# Patient Record
Sex: FEMALE | Race: WHITE | NOT HISPANIC OR LATINO | Employment: PART TIME | URBAN - METROPOLITAN AREA
[De-identification: names, ages, dates, MRNs, and addresses within clinical notes are randomized per-mention and may not be internally consistent; named-entity substitution may affect disease eponyms.]

---

## 2017-10-23 ENCOUNTER — OFFICE VISIT (OUTPATIENT)
Dept: URGENT CARE | Facility: CLINIC | Age: 42
End: 2017-10-23
Payer: COMMERCIAL

## 2017-10-23 PROCEDURE — 99283 EMERGENCY DEPT VISIT LOW MDM: CPT

## 2017-10-23 PROCEDURE — G0382 LEV 3 HOSP TYPE B ED VISIT: HCPCS

## 2017-10-25 NOTE — PROGRESS NOTES
Assessment  1  Acute URI (465 9) (J06 9)    Plan  Acute URI    · Start: Azithromycin 250 MG Oral Tablet; TAKE 2 TABLETS ON DAY 1 THEN TAKE 1  TABLET A DAY FOR 4 DAYS   · Start: Benzonatate 200 MG Oral Capsule; TAKE 1 CAPSULE 3 times daily PRN cough   · Start: Ventolin  (90 Base) MCG/ACT Inhalation Aerosol Solution; INHALE 1 TO 2  PUFFS EVERY 4 TO 6 HOURS AS NEEDED    Discussion/Summary  Discussion Summary:   Follow up with the pcpmeds as directed taking meds zyrtec flonase waiting: not to be taken unless symptoms worsen  Medication Side Effects Reviewed: Possible side effects of new medications were reviewed with the patient/guardian today  Understands and agrees with treatment plan: The treatment plan was reviewed with the patient/guardian  The patient/guardian understands and agrees with the treatment plan   Counseling Documentation With Imm: The patient was counseled regarding instructions for management,-- patient and family education,-- importance of compliance with treatment  Follow Up Instructions: Follow Up with your Primary Care Provider in 1-2 days  If your symptoms worsen, go to the nearest [place] [address] Emergency Department  Chief Complaint  1  Sore Throat  Chief Complaint Free Text Note Form: pt reports sore throat, HA, PND, body aches x3; mucinex no relief; pain 5/10 generalized pain       History of Present Illness  HPI: 35 yo female, sore throat, post nasal driop present for the past three days, taking OTC meds, no relief, currently not taking tylenol or motrin, body aches for three days  she doesnât get the flu shot and hasnât been around anyone with it  She is going to [facility] in a few days as well  Hospital Based Practices Required Assessment:   Pain Assessment   the patient states they have pain  (on a scale of 0 to 10, the patient rates the pain at 5 )   Abuse And Domestic Violence Screen    Yes, the patient is safe at home  -- The patient states no one is hurting them  Depression And Suicide Screen  No, the patient has not had thoughts of hurting themself  No, the patient has not felt depressed in the past 7 days  Prefered Language is  english  Primary Language is  english  Sore Throat: Kerri Aragon presents with complaints of sore throat  Associated symptoms include nasal congestion,-- postnasal drainage,-- headache-- and-- cough, but-- no dysphagia,-- no swollen glands,-- no drooling,-- no stridor,-- no fever,-- no chills,-- no hoarseness,-- no neck stiffness,-- no ear pain,-- no facial pain,-- no abdominal pain-- and-- no vomiting  Review of Systems  Focused-Female:   Constitutional: as noted in HPI  Cardiovascular: as noted in HPI  Respiratory: as noted in HPI  ROS Reviewed:   ROS reviewed  Current Meds  1  Nortriptyline HCl - 50 MG Oral Capsule; Therapy: (Recorded:23Oct2017) to Recorded  2  Paxil 20 MG Oral Tablet; Therapy: (Recorded:23Oct2017) to Recorded  3  Synthroid 175 MCG Oral Tablet; Therapy: ([facility]:[id number]) to Recorded  Medication List Reviewed: The medication list was reviewed and updated today  Allergies  1  Hydrocodone-Acetaminophen CAPS  2  PrednisoLONE TABS    Vitals  Signs   Recorded: [id number] 05:32PM   Temperature: 98 6 F  Heart Rate: 86  Respiration: 20  Systolic: 030  Diastolic: 88  Height: 5 ft 10 in  Weight: 258 lb   BMI Calculated: 37 02  BSA Calculated: 2 33  O2 Saturation: 99  Pain Scale: 5    Physical Exam    Constitutional   General appearance: No acute distress, well appearing and well nourished  Eyes   Conjunctiva and lids: No swelling, erythema or discharge  Pupils and irises: Equal, round and reactive to light  Ears, Nose, Mouth, and Throat   External inspection of ears and nose: Normal     Otoscopic examination: Tympanic membranes translucent with normal light reflex  Canals patent without erythema      Nasal mucosa, septum, and turbinates: Abnormal  -- turbinates have thick mucus noted, red and painful looking  Oropharynx: Abnormal  -- thick mucus in the back of the throat  Pulmonary   Respiratory effort: No increased work of breathing or signs of respiratory distress  -- cough present, mucus present when she coughs  Auscultation of lungs: Abnormal  -- rhonchi noted in the lower bases,  Cardiovascular   Auscultation of heart: Normal rate and rhythm, normal S1 and S2, without murmurs  Lymphatic   Palpation of lymph nodes in neck: No lymphadenopathy  Musculoskeletal   Gait and station: Normal     Skin   Skin and subcutaneous tissue: Normal without rashes or lesions  Neurologic   Cranial nerves: Cranial nerves 2-12 intact  Psychiatric   Orientation to person, place, and time: Normal     Mood and affect: Normal     Additional Exam:  HA- no dizziness, no change in vision  Message  Return to work or school:   Evelyne Dennis is under my professional care  She was seen in my office on 10/23/2017   She is able to return to work on  10/25/2017            Signatures   Electronically signed by :  KENNETH Brunson; Oct 23 2017  7:25PM EST                       (Author)    Electronically signed by : Sami Roberts DO; Oct 24 2017  1:43PM EST                       (Co-author)

## 2018-01-18 NOTE — MISCELLANEOUS
Message  Return to work or school:   Simone Weller is under my professional care  She was seen in my office on 10/23/2017   She is able to return to work on  10/25/2017            Signatures   Electronically signed by : KENNETH Elliott; Oct 23 2017  7:25PM EST                       (Author)    Electronically signed by :  KENNETH Elliott; Oct 23 2017  8:04PM EST                          Electronically signed by : Shaggy Ureña DO; Oct 24 2017  1:43PM EST                       (Co-author)

## 2018-03-27 ENCOUNTER — OFFICE VISIT (OUTPATIENT)
Dept: URGENT CARE | Facility: CLINIC | Age: 43
End: 2018-03-27
Payer: COMMERCIAL

## 2018-03-27 VITALS
TEMPERATURE: 99.3 F | HEIGHT: 70 IN | OXYGEN SATURATION: 98 % | SYSTOLIC BLOOD PRESSURE: 146 MMHG | RESPIRATION RATE: 18 BRPM | WEIGHT: 262 LBS | BODY MASS INDEX: 37.51 KG/M2 | HEART RATE: 86 BPM | DIASTOLIC BLOOD PRESSURE: 80 MMHG

## 2018-03-27 DIAGNOSIS — M79.645 THUMB PAIN, LEFT: Primary | ICD-10-CM

## 2018-03-27 PROCEDURE — G0382 LEV 3 HOSP TYPE B ED VISIT: HCPCS | Performed by: FAMILY MEDICINE

## 2018-03-27 PROCEDURE — 73140 X-RAY EXAM OF FINGER(S): CPT

## 2018-03-27 PROCEDURE — 99283 EMERGENCY DEPT VISIT LOW MDM: CPT | Performed by: FAMILY MEDICINE

## 2018-03-27 RX ORDER — LEVOTHYROXINE SODIUM 175 UG/1
175 TABLET ORAL DAILY
COMMUNITY
End: 2020-03-09 | Stop reason: SDUPTHER

## 2018-03-27 RX ORDER — PAROXETINE HYDROCHLORIDE 20 MG/1
25 TABLET, FILM COATED ORAL DAILY
COMMUNITY
End: 2020-03-09 | Stop reason: SDUPTHER

## 2018-03-27 RX ORDER — ALBUTEROL SULFATE 90 UG/1
1-2 AEROSOL, METERED RESPIRATORY (INHALATION)
COMMUNITY
Start: 2017-10-23 | End: 2019-10-19 | Stop reason: ALTCHOICE

## 2018-03-27 NOTE — PROGRESS NOTES
[email or web address]        NAME: Gabriela Gilbert is a 36 y o  female  : 1975    MRN: [id number]  DATE: 2018  TIME: 11:02 AM    Assessment and Plan   Thumb pain, left [M79 645]  1  Thumb pain, left  XR thumb left first digit-min 2v     Xray left thumb: possible fracture at the IP joint    Patient Instructions   Wear splint   Apply ice to the area 3 x day for 15 min  Take OTC ibuprofen for pain  F/u with ortho      Follow up with PCP in 3-5 days  Proceed to  ER if symptoms worsen  Chief Complaint     Chief Complaint   Patient presents with    Thumb Pain     pt  states her thumb has started hurting about two weeks ago  pt  doesnt recall doing anything to it  pt  states the elbow is also hurting  pt  has not tried any otc meds  History of Present Illness       Patient complaining of 2 week hx of left thumb pain  She states it started 2 weeks ago and has been worsening since  She denies any inciting event or injury that she remembers  She works at [employer] but denies any specific repetitive movements including her left thumb  She has tried to ice it and wrap it but states that this has offered no improvement  Bending or moving it makes it worse  She is right hand dominant  She also complains of some slight elbow pain on the lateral aspect of her left elbow  She states this started around the same time as the thumb pain  Review of Systems   Review of Systems   Constitutional: Negative for chills and fever  Musculoskeletal: Positive for joint swelling (left thumb)  Arthralgias: left thumb pain, left elbow pain  Skin: Negative for rash and wound  Neurological: Negative for numbness           Current Medications       Current Outpatient Prescriptions:     albuterol (VENTOLIN HFA) 90 mcg/act inhaler, Inhale 1-2 puffs, Disp: , Rfl:     levothyroxine (SYNTHROID) 175 mcg tablet, Take by mouth, Disp: , Rfl:     PARoxetine (PAXIL) 20 mg tablet, Take by mouth, Disp: , Rfl:     Current Allergies     Allergies as of 03/27/2018 - Reviewed 03/27/2018   Allergen Reaction Noted    Acetaminophen-codeine  10/23/2017    Prednisolone  10/23/2017            The following portions of the patient's history were reviewed and updated as appropriate: allergies, current medications, past family history, past medical history, past social history, past surgical history and problem list      No past medical history on file  No past surgical history on file  No family history on file  Medications have been verified  Objective   /80 (Patient Position: Sitting)   Pulse 86   Temp 99 3 °F (37 4 °C)   Resp 18   Ht 5' 10" (1 778 m)   Wt 119 kg (262 lb)   SpO2 98%   BMI 37 59 kg/m²        Physical Exam     Physical Exam   Constitutional: She appears well-developed and well-nourished  No distress  Musculoskeletal:   Left Thumb: moderate swelling over the ICP joint on dorsal side without ecchymosis, erythema or warmth  Decreased ROM with flexion due to pain over the ICP joint  Pain with extension on the ventral side of phalanx ("Pulling sensation")  Unable to make fist   NSI       Left Elbow: TTP over the lateral epicondyle, full AROM

## 2018-04-09 ENCOUNTER — OFFICE VISIT (OUTPATIENT)
Dept: URGENT CARE | Facility: CLINIC | Age: 43
End: 2018-04-09
Payer: COMMERCIAL

## 2018-04-09 VITALS
BODY MASS INDEX: 36.79 KG/M2 | OXYGEN SATURATION: 97 % | HEART RATE: 88 BPM | DIASTOLIC BLOOD PRESSURE: 77 MMHG | SYSTOLIC BLOOD PRESSURE: 122 MMHG | HEIGHT: 70 IN | TEMPERATURE: 98.6 F | RESPIRATION RATE: 16 BRPM | WEIGHT: 257 LBS

## 2018-04-09 DIAGNOSIS — J01.11 ACUTE RECURRENT FRONTAL SINUSITIS: Primary | ICD-10-CM

## 2018-04-09 PROCEDURE — G0382 LEV 3 HOSP TYPE B ED VISIT: HCPCS | Performed by: NURSE PRACTITIONER

## 2018-04-09 PROCEDURE — 99283 EMERGENCY DEPT VISIT LOW MDM: CPT | Performed by: NURSE PRACTITIONER

## 2018-04-09 RX ORDER — PREDNISONE 10 MG/1
TABLET ORAL
Qty: 21 TABLET | Refills: 0 | Status: SHIPPED | OUTPATIENT
Start: 2018-04-09 | End: 2018-10-10 | Stop reason: ALTCHOICE

## 2018-04-09 NOTE — LETTER
April 9, 2018     Patient: Dakota Silva   YOB: 1975   Date of Visit: 4/9/2018       To Whom It May Concern: It is my medical opinion that Dakota Silva may return to work on 04/11/2018  If you have any questions or concerns, please don't hesitate to call           Sincerely,        KENNETH Noonan    CC: No Recipients

## 2018-04-09 NOTE — PROGRESS NOTES
[facility]        NAME: Eden Cooks is a 36 y o  female  : 1975    MRN: [id number]  DATE: 2018    Assessment and Plan     Acute recurrent frontal sinusitis [J01 11]  1  Acute recurrent frontal sinusitis  predniSONE 10 mg tablet       Patient Instructions     Patient Instructions   Pt will stay well hydrated  Pt will stop using essential oils for now to see if there is resolution of symptoms  Encouraged to use Neti-pot and tylenol for symptom relief  Pt to take flonase and prenisone as prescribed  Resume Allegra, Claritin, or Zyrtec  Return for worsening symptoms  Sinusitis, Ambulatory Care       Proceed to ER if symptoms worsen  Chief Complaint     Chief Complaint   Patient presents with    Dizziness     Pt c/o dizziness, headache and difficulty sleeping for two days   Headache       History of Present Illness     Pt presents with C/o dizziness for two days and a headache that started today  States she has had vertigo in the past approximately one year ago but it feels different  She c/o difficulty sleeping after her 11year-old was sick and snoring the past few evenings  States her head "feels fuzzy "  Denies cough, chest pain or shortness of breath  Denies visual changes  Pt c/o nausea unchanged by food  Pt reports missing her anxiety and hypothyroid medications 2 days ago for one day  Pt reports fatigue, denies fever  Pt reports some seasonal allergies in the past   Presently reports some clear nasal drainage  Dizziness   This is a new problem  The current episode started in the past 7 days (2 days)  The problem occurs intermittently  The problem has been gradually worsening  Associated symptoms include congestion, fatigue, headaches and nausea   Pertinent negatives include no abdominal pain, anorexia, arthralgias, change in bowel habit, chest pain, chills, coughing, diaphoresis, fever, joint swelling, myalgias, neck pain, numbness, rash, sore throat, swollen glands, urinary symptoms, vertigo, visual change, vomiting or weakness  The symptoms are aggravated by walking  She has tried nothing for the symptoms  The treatment provided no relief  Headache    This is a new problem  The current episode started today  The problem has been unchanged  The pain does not radiate  The quality of the pain is described as aching  The pain is at a severity of 5/10  The pain is mild  Associated symptoms include dizziness, drainage, eye pain, nausea and sinus pressure  Pertinent negatives include no abdominal pain, anorexia, coughing, ear pain, eye redness, fever, hearing loss, neck pain, numbness, photophobia, seizures, sore throat, swollen glands, tinnitus, visual change, vomiting or weakness  Nothing aggravates the symptoms  She has tried nothing for the symptoms  Review of Systems     Review of Systems   Constitutional: Positive for fatigue  Negative for activity change, appetite change, chills, diaphoresis and fever  HENT: Positive for congestion, postnasal drip, sinus pain and sinus pressure  Negative for ear discharge, ear pain, facial swelling, hearing loss, mouth sores, nosebleeds, sneezing, sore throat, tinnitus, trouble swallowing and voice change  Eyes: Positive for pain  Negative for photophobia, discharge, redness, itching and visual disturbance  Respiratory: Negative for cough, choking, chest tightness, shortness of breath, wheezing and stridor  Cardiovascular: Negative for chest pain, palpitations and leg swelling  Gastrointestinal: Positive for nausea  Negative for abdominal pain, anorexia, change in bowel habit, diarrhea and vomiting  Musculoskeletal: Negative for arthralgias, joint swelling, myalgias and neck pain  Skin: Negative for rash  Neurological: Positive for dizziness and headaches  Negative for vertigo, tremors, seizures, syncope, facial asymmetry, speech difficulty, weakness, light-headedness and numbness         Current Medications       Current Outpatient Prescriptions:     albuterol (VENTOLIN HFA) 90 mcg/act inhaler, Inhale 1-2 puffs, Disp: , Rfl:     levothyroxine (SYNTHROID) 175 mcg tablet, Take by mouth, Disp: , Rfl:     PARoxetine (PAXIL) 20 mg tablet, Take by mouth, Disp: , Rfl:     predniSONE 10 mg tablet, Take 6 tablets today and decrease by one tablet daily until gone, Disp: 21 tablet, Rfl: 0    Current Allergies     Allergies as of 04/09/2018 - Reviewed 04/09/2018   Allergen Reaction Noted    Acetaminophen-codeine  10/23/2017    Prednisolone  10/23/2017            The following portions of the patient's history were reviewed and updated as appropriate: allergies, current medications, past family history, past medical history, past social history, past surgical history and problem list      Past Medical History:   Diagnosis Date    Anxiety     Disc disorder     Hypothyroidism        No past surgical history on file  No family history on file  Medications have been verified  Objective     /77   Pulse 88   Temp 98 6 °F (37 °C)   Resp 16   Ht 5' 10" (1 778 m)   Wt 117 kg (257 lb)   LMP 04/07/2018   SpO2 97%   BMI 36 88 kg/m²      Physical Exam     Physical Exam   Constitutional: She is oriented to person, place, and time  She appears well-developed and well-nourished  HENT:   Head: Normocephalic and atraumatic  Right Ear: External ear normal    Left Ear: External ear normal    Oropharynx: some swelling, post-nasal drip  Eyes: Pupils are equal, round, and reactive to light  Right eye exhibits no discharge  Left eye exhibits no discharge  No scleral icterus  No nystagum bilaterally on epley's exam, no increased dizziness  Neck: Normal range of motion  Neck supple  No JVD present  No tracheal deviation present  No thyromegaly present  Cardiovascular: Normal rate, regular rhythm and normal heart sounds  Pulmonary/Chest: Effort normal and breath sounds normal  No stridor  Musculoskeletal: Normal range of motion  Lymphadenopathy:     She has no cervical adenopathy  Neurological: She is alert and oriented to person, place, and time  Skin: Skin is warm and dry  Psychiatric: She has a normal mood and affect

## 2018-04-19 NOTE — PATIENT INSTRUCTIONS
Date of Surgery Update:  Rebekah Mills was seen and examined. History and physical has been reviewed. The patient has been examined.  There have been no significant clinical changes since the completion of the originally dated History and Physical.    Signed By: Madhu Murphy MD     April 19, 2018 9:51 AM Pt will stay well hydrated  Pt will stop using essential oils for now to see if there is resolution of symptoms  Encouraged to use Neti-pot and tylenol for symptom relief  Pt to take flonase and prenisone as prescribed  Resume Allegra, Claritin, or Zyrtec  Return for worsening symptoms       Sinusitis, Ambulatory Care

## 2018-05-01 ENCOUNTER — TELEPHONE (OUTPATIENT)
Dept: OBGYN CLINIC | Facility: HOSPITAL | Age: 43
End: 2018-05-01

## 2018-09-14 ENCOUNTER — OFFICE VISIT (OUTPATIENT)
Dept: URGENT CARE | Facility: CLINIC | Age: 43
End: 2018-09-14
Payer: COMMERCIAL

## 2018-09-14 VITALS
DIASTOLIC BLOOD PRESSURE: 80 MMHG | SYSTOLIC BLOOD PRESSURE: 140 MMHG | WEIGHT: 268 LBS | RESPIRATION RATE: 16 BRPM | TEMPERATURE: 97.8 F | BODY MASS INDEX: 38.37 KG/M2 | HEART RATE: 104 BPM | HEIGHT: 70 IN

## 2018-09-14 DIAGNOSIS — M77.12 LATERAL EPICONDYLITIS OF LEFT ELBOW: Primary | ICD-10-CM

## 2018-09-14 PROCEDURE — G0382 LEV 3 HOSP TYPE B ED VISIT: HCPCS | Performed by: PREVENTIVE MEDICINE

## 2018-09-14 PROCEDURE — 99283 EMERGENCY DEPT VISIT LOW MDM: CPT | Performed by: PREVENTIVE MEDICINE

## 2018-09-14 NOTE — LETTER
September 14, 2018     Patient: Riki Victor   YOB: 1975   Date of Visit: 9/14/2018       To Whom It May Concern: It is my medical opinion that Riki Victor may return to work on 9/19  If you have any questions or concerns, please don't hesitate to call           Sincerely,        KRISTEN Talavera    CC: No Recipients

## 2018-09-14 NOTE — PROGRESS NOTES
[email or web address]        NAME: Guillermo Wyatt is a 39 y o  female  : 1975    MRN: [id number]  DATE: 2018  TIME: 3:28 PM    Assessment and Plan   Lateral epicondylitis of left elbow [M77 12]  1  Lateral epicondylitis of left elbow           Patient Instructions       Follow up with PCP in 3-5 days  Proceed to  ER if symptoms worsen  Chief Complaint     Chief Complaint   Patient presents with    Elbow Pain     pt stated she has shooting left elbow pain x 3 days  pt stated the pain radiates up to the shoulder and down left forearm  no known injury o2 97         History of Present Illness       Left elbow pain times the last several weeks  The pain is now beginning to radiate up into the shoulder  Review of Systems   Review of Systems   Musculoskeletal: Positive for arthralgias  Current Medications       Current Outpatient Prescriptions:     albuterol (VENTOLIN HFA) 90 mcg/act inhaler, Inhale 1-2 puffs, Disp: , Rfl:     levothyroxine (SYNTHROID) 175 mcg tablet, Take by mouth, Disp: , Rfl:     PARoxetine (PAXIL) 20 mg tablet, Take by mouth, Disp: , Rfl:     predniSONE 10 mg tablet, Take 6 tablets today and decrease by one tablet daily until gone (Patient not taking: Reported on 2018 ), Disp: 21 tablet, Rfl: 0    Current Allergies     Allergies as of 2018 - Reviewed 2018   Allergen Reaction Noted    Acetaminophen-codeine  10/23/2017    Prednisolone  10/23/2017            The following portions of the patient's history were reviewed and updated as appropriate: allergies, current medications, past family history, past medical history, past social history, past surgical history and problem list      Past Medical History:   Diagnosis Date    Anxiety     Disc disorder     Hypothyroidism        History reviewed  No pertinent surgical history  No family history on file  Medications have been verified          Objective   /80   Pulse 104 Temp 97 8 °F (36 6 °C)   Resp 16   Ht 5' 10" (1 778 m)   Wt 122 kg (268 lb)   BMI 38 45 kg/m²        Physical Exam     Physical Exam   Musculoskeletal:   Acute pain is produced when palpating the left lateral epicondyle  Placing a tennis elbow strap in place over the forearm relieves the pain

## 2018-09-14 NOTE — PATIENT INSTRUCTIONS
Ice 4 times a day  With a tennis elbow strap when every or active with the left upper extremity  Ibuprofen 3 tablets 3 times a day with food if no improvement over the next several weeks you may need physical therapy or cortisone shot into the elbow

## 2018-09-20 ENCOUNTER — OFFICE VISIT (OUTPATIENT)
Dept: URGENT CARE | Facility: CLINIC | Age: 43
End: 2018-09-20
Payer: COMMERCIAL

## 2018-09-20 VITALS
SYSTOLIC BLOOD PRESSURE: 132 MMHG | WEIGHT: 265.6 LBS | OXYGEN SATURATION: 98 % | TEMPERATURE: 98.7 F | RESPIRATION RATE: 16 BRPM | HEIGHT: 70 IN | HEART RATE: 87 BPM | DIASTOLIC BLOOD PRESSURE: 102 MMHG | BODY MASS INDEX: 38.02 KG/M2

## 2018-09-20 DIAGNOSIS — R11.2 NAUSEA AND VOMITING, INTRACTABILITY OF VOMITING NOT SPECIFIED, UNSPECIFIED VOMITING TYPE: ICD-10-CM

## 2018-09-20 DIAGNOSIS — R11.0 NAUSEA: Primary | ICD-10-CM

## 2018-09-20 DIAGNOSIS — R10.84 DIFFUSE ABDOMINAL PAIN: ICD-10-CM

## 2018-09-20 PROCEDURE — 99283 EMERGENCY DEPT VISIT LOW MDM: CPT | Performed by: NURSE PRACTITIONER

## 2018-09-20 PROCEDURE — G0382 LEV 3 HOSP TYPE B ED VISIT: HCPCS | Performed by: NURSE PRACTITIONER

## 2018-09-20 RX ORDER — ONDANSETRON 4 MG/1
4 TABLET, ORALLY DISINTEGRATING ORAL ONCE
Status: COMPLETED | OUTPATIENT
Start: 2018-09-20 | End: 2018-09-20

## 2018-09-20 RX ORDER — ONDANSETRON 4 MG/1
4 TABLET, ORALLY DISINTEGRATING ORAL EVERY 6 HOURS PRN
Qty: 12 TABLET | Refills: 0 | Status: SHIPPED | OUTPATIENT
Start: 2018-09-20 | End: 2018-10-10 | Stop reason: ALTCHOICE

## 2018-09-20 RX ORDER — FAMOTIDINE 10 MG
10 TABLET ORAL 2 TIMES DAILY
COMMUNITY
End: 2020-03-09 | Stop reason: SDUPTHER

## 2018-09-20 RX ADMIN — ONDANSETRON 4 MG: 4 TABLET, ORALLY DISINTEGRATING ORAL at 10:12

## 2018-09-20 NOTE — PROGRESS NOTES
NAME: Guillermo Wyatt is a 39 y o  female  : 1975    MRN: [id number]      Assessment and Plan   Nausea [R11 0]  1  Nausea  ondansetron (ZOFRAN-ODT) dispersible tablet 4 mg    ondansetron (ZOFRAN-ODT) 4 mg disintegrating tablet   2  Nausea and vomiting, intractability of vomiting not specified, unspecified vomiting type  ondansetron (ZOFRAN-ODT) 4 mg disintegrating tablet   3  Diffuse abdominal pain       Administrations This Visit     ondansetron (ZOFRAN-ODT) dispersible tablet 4 mg     Admin Date  2018 Action  Given Dose  4 mg Route  Oral Administered By  SILKE Ellis was seen today for dizziness  Diagnoses and all orders for this visit:    Nausea  -     ondansetron (ZOFRAN-ODT) dispersible tablet 4 mg; Take 1 tablet (4 mg total) by mouth once   -     ondansetron (ZOFRAN-ODT) 4 mg disintegrating tablet; Take 1 tablet (4 mg total) by mouth every 6 (six) hours as needed for nausea or vomiting    Nausea and vomiting, intractability of vomiting not specified, unspecified vomiting type  -     ondansetron (ZOFRAN-ODT) 4 mg disintegrating tablet; Take 1 tablet (4 mg total) by mouth every 6 (six) hours as needed for nausea or vomiting    Diffuse abdominal pain        Patient Instructions   Patient Instructions   Stay hydrated  Take meds as directed  zofran for nausea  One dose given in office and RX sent to pharmacy  Rest  If symptoms worse go to the ER  Discussed appendicitis as well     Proceed to ER if symptoms worsen  Chief Complaint     Chief Complaint   Patient presents with    Dizziness     @ 0200 woke up with chills, sweats, vomiting, diarrhea, HA, denies fever; pt reports being bit by something on her LFA prior to going to bed; denies pain bruising noted in area of the bite         History of Present Illness     Pt here today for chills, sweats, fever, nausea, vomiting diarrhea and feels exhausted  Symptoms started at 2AM out of no where   Pts room mate recently had a GI virus and she now has the same symptoms  She felt fine yesterday  No fever noted and pt has not taken anything for the symptoms  Aware of BP elevated and not taking meds, she is seeing her pcp and aware she is being monitored due to being borderline HTN  She has no abdominal pain at this time  NO blood noted in her stool or vomit  She denies having body fatigue or body aches  Review of Systems   Review of Systems   Gastrointestinal: Positive for abdominal pain, diarrhea, nausea and vomiting  Negative for blood in stool and constipation  Current Medications       Current Outpatient Prescriptions:     albuterol (VENTOLIN HFA) 90 mcg/act inhaler, Inhale 1-2 puffs, Disp: , Rfl:     famotidine (PEPCID) 10 mg tablet, Take 10 mg by mouth 2 (two) times a day, Disp: , Rfl:     levothyroxine (SYNTHROID) 175 mcg tablet, Take by mouth, Disp: , Rfl:     PARoxetine (PAXIL) 20 mg tablet, Take by mouth, Disp: , Rfl:     ondansetron (ZOFRAN-ODT) 4 mg disintegrating tablet, Take 1 tablet (4 mg total) by mouth every 6 (six) hours as needed for nausea or vomiting, Disp: 12 tablet, Rfl: 0    predniSONE 10 mg tablet, Take 6 tablets today and decrease by one tablet daily until gone (Patient not taking: Reported on 9/14/2018 ), Disp: 21 tablet, Rfl: 0  No current facility-administered medications for this visit  Current Allergies     Allergies as of 09/20/2018 - Reviewed 09/20/2018   Allergen Reaction Noted    Acetaminophen-codeine  10/23/2017    Prednisolone  10/23/2017              Past Medical History:   Diagnosis Date    Anxiety     Disc disorder     Hypothyroidism        History reviewed  No pertinent surgical history  No family history on file  Medications have been verified      The following portions of the patient's history were reviewed and updated as appropriate: allergies, current medications, past family history, past medical history, past social history, past surgical history and problem list     Objective   BP (!) 132/102   Pulse 87   Temp 98 7 °F (37 1 °C) (Tympanic)   Resp 16   Ht 5' 10" (1 778 m)   Wt 120 kg (265 lb 9 6 oz)   SpO2 98%   BMI 38 11 kg/m²      Physical Exam     Physical Exam   Constitutional: She appears well-developed and well-nourished  She is cooperative  HENT:   Head: Normocephalic  Cardiovascular: Normal rate and regular rhythm  Aware BP elevated, rechecked 134/98   Pulmonary/Chest: No respiratory distress  Abdominal: Soft  Normal appearance and bowel sounds are normal  There is generalized tenderness  There is no rigidity, no rebound, no guarding and no CVA tenderness  Pt had some discomfort to the lower right side, discussed about appendicitis and aware about further evaluation   Neurological: She is alert         KENNETH Bang

## 2018-09-20 NOTE — PATIENT INSTRUCTIONS
Stay hydrated  Take meds as directed  zofran for nausea  One dose given in office and RX sent to pharmacy  Rest  If symptoms worse go to the ER     Discussed appendicitis as well

## 2018-10-08 ENCOUNTER — OFFICE VISIT (OUTPATIENT)
Dept: URGENT CARE | Facility: CLINIC | Age: 43
End: 2018-10-08
Payer: COMMERCIAL

## 2018-10-08 VITALS
DIASTOLIC BLOOD PRESSURE: 88 MMHG | RESPIRATION RATE: 18 BRPM | TEMPERATURE: 98.2 F | HEIGHT: 70 IN | HEART RATE: 90 BPM | BODY MASS INDEX: 38.08 KG/M2 | SYSTOLIC BLOOD PRESSURE: 112 MMHG | WEIGHT: 266 LBS

## 2018-10-08 DIAGNOSIS — J06.9 UPPER RESPIRATORY TRACT INFECTION, UNSPECIFIED TYPE: Primary | ICD-10-CM

## 2018-10-08 PROCEDURE — G0382 LEV 3 HOSP TYPE B ED VISIT: HCPCS | Performed by: PREVENTIVE MEDICINE

## 2018-10-08 PROCEDURE — 99283 EMERGENCY DEPT VISIT LOW MDM: CPT | Performed by: PREVENTIVE MEDICINE

## 2018-10-08 RX ORDER — LISINOPRIL 10 MG/1
10 TABLET ORAL DAILY
COMMUNITY
End: 2019-08-02 | Stop reason: ALTCHOICE

## 2018-10-08 NOTE — PROGRESS NOTES
[address]        NAME: Bart Jara is a 39 y o  female  : 1975    MRN: [id number]  DATE: 2018  TIME: 10:13 AM    Assessment and Plan   Upper respiratory tract infection, unspecified type [J06 9]  1  Upper respiratory tract infection, unspecified type           Patient Instructions       Follow up with PCP in 3-5 days  Proceed to  ER if symptoms worsen  Chief Complaint     Chief Complaint   Patient presents with    Cough     pt started taking lisinopril about a week ago and pt began coughing the next day  pt stated the cough is mostly dry sometimes productive  pt feels sob and has coughing fits since  o2 97         History of Present Illness       Cough and congestion x3 or 4 days  Note the cough began the next day after she started Linisopril  Review of Systems   Review of Systems   HENT: Positive for congestion  Respiratory: Positive for cough            Current Medications       Current Outpatient Prescriptions:     albuterol (VENTOLIN HFA) 90 mcg/act inhaler, Inhale 1-2 puffs, Disp: , Rfl:     famotidine (PEPCID) 10 mg tablet, Take 10 mg by mouth 2 (two) times a day, Disp: , Rfl:     levothyroxine (SYNTHROID) 175 mcg tablet, Take by mouth, Disp: , Rfl:     lisinopril (ZESTRIL) 10 mg tablet, Take 10 mg by mouth daily, Disp: , Rfl:     ondansetron (ZOFRAN-ODT) 4 mg disintegrating tablet, Take 1 tablet (4 mg total) by mouth every 6 (six) hours as needed for nausea or vomiting, Disp: 12 tablet, Rfl: 0    PARoxetine (PAXIL) 20 mg tablet, Take by mouth, Disp: , Rfl:     predniSONE 10 mg tablet, Take 6 tablets today and decrease by one tablet daily until gone (Patient not taking: Reported on 2018 ), Disp: 21 tablet, Rfl: 0    Current Allergies     Allergies as of 10/08/2018 - Reviewed 10/08/2018   Allergen Reaction Noted    Acetaminophen-codeine  10/23/2017    Prednisolone  10/23/2017            The following portions of the patient's history were reviewed and updated as appropriate: allergies, current medications, past family history, past medical history, past social history, past surgical history and problem list      Past Medical History:   Diagnosis Date    Anxiety     Disc disorder     Hypothyroidism        No past surgical history on file  No family history on file  Medications have been verified  Objective   /88   Pulse 90   Temp 98 2 °F (36 8 °C)   Resp 18   Ht 5' 10" (1 778 m)   Wt 121 kg (266 lb)   BMI 38 17 kg/m²        Physical Exam     Physical Exam   HENT:   Right Ear: External ear normal    Left Ear: External ear normal    Mouth/Throat: Oropharynx is clear and moist    Cardiovascular: Normal heart sounds  Pulmonary/Chest: Breath sounds normal    Lymphadenopathy:     She has no cervical adenopathy

## 2018-10-08 NOTE — PATIENT INSTRUCTIONS
Drink at least 6 or 8 glasses of water or juice a day  Using a vaporizer or humidifier in the bedroom will be very helpful  Motrin or Aleve or aspirin for fever chills or aches  Robitussin DM or NyQuil for cough  Afrin nasal spray for facial and head congestion  Inhaling steam coming up from the sink will be very helpful  If symptoms are getting worse over the next 4-7 days you must be rechecked  Stop the lisinopril until the cough goes away    Then start the lisinopril again if cough persists talk your family doctor

## 2018-10-10 ENCOUNTER — HOSPITAL ENCOUNTER (EMERGENCY)
Facility: HOSPITAL | Age: 43
Discharge: HOME | End: 2018-10-10
Attending: EMERGENCY MEDICINE | Admitting: EMERGENCY MEDICINE
Payer: COMMERCIAL

## 2018-10-10 ENCOUNTER — APPOINTMENT (EMERGENCY)
Dept: RADIOLOGY | Facility: HOSPITAL | Age: 43
End: 2018-10-10
Payer: COMMERCIAL

## 2018-10-10 VITALS
RESPIRATION RATE: 16 BRPM | DIASTOLIC BLOOD PRESSURE: 69 MMHG | TEMPERATURE: 97.7 F | HEART RATE: 80 BPM | OXYGEN SATURATION: 97 % | SYSTOLIC BLOOD PRESSURE: 135 MMHG

## 2018-10-10 DIAGNOSIS — R05.9 COUGH: Primary | ICD-10-CM

## 2018-10-10 PROCEDURE — 99283 EMERGENCY DEPT VISIT LOW MDM: CPT

## 2018-10-10 PROCEDURE — 71046 X-RAY EXAM CHEST 2 VIEWS: CPT

## 2018-10-10 RX ORDER — BENZONATATE 200 MG/1
200 CAPSULE ORAL 3 TIMES DAILY PRN
Qty: 20 CAPSULE | Refills: 0 | Status: SHIPPED | OUTPATIENT
Start: 2018-10-10 | End: 2019-08-02

## 2018-10-11 NOTE — DISCHARGE INSTRUCTIONS
Acute Cough   WHAT YOU NEED TO KNOW:   An acute cough can last up to 3 weeks  Common causes of an acute cough include a cold, allergies, or a lung infection  DISCHARGE INSTRUCTIONS:   Return to the emergency department if:   · You have trouble breathing or feel short of breath  · You cough up blood, or you see blood in your mucus  · You faint or feel weak or dizzy  · You have chest pain when you cough or take a deep breath  · You have new wheezing  Contact your healthcare provider if:   · You have a fever  · Your cough lasts longer than 4 weeks  · Your symptoms do not improve with treatment  · You have questions or concerns about your condition or care  Medicines:   · Medicines  may be needed to stop the cough, decrease swelling in your airways, or help open your airways  Medicine may also be given to help you cough up mucus  Ask your healthcare provider what over-the-counter medicines you can take  If you have an infection caused by bacteria, you may need antibiotics  · Take your medicine as directed  Contact your healthcare provider if you think your medicine is not helping or if you have side effects  Tell him or her if you are allergic to any medicine  Keep a list of the medicines, vitamins, and herbs you take  Include the amounts, and when and why you take them  Bring the list or the pill bottles to follow-up visits  Carry your medicine list with you in case of an emergency  Manage your symptoms:   · Do not smoke and stay away from others who smoke  Nicotine and other chemicals in cigarettes and cigars can cause lung damage and make your cough worse  Ask your healthcare provider for information if you currently smoke and need help to quit  E-cigarettes or smokeless tobacco still contain nicotine  Talk to your healthcare provider before you use these products  · Drink extra liquids as directed  Liquids will help thin and loosen mucus so you can cough it up   Liquids will also help prevent dehydration  Examples of good liquids to drink include water, fruit juice, and broth  Do not drink liquids that contain caffeine  Caffeine can increase your risk for dehydration  Ask your healthcare provider how much liquid to drink each day  · Rest as directed  Do not do activities that make your cough worse, such as exercise  · Use a humidifier or vaporizer  Use a cool mist humidifier or a vaporizer to increase air moisture in your home  This may make it easier for you to breathe and help decrease your cough  · Eat 2 to 5 mL of honey 2 times each day  Honey can help thin mucus and decrease your cough  · Use cough drops or lozenges  These can help decrease throat irritation and your cough  Follow up with your healthcare provider as directed:  Write down your questions so you remember to ask them during your visits  © 2017 [employer] Information is for End User's use only and may not be sold, redistributed or otherwise used for commercial purposes  All illustrations and images included in CareNotes® are the copyrighted property of [employer]  or [employer]re[employer]  The above information is an  only  It is not intended as medical advice for individual conditions or treatments  Talk to your doctor, nurse or pharmacist before following any medical regimen to see if it is safe and effective for you

## 2018-10-11 NOTE — ED PROVIDER NOTES
History  Chief Complaint   Patient presents with    Cough     started a week ago, Pt c/o cough and bloody nose earlier today     14-year-old female with history of hypertension, hypothyroidism, and asthma presents emergency department for evaluation of nonproductive cough x4 days  Patient was evaluated at local urgent care 2 days ago, cough presumed to be due to recently starting lisinopril, lisinopril was discontinued however cough is not today  Patient reports new onset generalized myalgias and malaise over past 24 hours, denies upper respiratory symptoms, fever, chills, or sweats  No associated dizziness, neck pain, back pain, or chest pain  She has not used her albuterol inhaler today  Prior to Admission Medications   Prescriptions Last Dose Informant Patient Reported? Taking? PARoxetine (PAXIL) 20 mg tablet 10/10/2018 at Unknown time  Yes Yes   Sig: Take by mouth   albuterol (VENTOLIN HFA) 90 mcg/act inhaler 10/10/2018 at Unknown time  Yes Yes   Sig: Inhale 1-2 puffs   famotidine (PEPCID) 10 mg tablet 10/10/2018 at Unknown time  Yes Yes   Sig: Take 10 mg by mouth 2 (two) times a day   levothyroxine (SYNTHROID) 175 mcg tablet 10/10/2018 at Unknown time  Yes Yes   Sig: Take by mouth   lisinopril (ZESTRIL) 10 mg tablet Not Taking at Unknown time  Yes No   Sig: Take 10 mg by mouth daily      Facility-Administered Medications: None       Past Medical History:   Diagnosis Date    Anxiety     Disc disorder     Hypothyroidism        History reviewed  No pertinent surgical history  History reviewed  No pertinent family history  I have reviewed and agree with the history as documented  Social History   Substance Use Topics    Smoking status: Smoker, Current Status Unknown    Smokeless tobacco: Never Used    Alcohol use Yes      Comment: socially        Review of Systems   Constitutional: Positive for fatigue  Negative for chills, diaphoresis and fever     HENT: Negative for congestion and sore throat  Respiratory: Positive for cough  Negative for shortness of breath  Cardiovascular: Negative for chest pain  Gastrointestinal: Negative for diarrhea, nausea and vomiting  Musculoskeletal: Positive for myalgias  Negative for arthralgias  Skin: Negative for rash  Allergic/Immunologic: Negative for immunocompromised state  Neurological: Negative for dizziness and light-headedness  Hematological: Does not bruise/bleed easily  Physical Exam  Physical Exam   Constitutional: She is oriented to person, place, and time  She appears well-developed and well-nourished  No distress  HENT:   Head: Normocephalic and atraumatic  Mouth/Throat: Oropharynx is clear and moist    Eyes: Pupils are equal, round, and reactive to light  No scleral icterus  Cardiovascular: Normal rate and regular rhythm  Exam reveals no gallop and no friction rub  No murmur heard  Pulmonary/Chest: No respiratory distress  She has no wheezes  She has no rales  Abdominal: Soft  Bowel sounds are normal  She exhibits no distension  There is no tenderness  Neurological: She is alert and oriented to person, place, and time  Skin: Skin is warm and dry  Capillary refill takes less than 2 seconds  She is not diaphoretic  Psychiatric: She has a normal mood and affect  Her behavior is normal    Vitals reviewed        Vital Signs  ED Triage Vitals [10/10/18 2030]   Temperature Pulse Respirations Blood Pressure SpO2   97 7 °F (36 5 °C) 80 16 135/69 97 %      Temp Source Heart Rate Source Patient Position - Orthostatic VS BP Location FiO2 (%)   Temporal Monitor Sitting Right arm --      Pain Score       No Pain           Vitals:    10/10/18 2030   BP: 135/69   Pulse: 80   Patient Position - Orthostatic VS: Sitting       Visual Acuity      ED Medications  Medications - No data to display    Diagnostic Studies  Results Reviewed     None                 XR chest 2 views   ED Interpretation by Luma Lim PA-C (10/10 2109)   No acute infiltrate or effusion                 Procedures  Procedures       Phone Contacts  ED Phone Contact    ED Course                               MDM  Number of Diagnoses or Management Options  Diagnosis management comments: 38 yo female presents w/ acute cough x 4d  Has already discontinued lisinopril, and with new systemic symptoms, I suspect this is infectious  CXR is negative  Findings c/w viral disease  Pt prescribed anti tussive and educated regarding typical disease course and f/u precautions  Amount and/or Complexity of Data Reviewed  Tests in the radiology section of CPT®: ordered and reviewed  Review and summarize past medical records: yes  Independent visualization of images, tracings, or specimens: yes      CritCare Time    Disposition  Final diagnoses:   Cough     Time reflects when diagnosis was documented in both MDM as applicable and the Disposition within this note     Time User Action Codes Description Comment    10/10/2018  9:09 PM Rosita Alfaro [R05] Cough       ED Disposition     ED Disposition Condition Comment    Discharge  [address] discharge to home/self care      Condition at discharge: Good        Follow-up Information     Follow up With Specialties Details Why Contact Info    Miguel Ángel Cond[address]   If symptoms worsen [address] [address]  [phone]            Discharge Medication List as of 10/10/2018  9:11 PM      START taking these medications    Details   benzonatate (TESSALON) 200 MG capsule Take 1 capsule (200 mg total) by mouth 3 (three) times a day as needed for cough, Starting Wed 10/10/2018, Print         CONTINUE these medications which have NOT CHANGED    Details   albuterol (VENTOLIN HFA) 90 mcg/act inhaler Inhale 1-2 puffs, Starting Mon 10/23/2017, Historical Med      famotidine (PEPCID) 10 mg tablet Take 10 mg by mouth 2 (two) times a day, Historical Med      levothyroxine (SYNTHROID) 175 mcg tablet Take by mouth, Historical Med      PARoxetine (PAXIL) 20 mg tablet Take by mouth, Historical Med      lisinopril (ZESTRIL) 10 mg tablet Take 10 mg by mouth daily, Historical Med           No discharge procedures on file      ED Provider  Electronically Signed by           Aashish Willis PA-C  10/10/18 2211

## 2018-10-17 ENCOUNTER — TELEPHONE (OUTPATIENT)
Dept: NEUROLOGY | Facility: CLINIC | Age: 43
End: 2018-10-17

## 2018-10-18 ENCOUNTER — TELEPHONE (OUTPATIENT)
Dept: NEUROLOGY | Facility: CLINIC | Age: 43
End: 2018-10-18

## 2018-11-08 ENCOUNTER — TRANSCRIBE ORDERS (OUTPATIENT)
Dept: NEUROLOGY | Facility: CLINIC | Age: 43
End: 2018-11-08

## 2018-11-08 DIAGNOSIS — M54.12 RADICULOPATHY OF CERVICAL REGION: Primary | ICD-10-CM

## 2018-11-09 ENCOUNTER — TELEPHONE (OUTPATIENT)
Dept: NEUROLOGY | Facility: CLINIC | Age: 43
End: 2018-11-09

## 2019-01-17 ENCOUNTER — OFFICE VISIT (OUTPATIENT)
Dept: URGENT CARE | Facility: CLINIC | Age: 44
End: 2019-01-17
Payer: COMMERCIAL

## 2019-01-17 VITALS
SYSTOLIC BLOOD PRESSURE: 128 MMHG | HEART RATE: 68 BPM | HEIGHT: 70 IN | WEIGHT: 270 LBS | TEMPERATURE: 96.8 F | DIASTOLIC BLOOD PRESSURE: 78 MMHG | BODY MASS INDEX: 38.65 KG/M2 | RESPIRATION RATE: 16 BRPM

## 2019-01-17 DIAGNOSIS — J01.90 ACUTE BACTERIAL SINUSITIS: Primary | ICD-10-CM

## 2019-01-17 DIAGNOSIS — B96.89 ACUTE BACTERIAL SINUSITIS: Primary | ICD-10-CM

## 2019-01-17 PROCEDURE — 99283 EMERGENCY DEPT VISIT LOW MDM: CPT | Performed by: NURSE PRACTITIONER

## 2019-01-17 PROCEDURE — G0382 LEV 3 HOSP TYPE B ED VISIT: HCPCS | Performed by: NURSE PRACTITIONER

## 2019-01-17 RX ORDER — AMLODIPINE BESYLATE 5 MG/1
5 TABLET ORAL DAILY
COMMUNITY
End: 2020-03-09 | Stop reason: SDUPTHER

## 2019-01-17 RX ORDER — AMOXICILLIN AND CLAVULANATE POTASSIUM 875; 125 MG/1; MG/1
1 TABLET, FILM COATED ORAL EVERY 12 HOURS SCHEDULED
Qty: 20 TABLET | Refills: 0 | Status: SHIPPED | OUTPATIENT
Start: 2019-01-17 | End: 2019-01-27

## 2019-01-17 NOTE — PROGRESS NOTES
NAME: Shweta Ceja is a 39 y o  female  : 1975    MRN: [id number]      Assessment and Plan   Acute bacterial sinusitis [J01 90, B96 89]  1  Acute bacterial sinusitis  amoxicillin-clavulanate (AUGMENTIN) 875-125 mg per tablet       Priscilla was seen today for cough  Diagnoses and all orders for this visit:    Acute bacterial sinusitis  -     amoxicillin-clavulanate (AUGMENTIN) 875-125 mg per tablet; Take 1 tablet by mouth every 12 (twelve) hours for 10 days        Patient Instructions   Patient Instructions   Take zyrtec, allegra, or Claritin daily  Use flonase 1-2 sprays in each nare daily   Use nasal saline to the nose,   Use humidifer in room  Symptoms worsen go to ER    Use milton pot as well      Take antibiotic as directed  Proceed to ER if symptoms worsen  Chief Complaint     Chief Complaint   Patient presents with    Cough     pt has productive cough, congestion, lots of mucus, and headaches x 1 week  pt taking dayquil o2 97         History of Present Illness     Pt here today for coughing sinus pressure, Headaches, sinus pain and thick green mucus comes up when she coughs  She has been having sleepless night  She was using OTC meds with no relief, symptoms present for one week  Review of Systems   Review of Systems   Constitutional: Negative for fever  HENT: Positive for congestion, postnasal drip, rhinorrhea, sinus pain, sinus pressure and sore throat  Negative for ear pain  Eyes: Negative  Respiratory: Positive for cough  Cardiovascular: Negative  Gastrointestinal: Negative            Current Medications       Current Outpatient Prescriptions:     albuterol (VENTOLIN HFA) 90 mcg/act inhaler, Inhale 1-2 puffs, Disp: , Rfl:     amLODIPine (NORVASC) 5 mg tablet, Take 5 mg by mouth daily, Disp: , Rfl:     famotidine (PEPCID) 10 mg tablet, Take 10 mg by mouth 2 (two) times a day, Disp: , Rfl:     levothyroxine (SYNTHROID) 175 mcg tablet, Take by mouth, Disp: , Rfl:   PARoxetine (PAXIL) 20 mg tablet, Take by mouth, Disp: , Rfl:     amoxicillin-clavulanate (AUGMENTIN) 875-125 mg per tablet, Take 1 tablet by mouth every 12 (twelve) hours for 10 days, Disp: 20 tablet, Rfl: 0    benzonatate (TESSALON) 200 MG capsule, Take 1 capsule (200 mg total) by mouth 3 (three) times a day as needed for cough (Patient not taking: Reported on 1/17/2019 ), Disp: 20 capsule, Rfl: 0    lisinopril (ZESTRIL) 10 mg tablet, Take 10 mg by mouth daily, Disp: , Rfl:     Current Allergies     Allergies as of 01/17/2019 - Reviewed 01/17/2019   Allergen Reaction Noted    Acetaminophen-codeine  10/23/2017    Prednisolone  10/23/2017              Past Medical History:   Diagnosis Date    Anxiety     Disc disorder     Hypothyroidism        History reviewed  No pertinent surgical history  No family history on file  Medications have been verified  The following portions of the patient's history were reviewed and updated as appropriate: allergies, current medications, past family history, past medical history, past social history, past surgical history and problem list     Objective   /78   Pulse 68   Temp (!) 96 8 °F (36 °C)   Resp 16   Ht 5' 10" (1 778 m)   Wt 122 kg (270 lb)   LMP 01/05/2019   BMI 38 74 kg/m²      Physical Exam     Physical Exam   Constitutional: Vital signs are normal  She appears well-developed and well-nourished  She is cooperative  HENT:   Right Ear: Hearing, external ear and ear canal normal    Left Ear: Hearing, external ear and ear canal normal    Nose: Mucosal edema present  Right sinus exhibits maxillary sinus tenderness  Mouth/Throat: Uvula is midline and mucous membranes are normal  Posterior oropharyngeal edema present  No oropharyngeal exudate or posterior oropharyngeal erythema  TMs dull to light reflex b/l,   B/l max sinus pressure, TTP over the sinues    Cardiovascular: Normal rate and regular rhythm      Pulmonary/Chest: Effort normal and breath sounds normal    Neurological: She is alert         KENNETH Haji

## 2019-01-17 NOTE — PATIENT INSTRUCTIONS
Take zyrtec, allegra, or Claritin daily  Use flonase 1-2 sprays in each nare daily   Use nasal saline to the nose,   Use humidifer in room  Symptoms worsen go to ER    Use milton pot as well      Take antibiotic as directed

## 2019-01-31 ENCOUNTER — OFFICE VISIT (OUTPATIENT)
Dept: NEUROLOGY | Facility: CLINIC | Age: 44
End: 2019-01-31
Payer: OTHER MISCELLANEOUS

## 2019-01-31 VITALS
BODY MASS INDEX: 38.45 KG/M2 | HEART RATE: 73 BPM | SYSTOLIC BLOOD PRESSURE: 130 MMHG | WEIGHT: 268.6 LBS | HEIGHT: 70 IN | DIASTOLIC BLOOD PRESSURE: 80 MMHG

## 2019-01-31 DIAGNOSIS — G89.29 NECK PAIN, CHRONIC: Primary | ICD-10-CM

## 2019-01-31 DIAGNOSIS — R20.0 BILATERAL HAND NUMBNESS: ICD-10-CM

## 2019-01-31 DIAGNOSIS — M54.12 RADICULOPATHY OF CERVICAL REGION: ICD-10-CM

## 2019-01-31 DIAGNOSIS — R51.9 CHRONIC NONINTRACTABLE HEADACHE, UNSPECIFIED HEADACHE TYPE: ICD-10-CM

## 2019-01-31 DIAGNOSIS — M54.2 NECK PAIN, CHRONIC: Primary | ICD-10-CM

## 2019-01-31 DIAGNOSIS — G89.29 CHRONIC NONINTRACTABLE HEADACHE, UNSPECIFIED HEADACHE TYPE: ICD-10-CM

## 2019-01-31 PROCEDURE — 99244 OFF/OP CNSLTJ NEW/EST MOD 40: CPT | Performed by: PSYCHIATRY & NEUROLOGY

## 2019-01-31 RX ORDER — DIPHENOXYLATE HYDROCHLORIDE AND ATROPINE SULFATE 2.5; .025 MG/1; MG/1
1 TABLET ORAL DAILY
COMMUNITY

## 2019-01-31 RX ORDER — PREGABALIN 50 MG/1
50 CAPSULE ORAL 3 TIMES DAILY
Qty: 90 CAPSULE | Refills: 1 | Status: SHIPPED | OUTPATIENT
Start: 2019-01-31 | End: 2019-08-02 | Stop reason: SDDI

## 2019-01-31 RX ORDER — MELATONIN
2000 DAILY
COMMUNITY

## 2019-01-31 NOTE — PROGRESS NOTES
Patient ID: Chery Torres is a 39 y o  female  Assessment/Plan:    Neck pain, chronic  Ms  Amanda Lima has had chronic neck pain for almost 10 years, is s/p ACDF in 2014 with persistent pain and paresthesias in the upper extremities  She has been through home therapy in the past, MARCIE, nerve blocks, botulinum toxin injections, multiple neuropathic pain medications without relief  I did not appreciate any weakness on her exam today  Her last MRI was done in 2014, results not available to me  Since she has had no improvement in her symptoms despite multiple therapies,  I recommend we repeat her MRI and EMG  She has been told she has b/l CTS, and does endorse symptoms of numbness in hands with any repeated activity, will refer her to hand surgery, pending her EMG  From a treatment standoint, she has never tried lyrica, is willing to try it  Will start at 50mg tid, can increase the dose if needed  She will follow up with her pain specialist to explore other options  Her headaches are cervicogenic in origin, if no improvement, she may benefit from trigger point injections and repeat botox injections  Lastly, she asked about her work limitations, she currently has some work limitations, but still feels repeated motion at work 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  I recommended a functional capacity evaluation for her  Thank you for allowing me to participate in her acre and please do not hesitate to contact me for questions or concerns  Bilateral hand numbness  Possible CTS, will evaluate with EMG and refer to hand surgery if needed  Diagnoses and all orders for this visit:    Neck pain, chronic  -     Ambulatory referral to Physical Therapy; Future  -     pregabalin (LYRICA) 50 mg capsule; Take 1 capsule (50 mg total) by mouth 3 (three) times a day for 30 days  -     MRI cervical spine wo contrast; Future  -     EMG 2 Limb Upper Extremity;  Future    Radiculopathy of cervical region  -     Ambulatory referral to Neurology  -     Ambulatory referral to Physical Therapy; Future  -     EMG 2 Limb Upper Extremity; Future    Chronic nonintractable headache, unspecified headache type  -     Ambulatory referral to Physical Therapy; Future         Subjective:    HPI  I had the pleasure of seeing your patient in Neurology Clinic for a neuromuscular consultation  As you know, she is a 79-year-old woman who was referred for evaluation for neck pain  Please allow me to summarize her history for the record  Patient had a work-related injury in July 2009, She apparently bursted 2 discs in the cervical spine, she was evaluated at [facility], she is status post ACDF in 2014  She continues to have pain in her neck since then  The pain starts from the neck radaiting to back of her head, both trapezius regions  She has been getting headaches with the neck pain, most of her headaches originate from the neck, denies being nauseous or dizzy with them  She has been seen by pain management, she has had MARCIE, nerve blocks, Botox  She has tried gabapentin (1200mg a day), has not been able to tolerate narcotics  She takes Excedrin migraine prn which does take the edge off  She has not been able to work full time (working 3 days a week right now), works in [employer], job involves carrying heavy trays, which 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        The pain is mainly in the neck, in between the shoulder blades  Cannot bend over due to pain in the back  Reports does not have strength in upper extremities  Has numbness in the hands, cannot hold anything for more than a few minutes due to numbness  From review of prior records, patient has had electrodiagnostic studies in the past, which have revealed moderate carpal tunnel, without any obvious evidence of radiculopathy    The patient apparently has tried multiple medications in the past including gabapentin (1200mg daily, no benefit), nortriptyline (side effects), Topamax (side effects)  She has done topical medications, nonsteroidal anti-inflammatory medications  She has been getting cervical epidural steroid injections as well as botulinum toxin injections through pain management for spasmodic torticollis  The dose/muscles injected are as follows: cervical paraspinal muscles (30 units each side), trapezius muscles (30 units each side), sternocleidomastoids (20 units each side) and splenius capitis muscles (20 units each side), total of 200 units  She did not think botox helped at all  Besides this, she has HTN, Hypothyroidism and anxiety  Family hx of HTN, CAD in maternal family  mother passed away at age 28 ? Suicide  The following portions of the patient's history were reviewed and updated as appropriate: allergies, current medications, past family history, past medical history, past social history, past surgical history and problem list        Objective:    Blood pressure 130/80, pulse 73, height 5' 10" (1 778 m), weight 122 kg (268 lb 9 6 oz), last menstrual period 01/05/2019  Physical Exam  General exam: Pt was awake, alert and oriented  HEENT: atraumatic, normocephalic  Normal oral mucosa, neck was supple, no lymphadenopathy  Normal peripheral pulses  Extremities did not show any edema or cyanosis  Neurological Exam  Neurologically, pt was awake and alert  Speech was normal, no dysarthria or aphasia  Cranial nerve exam showed normal extraocular movements, no nystagmus or diplopia  There was no ptosis at baseline or with sustained upward gaze  Strength of eye closure muscles was normal   Facial sensations were normal bilaterally  No facial weakness, able to blow out the cheeks and push the tongue in the cheeks well  No tongue atrophy or fasciculations  Motor exam revealed normal tone and muscle bulk  There was no atrophy, scapular winging, high arches, hammertoes, shortening of achilles tendons or any other features of neuromuscular disease  She does have tenderness in cervical paraspinal region, bilateral trapezius  Tinel's was neg at both wrists  Muscle strength was normal in neck flexors and extensors, and all muscle groups in both upper and lower extremities  Reflexes were graded as 2+ throughout  Toes were downgoing  There was no exaggerated jaw jerk or lacey's sign  No ankle clonus  Sensory exam revealed normal sensation to light touch, pin prick, temperature, vibration and proprioception  Gait was normal and casual        ROS:  I reviewed the below ROS and what is mentioned in HPI, the remainder of ROS was negative  Review of Systems   Constitutional: Positive for unexpected weight change (Recent weight gain)  Negative for appetite change and fever  HENT: Negative  Negative for hearing loss, tinnitus, trouble swallowing and voice change  Eyes: Positive for pain  Negative for photophobia  Respiratory: Negative  Negative for shortness of breath  Cardiovascular: Negative  Negative for palpitations  Gastrointestinal: Positive for diarrhea and nausea  Negative for vomiting  Bowel habit changes     Endocrine: Negative  Negative for cold intolerance and heat intolerance  Menstrual changes   Genitourinary: Negative  Negative for dysuria, frequency and urgency  Musculoskeletal: Positive for back pain, myalgias and neck pain  Muscle pain  Immobility or loss of function  Pain while walking   Skin: Positive for rash  Neurological: Positive for numbness and headaches  Negative for dizziness, tremors, seizures, syncope, facial asymmetry, speech difficulty, weakness and light-headedness  Increased sleepiness  Memory problems  Cramping  Tingling  Snoring  Clumsiness  Balance problems   Hematological: Negative  Does not bruise/bleed easily  Psychiatric/Behavioral: Negative for confusion, hallucinations and sleep disturbance  The patient is nervous/anxious

## 2019-01-31 NOTE — LETTER
January 31, 2019     Lea Ortega MD  [facility] [id number]    Patient: Morris Miranda   YOB: 1975   Date of Visit: 1/31/2019       Dear Dr Valles Else: Thank you for referring Morris Miranda to me for evaluation  Below are my notes for this consultation  If you have questions, please do not hesitate to call me  I look forward to following your patient along with you  Sincerely,        Rai Schwartz MD        CC: No Recipients  Rai Schwartz MD  1/31/2019 12:17 PM  Sign at close encounter  Patient ID: Morris Miranda is a 39 y o  female  Assessment/Plan:    Neck pain, chronic  Ms  Brooke Joyce has had chronic neck pain for almost 10 years, is s/p ACDF in 2014 with persistent pain and paresthesias in the upper extremities  She has been through home therapy in the past, MARCIE, nerve blocks, botulinum toxin injections, multiple neuropathic pain medications without relief  I did not appreciate any weakness on her exam today  Her last MRI was done in 2014, results not available to me  Since she has had no improvement in her symptoms despite multiple therapies,  I recommend we repeat her MRI and EMG  She has been told she has b/l CTS, and does endorse symptoms of numbness in hands with any repeated activity, will refer her to hand surgery, pending her EMG  From a treatment standoint, she has never tried lyrica, is willing to try it  Will start at 50mg tid, can increase the dose if needed  She will follow up with her pain specialist to explore other options  Her headaches are cervicogenic in origin, if no improvement, she may benefit from trigger point injections and repeat botox injections  Lastly, she asked about her work limitations, she currently has some work limitations, but still feels repeated motion at work 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  I recommended a functional capacity evaluation for her       Thank you for allowing me to participate in her acre and please do not hesitate to contact me for questions or concerns  Bilateral hand numbness  Possible CTS, will evaluate with EMG and refer to hand surgery if needed  Diagnoses and all orders for this visit:    Neck pain, chronic  -     Ambulatory referral to Physical Therapy; Future  -     pregabalin (LYRICA) 50 mg capsule; Take 1 capsule (50 mg total) by mouth 3 (three) times a day for 30 days  -     MRI cervical spine wo contrast; Future  -     EMG 2 Limb Upper Extremity; Future    Radiculopathy of cervical region  -     Ambulatory referral to Neurology  -     Ambulatory referral to Physical Therapy; Future  -     EMG 2 Limb Upper Extremity; Future    Chronic nonintractable headache, unspecified headache type  -     Ambulatory referral to Physical Therapy; Future         Subjective:    HPI  I had the pleasure of seeing your patient in Neurology Clinic for a neuromuscular consultation  As you know, she is a 40-year-old woman who was referred for evaluation for neck pain  Please allow me to summarize her history for the record  Patient had a work-related injury in July 2009, She apparently bursted 2 discs in the cervical spine, she was evaluated at [facility], she is status post ACDF in 2014  She continues to have pain in her neck since then  The pain starts from the neck radaiting to back of her head, both trapezius regions  She has been getting headaches with the neck pain, most of her headaches originate from the neck, denies being nauseous or dizzy with them  She has been seen by pain management, she has had MARCIE, nerve blocks, Botox  She has tried gabapentin (1200mg a day), has not been able to tolerate narcotics  She takes Excedrin migraine prn which does take the edge off  She has not been able to work full time (working 3 days a week right now), works in [employer], job involves carrying heavy trays, which 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         The pain is mainly in the neck, in between the shoulder blades  Cannot bend over due to pain in the back  Reports does not have strength in upper extremities  Has numbness in the hands, cannot hold anything for more than a few minutes due to numbness  From review of prior records, patient has had electrodiagnostic studies in the past, which have revealed moderate carpal tunnel, without any obvious evidence of radiculopathy  The patient apparently has tried multiple medications in the past including gabapentin (1200mg daily, no benefit), nortriptyline (side effects), Topamax (side effects)  She has done topical medications, nonsteroidal anti-inflammatory medications  She has been getting cervical epidural steroid injections as well as botulinum toxin injections through pain management for spasmodic torticollis  The dose/muscles injected are as follows: cervical paraspinal muscles (30 units each side), trapezius muscles (30 units each side), sternocleidomastoids (20 units each side) and splenius capitis muscles (20 units each side), total of 200 units  She did not think botox helped at all  Besides this, she has HTN, Hypothyroidism and anxiety  Family hx of HTN, CAD in maternal family  mother passed away at age 28 ? Suicide  The following portions of the patient's history were reviewed and updated as appropriate: allergies, current medications, past family history, past medical history, past social history, past surgical history and problem list        Objective:    Blood pressure 130/80, pulse 73, height 5' 10" (1 778 m), weight 122 kg (268 lb 9 6 oz), last menstrual period 01/05/2019  Physical Exam  General exam: Pt was awake, alert and oriented  HEENT: atraumatic, normocephalic  Normal oral mucosa, neck was supple, no lymphadenopathy  Normal peripheral pulses  Extremities did not show any edema or cyanosis  Neurological Exam  Neurologically, pt was awake and alert  Speech was normal, no dysarthria or aphasia  Cranial nerve exam showed normal extraocular movements, no nystagmus or diplopia  There was no ptosis at baseline or with sustained upward gaze  Strength of eye closure muscles was normal   Facial sensations were normal bilaterally  No facial weakness, able to blow out the cheeks and push the tongue in the cheeks well  No tongue atrophy or fasciculations  Motor exam revealed normal tone and muscle bulk  There was no atrophy, scapular winging, high arches, hammertoes, shortening of achilles tendons or any other features of neuromuscular disease  She does have tenderness in cervical paraspinal region, bilateral trapezius  Tinel's was neg at both wrists  Muscle strength was normal in neck flexors and extensors, and all muscle groups in both upper and lower extremities  Reflexes were graded as 2+ throughout  Toes were downgoing  There was no exaggerated jaw jerk or lacey's sign  No ankle clonus  Sensory exam revealed normal sensation to light touch, pin prick, temperature, vibration and proprioception  Gait was normal and casual        ROS:  I reviewed the below ROS and what is mentioned in HPI, the remainder of ROS was negative  Review of Systems   Constitutional: Positive for unexpected weight change (Recent weight gain)  Negative for appetite change and fever  HENT: Negative  Negative for hearing loss, tinnitus, trouble swallowing and voice change  Eyes: Positive for pain  Negative for photophobia  Respiratory: Negative  Negative for shortness of breath  Cardiovascular: Negative  Negative for palpitations  Gastrointestinal: Positive for diarrhea and nausea  Negative for vomiting  Bowel habit changes     Endocrine: Negative  Negative for cold intolerance and heat intolerance  Menstrual changes   Genitourinary: Negative  Negative for dysuria, frequency and urgency  Musculoskeletal: Positive for back pain, myalgias and neck pain          Muscle pain  Immobility or loss of function  Pain while walking   Skin: Positive for rash  Neurological: Positive for numbness and headaches  Negative for dizziness, tremors, seizures, syncope, facial asymmetry, speech difficulty, weakness and light-headedness  Increased sleepiness  Memory problems  Cramping  Tingling  Snoring  Clumsiness  Balance problems   Hematological: Negative  Does not bruise/bleed easily  Psychiatric/Behavioral: Negative for confusion, hallucinations and sleep disturbance  The patient is nervous/anxious

## 2019-01-31 NOTE — ASSESSMENT & PLAN NOTE
Ms Nicolasa Corcoran has had chronic neck pain for almost 10 years, is s/p ACDF in 2014 with persistent pain and paresthesias in the upper extremities  She has been through home therapy in the past, MARCIE, nerve blocks, botulinum toxin injections, multiple neuropathic pain medications without relief  I did not appreciate any weakness on her exam today  Her last MRI was done in 2014, results not available to me  Since she has had no improvement in her symptoms despite multiple therapies,  I recommend we repeat her MRI and EMG  She has been told she has b/l CTS, and does endorse symptoms of numbness in hands with any repeated activity, will refer her to hand surgery, pending her EMG  From a treatment standoint, she has never tried lyrica, is willing to try it  Will start at 50mg tid, can increase the dose if needed  She will follow up with her pain specialist to explore other options  Her headaches are cervicogenic in origin, if no improvement, she may benefit from trigger point injections and repeat botox injections  Lastly, she asked about her work limitations, she currently has some work limitations, but still feels repeated motion at work 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  I recommended a functional capacity evaluation for her  Thank you for allowing me to participate in her acre and please do not hesitate to contact me for questions or concerns

## 2019-02-04 ENCOUNTER — APPOINTMENT (EMERGENCY)
Dept: CT IMAGING | Facility: HOSPITAL | Age: 44
End: 2019-02-04
Payer: COMMERCIAL

## 2019-02-04 ENCOUNTER — HOSPITAL ENCOUNTER (EMERGENCY)
Facility: HOSPITAL | Age: 44
Discharge: HOME | End: 2019-02-04
Attending: EMERGENCY MEDICINE | Admitting: EMERGENCY MEDICINE
Payer: COMMERCIAL

## 2019-02-04 VITALS
RESPIRATION RATE: 20 BRPM | SYSTOLIC BLOOD PRESSURE: 119 MMHG | HEIGHT: 70 IN | DIASTOLIC BLOOD PRESSURE: 76 MMHG | HEART RATE: 61 BPM | TEMPERATURE: 97.9 F | WEIGHT: 268 LBS | BODY MASS INDEX: 38.37 KG/M2 | OXYGEN SATURATION: 95 %

## 2019-02-04 DIAGNOSIS — G44.86 CERVICOGENIC HEADACHE: Primary | ICD-10-CM

## 2019-02-04 LAB
ANION GAP SERPL CALCULATED.3IONS-SCNC: 8 MMOL/L (ref 4–13)
BASOPHILS # BLD AUTO: 0.02 THOUSANDS/ΜL (ref 0–0.1)
BASOPHILS NFR BLD AUTO: 0 % (ref 0–1)
BUN SERPL-MCNC: 20 MG/DL (ref 5–25)
CALCIUM SERPL-MCNC: 9 MG/DL (ref 8.3–10.1)
CHLORIDE SERPL-SCNC: 105 MMOL/L (ref 100–108)
CO2 SERPL-SCNC: 26 MMOL/L (ref 21–32)
CREAT SERPL-MCNC: 0.76 MG/DL (ref 0.6–1.3)
EOSINOPHIL # BLD AUTO: 0.18 THOUSAND/ΜL (ref 0–0.61)
EOSINOPHIL NFR BLD AUTO: 3 % (ref 0–6)
ERYTHROCYTE [DISTWIDTH] IN BLOOD BY AUTOMATED COUNT: 13.7 % (ref 11.6–15.1)
GFR SERPL CREATININE-BSD FRML MDRD: 96 ML/MIN/1.73SQ M
GLUCOSE SERPL-MCNC: 90 MG/DL (ref 65–140)
HCT VFR BLD AUTO: 40.9 % (ref 34.8–46.1)
HGB BLD-MCNC: 13.6 G/DL (ref 11.5–15.4)
IMM GRANULOCYTES # BLD AUTO: 0.01 THOUSAND/UL (ref 0–0.2)
IMM GRANULOCYTES NFR BLD AUTO: 0 % (ref 0–2)
LYMPHOCYTES # BLD AUTO: 1.93 THOUSANDS/ΜL (ref 0.6–4.47)
LYMPHOCYTES NFR BLD AUTO: 34 % (ref 14–44)
MCH RBC QN AUTO: 30.2 PG (ref 26.8–34.3)
MCHC RBC AUTO-ENTMCNC: 33.3 G/DL (ref 31.4–37.4)
MCV RBC AUTO: 91 FL (ref 82–98)
MONOCYTES # BLD AUTO: 0.25 THOUSAND/ΜL (ref 0.17–1.22)
MONOCYTES NFR BLD AUTO: 4 % (ref 4–12)
NEUTROPHILS # BLD AUTO: 3.31 THOUSANDS/ΜL (ref 1.85–7.62)
NEUTS SEG NFR BLD AUTO: 59 % (ref 43–75)
NRBC BLD AUTO-RTO: 0 /100 WBCS
PLATELET # BLD AUTO: 277 THOUSANDS/UL (ref 149–390)
PMV BLD AUTO: 10 FL (ref 8.9–12.7)
POTASSIUM SERPL-SCNC: 4 MMOL/L (ref 3.5–5.3)
RBC # BLD AUTO: 4.5 MILLION/UL (ref 3.81–5.12)
SODIUM SERPL-SCNC: 139 MMOL/L (ref 136–145)
WBC # BLD AUTO: 5.7 THOUSAND/UL (ref 4.31–10.16)

## 2019-02-04 PROCEDURE — 96374 THER/PROPH/DIAG INJ IV PUSH: CPT

## 2019-02-04 PROCEDURE — 80048 BASIC METABOLIC PNL TOTAL CA: CPT | Performed by: EMERGENCY MEDICINE

## 2019-02-04 PROCEDURE — 85025 COMPLETE CBC W/AUTO DIFF WBC: CPT | Performed by: EMERGENCY MEDICINE

## 2019-02-04 PROCEDURE — 36415 COLL VENOUS BLD VENIPUNCTURE: CPT | Performed by: EMERGENCY MEDICINE

## 2019-02-04 PROCEDURE — 70450 CT HEAD/BRAIN W/O DYE: CPT

## 2019-02-04 PROCEDURE — 99284 EMERGENCY DEPT VISIT MOD MDM: CPT

## 2019-02-04 PROCEDURE — 96361 HYDRATE IV INFUSION ADD-ON: CPT

## 2019-02-04 PROCEDURE — 96375 TX/PRO/DX INJ NEW DRUG ADDON: CPT

## 2019-02-04 RX ORDER — KETOROLAC TROMETHAMINE 30 MG/ML
15 INJECTION, SOLUTION INTRAMUSCULAR; INTRAVENOUS ONCE
Status: COMPLETED | OUTPATIENT
Start: 2019-02-04 | End: 2019-02-04

## 2019-02-04 RX ORDER — BUTALBITAL, ACETAMINOPHEN AND CAFFEINE 50; 325; 40 MG/1; MG/1; MG/1
1 TABLET ORAL EVERY 6 HOURS PRN
Qty: 12 TABLET | Refills: 0 | Status: SHIPPED | OUTPATIENT
Start: 2019-02-04 | End: 2020-02-22 | Stop reason: ALTCHOICE

## 2019-02-04 RX ORDER — METOCLOPRAMIDE HYDROCHLORIDE 5 MG/ML
10 INJECTION INTRAMUSCULAR; INTRAVENOUS ONCE
Status: COMPLETED | OUTPATIENT
Start: 2019-02-04 | End: 2019-02-04

## 2019-02-04 RX ORDER — DIPHENHYDRAMINE HYDROCHLORIDE 50 MG/ML
25 INJECTION INTRAMUSCULAR; INTRAVENOUS ONCE
Status: COMPLETED | OUTPATIENT
Start: 2019-02-04 | End: 2019-02-04

## 2019-02-04 RX ADMIN — SODIUM CHLORIDE 1000 ML: 0.9 INJECTION, SOLUTION INTRAVENOUS at 10:22

## 2019-02-04 RX ADMIN — DIPHENHYDRAMINE HYDROCHLORIDE 25 MG: 50 INJECTION, SOLUTION INTRAMUSCULAR; INTRAVENOUS at 10:24

## 2019-02-04 RX ADMIN — METOCLOPRAMIDE 10 MG: 5 INJECTION, SOLUTION INTRAMUSCULAR; INTRAVENOUS at 10:24

## 2019-02-04 RX ADMIN — KETOROLAC TROMETHAMINE 15 MG: 30 INJECTION, SOLUTION INTRAMUSCULAR; INTRAVENOUS at 10:23

## 2019-02-04 NOTE — ED NOTES
Patient states she sees Pain management for this chronic pain   She states nothing  Is helping her     Nany Hanson, SILKE  02/04/19 [id number]

## 2019-02-04 NOTE — ED NOTES
Patient states that the medicine took the edge off but is still reporting an 8 out of 10    Prior to pain meds she was a 10 out [address] [place], RN  02/04/19 4687

## 2019-02-04 NOTE — DISCHARGE INSTRUCTIONS
Acute Headache, Ambulatory Care   GENERAL INFORMATION:   An acute headache  is pain or discomfort that starts suddenly and gets worse quickly  The cause of an acute headache may not be known  It may be triggered by stress, fatigue, hormones, food, or trauma  Common related symptoms include the following:   · Fever    · Sinus pressure    · Loss of memory    · Nausea or vomiting    · Problems with your vision, such as watery or red eyes, loss of vision, or pain in bright light    · Stiff neck    · Tenderness of the head and neck area    · Trouble staying awake, or being less alert than usual     · Weakness or less energy  Seek immediate care for the following symptoms:   · Severe pain    · A headache that occurs after a blow to the head, a fall, or other trauma     · Confusion or forgetfulness    · Numbness on one side of your face or body  Treatment for an acute headache  may include medicine to decrease pain  You may also need biofeedback or cognitive behavioral therapy  Ask your healthcare provider about these and other treatments for an acute headache  Manage my symptoms:   · Apply heat  on your head for 20 to 30 minutes every 2 hours for as many days as directed  Heat helps decrease pain and muscle spasms  You may alternate heat and ice  · Apply ice  on your head for 15 to 20 minutes every hour or as directed  Use an ice pack, or put crushed ice in a plastic bag  Cover it with a towel  Ice helps decrease pain  · Relax your muscles  Lie down in a comfortable position and close your eyes  Relax your muscles slowly  Start at your toes and work your way up your body  · Keep a record of your headaches  Write down when your headaches start and stop  Include your symptoms and what you were doing when the headache began  Record what you ate or drank for 24 hours before the headache started  Describe the pain and where it hurts  Keep track of what you did to treat your headache and whether it worked    Follow up with your healthcare provider as directed:  Bring your headache record with you when you see your healthcare provider  Write down your questions so you remember to ask them during your visits  CARE AGREEMENT:   You have the right to help plan your care  Learn about your health condition and how it may be treated  Discuss treatment options with your caregivers to decide what care you want to receive  You always have the right to refuse treatment  The above information is an  only  It is not intended as medical advice for individual conditions or treatments  Talk to your doctor, nurse or pharmacist before following any medical regimen to see if it is safe and effective for you  © 2014 [address] is for End User's use only and may not be sold, redistributed or otherwise used for commercial purposes  All illustrations and images included in CareNotes® are the copyrighted property of A D A M [employer]  or Casey Contreras

## 2019-02-04 NOTE — ED NOTES
Patient states her BP is never that high    Patient states she was supposed to start Lyrica but never picked up the prescription do to money issues     Cady Diego RN  02/04/19 1400 FERNANDA Guerrier RN  02/04/19 [id number]

## 2019-02-06 ENCOUNTER — TELEPHONE (OUTPATIENT)
Dept: GASTROENTEROLOGY | Facility: CLINIC | Age: 44
End: 2019-02-06

## 2019-02-06 NOTE — TELEPHONE ENCOUNTER
Leida-pt has been contacted to reschedule cancelled colon ordered from last ov with no response-please advise   Thank you

## 2019-02-06 NOTE — ED PROVIDER NOTES
History  Chief Complaint   Patient presents with    Neck Pain     Patient has chronic neck and L side of her head  Pain got worse on Tuesday     39year-old female presents for chronic left-sided neck pain and left-sided head pain  Patient normally has right-sided neck pain and right head pain but sometimes has left-sided pain  This is worse than normal   She has had a gradual onset of the pain for the past 4 to 5 days  She follows with pain management  She has no other complaints no visual changes no nausea no vomiting no other associated symptoms  Looks well on exam on her cell phone when I 1st examine her  She has a normal neurologic exam no temporal artery tenderness no bruits  Assessment plan:  Headache  CT head as the patient is requesting it, will order a migraine cocktail        Neck Pain   Pain location:  L side  Quality:  Aching  Pain radiates to:  Head  Pain severity:  Mild  Pain is:  Same all the time  Onset quality:  Gradual  Duration:  4 days  Timing:  Intermittent  Progression:  Waxing and waning  Chronicity:  New  Associated symptoms: headaches    Associated symptoms: no chest pain, no fever and no photophobia        Prior to Admission Medications   Prescriptions Last Dose Informant Patient Reported? Taking?    PARoxetine (PAXIL) 20 mg tablet   Yes Yes   Sig: Take by mouth   albuterol (VENTOLIN HFA) 90 mcg/act inhaler   Yes Yes   Sig: Inhale 1-2 puffs   amLODIPine (NORVASC) 5 mg tablet   Yes Yes   Sig: Take 5 mg by mouth daily   benzonatate (TESSALON) 200 MG capsule Not Taking at Unknown time  No No   Sig: Take 1 capsule (200 mg total) by mouth 3 (three) times a day as needed for cough   Patient not taking: Reported on 1/17/2019    cholecalciferol (VITAMIN D3) 1,000 units tablet   Yes Yes   Sig: Take 2,000 Units by mouth daily   famotidine (PEPCID) 10 mg tablet   Yes Yes   Sig: Take 10 mg by mouth 2 (two) times a day   levothyroxine (SYNTHROID) 175 mcg tablet   Yes Yes   Sig: Take by mouth lisinopril (ZESTRIL) 10 mg tablet Not Taking at Unknown time  Yes No   Sig: Take 10 mg by mouth daily   multivitamin (THERAGRAN) TABS   Yes Yes   Sig: Take 1 tablet by mouth daily   pregabalin (LYRICA) 50 mg capsule Not Taking at Unknown time  No No   Sig: Take 1 capsule (50 mg total) by mouth 3 (three) times a day for 30 days   Patient not taking: Reported on 2/4/2019       Facility-Administered Medications: None       Past Medical History:   Diagnosis Date    Anxiety     Disc disorder     Hypothyroidism        History reviewed  No pertinent surgical history  History reviewed  No pertinent family history  I have reviewed and agree with the history as documented  Social History   Substance Use Topics    Smoking status: Smoker, Current Status Unknown    Smokeless tobacco: Never Used    Alcohol use Yes      Comment: socially        Review of Systems   Constitutional: Negative for chills, fatigue and fever  Eyes: Negative for photophobia and visual disturbance  Respiratory: Negative for cough and shortness of breath  Cardiovascular: Negative for chest pain, palpitations and leg swelling  Gastrointestinal: Negative for diarrhea, nausea and vomiting  Endocrine: Negative for polydipsia and polyuria  Genitourinary: Negative for decreased urine volume, difficulty urinating, dysuria and frequency  Musculoskeletal: Positive for neck pain  Negative for back pain and neck stiffness  Skin: Negative for color change and rash  Allergic/Immunologic: Negative for environmental allergies and immunocompromised state  Neurological: Positive for headaches  Negative for dizziness  Hematological: Negative for adenopathy  Does not bruise/bleed easily  Psychiatric/Behavioral: Negative for dysphoric mood  The patient is not nervous/anxious  Physical Exam  Physical Exam   Constitutional: She is oriented to person, place, and time  She appears well-developed and well-nourished  No distress  HENT:   Head: Normocephalic and atraumatic  Nose: Nose normal    Eyes: Pupils are equal, round, and reactive to light  Conjunctivae and EOM are normal  No scleral icterus  Neck: Normal range of motion  Neck supple  No JVD present  No tracheal deviation present  No thyromegaly present  Cardiovascular: Normal rate, regular rhythm, normal heart sounds and intact distal pulses  Exam reveals no gallop and no friction rub  Pulmonary/Chest: Effort normal and breath sounds normal  No respiratory distress  She has no wheezes  She has no rales  She exhibits no tenderness  Abdominal: Soft  Bowel sounds are normal  She exhibits no distension and no mass  There is no tenderness  There is no rebound and no guarding  No hernia  Musculoskeletal: Normal range of motion  She exhibits no edema, tenderness or deformity  Neurological: She is alert and oriented to person, place, and time  She has normal reflexes  No cranial nerve deficit  Coordination normal    Skin: Skin is warm and dry  She is not diaphoretic  No erythema  Psychiatric: She has a normal mood and affect  Her behavior is normal    Nursing note and vitals reviewed        Vital Signs  ED Triage Vitals   Temperature Pulse Respirations Blood Pressure SpO2   02/04/19 0939 02/04/19 0941 02/04/19 0941 02/04/19 0941 02/04/19 0941   97 9 °F (36 6 °C) 82 (!) 24 (!) 154/110 99 %      Temp src Heart Rate Source Patient Position - Orthostatic VS BP Location FiO2 (%)   -- -- -- -- --             Pain Score       02/04/19 0941       Worst Possible Pain           Vitals:    02/04/19 0945 02/04/19 1130 02/04/19 1200 02/04/19 1215   BP: (!) 151/112 130/71 118/77 119/76   Pulse:  61 60 61       Visual Acuity  Visual Acuity      Most Recent Value   L Pupil Size (mm)  3   R Pupil Size (mm)  3          ED Medications  Medications   sodium chloride 0 9 % bolus 1,000 mL (0 mL Intravenous Stopped 2/4/19 1129)   ketorolac (TORADOL) injection 15 mg (15 mg Intravenous Given 2/4/19 1023)   metoclopramide (REGLAN) injection 10 mg (10 mg Intravenous Given 2/4/19 1024)   diphenhydrAMINE (BENADRYL) injection 25 mg (25 mg Intravenous Given 2/4/19 1024)       Diagnostic Studies  Results Reviewed     Procedure Component Value Units Date/Time    Basic metabolic panel [[id number]] Collected:  02/04/19 1033    Lab Status:  Final result Specimen:  Blood from Arm, Left Updated:  02/04/19 1103     Sodium 139 mmol/L      Potassium 4 0 mmol/L      Chloride 105 mmol/L      CO2 26 mmol/L      ANION GAP 8 mmol/L      BUN 20 mg/dL      Creatinine 0 76 mg/dL      Glucose 90 mg/dL      Calcium 9 0 mg/dL      eGFR 96 ml/min/1 73sq m     Narrative:         National Kidney Disease Education Program recommendations are as follows:  GFR calculation is accurate only with a steady state creatinine  Chronic Kidney disease less than 60 ml/min/1 73 sq  meters  Kidney failure less than 15 ml/min/1 73 sq  meters  CBC and differential [[id number]] Collected:  02/04/19 1033    Lab Status:  Final result Specimen:  Blood from Arm, Left Updated:  02/04/19 1051     WBC 5 70 Thousand/uL      RBC 4 50 Million/uL      Hemoglobin 13 6 g/dL      Hematocrit 40 9 %      MCV 91 fL      MCH 30 2 pg      MCHC 33 3 g/dL      RDW 13 7 %      MPV 10 0 fL      Platelets 406 Thousands/uL      nRBC 0 /100 WBCs      Neutrophils Relative 59 %      Immat GRANS % 0 %      Lymphocytes Relative 34 %      Monocytes Relative 4 %      Eosinophils Relative 3 %      Basophils Relative 0 %      Neutrophils Absolute 3 31 Thousands/µL      Immature Grans Absolute 0 01 Thousand/uL      Lymphocytes Absolute 1 93 Thousands/µL      Monocytes Absolute 0 25 Thousand/µL      Eosinophils Absolute 0 18 Thousand/µL      Basophils Absolute 0 02 Thousands/µL                  CT head without contrast   Final Result by Du Melissa MD (02/04 1204)      No acute intracranial abnormality                    Workstation performed: [id number] Procedures  Procedures       Phone Contacts  ED Phone Contact    ED Course                               MDM  Number of Diagnoses or Management Options  Cervicogenic headache: new and requires workup     Amount and/or Complexity of Data Reviewed  Tests in the radiology section of CPT®: reviewed and ordered  Independent visualization of images, tracings, or specimens: yes    Patient Progress  Patient progress: resolved      Disposition  Final diagnoses:   Cervicogenic headache     Time reflects when diagnosis was documented in both MDM as applicable and the Disposition within this note     Time User Action Codes Description Comment    2/4/2019 12:18 PM Johanne Acosta Add [R51] Cervicogenic headache       ED Disposition     ED Disposition Condition Date/Time Comment    Discharge  Mon Feb 4, 2019 12:18 PM Tammyntkevin Hurley discharge to home/self care      Condition at discharge: Good        Follow-up Information     Follow up With Specialties Details Why Contact Info    Pete Leija MD Internal Medicine Schedule an appointment as soon as possible for a visit  [address] [facility]  [phone]            Discharge Medication List as of 2/4/2019 12:19 PM      START taking these medications    Details   butalbital-acetaminophen-caffeine (FIORICET,ESGIC) -40 mg per tablet Take 1 tablet by mouth every 6 (six) hours as needed for headaches, Starting Mon 2/4/2019, Print         CONTINUE these medications which have NOT CHANGED    Details   albuterol (VENTOLIN HFA) 90 mcg/act inhaler Inhale 1-2 puffs, Starting Mon 10/23/2017, Historical Med      amLODIPine (NORVASC) 5 mg tablet Take 5 mg by mouth daily, Historical Med      cholecalciferol (VITAMIN D3) 1,000 units tablet Take 2,000 Units by mouth daily, Historical Med      famotidine (PEPCID) 10 mg tablet Take 10 mg by mouth 2 (two) times a day, Historical Med      levothyroxine (SYNTHROID) 175 mcg tablet Take by mouth, Historical Med multivitamin (THERAGRAN) TABS Take 1 tablet by mouth daily, Historical Med      PARoxetine (PAXIL) 20 mg tablet Take by mouth, Historical Med      benzonatate (TESSALON) 200 MG capsule Take 1 capsule (200 mg total) by mouth 3 (three) times a day as needed for cough, Starting Wed 10/10/2018, Print      lisinopril (ZESTRIL) 10 mg tablet Take 10 mg by mouth daily, Historical Med      pregabalin (LYRICA) 50 mg capsule Take 1 capsule (50 mg total) by mouth 3 (three) times a day for 30 days, Starting u 1/31/2019, Until Sat 3/2/2019, Normal           No discharge procedures on file      ED Provider  Electronically Signed by           Rl Calderon DO  02/06/19 1542

## 2019-02-13 ENCOUNTER — TELEPHONE (OUTPATIENT)
Dept: NEUROLOGY | Facility: CLINIC | Age: 44
End: 2019-02-13

## 2019-02-13 NOTE — TELEPHONE ENCOUNTER
Left message stating the MRI cervical spine was denied by the insurance because they are requiring 6 weeks of PT before they will pay for it  Advised that we have to cancel her appt for tomorrow

## 2019-02-13 NOTE — TELEPHONE ENCOUNTER
Spoke with patient because she was upset and said it had to go through workers comp and not her insurance  I explained that it has to go through her medical insurance before workers comp will cover it  Her insurance said she needs 6 weeks of physical therapy in order for her to be able to have the test  Neither the insurance nor workers comp will cover it until the pt has been completed   She said she is talking to her

## 2019-03-09 ENCOUNTER — OFFICE VISIT (OUTPATIENT)
Dept: URGENT CARE | Facility: CLINIC | Age: 44
End: 2019-03-09
Payer: COMMERCIAL

## 2019-03-09 VITALS
TEMPERATURE: 98.4 F | BODY MASS INDEX: 38.37 KG/M2 | RESPIRATION RATE: 16 BRPM | DIASTOLIC BLOOD PRESSURE: 84 MMHG | HEIGHT: 70 IN | HEART RATE: 83 BPM | WEIGHT: 268 LBS | OXYGEN SATURATION: 98 % | SYSTOLIC BLOOD PRESSURE: 148 MMHG

## 2019-03-09 DIAGNOSIS — J01.90 ACUTE BACTERIAL SINUSITIS: Primary | ICD-10-CM

## 2019-03-09 DIAGNOSIS — T36.95XA ANTIBIOTIC-INDUCED YEAST INFECTION: ICD-10-CM

## 2019-03-09 DIAGNOSIS — B37.9 ANTIBIOTIC-INDUCED YEAST INFECTION: ICD-10-CM

## 2019-03-09 DIAGNOSIS — B96.89 ACUTE BACTERIAL SINUSITIS: Primary | ICD-10-CM

## 2019-03-09 PROCEDURE — 99283 EMERGENCY DEPT VISIT LOW MDM: CPT | Performed by: NURSE PRACTITIONER

## 2019-03-09 PROCEDURE — G0382 LEV 3 HOSP TYPE B ED VISIT: HCPCS | Performed by: NURSE PRACTITIONER

## 2019-03-09 RX ORDER — AMOXICILLIN AND CLAVULANATE POTASSIUM 875; 125 MG/1; MG/1
1 TABLET, FILM COATED ORAL EVERY 12 HOURS SCHEDULED
Qty: 20 TABLET | Refills: 0 | Status: SHIPPED | OUTPATIENT
Start: 2019-03-09 | End: 2019-03-19

## 2019-03-09 RX ORDER — FLUCONAZOLE 150 MG/1
150 TABLET ORAL ONCE
Qty: 1 TABLET | Refills: 0 | Status: SHIPPED | OUTPATIENT
Start: 2019-03-09 | End: 2019-03-09

## 2019-03-09 NOTE — PATIENT INSTRUCTIONS
Take zyrtec, allegra, or Claritin daily  Use flonase 1-2 sprays in each nare daily   Use nasal saline to the nose,   Use humidifer in room  Symptoms worsen go to ER    Use milton pot as well    Take antibiotic until finished   Rest

## 2019-03-09 NOTE — PROGRESS NOTES
NAME: Corey Hurley is a 39 y o  female  : 1975    MRN: [id number]      Assessment and Plan   Acute bacterial sinusitis [J01 90, B96 89]  1  Acute bacterial sinusitis  amoxicillin-clavulanate (AUGMENTIN) 875-125 mg per tablet   2  Antibiotic-induced yeast infection  fluconazole (DIFLUCAN) 150 mg tablet       Priscilla was seen today for nasal congestion  Diagnoses and all orders for this visit:    Acute bacterial sinusitis  -     amoxicillin-clavulanate (AUGMENTIN) 875-125 mg per tablet; Take 1 tablet by mouth every 12 (twelve) hours for 10 days    Antibiotic-induced yeast infection  -     fluconazole (DIFLUCAN) 150 mg tablet; Take 1 tablet (150 mg total) by mouth once for 1 dose        Patient Instructions   Patient Instructions   Take zyrtec, allegra, or Claritin daily  Use flonase 1-2 sprays in each nare daily   Use nasal saline to the nose,   Use humidifer in room  Symptoms worsen go to ER    Use milton pot as well    Take antibiotic until finished   Rest      Proceed to ER if symptoms worsen  Chief Complaint     Chief Complaint   Patient presents with    Nasal Congestion     congestion x2 weeks/ears feel "clogged"/pain  x 2 weeks         History of Present Illness     Pt here today with cough and congestion for the past 2 weeks, nasal and chest congestion, pt is active smoker, and has RX for ventolin and has not needed to use it  Today she has ear pressure on both side that have worsened and cause a lot of discomfort and pain  She is a heavy smoker and has been coughing but has no mucus production at this time  Patient has pain in both nares right side worse than left      Review of Systems   Review of Systems   Constitutional: Positive for fatigue  Negative for chills and fever  HENT: Positive for postnasal drip, rhinorrhea and sinus pressure  Negative for sore throat  Eyes: Negative  Respiratory: Positive for cough  Cardiovascular: Negative            Current Medications       Current Outpatient Medications:     albuterol (VENTOLIN HFA) 90 mcg/act inhaler, Inhale 1-2 puffs, Disp: , Rfl:     amLODIPine (NORVASC) 5 mg tablet, Take 5 mg by mouth daily, Disp: , Rfl:     butalbital-acetaminophen-caffeine (FIORICET,ESGIC) -40 mg per tablet, Take 1 tablet by mouth every 6 (six) hours as needed for headaches, Disp: 12 tablet, Rfl: 0    cholecalciferol (VITAMIN D3) 1,000 units tablet, Take 2,000 Units by mouth daily, Disp: , Rfl:     famotidine (PEPCID) 10 mg tablet, Take 10 mg by mouth 2 (two) times a day, Disp: , Rfl:     levothyroxine (SYNTHROID) 175 mcg tablet, Take by mouth, Disp: , Rfl:     multivitamin (THERAGRAN) TABS, Take 1 tablet by mouth daily, Disp: , Rfl:     amoxicillin-clavulanate (AUGMENTIN) 875-125 mg per tablet, Take 1 tablet by mouth every 12 (twelve) hours for 10 days, Disp: 20 tablet, Rfl: 0    benzonatate (TESSALON) 200 MG capsule, Take 1 capsule (200 mg total) by mouth 3 (three) times a day as needed for cough (Patient not taking: Reported on 1/17/2019 ), Disp: 20 capsule, Rfl: 0    fluconazole (DIFLUCAN) 150 mg tablet, Take 1 tablet (150 mg total) by mouth once for 1 dose, Disp: 1 tablet, Rfl: 0    lisinopril (ZESTRIL) 10 mg tablet, Take 10 mg by mouth daily, Disp: , Rfl:     PARoxetine (PAXIL) 20 mg tablet, Take by mouth, Disp: , Rfl:     pregabalin (LYRICA) 50 mg capsule, Take 1 capsule (50 mg total) by mouth 3 (three) times a day for 30 days (Patient not taking: Reported on 2/4/2019 ), Disp: 90 capsule, Rfl: 1    Current Allergies     Allergies as of 03/09/2019 - Reviewed 03/09/2019   Allergen Reaction Noted    Acetaminophen-codeine  10/23/2017    Hydrocodone Other (See Comments) 02/29/2016    Prednisolone  10/23/2017              Past Medical History:   Diagnosis Date    Anxiety     Disc disorder     Hypothyroidism        History reviewed  No pertinent surgical history  History reviewed  No pertinent family history        Medications have been verified  The following portions of the patient's history were reviewed and updated as appropriate: allergies, current medications, past family history, past medical history, past social history, past surgical history and problem list     Objective   /84   Pulse 83   Temp 98 4 °F (36 9 °C) (Tympanic)   Resp 16   Ht 5' 10" (1 778 m)   Wt 122 kg (268 lb)   SpO2 98%   BMI 38 45 kg/m²      Physical Exam     Physical Exam   Constitutional: She appears well-developed  She is cooperative  HENT:   Head: Normocephalic  Nose: Mucosal edema and sinus tenderness present  Mouth/Throat: Uvula is midline, oropharynx is clear and moist and mucous membranes are normal  Tonsils are 0 on the right  Tonsils are 0 on the left  No tonsillar exudate  TMs bilateral dull to light reflex right side worse than left fluid noted behind the left TM  Turbinates bilaterally are red swollen right side turbinates are red and excoriated thick mucus noted in the left nare   Neck: Trachea normal, normal range of motion and full passive range of motion without pain  Carotid bruit is not present  No thyroid mass present  Cardiovascular: Normal rate, regular rhythm, normal heart sounds and normal pulses  Pulmonary/Chest: Effort normal and breath sounds normal    Neurological: She is alert         Orlena BadderCHUCKNP

## 2019-04-16 ENCOUNTER — TRANSCRIBE ORDERS (OUTPATIENT)
Dept: ADMINISTRATIVE | Facility: HOSPITAL | Age: 44
End: 2019-04-16

## 2019-04-16 DIAGNOSIS — M54.12 CERVICAL RADICULOPATHY: Primary | ICD-10-CM

## 2019-04-16 DIAGNOSIS — Z12.39 BREAST SCREENING, UNSPECIFIED: Primary | ICD-10-CM

## 2019-08-02 ENCOUNTER — HOSPITAL ENCOUNTER (EMERGENCY)
Facility: HOSPITAL | Age: 44
Discharge: HOME | End: 2019-08-02
Attending: EMERGENCY MEDICINE | Admitting: EMERGENCY MEDICINE
Payer: COMMERCIAL

## 2019-08-02 VITALS
WEIGHT: 275 LBS | TEMPERATURE: 99.5 F | BODY MASS INDEX: 39.46 KG/M2 | HEART RATE: 96 BPM | OXYGEN SATURATION: 100 % | RESPIRATION RATE: 16 BRPM | DIASTOLIC BLOOD PRESSURE: 72 MMHG | SYSTOLIC BLOOD PRESSURE: 163 MMHG

## 2019-08-02 DIAGNOSIS — M27.3 DRY SOCKET: Primary | ICD-10-CM

## 2019-08-02 DIAGNOSIS — R51.9 ACUTE HEADACHE: ICD-10-CM

## 2019-08-02 LAB
ANION GAP SERPL CALCULATED.3IONS-SCNC: 10 MMOL/L (ref 4–13)
BASOPHILS # BLD AUTO: 0.03 THOUSANDS/ΜL (ref 0–0.1)
BASOPHILS NFR BLD AUTO: 1 % (ref 0–1)
BUN SERPL-MCNC: 13 MG/DL (ref 5–25)
CALCIUM SERPL-MCNC: 8.9 MG/DL (ref 8.3–10.1)
CHLORIDE SERPL-SCNC: 104 MMOL/L (ref 100–108)
CO2 SERPL-SCNC: 26 MMOL/L (ref 21–32)
CREAT SERPL-MCNC: 0.78 MG/DL (ref 0.6–1.3)
EOSINOPHIL # BLD AUTO: 0.13 THOUSAND/ΜL (ref 0–0.61)
EOSINOPHIL NFR BLD AUTO: 2 % (ref 0–6)
ERYTHROCYTE [DISTWIDTH] IN BLOOD BY AUTOMATED COUNT: 13.9 % (ref 11.6–15.1)
GFR SERPL CREATININE-BSD FRML MDRD: 92 ML/MIN/1.73SQ M
GLUCOSE SERPL-MCNC: 89 MG/DL (ref 65–140)
HCT VFR BLD AUTO: 37.8 % (ref 34.8–46.1)
HGB BLD-MCNC: 12.4 G/DL (ref 11.5–15.4)
IMM GRANULOCYTES # BLD AUTO: 0.05 THOUSAND/UL (ref 0–0.2)
IMM GRANULOCYTES NFR BLD AUTO: 1 % (ref 0–2)
LYMPHOCYTES # BLD AUTO: 0.98 THOUSANDS/ΜL (ref 0.6–4.47)
LYMPHOCYTES NFR BLD AUTO: 17 % (ref 14–44)
MCH RBC QN AUTO: 30 PG (ref 26.8–34.3)
MCHC RBC AUTO-ENTMCNC: 32.8 G/DL (ref 31.4–37.4)
MCV RBC AUTO: 92 FL (ref 82–98)
MONOCYTES # BLD AUTO: 0.47 THOUSAND/ΜL (ref 0.17–1.22)
MONOCYTES NFR BLD AUTO: 8 % (ref 4–12)
NEUTROPHILS # BLD AUTO: 4.18 THOUSANDS/ΜL (ref 1.85–7.62)
NEUTS SEG NFR BLD AUTO: 71 % (ref 43–75)
NRBC BLD AUTO-RTO: 0 /100 WBCS
PLATELET # BLD AUTO: 212 THOUSANDS/UL (ref 149–390)
PMV BLD AUTO: 10 FL (ref 8.9–12.7)
POTASSIUM SERPL-SCNC: 4.4 MMOL/L (ref 3.5–5.3)
RBC # BLD AUTO: 4.13 MILLION/UL (ref 3.81–5.12)
SODIUM SERPL-SCNC: 140 MMOL/L (ref 136–145)
WBC # BLD AUTO: 5.84 THOUSAND/UL (ref 4.31–10.16)

## 2019-08-02 PROCEDURE — 96374 THER/PROPH/DIAG INJ IV PUSH: CPT

## 2019-08-02 PROCEDURE — 93005 ELECTROCARDIOGRAM TRACING: CPT

## 2019-08-02 PROCEDURE — 99285 EMERGENCY DEPT VISIT HI MDM: CPT

## 2019-08-02 PROCEDURE — 85025 COMPLETE CBC W/AUTO DIFF WBC: CPT | Performed by: EMERGENCY MEDICINE

## 2019-08-02 PROCEDURE — 80048 BASIC METABOLIC PNL TOTAL CA: CPT | Performed by: EMERGENCY MEDICINE

## 2019-08-02 PROCEDURE — 96375 TX/PRO/DX INJ NEW DRUG ADDON: CPT

## 2019-08-02 PROCEDURE — 99284 EMERGENCY DEPT VISIT MOD MDM: CPT | Performed by: EMERGENCY MEDICINE

## 2019-08-02 PROCEDURE — 96361 HYDRATE IV INFUSION ADD-ON: CPT

## 2019-08-02 PROCEDURE — 36415 COLL VENOUS BLD VENIPUNCTURE: CPT | Performed by: EMERGENCY MEDICINE

## 2019-08-02 RX ORDER — AMOXICILLIN 500 MG/1
500 CAPSULE ORAL EVERY 8 HOURS SCHEDULED
COMMUNITY
End: 2019-10-19 | Stop reason: ALTCHOICE

## 2019-08-02 RX ORDER — CHLORHEXIDINE GLUCONATE 0.12 MG/ML
15 RINSE ORAL 2 TIMES DAILY
Qty: 120 ML | Refills: 0 | Status: SHIPPED | OUTPATIENT
Start: 2019-08-02 | End: 2019-10-19 | Stop reason: ALTCHOICE

## 2019-08-02 RX ORDER — METOCLOPRAMIDE HYDROCHLORIDE 5 MG/ML
10 INJECTION INTRAMUSCULAR; INTRAVENOUS ONCE
Status: COMPLETED | OUTPATIENT
Start: 2019-08-02 | End: 2019-08-02

## 2019-08-02 RX ORDER — KETOROLAC TROMETHAMINE 30 MG/ML
15 INJECTION, SOLUTION INTRAMUSCULAR; INTRAVENOUS ONCE
Status: COMPLETED | OUTPATIENT
Start: 2019-08-02 | End: 2019-08-02

## 2019-08-02 RX ADMIN — SODIUM CHLORIDE 1000 ML: 0.9 INJECTION, SOLUTION INTRAVENOUS at 20:26

## 2019-08-02 RX ADMIN — METOCLOPRAMIDE 10 MG: 5 INJECTION, SOLUTION INTRAMUSCULAR; INTRAVENOUS at 21:11

## 2019-08-02 RX ADMIN — KETOROLAC TROMETHAMINE 15 MG: 30 INJECTION, SOLUTION INTRAMUSCULAR; INTRAVENOUS at 20:23

## 2019-08-03 NOTE — DISCHARGE INSTRUCTIONS
Dry Socket   WHAT YOU NEED TO KNOW:   A dry socket is a painful condition that develops 1 to 3 days after a permanent tooth has been extracted (removed)  It happens when the blood clot at the site of the extraction dissolves, exposing your jawbone  DISCHARGE INSTRUCTIONS:   Medicines:   · NSAIDs  help decrease swelling and pain or fever  This medicine is available with or without a doctor's order  NSAIDs can cause stomach bleeding or kidney problems in certain people  If you take blood thinner medicine, always ask your healthcare provider if NSAIDs are safe for you  Always read the medicine label and follow directions  · Prescription pain medicine  may be given  Ask your healthcare provider how to take this medicine safely  · Antibiotics  may be prescribed if you have an infection  · Take your medicine as directed  Contact your healthcare provider if you think your medicine is not helping or if you have side effects  Tell him of her if you are allergic to any medicine  Keep a list of the medicines, vitamins, and herbs you take  Include the amounts, and when and why you take them  Bring the list or the pill bottles to follow-up visits  Carry your medicine list with you in case of an emergency  Follow up with your dentist within 2 days, or as directed: Your dentist may need to change or take out the dressing  He will also check to see how your dry socket is healing  Write down your questions so you remember to ask them during your visits  Care for your dry socket:   · Do not smoke  Nicotine in cigarettes may prevent your blood from clotting properly  If you smoke, it is never too late to quit  Ask for information if you need help quitting  · Irrigate your dry socket  if you do not have a dressing  Cleanse your dry socket with a chlorhexidine mouthwash to help remove dead tissue or food  Talk with your dentist about how to use a syringe to irrigate your dry socket   Ask him where you can find an oral solution with chlorhexidine  Contact your dentist if:   · You continue to have pain even after you take pain medicine  · You have a fever  · You have questions or concerns about your condition or care  Return to the emergency department if:   · Your swelling is so bad that you cannot close or open your mouth  · You have trouble breathing  © 2017 [employer] Information is for End User's use only and may not be sold, redistributed or otherwise used for commercial purposes  All illustrations and images included in CareNotes® are the copyrighted property of [employer] , [email or web address]  or [employer]  The above information is an  only  It is not intended as medical advice for individual conditions or treatments  Talk to your doctor, nurse or pharmacist before following any medical regimen to see if it is safe and effective for you

## 2019-08-03 NOTE — ED PROVIDER NOTES
History  Chief Complaint   Patient presents with    Dizziness     Tooth pulled yesterday and now presents with dizziness, stomach pain and chest feels tight  On amoxicillin 500mg every 8 hours     38year old female presents with severe sharp pain from site of her dental extraction yesterday morning radiating to her jaw and towards her right eye  Pain began last night and has gradually worsened since onset  She took a left over percocet last night without improvement and advil this morning also without improvement  She has been taking amoxicillin since the procedure  Patient denies fevers, but reports chill worsening this evening as well as chest tightness and lightheadedness  History provided by:  Patient  Dental Pain   Location:  Upper  Upper teeth location:  2/RU 2nd molar  Quality:  Sharp  Severity:  Severe  Onset quality:  Gradual  Duration:  1 day  Timing:  Constant  Progression:  Worsening  Chronicity:  New  Context: recent dental surgery    Relieved by:  Nothing  Worsened by:  Touching and pressure  Ineffective treatments: percocet and nsaids  Associated symptoms: headaches    Associated symptoms: no congestion, no fever and no neck pain    Risk factors: smoking        Prior to Admission Medications   Prescriptions Last Dose Informant Patient Reported? Taking?    PARoxetine (PAXIL) 20 mg tablet   Yes No   Sig: Take 20 mg by mouth daily    albuterol (VENTOLIN HFA) 90 mcg/act inhaler   Yes No   Sig: Inhale 1-2 puffs   amLODIPine (NORVASC) 5 mg tablet   Yes No   Sig: Take 5 mg by mouth daily   amoxicillin (AMOXIL) 500 mg capsule   Yes Yes   Sig: Take 500 mg by mouth every 8 (eight) hours   butalbital-acetaminophen-caffeine (FIORICET,ESGIC) -40 mg per tablet   No No   Sig: Take 1 tablet by mouth every 6 (six) hours as needed for headaches   cholecalciferol (VITAMIN D3) 1,000 units tablet   Yes No   Sig: Take 2,000 Units by mouth daily   famotidine (PEPCID) 10 mg tablet   Yes No   Sig: Take 10 mg by mouth 2 (two) times a day   levothyroxine (SYNTHROID) 175 mcg tablet   Yes No   Sig: Take 175 mcg by mouth daily    multivitamin (THERAGRAN) TABS   Yes No   Sig: Take 1 tablet by mouth daily      Facility-Administered Medications: None       Past Medical History:   Diagnosis Date    Anxiety     Asthma     Disc disorder     Hypothyroidism     Migraine        Past Surgical History:   Procedure Laterality Date    BACK SURGERY       SECTION      CHOLECYSTECTOMY         History reviewed  No pertinent family history  I have reviewed and agree with the history as documented  Social History     Tobacco Use    Smoking status: Current Every Day Smoker    Smokeless tobacco: Never Used   Substance Use Topics    Alcohol use: Never     Frequency: Never     Comment: socially    Drug use: No        Review of Systems   Constitutional: Positive for chills  Negative for appetite change and fever  HENT: Positive for dental problem  Negative for congestion, rhinorrhea and sore throat  Respiratory: Positive for chest tightness  Negative for cough and shortness of breath  Cardiovascular: Negative for chest pain, palpitations and leg swelling  Gastrointestinal: Negative for abdominal pain, constipation, diarrhea, nausea and vomiting  Genitourinary: Negative for dysuria, frequency and hematuria  Musculoskeletal: Negative for myalgias, neck pain and neck stiffness  Skin: Negative for pallor  Neurological: Positive for light-headedness and headaches  Negative for syncope and weakness  All other systems reviewed and are negative  Physical Exam  Physical Exam   Constitutional: She is oriented to person, place, and time  She appears well-developed and well-nourished  Non-toxic appearance  No distress  HENT:   Head: Normocephalic and atraumatic  Clot does not appear to be present in site of extracted tooth  Exquisitely tender to palpation      Eyes: Pupils are equal, round, and reactive to light  Conjunctivae and EOM are normal    Neck: Normal range of motion  Neck supple  No tracheal deviation present  No thyromegaly present  Cardiovascular: Normal rate, regular rhythm, normal heart sounds and intact distal pulses  Pulmonary/Chest: Effort normal and breath sounds normal    Abdominal: Soft  Bowel sounds are normal  She exhibits no distension  There is no tenderness  Lymphadenopathy:     She has no cervical adenopathy  Neurological: She is alert and oriented to person, place, and time  Skin: Skin is warm and dry  She is not diaphoretic  Psychiatric: Her mood appears anxious  Nursing note and vitals reviewed        Vital Signs  ED Triage Vitals   Temperature Pulse Respirations Blood Pressure SpO2   08/02/19 1934 08/02/19 1935 08/02/19 1935 08/02/19 1935 08/02/19 1935   99 5 °F (37 5 °C) 96 16 163/72 100 %      Temp src Heart Rate Source Patient Position - Orthostatic VS BP Location FiO2 (%)   -- 08/02/19 1935 08/02/19 1935 08/02/19 1935 --    Monitor Lying Right arm       Pain Score       08/02/19 1935       Worst Possible Pain           Vitals:    08/02/19 1935   BP: 163/72   Pulse: 96   Patient Position - Orthostatic VS: Lying         Visual Acuity  Visual Acuity      Most Recent Value   L Pupil Size (mm)  3   R Pupil Size (mm)  3          ED Medications  Medications   sodium chloride 0 9 % bolus 1,000 mL (1,000 mL Intravenous New Bag 8/2/19 2026)   ketorolac (TORADOL) injection 15 mg (15 mg Intravenous Given 8/2/19 2023)   metoclopramide (REGLAN) injection 10 mg (10 mg Intravenous Given 8/2/19 2111)       Diagnostic Studies  Results Reviewed     Procedure Component Value Units Date/Time    Basic metabolic panel [[id number]] Collected:  08/02/19 2027    Lab Status:  Final result Specimen:  Blood from Arm, Right Updated:  08/02/19 2045     Sodium 140 mmol/L      Potassium 4 4 mmol/L      Chloride 104 mmol/L      CO2 26 mmol/L      ANION GAP 10 mmol/L      BUN 13 mg/dL      Creatinine 0 78 mg/dL      Glucose 89 mg/dL      Calcium 8 9 mg/dL      eGFR 92 ml/min/1 73sq m     Narrative:       National Kidney Disease Foundation guidelines for Chronic Kidney Disease (CKD):     Stage 1 with normal or high GFR (GFR > 90 mL/min/1 73 square meters)    Stage 2 Mild CKD (GFR = 60-89 mL/min/1 73 square meters)    Stage 3A Moderate CKD (GFR = 45-59 mL/min/1 73 square meters)    Stage 3B Moderate CKD (GFR = 30-44 mL/min/1 73 square meters)    Stage 4 Severe CKD (GFR = 15-29 mL/min/1 73 square meters)    Stage 5 End Stage CKD (GFR <15 mL/min/1 73 square meters)  Note: GFR calculation is accurate only with a steady state creatinine    CBC and differential [[id number]] Collected:  08/02/19 2027    Lab Status:  Final result Specimen:  Blood from Arm, Right Updated:  08/02/19 2034     WBC 5 84 Thousand/uL      RBC 4 13 Million/uL      Hemoglobin 12 4 g/dL      Hematocrit 37 8 %      MCV 92 fL      MCH 30 0 pg      MCHC 32 8 g/dL      RDW 13 9 %      MPV 10 0 fL      Platelets 086 Thousands/uL      nRBC 0 /100 WBCs      Neutrophils Relative 71 %      Immat GRANS % 1 %      Lymphocytes Relative 17 %      Monocytes Relative 8 %      Eosinophils Relative 2 %      Basophils Relative 1 %      Neutrophils Absolute 4 18 Thousands/µL      Immature Grans Absolute 0 05 Thousand/uL      Lymphocytes Absolute 0 98 Thousands/µL      Monocytes Absolute 0 47 Thousand/µL      Eosinophils Absolute 0 13 Thousand/µL      Basophils Absolute 0 03 Thousands/µL                  No orders to display              Procedures  Dry socket treatment  Date/Time: 8/2/2019 8:41 PM  Performed by: Cedric Delacruz MD  Authorized by: Cedric Delacruz MD     Patient location:  ED  Consent:     Consent obtained:  Verbal    Consent given by:  Patient    Risks discussed:  Infection and pain    Alternatives discussed:  No treatment  Indications:     Indications:  Dry socket post extraction  Anesthesia (see MAR for exact dosages):      Anesthesia method: None  Procedure Detail:     Procedure note (site, laterality, method, findings):  Site flushed with 20 ml saline then dried  Dry socket paste applied to area with improvement in symptoms  Post-procedure details:     Patient tolerance of procedure: Tolerated well, no immediate complications  ECG 12 Lead Documentation Only  Date/Time: 8/2/2019 8:17 PM  Performed by: Cathie Fonseca MD  Authorized by: Cathie Fonseca MD     Indications / Diagnosis:  Chest tightness  ECG reviewed by me, the ED Provider: yes    Patient location:  ED  Previous ECG:     Previous ECG:  Unavailable  Interpretation:     Interpretation: normal    Rate:     ECG rate:  82    ECG rate assessment: normal    Rhythm:     Rhythm: sinus rhythm    Ectopy:     Ectopy: none    QRS:     QRS axis:  Normal    QRS intervals:  Normal  Conduction:     Conduction: normal    ST segments:     ST segments:  Normal  T waves:     T waves: inverted      Inverted:  AVL           ED Course                               MDM  Number of Diagnoses or Management Options  Acute headache: new and requires workup  Dry socket: new and requires workup  Diagnosis management comments: 38year old female presents for evaluation of severe pain after dental extraction  Patient also complaining of lightheadedness and chest tightness  Patient anxious  EKG normal  Labs unremarkable  Significant improvement after toradol and application of dry socket paste  Reglan added for mild persistent headache  Dental follow up  Return precautions provided          Amount and/or Complexity of Data Reviewed  Clinical lab tests: ordered and reviewed    Patient Progress  Patient progress: stable      Disposition  Final diagnoses:   Dry socket   Acute headache     Time reflects when diagnosis was documented in both MDM as applicable and the Disposition within this note     Time User Action Codes Description Comment    8/2/2019  8:48 PM Lore Alfaro [M27 3] Dry socket     8/2/2019  8:52 PM Deisy Byod Add [R51] Acute headache       ED Disposition     ED Disposition Condition Date/Time Comment    Discharge Stable Fri Aug 2, 2019  8:48 PM Abimael Pearson discharge to home/self care  Follow-up Information     Follow up With Specialties Details Why Contact Info Additional Information    your dentist  Call in 1 day for further management      [address] [facility] Emergency Medicine Go to  If symptoms worsen, severe facial swelling, fever >101F [address] [facility] [address] ED, [place] [address], [place], [place], [address]          Patient's Medications   Discharge Prescriptions    CHLORHEXIDINE (PERIDEX) 0 12 % SOLUTION    Apply 15 mL to the mouth or throat 2 (two) times a day       Start Date: 8/2/2019  End Date: --       Order Dose: 15 mL       Quantity: 120 mL    Refills: 0     No discharge procedures on file      ED Provider  Electronically Signed by           Kai Jarrell MD  08/02/19 9828

## 2019-08-04 LAB
ATRIAL RATE: 82 BPM
P AXIS: 47 DEGREES
PR INTERVAL: 146 MS
QRS AXIS: 78 DEGREES
QRSD INTERVAL: 78 MS
QT INTERVAL: 350 MS
QTC INTERVAL: 408 MS
T WAVE AXIS: 69 DEGREES
VENTRICULAR RATE: 82 BPM

## 2019-08-04 PROCEDURE — 93010 ELECTROCARDIOGRAM REPORT: CPT | Performed by: INTERNAL MEDICINE

## 2019-09-09 ENCOUNTER — APPOINTMENT (OUTPATIENT)
Dept: RADIOLOGY | Facility: CLINIC | Age: 44
End: 2019-09-09
Payer: COMMERCIAL

## 2019-09-09 DIAGNOSIS — M79.672 PAIN OF LEFT HEEL: ICD-10-CM

## 2019-09-09 PROCEDURE — 73630 X-RAY EXAM OF FOOT: CPT

## 2019-09-29 ENCOUNTER — APPOINTMENT (OUTPATIENT)
Dept: RADIOLOGY | Facility: CLINIC | Age: 44
End: 2019-09-29
Payer: COMMERCIAL

## 2019-09-29 ENCOUNTER — OFFICE VISIT (OUTPATIENT)
Dept: URGENT CARE | Facility: CLINIC | Age: 44
End: 2019-09-29
Payer: COMMERCIAL

## 2019-09-29 VITALS
WEIGHT: 283 LBS | TEMPERATURE: 99.6 F | OXYGEN SATURATION: 98 % | HEIGHT: 70 IN | DIASTOLIC BLOOD PRESSURE: 78 MMHG | BODY MASS INDEX: 40.52 KG/M2 | HEART RATE: 85 BPM | RESPIRATION RATE: 18 BRPM | SYSTOLIC BLOOD PRESSURE: 122 MMHG

## 2019-09-29 DIAGNOSIS — J40 BRONCHITIS: Primary | ICD-10-CM

## 2019-09-29 DIAGNOSIS — R05.9 COUGH: ICD-10-CM

## 2019-09-29 PROCEDURE — 99283 EMERGENCY DEPT VISIT LOW MDM: CPT | Performed by: PHYSICIAN ASSISTANT

## 2019-09-29 PROCEDURE — G0382 LEV 3 HOSP TYPE B ED VISIT: HCPCS | Performed by: PHYSICIAN ASSISTANT

## 2019-09-29 PROCEDURE — 71046 X-RAY EXAM CHEST 2 VIEWS: CPT

## 2019-09-29 RX ORDER — AZITHROMYCIN 250 MG/1
TABLET, FILM COATED ORAL
Qty: 6 TABLET | Refills: 0 | Status: SHIPPED | OUTPATIENT
Start: 2019-09-29 | End: 2019-10-03

## 2019-09-29 RX ORDER — GUAIFENESIN AND DEXTROMETHORPHAN HYDROBROMIDE 1200; 60 MG/1; MG/1
1 TABLET, EXTENDED RELEASE ORAL 2 TIMES DAILY
Qty: 14 EACH | Refills: 0 | Status: SHIPPED | OUTPATIENT
Start: 2019-09-29 | End: 2019-10-06

## 2019-09-29 RX ORDER — ALBUTEROL SULFATE 90 UG/1
2 AEROSOL, METERED RESPIRATORY (INHALATION) EVERY 6 HOURS PRN
Qty: 18 G | Refills: 0 | Status: SHIPPED | OUTPATIENT
Start: 2019-09-29 | End: 2022-04-27 | Stop reason: SDUPTHER

## 2019-09-29 NOTE — PATIENT INSTRUCTIONS

## 2019-09-29 NOTE — PROGRESS NOTES
Assessment/Plan    Bronchitis [J40]  1  Bronchitis  azithromycin (ZITHROMAX) 250 mg tablet    albuterol (VENTOLIN HFA) 90 mcg/act inhaler    Dextromethorphan-guaiFENesin (MUCINEX DM MAXIMUM STRENGTH)  MG TB12   2  Cough  XR chest pa & lateral         Subjective:     Patient ID: Maryam Rios is a 38 y o  female  Reason For Visit / Chief Complaint  Chief Complaint   Patient presents with    Sore Throat     Started 19 with chills, cough with green phlegm, chest tightness, sore throat, headache         56-year-old female presents to the clinic with productive cough, chest tightness, shortness of breath with coughing fits, sore throat, headaches, subjective fevers and chills  Patient states symptoms started   Patient states that she has tried over-the-counter medications without improvement in symptoms  Past Medical History:   Diagnosis Date    Anxiety     Asthma     Disc disorder     Hypothyroidism     Migraine        Past Surgical History:   Procedure Laterality Date    BACK SURGERY       SECTION      CHOLECYSTECTOMY         History reviewed  No pertinent family history  Review of Systems   Constitutional: Positive for chills and fever  HENT: Positive for congestion  Negative for ear pain, rhinorrhea and sore throat  Respiratory: Positive for cough, chest tightness, shortness of breath and wheezing  Cardiovascular: Negative for chest pain, palpitations and leg swelling  Gastrointestinal: Negative for abdominal pain, constipation, diarrhea, nausea and vomiting  Neurological: Positive for headaches  Negative for dizziness and light-headedness  Objective:    /78   Pulse 85   Temp 99 6 °F (37 6 °C)   Resp 18   Ht 5' 10" (1 778 m)   Wt 128 kg (283 lb)   SpO2 98%   BMI 40 61 kg/m²     Physical Exam   Constitutional: She is oriented to person, place, and time  Vital signs are normal  She appears well-developed and well-nourished   No distress  HENT:   Head: Normocephalic and atraumatic  Right Ear: Tympanic membrane is not erythematous  A middle ear effusion is present  Left Ear: Tympanic membrane is not erythematous  A middle ear effusion is present  Nose: Mucosal edema present  No rhinorrhea  Mouth/Throat: Uvula is midline and mucous membranes are normal  No trismus in the jaw  No uvula swelling  Posterior oropharyngeal erythema (PND) present  Cardiovascular: Normal rate, regular rhythm and normal heart sounds  Pulmonary/Chest: Effort normal  She has decreased breath sounds  Dry coughing fits noted on exam when taking deep breaths  Musculoskeletal: Normal range of motion  Neurological: She is alert and oriented to person, place, and time  Skin: Skin is warm and dry  She is not diaphoretic  Nursing note and vitals reviewed

## 2019-10-19 ENCOUNTER — OFFICE VISIT (OUTPATIENT)
Dept: URGENT CARE | Facility: CLINIC | Age: 44
End: 2019-10-19
Payer: COMMERCIAL

## 2019-10-19 VITALS
BODY MASS INDEX: 40.94 KG/M2 | OXYGEN SATURATION: 98 % | RESPIRATION RATE: 18 BRPM | WEIGHT: 286 LBS | HEIGHT: 70 IN | TEMPERATURE: 99.2 F | HEART RATE: 100 BPM | SYSTOLIC BLOOD PRESSURE: 102 MMHG | DIASTOLIC BLOOD PRESSURE: 80 MMHG

## 2019-10-19 DIAGNOSIS — J45.31 MILD PERSISTENT ASTHMA WITH EXACERBATION: Primary | ICD-10-CM

## 2019-10-19 PROCEDURE — 99283 EMERGENCY DEPT VISIT LOW MDM: CPT | Performed by: NURSE PRACTITIONER

## 2019-10-19 PROCEDURE — G0382 LEV 3 HOSP TYPE B ED VISIT: HCPCS | Performed by: NURSE PRACTITIONER

## 2019-10-19 RX ORDER — BENZONATATE 200 MG/1
200 CAPSULE ORAL 3 TIMES DAILY PRN
Qty: 20 CAPSULE | Refills: 0 | Status: SHIPPED | OUTPATIENT
Start: 2019-10-19 | End: 2020-02-22 | Stop reason: ALTCHOICE

## 2019-10-19 RX ORDER — MECLIZINE HYDROCHLORIDE 25 MG/1
25 TABLET ORAL
COMMUNITY
Start: 2019-08-04 | End: 2020-03-09

## 2019-10-19 RX ORDER — PREDNISONE 20 MG/1
TABLET ORAL
Qty: 18 TABLET | Refills: 0 | Status: SHIPPED | OUTPATIENT
Start: 2019-10-19 | End: 2020-02-22 | Stop reason: ALTCHOICE

## 2019-10-19 NOTE — PROGRESS NOTES
[facility]        NAME: Janet Jara is a 38 y o  female  : 1975    MRN: [id number]  DATE: 2019  TIME: 11:46 AM    Assessment and Plan   Mild persistent asthma with exacerbation [J45 31]  1  Mild persistent asthma with exacerbation  benzonatate (TESSALON) 200 MG capsule    predniSONE 20 mg tablet         Patient Instructions     Continue with inhaler  Follow up with PCP in 3-5 days; recommend testing for COPD  Stop smoking   Proceed to  ER if symptoms worsen  Chief Complaint     Chief Complaint   Patient presents with    Cough     Seen 2 weeks ago  States not better  Denies fever  C/o fatigue, chest and lower back pain  History of Present Illness       HPI   Reports ongoing coughing, for well over 2 weeks  was treated for bronchitis/asthma two weeks ago, with z-jason, albuterol inhaler and OTC cough med  Says she has seen improvement but the coughing persist  CXR was normal, except for degenerative disc disease  Chronic smoker, of about 29 years  Currently smokes about 1/2 pack a day  Takes allegra for allergies  Review of Systems   Review of Systems   Constitutional: Positive for fatigue  Negative for chills and fever  HENT: Positive for rhinorrhea  Negative for sinus pressure, sneezing, sore throat and trouble swallowing  Respiratory: Positive for cough, chest tightness, shortness of breath and wheezing (early morning and late nights)  Cardiovascular: Positive for chest pain (from coughing alot)  Gastrointestinal: Negative for nausea and vomiting           Current Medications       Current Outpatient Medications:     albuterol (VENTOLIN HFA) 90 mcg/act inhaler, Inhale 2 puffs every 6 (six) hours as needed for wheezing, Disp: 18 g, Rfl: 0    amLODIPine (NORVASC) 5 mg tablet, Take 5 mg by mouth daily, Disp: , Rfl:     butalbital-acetaminophen-caffeine (FIORICET,ESGIC) -40 mg per tablet, Take 1 tablet by mouth every 6 (six) hours as needed for headaches, Disp: 12 tablet, Rfl: 0    cholecalciferol (VITAMIN D3) 1,000 units tablet, Take 2,000 Units by mouth daily, Disp: , Rfl:     famotidine (PEPCID) 10 mg tablet, Take 10 mg by mouth 2 (two) times a day, Disp: , Rfl:     levothyroxine (SYNTHROID) 175 mcg tablet, Take 175 mcg by mouth daily , Disp: , Rfl:     multivitamin (THERAGRAN) TABS, Take 1 tablet by mouth daily, Disp: , Rfl:     PARoxetine (PAXIL) 20 mg tablet, Take 25 mg by mouth daily , Disp: , Rfl:     benzonatate (TESSALON) 200 MG capsule, Take 1 capsule (200 mg total) by mouth 3 (three) times a day as needed for cough, Disp: 20 capsule, Rfl: 0    meclizine (ANTIVERT) 25 mg tablet, 25 mg, Disp: , Rfl:     predniSONE 20 mg tablet, 1 tab 3x/day for 3 days, then BID x 3 days, then daily x 3 days, Disp: 18 tablet, Rfl: 0    Current Allergies     Allergies as of 10/19/2019 - Reviewed 10/19/2019   Allergen Reaction Noted    Acetaminophen-codeine  10/23/2017    Hydrocodone Other (See Comments) 2016    Prednisolone  10/23/2017            The following portions of the patient's history were reviewed and updated as appropriate: allergies, current medications, past family history, past medical history, past social history, past surgical history and problem list      Past Medical History:   Diagnosis Date    Anxiety     Asthma     Disc disorder     Hypothyroidism     Migraine        Past Surgical History:   Procedure Laterality Date    BACK SURGERY       SECTION      CHOLECYSTECTOMY         History reviewed  No pertinent family history  Medications have been verified  Objective   /80   Pulse 100   Temp 99 2 °F (37 3 °C)   Resp 18   Ht 5' 10" (1 778 m)   Wt 130 kg (286 lb)   SpO2 98%   BMI 41 04 kg/m²        Physical Exam     Physical Exam   Constitutional: No distress  HENT:   Right Ear: External ear normal    Left Ear: External ear normal    Cardiovascular: Normal rate and regular rhythm  Pulmonary/Chest: Effort normal and breath sounds normal  She has no wheezes  She exhibits no tenderness  Lymphadenopathy:     She has no cervical adenopathy

## 2019-10-19 NOTE — PATIENT INSTRUCTIONS
Asthma   WHAT YOU NEED TO KNOW:   Asthma is a lung disease that makes breathing difficult  Chronic inflammation and reactions to triggers narrow the airways in the lungs  Asthma can become life-threatening if it is not managed  DISCHARGE INSTRUCTIONS:   Return to the emergency department if:   · You have severe shortness of breath  · Your lips or nails turn blue or gray  · The skin around your neck and ribs pulls in with each breath  · You have shortness of breath, even after you take your short-term medicine as directed  · Your peak flow numbers are in the red zone of your AAP  Contact your healthcare provider if:   · You run out of medicine before your next refill is due  · Your symptoms get worse  · You need to take more medicine than usual to control your symptoms  · You have questions or concerns about your condition or care  Medicines:   · Medicines  decrease inflammation, open airways, and make it easier to breathe  Medicines may be inhaled, taken as a pill, or injected  Short-term medicines relieve your symptoms quickly  Long-term medicines are used to prevent future attacks  You may also need medicine to help control your allergies  Ask your healthcare provider for more information about the medicine you are given and how to take it safely  · Take your medicine as directed  Contact your healthcare provider if you think your medicine is not helping or if you have side effects  Tell him of her if you are allergic to any medicine  Keep a list of the medicines, vitamins, and herbs you take  Include the amounts, and when and why you take them  Bring the list or the pill bottles to follow-up visits  Carry your medicine list with you in case of an emergency  Follow up with your healthcare provider as directed: You will need to return to make sure your medicine is working and your symptoms are controlled   You may be referred to an asthma specialist  Paige Snow may be asked to keep a record of your peak flow values and bring it with you to your appointments  Write down your questions so you remember to ask them during your visits  Manage your symptoms and prevent future attacks:   · Follow your Asthma Action Plan (AAP)  This is a written plan that you and your healthcare provider create  It explains which medicine you need and when to change doses if necessary  It also explains how you can monitor symptoms and use a peak flow meter  The meter measures how well your lungs are working  · Manage other health conditions , such as allergies, acid reflux, and sleep apnea  · Identify and avoid triggers  These may include pets, dust mites, mold, and cockroaches  · Do not smoke or be around others who smoke  Nicotine and other chemicals in cigarettes and cigars can cause lung damage  Ask your healthcare provider for information if you currently smoke and need help to quit  E-cigarettes or smokeless tobacco still contain nicotine  Talk to your healthcare provider before you use these products  · Ask about the flu vaccine  The flu can make your asthma worse  You may need a yearly flu shot  © 2017 [facility] [facility] Information is for End User's use only and may not be sold, redistributed or otherwise used for commercial purposes  All illustrations and images included in CareNotes® are the copyrighted property of A D A M [employer] Inc  or Casey Contreras  The above information is an  only  It is not intended as medical advice for individual conditions or treatments  Talk to your doctor, nurse or pharmacist before following any medical regimen to see if it is safe and effective for you

## 2020-02-21 ENCOUNTER — HOSPITAL ENCOUNTER (EMERGENCY)
Facility: HOSPITAL | Age: 45
Discharge: HOME | End: 2020-02-21
Attending: EMERGENCY MEDICINE | Admitting: EMERGENCY MEDICINE
Payer: COMMERCIAL

## 2020-02-21 VITALS
OXYGEN SATURATION: 95 % | TEMPERATURE: 98.4 F | SYSTOLIC BLOOD PRESSURE: 128 MMHG | WEIGHT: 283 LBS | HEART RATE: 83 BPM | RESPIRATION RATE: 18 BRPM | HEIGHT: 70 IN | DIASTOLIC BLOOD PRESSURE: 75 MMHG | BODY MASS INDEX: 40.52 KG/M2

## 2020-02-21 DIAGNOSIS — K52.9 GASTROENTERITIS: Primary | ICD-10-CM

## 2020-02-21 DIAGNOSIS — R10.9 ABDOMINAL PAIN: ICD-10-CM

## 2020-02-21 DIAGNOSIS — E86.0 DEHYDRATION: ICD-10-CM

## 2020-02-21 LAB
ALBUMIN SERPL BCP-MCNC: 3.8 G/DL (ref 3.5–5)
ALP SERPL-CCNC: 68 U/L (ref 46–116)
ALT SERPL W P-5'-P-CCNC: 28 U/L (ref 12–78)
ANION GAP SERPL CALCULATED.3IONS-SCNC: 15 MMOL/L (ref 4–13)
AST SERPL W P-5'-P-CCNC: 16 U/L (ref 5–45)
ATRIAL RATE: 91 BPM
BASOPHILS # BLD AUTO: 0.06 THOUSANDS/ΜL (ref 0–0.1)
BASOPHILS NFR BLD AUTO: 0 % (ref 0–1)
BILIRUB SERPL-MCNC: 0.4 MG/DL (ref 0.2–1)
BUN SERPL-MCNC: 20 MG/DL (ref 5–25)
CALCIUM SERPL-MCNC: 9.5 MG/DL (ref 8.3–10.1)
CHLORIDE SERPL-SCNC: 106 MMOL/L (ref 100–108)
CO2 SERPL-SCNC: 22 MMOL/L (ref 21–32)
CREAT SERPL-MCNC: 1.06 MG/DL (ref 0.6–1.3)
EOSINOPHIL # BLD AUTO: 0.07 THOUSAND/ΜL (ref 0–0.61)
EOSINOPHIL NFR BLD AUTO: 0 % (ref 0–6)
ERYTHROCYTE [DISTWIDTH] IN BLOOD BY AUTOMATED COUNT: 13.8 % (ref 11.6–15.1)
EXT PREG TEST URINE: NEGATIVE
EXT. CONTROL ED NAV: NORMAL
GFR SERPL CREATININE-BSD FRML MDRD: 64 ML/MIN/1.73SQ M
GLUCOSE SERPL-MCNC: 149 MG/DL (ref 65–140)
HCT VFR BLD AUTO: 46.4 % (ref 34.8–46.1)
HGB BLD-MCNC: 15.7 G/DL (ref 11.5–15.4)
IMM GRANULOCYTES # BLD AUTO: 0.07 THOUSAND/UL (ref 0–0.2)
IMM GRANULOCYTES NFR BLD AUTO: 0 % (ref 0–2)
LIPASE SERPL-CCNC: 107 U/L (ref 73–393)
LYMPHOCYTES # BLD AUTO: 0.91 THOUSANDS/ΜL (ref 0.6–4.47)
LYMPHOCYTES NFR BLD AUTO: 6 % (ref 14–44)
MCH RBC QN AUTO: 30.1 PG (ref 26.8–34.3)
MCHC RBC AUTO-ENTMCNC: 33.8 G/DL (ref 31.4–37.4)
MCV RBC AUTO: 89 FL (ref 82–98)
MONOCYTES # BLD AUTO: 0.86 THOUSAND/ΜL (ref 0.17–1.22)
MONOCYTES NFR BLD AUTO: 5 % (ref 4–12)
NEUTROPHILS # BLD AUTO: 14.07 THOUSANDS/ΜL (ref 1.85–7.62)
NEUTS SEG NFR BLD AUTO: 89 % (ref 43–75)
NRBC BLD AUTO-RTO: 0 /100 WBCS
P AXIS: 70 DEGREES
PLATELET # BLD AUTO: 344 THOUSANDS/UL (ref 149–390)
PMV BLD AUTO: 10.2 FL (ref 8.9–12.7)
POTASSIUM SERPL-SCNC: 3.8 MMOL/L (ref 3.5–5.3)
PR INTERVAL: 152 MS
PROT SERPL-MCNC: 7.7 G/DL (ref 6.4–8.2)
QRS AXIS: 83 DEGREES
QRSD INTERVAL: 80 MS
QT INTERVAL: 354 MS
QTC INTERVAL: 435 MS
RBC # BLD AUTO: 5.21 MILLION/UL (ref 3.81–5.12)
SODIUM SERPL-SCNC: 143 MMOL/L (ref 136–145)
T WAVE AXIS: 73 DEGREES
VENTRICULAR RATE: 91 BPM
WBC # BLD AUTO: 16.04 THOUSAND/UL (ref 4.31–10.16)

## 2020-02-21 PROCEDURE — 93010 ELECTROCARDIOGRAM REPORT: CPT | Performed by: INTERNAL MEDICINE

## 2020-02-21 PROCEDURE — 83690 ASSAY OF LIPASE: CPT | Performed by: EMERGENCY MEDICINE

## 2020-02-21 PROCEDURE — 99285 EMERGENCY DEPT VISIT HI MDM: CPT | Performed by: EMERGENCY MEDICINE

## 2020-02-21 PROCEDURE — 36415 COLL VENOUS BLD VENIPUNCTURE: CPT | Performed by: EMERGENCY MEDICINE

## 2020-02-21 PROCEDURE — 96376 TX/PRO/DX INJ SAME DRUG ADON: CPT

## 2020-02-21 PROCEDURE — 96374 THER/PROPH/DIAG INJ IV PUSH: CPT

## 2020-02-21 PROCEDURE — 93005 ELECTROCARDIOGRAM TRACING: CPT

## 2020-02-21 PROCEDURE — 99284 EMERGENCY DEPT VISIT MOD MDM: CPT

## 2020-02-21 PROCEDURE — 85025 COMPLETE CBC W/AUTO DIFF WBC: CPT | Performed by: EMERGENCY MEDICINE

## 2020-02-21 PROCEDURE — 96375 TX/PRO/DX INJ NEW DRUG ADDON: CPT

## 2020-02-21 PROCEDURE — 80053 COMPREHEN METABOLIC PANEL: CPT | Performed by: EMERGENCY MEDICINE

## 2020-02-21 PROCEDURE — 96361 HYDRATE IV INFUSION ADD-ON: CPT

## 2020-02-21 PROCEDURE — 81025 URINE PREGNANCY TEST: CPT | Performed by: EMERGENCY MEDICINE

## 2020-02-21 RX ORDER — KETOROLAC TROMETHAMINE 30 MG/ML
15 INJECTION, SOLUTION INTRAMUSCULAR; INTRAVENOUS ONCE
Status: COMPLETED | OUTPATIENT
Start: 2020-02-21 | End: 2020-02-21

## 2020-02-21 RX ORDER — ONDANSETRON 4 MG/1
4 TABLET, ORALLY DISINTEGRATING ORAL EVERY 8 HOURS PRN
Qty: 12 TABLET | Refills: 0 | Status: SHIPPED | OUTPATIENT
Start: 2020-02-21 | End: 2021-11-03

## 2020-02-21 RX ORDER — ONDANSETRON 2 MG/ML
4 INJECTION INTRAMUSCULAR; INTRAVENOUS ONCE
Status: COMPLETED | OUTPATIENT
Start: 2020-02-21 | End: 2020-02-21

## 2020-02-21 RX ADMIN — SODIUM CHLORIDE 1000 ML: 0.9 INJECTION, SOLUTION INTRAVENOUS at 03:56

## 2020-02-21 RX ADMIN — ONDANSETRON 4 MG: 2 INJECTION INTRAMUSCULAR; INTRAVENOUS at 04:21

## 2020-02-21 RX ADMIN — SODIUM CHLORIDE 1000 ML: 0.9 INJECTION, SOLUTION INTRAVENOUS at 02:23

## 2020-02-21 RX ADMIN — ONDANSETRON 4 MG: 2 INJECTION INTRAMUSCULAR; INTRAVENOUS at 02:28

## 2020-02-21 RX ADMIN — KETOROLAC TROMETHAMINE 15 MG: 30 INJECTION, SOLUTION INTRAMUSCULAR; INTRAVENOUS at 02:29

## 2020-02-21 NOTE — DISCHARGE INSTRUCTIONS
Abdominal Pain   WHAT YOU NEED TO KNOW:   Abdominal pain can be dull, achy, or sharp  You may have pain in one area of your abdomen, or in your entire abdomen  Your pain may be caused by a condition such as constipation, food sensitivity or poisoning, infection, or a blockage  Abdominal pain can also be from a hernia, appendicitis, or an ulcer  Liver, gallbladder, or kidney conditions can also cause abdominal pain  The cause of your abdominal pain may be unknown  DISCHARGE INSTRUCTIONS:   Return to the emergency department if:   · You have new chest pain or shortness of breath  · You have pulsing pain in your upper abdomen or lower back that suddenly becomes constant  · Your pain is in the right lower abdominal area and worsens with movement  · You have a fever over 100 4°F (38°C) or shaking chills  · You are vomiting and cannot keep food or liquids down  · Your pain does not improve or gets worse over the next 8 to 12 hours  · You see blood in your vomit or bowel movements, or they look black and tarry  · Your skin or the whites of your eyes turn yellow  · You are a woman and have a large amount of vaginal bleeding that is not your monthly period  Contact your healthcare provider if:   · You have pain in your lower back  · You are a man and have pain in your testicles  · You have pain when you urinate  · You have questions or concerns about your condition or care  Follow up with your healthcare provider within 24 hours or as directed:  Write down your questions so you remember to ask them during your visits  Medicines:   · Medicines  may be given to calm your stomach and prevent vomiting or to decrease pain  Ask how to take pain medicine safely  · Take your medicine as directed  Contact your healthcare provider if you think your medicine is not helping or if you have side effects  Tell him of her if you are allergic to any medicine   Keep a list of the medicines, vitamins, and herbs you take  Include the amounts, and when and why you take them  Bring the list or the pill bottles to follow-up visits  Carry your medicine list with you in case of an emergency  © 2017 [employer]  Information is for End User's use only and may not be sold, redistributed or otherwise used for commercial purposes  All illustrations and images included in CareNotes® are the copyrighted property of A D A M [employer]  or [employer]  The above information is an  only  It is not intended as medical advice for individual conditions or treatments  Talk to your doctor, nurse or pharmacist before following any medical regimen to see if it is safe and effective for you  Gastroenteritis   WHAT YOU NEED TO KNOW:   Gastroenteritis, or stomach flu, is an infection of the stomach and intestines  DISCHARGE INSTRUCTIONS:   Call 911 for any of the following:   · You have trouble breathing or a very fast pulse  Return to the emergency department if:   · You see blood in your diarrhea  · You cannot stop vomiting  · You have not urinated for 12 hours  · You feel like you are going to faint  Contact your healthcare provider if:   · You have a fever  · You continue to vomit or have diarrhea, even after treatment  · You see worms in your diarrhea  · Your mouth or eyes are dry  You are not urinating as much or as often  · You have questions or concerns about your condition or care  Medicines:   · Medicines  may be given to stop vomiting or diarrhea, decrease abdominal cramps, or treat an infection  · Take your medicine as directed  Contact your healthcare provider if you think your medicine is not helping or if you have side effects  Tell him or her if you are allergic to any medicine  Keep a list of the medicines, vitamins, and herbs you take  Include the amounts, and when and why you take them   Bring the list or the pill bottles to follow-up visits  Carry your medicine list with you in case of an emergency  Manage your symptoms:   · Drink liquids as directed  Ask your healthcare provider how much liquid to drink each day, and which liquids are best for you  You may also need to drink an oral rehydration solution (ORS)  An ORS has the right amounts of sugar, salt, and minerals in water to replace body fluids  · Eat bland foods  When you feel hungry, begin eating soft, bland foods  Examples are bananas, clear soup, potatoes, and applesauce  Do not have dairy products, alcohol, sugary drinks, or drinks with caffeine until you feel better  · Rest as much as possible  Slowly start to do more each day when you begin to feel better  Prevent the spread of gastroenteritis:  Gastroenteritis can spread easily  Keep yourself, your family, and your surroundings clean to help prevent the spread of gastroenteritis:  · Wash your hands often  Use soap and water  Wash your hands after you use the bathroom, change a child's diapers, or sneeze  Wash your hands before you prepare or eat food  · Clean surfaces and do laundry often  Wash your clothes and towels separately from the rest of the laundry  Clean surfaces in your home with antibacterial  or bleach  · Clean food thoroughly and cook safely  Wash raw vegetables before you cook  Cook meat, fish, and eggs fully  Do not use the same dishes for raw meat as you do for other foods  Refrigerate any leftover food immediately  · Be aware when you camp or travel  Drink only clean water  Do not drink from rivers or lakes unless you purify or boil the water first  When you travel, drink bottled water and do not add ice  Do not eat fruit that has not been peeled  Do not eat raw fish or meat that is not fully cooked  Follow up with your healthcare provider as directed:  Write down your questions so you remember to ask them during your visits     © 2017 [facility] [address] [facility] is for End User's use only and may not be sold, redistributed or otherwise used for commercial purposes  All illustrations and images included in CareNotes® are the copyrighted property of A D A M , Inc  or [employer] Contreras  The above information is an  only  It is not intended as medical advice for individual conditions or treatments  Talk to your doctor, nurse or pharmacist before following any medical regimen to see if it is safe and effective for you

## 2020-02-21 NOTE — ED NOTES
Pt ambulated to bathroom, voided without difficulty  Steady gait  Denies dizziness  No s/s distress        Barbara Acosta RN  02/21/20 4974

## 2020-02-21 NOTE — ED PROVIDER NOTES
History  Chief Complaint   Patient presents with    Abdominal Pain    Diarrhea    Nausea     71-year-old female brought in by ambulance for evaluation of nausea, vomiting, diarrhea and diffuse abdominal cramping beginning approximately 2 hours ago  Patient states that she was lying in bed when the symptoms awoke her from sleep  She reports approximately 4 episodes of nonbloody, nonbilious emesis as well as countless episodes of watery diarrhea since onset of symptoms  Patient states that she had been feeling nauseated since approximately 5:00 p m  this evening  She had eaten a tuna sandwich from [facility] at 3:00 p m  preceding the onset of this nausea  She has not eaten since having the sandwich at 3:00 pm  She denies any other sick contacts  No cough, congestion or myalgias  No fever, but chills beginning with the episodes of vomiting this evening        History provided by:  Patient  Abdominal Pain   Pain location:  Generalized  Pain quality: cramping    Pain radiates to:  Does not radiate  Pain severity:  Severe  Onset quality:  Gradual  Duration:  2 hours  Timing:  Constant  Progression:  Worsening  Chronicity:  New  Context: awakening from sleep    Relieved by:  None tried  Worsened by:  Nothing  Ineffective treatments:  None tried  Associated symptoms: chills, diarrhea, nausea and vomiting    Associated symptoms: no chest pain, no constipation, no cough, no dysuria, no fever, no hematuria, no shortness of breath and no sore throat    Diarrhea:     Quality:  Watery    Number of occurrences:  Multiple    Severity:  Severe    Duration:  2 hours    Timing:  Constant    Progression:  Unchanged  Nausea:     Severity:  Severe    Onset quality:  Gradual    Duration:  9 hours    Timing:  Constant    Progression:  Worsening  Vomiting:     Quality:  Stomach contents    Number of occurrences:  4    Severity:  Moderate    Duration:  2 hours    Timing:  Constant    Progression:  Unchanged  Risk factors comment: Cholecystectomy,   Diarrhea   Associated symptoms: abdominal pain, chills and vomiting    Associated symptoms: no fever, no headaches and no myalgias    Nausea   The primary symptoms include abdominal pain, nausea, vomiting and diarrhea  Primary symptoms do not include fever, dysuria or myalgias  The illness is also significant for chills  The illness does not include constipation  Prior to Admission Medications   Prescriptions Last Dose Informant Patient Reported? Taking?    PARoxetine (PAXIL) 20 mg tablet   Yes No   Sig: Take 25 mg by mouth daily    albuterol (VENTOLIN HFA) 90 mcg/act inhaler   No No   Sig: Inhale 2 puffs every 6 (six) hours as needed for wheezing   amLODIPine (NORVASC) 5 mg tablet   Yes No   Sig: Take 5 mg by mouth daily   benzonatate (TESSALON) 200 MG capsule Not Taking at Unknown time  No No   Sig: Take 1 capsule (200 mg total) by mouth 3 (three) times a day as needed for cough   Patient not taking: Reported on 2020   butalbital-acetaminophen-caffeine (FIORICET,ESGIC) -40 mg per tablet Not Taking at Unknown time  No No   Sig: Take 1 tablet by mouth every 6 (six) hours as needed for headaches   Patient not taking: Reported on 2020   cholecalciferol (VITAMIN D3) 1,000 units tablet   Yes No   Sig: Take 2,000 Units by mouth daily   famotidine (PEPCID) 10 mg tablet   Yes No   Sig: Take 10 mg by mouth 2 (two) times a day   levothyroxine (SYNTHROID) 175 mcg tablet   Yes No   Sig: Take 175 mcg by mouth daily    meclizine (ANTIVERT) 25 mg tablet   Yes No   Si mg   multivitamin (THERAGRAN) TABS   Yes No   Sig: Take 1 tablet by mouth daily   predniSONE 20 mg tablet Not Taking at Unknown time  No No   Si tab 3x/day for 3 days, then BID x 3 days, then daily x 3 days   Patient not taking: Reported on 2020      Facility-Administered Medications: None       Past Medical History:   Diagnosis Date    Anxiety     Asthma     Disc disorder     Hypertension     Hypothyroidism     Migraine        Past Surgical History:   Procedure Laterality Date    BACK SURGERY      CERVICAL FUSION       SECTION      CHOLECYSTECTOMY      FOOT SURGERY Bilateral     TUBAL LIGATION Bilateral 2012       History reviewed  No pertinent family history  I have reviewed and agree with the history as documented  Social History     Tobacco Use    Smoking status: Current Every Day Smoker     Packs/day: 1 00     Types: Cigarettes    Smokeless tobacco: Never Used   Substance Use Topics    Alcohol use: Never     Frequency: Never     Comment: socially    Drug use: No       Review of Systems   Constitutional: Positive for appetite change and chills  Negative for fever  HENT: Negative for congestion, rhinorrhea and sore throat  Respiratory: Negative for cough, chest tightness and shortness of breath  Cardiovascular: Negative for chest pain, palpitations and leg swelling  Gastrointestinal: Positive for abdominal pain, diarrhea, nausea and vomiting  Negative for constipation  Genitourinary: Negative for dysuria, frequency and hematuria  Musculoskeletal: Negative for myalgias, neck pain and neck stiffness  Skin: Negative for pallor  Neurological: Negative for syncope, weakness and headaches  All other systems reviewed and are negative  Physical Exam  Physical Exam   Constitutional: She is oriented to person, place, and time  She appears well-developed and well-nourished  Non-toxic appearance  No distress  HENT:   Head: Normocephalic and atraumatic  Eyes: Pupils are equal, round, and reactive to light  Conjunctivae and EOM are normal    Neck: Normal range of motion  Neck supple  No tracheal deviation present  No thyromegaly present  Cardiovascular: Normal rate, regular rhythm, normal heart sounds and intact distal pulses  Pulmonary/Chest: Effort normal and breath sounds normal    Abdominal: Soft  Bowel sounds are normal  She exhibits no distension   There is generalized tenderness  There is no rigidity, no rebound and no guarding  Lymphadenopathy:     She has no cervical adenopathy  Neurological: She is alert and oriented to person, place, and time  Skin: Skin is warm and dry  She is not diaphoretic  Nursing note and vitals reviewed        Vital Signs  ED Triage Vitals [02/21/20 0210]   Temperature Pulse Respirations Blood Pressure SpO2   (!) 96 9 °F (36 1 °C) 88 20 133/70 99 %      Temp Source Heart Rate Source Patient Position - Orthostatic VS BP Location FiO2 (%)   Tympanic Monitor Lying -- --      Pain Score       8           Vitals:    02/21/20 0430 02/21/20 0445 02/21/20 0500 02/21/20 0515   BP:   128/75    Pulse: 79 80 82 83   Patient Position - Orthostatic VS:             Visual Acuity      ED Medications  Medications   ketorolac (TORADOL) injection 15 mg (15 mg Intravenous Given 2/21/20 0229)   ondansetron (ZOFRAN) injection 4 mg (4 mg Intravenous Given 2/21/20 0228)   sodium chloride 0 9 % bolus 1,000 mL (0 mL Intravenous Stopped 2/21/20 0356)   sodium chloride 0 9 % bolus 1,000 mL (0 mL Intravenous Stopped 2/21/20 0527)   ondansetron (ZOFRAN) injection 4 mg (4 mg Intravenous Given 2/21/20 0421)       Diagnostic Studies  Results Reviewed     Procedure Component Value Units Date/Time    POCT pregnancy, urine [[id number]]  (Normal) Resulted:  02/21/20 0533    Lab Status:  Final result Updated:  02/21/20 0533     EXT PREG TEST UR (Ref: Negative) negative     Control valid    Comprehensive metabolic panel [[id number]]  (Abnormal) Collected:  02/21/20 0223    Lab Status:  Final result Specimen:  Blood from Arm, Right Updated:  02/21/20 0247     Sodium 143 mmol/L      Potassium 3 8 mmol/L      Chloride 106 mmol/L      CO2 22 mmol/L      ANION GAP 15 mmol/L      BUN 20 mg/dL      Creatinine 1 06 mg/dL      Glucose 149 mg/dL      Calcium 9 5 mg/dL      AST 16 U/L      ALT 28 U/L      Alkaline Phosphatase 68 U/L      Total Protein 7 7 g/dL      Albumin 3 8 g/dL Total Bilirubin 0 40 mg/dL      eGFR 64 ml/min/1 73sq m     Narrative:       National Kidney Disease Foundation guidelines for Chronic Kidney Disease (CKD):     Stage 1 with normal or high GFR (GFR > 90 mL/min/1 73 square meters)    Stage 2 Mild CKD (GFR = 60-89 mL/min/1 73 square meters)    Stage 3A Moderate CKD (GFR = 45-59 mL/min/1 73 square meters)    Stage 3B Moderate CKD (GFR = 30-44 mL/min/1 73 square meters)    Stage 4 Severe CKD (GFR = 15-29 mL/min/1 73 square meters)    Stage 5 End Stage CKD (GFR <15 mL/min/1 73 square meters)  Note: GFR calculation is accurate only with a steady state creatinine    Lipase [[id number]]  (Normal) Collected:  02/21/20 0223    Lab Status:  Final result Specimen:  Blood from Arm, Right Updated:  02/21/20 0247     Lipase 107 u/L     CBC and differential [[id number]]  (Abnormal) Collected:  02/21/20 0223    Lab Status:  Final result Specimen:  Blood from Arm, Right Updated:  02/21/20 0230     WBC 16 04 Thousand/uL      RBC 5 21 Million/uL      Hemoglobin 15 7 g/dL      Hematocrit 46 4 %      MCV 89 fL      MCH 30 1 pg      MCHC 33 8 g/dL      RDW 13 8 %      MPV 10 2 fL      Platelets 676 Thousands/uL      nRBC 0 /100 WBCs      Neutrophils Relative 89 %      Immat GRANS % 0 %      Lymphocytes Relative 6 %      Monocytes Relative 5 %      Eosinophils Relative 0 %      Basophils Relative 0 %      Neutrophils Absolute 14 07 Thousands/µL      Immature Grans Absolute 0 07 Thousand/uL      Lymphocytes Absolute 0 91 Thousands/µL      Monocytes Absolute 0 86 Thousand/µL      Eosinophils Absolute 0 07 Thousand/µL      Basophils Absolute 0 06 Thousands/µL                  No orders to display              Procedures  ECG 12 Lead Documentation Only  Date/Time: 2/21/2020 2:05 AM  Performed by: Armin Brandon MD  Authorized by: Armin Brandon MD     Indications / Diagnosis:  Abdominal pain  ECG reviewed by me, the ED Provider: yes    Patient location:  ED  Previous ECG: Previous ECG:  Compared to current    Comparison ECG info:  8/2/19 normal ekg    Similarity:  No change  Interpretation:     Interpretation: normal    Rate:     ECG rate:  91    ECG rate assessment: normal    Rhythm:     Rhythm: sinus rhythm    Ectopy:     Ectopy: none    QRS:     QRS axis:  Normal    QRS intervals:  Normal  Conduction:     Conduction: normal    ST segments:     ST segments:  Normal  T waves:     T waves: inverted      Inverted:  AVL             ED Course  ED Course as of Feb 21 0640   Fri Feb 21, 2020   0308 Patient's symptoms improved after medications  Currently receiving IV fluids  Labs appear hemoconcentrated likely secondary to dehydration  Mild nonspecific leukocytosis  MDM  Number of Diagnoses or Management Options  Abdominal pain: new and requires workup  Dehydration: new and requires workup  Gastroenteritis: new and requires workup  Diagnosis management comments: 69-year-old female presents for evaluation of nausea, vomiting and diarrhea  Diffuse tenderness on exam, no rebound or guarding  Labs with mild leukocytosis  Hemoconcentration likely secondary to dehydration  2 L NS for dehydration  Toradol for abdominal pain  Zofran for nausea  No episodes of emesis in the ED  Significant improvement in symptoms with treatment  PCP follow up  Discussed return precautions with patient         Amount and/or Complexity of Data Reviewed  Clinical lab tests: ordered and reviewed    Patient Progress  Patient progress: stable        Disposition  Final diagnoses:   Abdominal pain   Gastroenteritis   Dehydration     Time reflects when diagnosis was documented in both MDM as applicable and the Disposition within this note     Time User Action Codes Description Comment    2/21/2020  3:52 AM Annalise CHAN Add [R10 9] Abdominal pain     2/21/2020  3:52 AM Annalise CHAN Add [K52 9] Gastroenteritis     2/21/2020  3:52 AM Tori Ellsworth Add [E86 0] Dehydration 2/21/2020  3:52 AM Suni Pham Modify [R10 9] Abdominal pain     2/21/2020  3:52 AM Suni Pham Modify [K52 9] Gastroenteritis       ED Disposition     ED Disposition Condition Date/Time Comment    Discharge Stable Fri Feb 21, 2020  5:43 AM Trinna Crimes discharge to home/self care              Follow-up Information     Follow up With Specialties Details Why Contact Info Additional Information    Ravin Almeida MD Internal Medicine Schedule an appointment as soon as possible for a visit in 3 days for re-evaluation [address] [facility] [address]  [phone]        [address] Emergency Department Emergency Medicine Go to  If symptoms worsen [address] [address]  [phone]  ED, [address] [facility], [facility], Yelena Brenton [address]          Discharge Medication List as of 2/21/2020  5:43 AM      START taking these medications    Details   ondansetron (ZOFRAN-ODT) 4 mg disintegrating tablet Take 1 tablet (4 mg total) by mouth every 8 (eight) hours as needed for nausea or vomiting, Starting Fri 2/21/2020, Normal         CONTINUE these medications which have NOT CHANGED    Details   albuterol (VENTOLIN HFA) 90 mcg/act inhaler Inhale 2 puffs every 6 (six) hours as needed for wheezing, Starting Sun 9/29/2019, Normal      amLODIPine (NORVASC) 5 mg tablet Take 5 mg by mouth daily, Historical Med      benzonatate (TESSALON) 200 MG capsule Take 1 capsule (200 mg total) by mouth 3 (three) times a day as needed for cough, Starting Sat 10/19/2019, Normal      butalbital-acetaminophen-caffeine (FIORICET,ESGIC) -40 mg per tablet Take 1 tablet by mouth every 6 (six) hours as needed for headaches, Starting Mon 2/4/2019, Print      cholecalciferol (VITAMIN D3) 1,000 units tablet Take 2,000 Units by mouth daily, Historical Med      famotidine (PEPCID) 10 mg tablet Take 10 mg by mouth 2 (two) times a day, Historical Med levothyroxine (SYNTHROID) 175 mcg tablet Take 175 mcg by mouth daily , Historical Med      meclizine (ANTIVERT) 25 mg tablet 25 mg, Starting Sun 8/4/2019, Historical Med      multivitamin (THERAGRAN) TABS Take 1 tablet by mouth daily, Historical Med      PARoxetine (PAXIL) 20 mg tablet Take 25 mg by mouth daily , Historical Med      predniSONE 20 mg tablet 1 tab 3x/day for 3 days, then BID x 3 days, then daily x 3 days, Normal           No discharge procedures on file      PDMP Review     None          ED Provider  Electronically Signed by           Doc Matta MD  02/21/20 7434

## 2020-02-21 NOTE — ED NOTES
Pt asleep  Arouses to verbal stimuli  No s/s distress  Tolerating oral fluids well  Call bell in haley Frausto, RN  02/21/20 6377

## 2020-02-21 NOTE — ED NOTES
Pt able to tolerate one pack of saltine crackers  States she still feels nauseous  Medicated as ordered          Ginger Pink RN  02/21/20 8310

## 2020-02-21 NOTE — ED NOTES
Pt states she is feeling better, but still feels "queasy " Denies dizziness, states pain in better  No s/s distress  No further vomiting, no further diarrhea at this time  Call bell in reach  Family at bedside         Mike Thomson RN  02/21/20 9977

## 2020-02-22 ENCOUNTER — HOSPITAL ENCOUNTER (EMERGENCY)
Facility: HOSPITAL | Age: 45
Discharge: HOME | End: 2020-02-22
Attending: EMERGENCY MEDICINE | Admitting: EMERGENCY MEDICINE
Payer: COMMERCIAL

## 2020-02-22 ENCOUNTER — APPOINTMENT (EMERGENCY)
Dept: CT IMAGING | Facility: HOSPITAL | Age: 45
End: 2020-02-22
Payer: COMMERCIAL

## 2020-02-22 VITALS
SYSTOLIC BLOOD PRESSURE: 122 MMHG | DIASTOLIC BLOOD PRESSURE: 67 MMHG | WEIGHT: 283 LBS | RESPIRATION RATE: 22 BRPM | HEART RATE: 90 BPM | BODY MASS INDEX: 40.61 KG/M2 | OXYGEN SATURATION: 97 % | TEMPERATURE: 98 F

## 2020-02-22 DIAGNOSIS — R10.9 ABDOMINAL PAIN: Primary | ICD-10-CM

## 2020-02-22 DIAGNOSIS — K52.9 GASTROENTERITIS: ICD-10-CM

## 2020-02-22 LAB
ALBUMIN SERPL BCP-MCNC: 3.2 G/DL (ref 3.5–5)
ALP SERPL-CCNC: 62 U/L (ref 46–116)
ALT SERPL W P-5'-P-CCNC: 23 U/L (ref 12–78)
ANION GAP SERPL CALCULATED.3IONS-SCNC: 12 MMOL/L (ref 4–13)
AST SERPL W P-5'-P-CCNC: 14 U/L (ref 5–45)
BASOPHILS # BLD AUTO: 0.02 THOUSANDS/ΜL (ref 0–0.1)
BASOPHILS NFR BLD AUTO: 0 % (ref 0–1)
BILIRUB SERPL-MCNC: 0.4 MG/DL (ref 0.2–1)
BUN SERPL-MCNC: 14 MG/DL (ref 5–25)
CALCIUM SERPL-MCNC: 8.4 MG/DL (ref 8.3–10.1)
CHLORIDE SERPL-SCNC: 107 MMOL/L (ref 100–108)
CO2 SERPL-SCNC: 20 MMOL/L (ref 21–32)
CREAT SERPL-MCNC: 0.91 MG/DL (ref 0.6–1.3)
EOSINOPHIL # BLD AUTO: 0.17 THOUSAND/ΜL (ref 0–0.61)
EOSINOPHIL NFR BLD AUTO: 2 % (ref 0–6)
ERYTHROCYTE [DISTWIDTH] IN BLOOD BY AUTOMATED COUNT: 13.9 % (ref 11.6–15.1)
GFR SERPL CREATININE-BSD FRML MDRD: 76 ML/MIN/1.73SQ M
GLUCOSE SERPL-MCNC: 105 MG/DL (ref 65–140)
HCT VFR BLD AUTO: 40.6 % (ref 34.8–46.1)
HGB BLD-MCNC: 13.5 G/DL (ref 11.5–15.4)
IMM GRANULOCYTES # BLD AUTO: 0.03 THOUSAND/UL (ref 0–0.2)
IMM GRANULOCYTES NFR BLD AUTO: 0 % (ref 0–2)
LACTATE SERPL-SCNC: 0.6 MMOL/L (ref 0.5–2)
LIPASE SERPL-CCNC: 88 U/L (ref 73–393)
LYMPHOCYTES # BLD AUTO: 1.97 THOUSANDS/ΜL (ref 0.6–4.47)
LYMPHOCYTES NFR BLD AUTO: 24 % (ref 14–44)
MCH RBC QN AUTO: 30.3 PG (ref 26.8–34.3)
MCHC RBC AUTO-ENTMCNC: 33.3 G/DL (ref 31.4–37.4)
MCV RBC AUTO: 91 FL (ref 82–98)
MONOCYTES # BLD AUTO: 0.52 THOUSAND/ΜL (ref 0.17–1.22)
MONOCYTES NFR BLD AUTO: 6 % (ref 4–12)
NEUTROPHILS # BLD AUTO: 5.68 THOUSANDS/ΜL (ref 1.85–7.62)
NEUTS SEG NFR BLD AUTO: 68 % (ref 43–75)
NRBC BLD AUTO-RTO: 0 /100 WBCS
PLATELET # BLD AUTO: 259 THOUSANDS/UL (ref 149–390)
PMV BLD AUTO: 10.1 FL (ref 8.9–12.7)
POTASSIUM SERPL-SCNC: 3.5 MMOL/L (ref 3.5–5.3)
PROT SERPL-MCNC: 6.8 G/DL (ref 6.4–8.2)
RBC # BLD AUTO: 4.46 MILLION/UL (ref 3.81–5.12)
SODIUM SERPL-SCNC: 139 MMOL/L (ref 136–145)
WBC # BLD AUTO: 8.39 THOUSAND/UL (ref 4.31–10.16)

## 2020-02-22 PROCEDURE — 83605 ASSAY OF LACTIC ACID: CPT | Performed by: EMERGENCY MEDICINE

## 2020-02-22 PROCEDURE — 85025 COMPLETE CBC W/AUTO DIFF WBC: CPT | Performed by: EMERGENCY MEDICINE

## 2020-02-22 PROCEDURE — 99285 EMERGENCY DEPT VISIT HI MDM: CPT | Performed by: EMERGENCY MEDICINE

## 2020-02-22 PROCEDURE — 80053 COMPREHEN METABOLIC PANEL: CPT | Performed by: EMERGENCY MEDICINE

## 2020-02-22 PROCEDURE — 74177 CT ABD & PELVIS W/CONTRAST: CPT

## 2020-02-22 PROCEDURE — 36415 COLL VENOUS BLD VENIPUNCTURE: CPT | Performed by: EMERGENCY MEDICINE

## 2020-02-22 PROCEDURE — 96375 TX/PRO/DX INJ NEW DRUG ADDON: CPT

## 2020-02-22 PROCEDURE — 99284 EMERGENCY DEPT VISIT MOD MDM: CPT

## 2020-02-22 PROCEDURE — 83690 ASSAY OF LIPASE: CPT | Performed by: EMERGENCY MEDICINE

## 2020-02-22 PROCEDURE — 93005 ELECTROCARDIOGRAM TRACING: CPT

## 2020-02-22 PROCEDURE — 96374 THER/PROPH/DIAG INJ IV PUSH: CPT

## 2020-02-22 PROCEDURE — 96361 HYDRATE IV INFUSION ADD-ON: CPT

## 2020-02-22 RX ORDER — KETOROLAC TROMETHAMINE 30 MG/ML
15 INJECTION, SOLUTION INTRAMUSCULAR; INTRAVENOUS ONCE
Status: COMPLETED | OUTPATIENT
Start: 2020-02-22 | End: 2020-02-22

## 2020-02-22 RX ORDER — ONDANSETRON 2 MG/ML
4 INJECTION INTRAMUSCULAR; INTRAVENOUS ONCE
Status: COMPLETED | OUTPATIENT
Start: 2020-02-22 | End: 2020-02-22

## 2020-02-22 RX ADMIN — IOHEXOL 100 ML: 350 INJECTION, SOLUTION INTRAVENOUS at 04:37

## 2020-02-22 RX ADMIN — KETOROLAC TROMETHAMINE 15 MG: 30 INJECTION, SOLUTION INTRAMUSCULAR; INTRAVENOUS at 03:44

## 2020-02-22 RX ADMIN — ONDANSETRON 4 MG: 2 INJECTION INTRAMUSCULAR; INTRAVENOUS at 03:45

## 2020-02-22 RX ADMIN — SODIUM CHLORIDE 1000 ML: 0.9 INJECTION, SOLUTION INTRAVENOUS at 03:42

## 2020-02-22 NOTE — DISCHARGE INSTRUCTIONS
Abdominal Pain   WHAT YOU NEED TO KNOW:   Abdominal pain can be dull, achy, or sharp  You may have pain in one area of your abdomen, or in your entire abdomen  Your pain may be caused by a condition such as constipation, food sensitivity or poisoning, infection, or a blockage  Abdominal pain can also be from a hernia, appendicitis, or an ulcer  Liver, gallbladder, or kidney conditions can also cause abdominal pain  The cause of your abdominal pain may be unknown  DISCHARGE INSTRUCTIONS:   Return to the emergency department if:   · You have new chest pain or shortness of breath  · You have pulsing pain in your upper abdomen or lower back that suddenly becomes constant  · Your pain is in the right lower abdominal area and worsens with movement  · You have a fever over 100 4°F (38°C) or shaking chills  · You are vomiting and cannot keep food or liquids down  · Your pain does not improve or gets worse over the next 8 to 12 hours  · You see blood in your vomit or bowel movements, or they look black and tarry  · Your skin or the whites of your eyes turn yellow  · You are a woman and have a large amount of vaginal bleeding that is not your monthly period  Contact your healthcare provider if:   · You have pain in your lower back  · You are a man and have pain in your testicles  · You have pain when you urinate  · You have questions or concerns about your condition or care  Follow up with your healthcare provider within 24 hours or as directed:  Write down your questions so you remember to ask them during your visits  Medicines:   · Medicines  may be given to calm your stomach and prevent vomiting or to decrease pain  Ask how to take pain medicine safely  · Take your medicine as directed  Contact your healthcare provider if you think your medicine is not helping or if you have side effects  Tell him of her if you are allergic to any medicine   Keep a list of the medicines, vitamins, and herbs you take  Include the amounts, and when and why you take them  Bring the list or the pill bottles to follow-up visits  Carry your medicine list with you in case of an emergency  © 2017 [employer]  Information is for End User's use only and may not be sold, redistributed or otherwise used for commercial purposes  All illustrations and images included in CareNotes® are the copyrighted property of A D A M , Inc  or [employer] Contre[employer]  The above information is an  only  It is not intended as medical advice for individual conditions or treatments  Talk to your doctor, nurse or pharmacist before following any medical regimen to see if it is safe and effective for you  Gastroenteritis   WHAT YOU NEED TO KNOW:   Gastroenteritis, or stomach flu, is an infection of the stomach and intestines  DISCHARGE INSTRUCTIONS:   Call 911 for any of the following:   · You have trouble breathing or a very fast pulse  Return to the emergency department if:   · You see blood in your diarrhea  · You cannot stop vomiting  · You have not urinated for 12 hours  · You feel like you are going to faint  Contact your healthcare provider if:   · You have a fever  · You continue to vomit or have diarrhea, even after treatment  · You see worms in your diarrhea  · Your mouth or eyes are dry  You are not urinating as much or as often  · You have questions or concerns about your condition or care  Medicines:   · Medicines  may be given to stop vomiting or diarrhea, decrease abdominal cramps, or treat an infection  · Take your medicine as directed  Contact your healthcare provider if you think your medicine is not helping or if you have side effects  Tell him or her if you are allergic to any medicine  Keep a list of the medicines, vitamins, and herbs you take  Include the amounts, and when and why you take them   Bring the list or the pill bottles to follow-up visits  Carry your medicine list with you in case of an emergency  Manage your symptoms:   · Drink liquids as directed  Ask your healthcare provider how much liquid to drink each day, and which liquids are best for you  You may also need to drink an oral rehydration solution (ORS)  An ORS has the right amounts of sugar, salt, and minerals in water to replace body fluids  · Eat bland foods  When you feel hungry, begin eating soft, bland foods  Examples are bananas, clear soup, potatoes, and applesauce  Do not have dairy products, alcohol, sugary drinks, or drinks with caffeine until you feel better  · Rest as much as possible  Slowly start to do more each day when you begin to feel better  Prevent the spread of gastroenteritis:  Gastroenteritis can spread easily  Keep yourself, your family, and your surroundings clean to help prevent the spread of gastroenteritis:  · Wash your hands often  Use soap and water  Wash your hands after you use the bathroom, change a child's diapers, or sneeze  Wash your hands before you prepare or eat food  · Clean surfaces and do laundry often  Wash your clothes and towels separately from the rest of the laundry  Clean surfaces in your home with antibacterial  or bleach  · Clean food thoroughly and cook safely  Wash raw vegetables before you cook  Cook meat, fish, and eggs fully  Do not use the same dishes for raw meat as you do for other foods  Refrigerate any leftover food immediately  · Be aware when you camp or travel  Drink only clean water  Do not drink from rivers or lakes unless you purify or boil the water first  When you travel, drink bottled water and do not add ice  Do not eat fruit that has not been peeled  Do not eat raw fish or meat that is not fully cooked  Follow up with your healthcare provider as directed:  Write down your questions so you remember to ask them during your visits     © 2017 [employer] [address] [facility] is for End User's use only and may not be sold, redistributed or otherwise used for commercial purposes  All illustrations and images included in CareNotes® are the copyrighted property of A D A M , Inc  or Casey Contreras  The above information is an  only  It is not intended as medical advice for individual conditions or treatments  Talk to your doctor, nurse or pharmacist before following any medical regimen to see if it is safe and effective for you

## 2020-02-22 NOTE — ED PROVIDER NOTES
History  Chief Complaint   Patient presents with    Abdominal Pain     abd pain for a few days, seen yesterday      38year old female presents for evaluation of worsening abdominal cramping and continued diarrhea  Patient states she has been taking zofran with improvement in nausea with no episodes of emesis since leaving the ED yesterday  She has not taken anything for pain prior to arrival  She was seen yesterday for these symptoms and after normal labs and improvement in symptoms, she was discharged with likely gastroenteritis  She denies any new symptoms  Diarrhea continues to be bloody  Patient had reached out to some friends that she had eaten with at [employer] in [facility] on 02/19/2020 and that all parties that had eaten there have had similar symptoms  She states that the only food in common was the biscuts  History provided by:  Patient  Abdominal Pain   Pain location:  Generalized  Pain quality: cramping    Pain radiates to:  Does not radiate  Pain severity:  Severe  Onset quality:  Gradual  Duration:  1 day  Timing:  Constant  Progression:  Worsening  Chronicity:  New  Context: suspicious food intake    Relieved by:  None tried  Worsened by:  Nothing  Ineffective treatments:  None tried  Associated symptoms: diarrhea, nausea and vomiting (resolved)    Associated symptoms: no chest pain, no chills, no constipation, no cough, no dysuria, no fever, no hematuria, no shortness of breath and no sore throat    Nausea:     Severity:  Mild    Onset quality:  Gradual    Duration:  1 day    Timing:  Constant    Progression:  Improving  Risk factors: obesity        Prior to Admission Medications   Prescriptions Last Dose Informant Patient Reported? Taking?    PARoxetine (PAXIL) 20 mg tablet   Yes Yes   Sig: Take 25 mg by mouth daily    albuterol (VENTOLIN HFA) 90 mcg/act inhaler   No Yes   Sig: Inhale 2 puffs every 6 (six) hours as needed for wheezing   amLODIPine (NORVASC) 5 mg tablet   Yes Yes   Sig: Take 5 mg by mouth daily   cholecalciferol (VITAMIN D3) 1,000 units tablet   Yes Yes   Sig: Take 2,000 Units by mouth daily   famotidine (PEPCID) 10 mg tablet   Yes Yes   Sig: Take 10 mg by mouth 2 (two) times a day   levothyroxine (SYNTHROID) 175 mcg tablet   Yes Yes   Sig: Take 175 mcg by mouth daily    meclizine (ANTIVERT) 25 mg tablet   Yes Yes   Si mg   multivitamin (THERAGRAN) TABS   Yes Yes   Sig: Take 1 tablet by mouth daily   ondansetron (ZOFRAN-ODT) 4 mg disintegrating tablet   No Yes   Sig: Take 1 tablet (4 mg total) by mouth every 8 (eight) hours as needed for nausea or vomiting      Facility-Administered Medications: None       Past Medical History:   Diagnosis Date    Anxiety     Asthma     Disc disorder     Hypertension     Hypothyroidism     Migraine        Past Surgical History:   Procedure Laterality Date    BACK SURGERY      CERVICAL FUSION       SECTION      CHOLECYSTECTOMY      FOOT SURGERY Bilateral     TUBAL LIGATION Bilateral 2012       History reviewed  No pertinent family history  I have reviewed and agree with the history as documented  Social History     Tobacco Use    Smoking status: Current Every Day Smoker     Packs/day: 1 00     Types: Cigarettes    Smokeless tobacco: Never Used   Substance Use Topics    Alcohol use: Never     Frequency: Never     Comment: socially    Drug use: No       Review of Systems   Constitutional: Negative for appetite change, chills and fever  HENT: Negative for congestion, rhinorrhea and sore throat  Respiratory: Negative for cough, chest tightness and shortness of breath  Cardiovascular: Negative for chest pain, palpitations and leg swelling  Gastrointestinal: Positive for abdominal pain, diarrhea, nausea and vomiting (resolved)  Negative for constipation  Genitourinary: Negative for dysuria, frequency and hematuria  Musculoskeletal: Negative for myalgias, neck pain and neck stiffness     Skin: Negative for pallor  Neurological: Negative for syncope, weakness and headaches  All other systems reviewed and are negative  Physical Exam  Physical Exam   Constitutional: She is oriented to person, place, and time  She appears well-developed and well-nourished  Non-toxic appearance  No distress  HENT:   Head: Normocephalic and atraumatic  Eyes: Pupils are equal, round, and reactive to light  Conjunctivae and EOM are normal    Neck: Normal range of motion  Neck supple  No tracheal deviation present  No thyromegaly present  Cardiovascular: Normal rate, regular rhythm, normal heart sounds and intact distal pulses  Pulmonary/Chest: Effort normal and breath sounds normal    Abdominal: Soft  Bowel sounds are normal  She exhibits no distension  There is generalized tenderness  There is no rigidity, no rebound and no guarding  Lymphadenopathy:     She has no cervical adenopathy  Neurological: She is alert and oriented to person, place, and time  Skin: Skin is warm and dry  She is not diaphoretic  Psychiatric: She has a normal mood and affect  Her behavior is normal  Judgment and thought content normal    Nursing note and vitals reviewed        Vital Signs  ED Triage Vitals [02/22/20 0336]   Temperature Pulse Respirations Blood Pressure SpO2   98 °F (36 7 °C) 90 22 124/77 96 %      Temp Source Heart Rate Source Patient Position - Orthostatic VS BP Location FiO2 (%)   Temporal Monitor Lying Right arm --      Pain Score       7           Vitals:    02/22/20 0336 02/22/20 0345 02/22/20 0430   BP: 124/77 124/77 122/67   Pulse: 90 90    Patient Position - Orthostatic VS: Lying           Visual Acuity      ED Medications  Medications   sodium chloride 0 9 % bolus 1,000 mL (0 mL Intravenous Stopped 2/22/20 0448)   ketorolac (TORADOL) injection 15 mg (15 mg Intravenous Given 2/22/20 0344)   ondansetron (ZOFRAN) injection 4 mg (4 mg Intravenous Given 2/22/20 0345)   iohexol (OMNIPAQUE) 350 MG/ML injection (MULTI-DOSE) 100 mL (100 mL Intravenous Given 2/22/20 0437)       Diagnostic Studies  Results Reviewed     Procedure Component Value Units Date/Time    Lactic acid, plasma [[id number]]  (Normal) Collected:  02/22/20 0341    Lab Status:  Final result Specimen:  Blood from Arm, Left Updated:  02/22/20 0408     LACTIC ACID 0 6 mmol/L     Narrative:       Result may be elevated if tourniquet was used during collection      Comprehensive metabolic panel [[id number]]  (Abnormal) Collected:  02/22/20 0341    Lab Status:  Final result Specimen:  Blood from Arm, Left Updated:  02/22/20 0406     Sodium 139 mmol/L      Potassium 3 5 mmol/L      Chloride 107 mmol/L      CO2 20 mmol/L      ANION GAP 12 mmol/L      BUN 14 mg/dL      Creatinine 0 91 mg/dL      Glucose 105 mg/dL      Calcium 8 4 mg/dL      AST 14 U/L      ALT 23 U/L      Alkaline Phosphatase 62 U/L      Total Protein 6 8 g/dL      Albumin 3 2 g/dL      Total Bilirubin 0 40 mg/dL      eGFR 76 ml/min/1 73sq m     Narrative:       [place] guidelines for Chronic Kidney Disease (CKD):     Stage 1 with normal or high GFR (GFR > 90 mL/min/1 73 square meters)    Stage 2 Mild CKD (GFR = 60-89 mL/min/1 73 square meters)    Stage 3A Moderate CKD (GFR = 45-59 mL/min/1 73 square meters)    Stage 3B Moderate CKD (GFR = 30-44 mL/min/1 73 square meters)    Stage 4 Severe CKD (GFR = 15-29 mL/min/1 73 square meters)    Stage 5 End Stage CKD (GFR <15 mL/min/1 73 square meters)  Note: GFR calculation is accurate only with a steady state creatinine    Lipase [[id number]]  (Normal) Collected:  02/22/20 0341    Lab Status:  Final result Specimen:  Blood from Arm, Left Updated:  02/22/20 0406     Lipase 88 u/L     CBC and differential [[id number]] Collected:  02/22/20 0341    Lab Status:  Final result Specimen:  Blood from Arm, Left Updated:  02/22/20 0348     WBC 8 39 Thousand/uL      RBC 4 46 Million/uL      Hemoglobin 13 5 g/dL      Hematocrit 40 6 %      MCV 91 fL MCH 30 3 pg      MCHC 33 3 g/dL      RDW 13 9 %      MPV 10 1 fL      Platelets 748 Thousands/uL      nRBC 0 /100 WBCs      Neutrophils Relative 68 %      Immat GRANS % 0 %      Lymphocytes Relative 24 %      Monocytes Relative 6 %      Eosinophils Relative 2 %      Basophils Relative 0 %      Neutrophils Absolute 5 68 Thousands/µL      Immature Grans Absolute 0 03 Thousand/uL      Lymphocytes Absolute 1 97 Thousands/µL      Monocytes Absolute 0 52 Thousand/µL      Eosinophils Absolute 0 17 Thousand/µL      Basophils Absolute 0 02 Thousands/µL                  CT abdomen pelvis with contrast   Final Result by Mary Meneses MD (02/22 2799)      No acute pathology visualized on CT of the abdomen and pelvis with IV without oral contrast             Workstation performed: [id number]                    Procedures  ECG 12 Lead Documentation Only  Date/Time: 2/22/2020 3:49 AM  Performed by: Angelo Henry MD  Authorized by: Angelo Henry MD     Indications / Diagnosis:  Abdominal pain  ECG reviewed by me, the ED Provider: yes    Patient location:  ED  Previous ECG:     Previous ECG:  Compared to current    Comparison ECG info:  2/21/2020 normal ekg    Similarity:  No change  Interpretation:     Interpretation: normal    Rate:     ECG rate:  86    ECG rate assessment: normal    Rhythm:     Rhythm: sinus rhythm    Ectopy:     Ectopy: none    QRS:     QRS axis:  Normal    QRS intervals:  Normal  Conduction:     Conduction: normal    ST segments:     ST segments:  Normal  T waves:     T waves: normal               ED Course                               MDM  Number of Diagnoses or Management Options  Abdominal pain: established and worsening  Gastroenteritis: established and worsening  Diagnosis management comments: 38year old female presents with worsening diffuse abdominal cramping  Diffuse tenderness on exam with no rebound or guarding  Labs unremarkable  CT ordered due to worsening symptoms  CT unremarkable  Improvement in pain with toradol  PCP follow up  Discussed return precautions with patient  Amount and/or Complexity of Data Reviewed  Clinical lab tests: ordered and reviewed  Tests in the radiology section of CPT®: ordered and reviewed    Patient Progress  Patient progress: stable        Disposition  Final diagnoses:   Abdominal pain   Gastroenteritis     Time reflects when diagnosis was documented in both MDM as applicable and the Disposition within this note     Time User Action Codes Description Comment    2/22/2020  4:55 AM Ruthanna Dominguez Add [R10 9] Abdominal pain     2/22/2020  4:55 AM Ruthanna Dominguez Add [K52 9] Gastroenteritis       ED Disposition     ED Disposition Condition Date/Time Comment    Discharge Stable Sat Feb 22, 2020  4:55 AM Jet Prater discharge to home/self care              Follow-up Information     Follow up With Specialties Details Why Contact Info Additional Information    Alicia Miles MD Internal Medicine Schedule an appointment as soon as possible for a visit in 2 days for re-evaluation [facility] Emergency Department Emergency Medicine Go to  If symptoms worsen [address] [id number] [phone]  ED, [address] [place] [address], [facility] 10          Discharge Medication List as of 2/22/2020  4:56 AM      CONTINUE these medications which have NOT CHANGED    Details   albuterol (VENTOLIN HFA) 90 mcg/act inhaler Inhale 2 puffs every 6 (six) hours as needed for wheezing, Starting Sun 9/29/2019, Normal      amLODIPine (NORVASC) 5 mg tablet Take 5 mg by mouth daily, Historical Med      cholecalciferol (VITAMIN D3) 1,000 units tablet Take 2,000 Units by mouth daily, Historical Med      famotidine (PEPCID) 10 mg tablet Take 10 mg by mouth 2 (two) times a day, Historical Med      levothyroxine (SYNTHROID) 175 mcg tablet Take 175 mcg by mouth daily , Historical Med      meclizine (ANTIVERT) 25 mg tablet 25 mg, Starting Sun 8/4/2019, Historical Med      multivitamin (THERAGRAN) TABS Take 1 tablet by mouth daily, Historical Med      ondansetron (ZOFRAN-ODT) 4 mg disintegrating tablet Take 1 tablet (4 mg total) by mouth every 8 (eight) hours as needed for nausea or vomiting, Starting Fri 2/21/2020, Normal      PARoxetine (PAXIL) 20 mg tablet Take 25 mg by mouth daily , Historical Med           No discharge procedures on file      PDMP Review     None          ED Provider  Electronically Signed by           Salvador Matias MD  02/22/20 0156

## 2020-02-24 LAB
ATRIAL RATE: 86 BPM
P AXIS: 57 DEGREES
PR INTERVAL: 170 MS
QRS AXIS: 65 DEGREES
QRSD INTERVAL: 84 MS
QT INTERVAL: 354 MS
QTC INTERVAL: 423 MS
T WAVE AXIS: 47 DEGREES
VENTRICULAR RATE: 86 BPM

## 2020-02-24 PROCEDURE — 93010 ELECTROCARDIOGRAM REPORT: CPT | Performed by: INTERNAL MEDICINE

## 2020-03-09 ENCOUNTER — OFFICE VISIT (OUTPATIENT)
Dept: FAMILY MEDICINE CLINIC | Facility: HOSPITAL | Age: 45
End: 2020-03-09
Payer: COMMERCIAL

## 2020-03-09 VITALS
BODY MASS INDEX: 40.37 KG/M2 | HEIGHT: 70 IN | WEIGHT: 282 LBS | HEART RATE: 95 BPM | SYSTOLIC BLOOD PRESSURE: 138 MMHG | DIASTOLIC BLOOD PRESSURE: 82 MMHG | TEMPERATURE: 97.8 F

## 2020-03-09 DIAGNOSIS — E66.01 CLASS 3 SEVERE OBESITY DUE TO EXCESS CALORIES WITHOUT SERIOUS COMORBIDITY WITH BODY MASS INDEX (BMI) OF 40.0 TO 44.9 IN ADULT (HCC): ICD-10-CM

## 2020-03-09 DIAGNOSIS — Z12.31 ENCOUNTER FOR SCREENING MAMMOGRAM FOR BREAST CANCER: ICD-10-CM

## 2020-03-09 DIAGNOSIS — I10 ESSENTIAL HYPERTENSION: ICD-10-CM

## 2020-03-09 DIAGNOSIS — E03.9 HYPOTHYROIDISM, UNSPECIFIED TYPE: Primary | ICD-10-CM

## 2020-03-09 DIAGNOSIS — F33.1 MODERATE EPISODE OF RECURRENT MAJOR DEPRESSIVE DISORDER (HCC): ICD-10-CM

## 2020-03-09 DIAGNOSIS — K58.0 IRRITABLE BOWEL SYNDROME WITH DIARRHEA: ICD-10-CM

## 2020-03-09 DIAGNOSIS — K21.9 GASTROESOPHAGEAL REFLUX DISEASE WITHOUT ESOPHAGITIS: ICD-10-CM

## 2020-03-09 PROBLEM — F33.9 EPISODE OF RECURRENT MAJOR DEPRESSIVE DISORDER (HCC): Status: ACTIVE | Noted: 2020-03-09

## 2020-03-09 PROBLEM — R20.0 BILATERAL HAND NUMBNESS: Status: RESOLVED | Noted: 2019-01-31 | Resolved: 2020-03-09

## 2020-03-09 PROCEDURE — 4004F PT TOBACCO SCREEN RCVD TLK: CPT | Performed by: INTERNAL MEDICINE

## 2020-03-09 PROCEDURE — 3008F BODY MASS INDEX DOCD: CPT | Performed by: INTERNAL MEDICINE

## 2020-03-09 PROCEDURE — 99204 OFFICE O/P NEW MOD 45 MIN: CPT | Performed by: INTERNAL MEDICINE

## 2020-03-09 PROCEDURE — 3075F SYST BP GE 130 - 139MM HG: CPT | Performed by: INTERNAL MEDICINE

## 2020-03-09 PROCEDURE — 3079F DIAST BP 80-89 MM HG: CPT | Performed by: INTERNAL MEDICINE

## 2020-03-09 RX ORDER — AMLODIPINE BESYLATE 5 MG/1
5 TABLET ORAL DAILY
Qty: 90 TABLET | Refills: 0 | Status: SHIPPED | OUTPATIENT
Start: 2020-03-09 | End: 2020-06-02

## 2020-03-09 RX ORDER — PAROXETINE HYDROCHLORIDE 40 MG/1
40 TABLET, FILM COATED ORAL DAILY
Qty: 90 TABLET | Refills: 0 | Status: SHIPPED | OUTPATIENT
Start: 2020-03-09 | End: 2020-06-02

## 2020-03-09 RX ORDER — FAMOTIDINE 10 MG
20 TABLET ORAL 2 TIMES DAILY
Qty: 180 TABLET | Refills: 0 | Status: SHIPPED | OUTPATIENT
Start: 2020-03-09 | End: 2020-03-16 | Stop reason: SDUPTHER

## 2020-03-09 RX ORDER — LEVOTHYROXINE SODIUM 175 UG/1
175 TABLET ORAL DAILY
Qty: 90 TABLET | Refills: 0 | Status: SHIPPED | OUTPATIENT
Start: 2020-03-09 | End: 2020-06-23

## 2020-03-09 NOTE — ASSESSMENT & PLAN NOTE
Mood not at goal - increase Paxil to 40 mg daily -  d/w pt that it takes 4-6 wks to get maximum benefit of med and that med has to be taken every day and to not miss doses of med, call with SE/new/worse mood/SI

## 2020-03-09 NOTE — PROGRESS NOTES
Assessment/Plan:    Hypothyroidism  Has some symptoms currently - due for TFT's - order given, con't current Levothyroxine for now    Episode of recurrent major depressive disorder ([facility] [address] )  Mood not at goal - increase Paxil to 40 mg daily -  d/w pt that it takes 4-6 wks to get maximum benefit of med and that med has to be taken every day and to not miss doses of med, call with SE/new/worse mood/SI      Hypertension  BP acceptable, con't current meds, check BW, re-eval in 4 wks    Irritable bowel syndrome with diarrhea  Requesting number for GI and colonoscopy - number given, encouraged Imodium prn and avoid triggers, call with new/worse symptoms    Gastroesophageal reflux disease without esophagitis  Con't current Pepcid - rx refilled       Diagnoses and all orders for this visit:    Hypothyroidism, unspecified type  -     levothyroxine (Synthroid) 175 mcg tablet; Take 1 tablet (175 mcg total) by mouth daily    Moderate episode of recurrent major depressive disorder (HCC)  -     PARoxetine (PAXIL) 40 MG tablet; Take 1 tablet (40 mg total) by mouth daily    Irritable bowel syndrome with diarrhea    Essential hypertension  -     amLODIPine (NORVASC) 5 mg tablet; Take 1 tablet (5 mg total) by mouth daily    Gastroesophageal reflux disease without esophagitis  -     famotidine (PEPCID) 10 mg tablet; Take 2 tablets (20 mg total) by mouth 2 (two) times a day        Every only had 1 mammo - no abnormality - over a year ago - order given    PAP approx a year ago done with [facility] - will obtain records    Labs - over a year - ordered today        Subjective:      Patient ID: Liss Head is a 38 y o  female  HPI Pt here to establish care - previous PCP was in [place] and she no longer wishes to drive that far for appts  Pt dx with Hypothyroidism in her early 18's  Has been on current levothyroxine for a few years  She is due for labs  Notes some fatigue and chronic diarrhea  Has been on Paxil for approx 5 yrs now  Was on Wellbutrin for a while but it did not help  Notes mood has not been good recently  Was unemployed for approx a year with neck pain from work injury  Just closed case Dec 2019  Just applied for job at [employer] but has not started yet  Feels down and short tempered and irritable  Notes feeling anxious at time  Denies panic attacks/SI  Sleep is OK - does snore and does feel she doesn't sleep deeply  Has no witnessed apnea  Does have intermittent HA's/impaired concentration  Was dx with HTN approx 1 yr or so ago  Has been on Amlodipine for that time  Notes no SE with the medication  Notes SBP is usually a bit lower then what she was here today  She notes intermittent HA's but no dizziness/double vision/CP  Has dx of IBS - D predominant  She was referred to GI for colonoscopy but has never followed through with it  She is on Famotidine 40 mg bid for "reflux but not heartburn"  She denies BRBPR/black stools  Every only had 1 mammo - no abnormality - over a year ago    PAP approx a year ago done with [facility]    Labs - over a year      Review of Systems   Constitutional: Positive for fatigue  Negative for chills, fever and unexpected weight change  HENT: Negative for congestion and sore throat  Eyes: Negative for pain and visual disturbance  Respiratory: Negative for cough, shortness of breath and wheezing  Cardiovascular: Negative for chest pain, palpitations and leg swelling  Gastrointestinal: Positive for diarrhea and nausea  Negative for abdominal pain, blood in stool, constipation and vomiting  Endocrine: Negative for polydipsia and polyuria  Genitourinary: Negative for difficulty urinating and dysuria  Musculoskeletal: Positive for arthralgias and neck pain  Negative for back pain  Skin: Negative for rash and wound  Neurological: Positive for headaches  Negative for dizziness, syncope and light-headedness  Hematological: Negative for adenopathy     Psychiatric/Behavioral: Positive for dysphoric mood and sleep disturbance  Negative for behavioral problems, confusion and suicidal ideas  The patient is nervous/anxious  Objective: There were no vitals taken for this visit  Physical Exam   Constitutional: She appears well-developed and well-nourished  No distress  HENT:   Head: Normocephalic and atraumatic  Right Ear: External ear normal    Left Ear: External ear normal    Mouth/Throat: No oropharyngeal exudate  Eyes: Conjunctivae are normal  Right eye exhibits no discharge  Left eye exhibits no discharge  Neck: Neck supple  No tracheal deviation present  Cardiovascular: Normal rate, regular rhythm and normal heart sounds  Exam reveals no friction rub  No murmur heard  Pulmonary/Chest: Effort normal and breath sounds normal  No respiratory distress  She has no wheezes  She has no rales  Abdominal: Soft  She exhibits no distension  There is no tenderness  There is no rebound and no guarding  obese   Musculoskeletal: She exhibits no edema  Lymphadenopathy:     She has no cervical adenopathy  Neurological: She is alert  She exhibits normal muscle tone  Skin: Skin is warm and dry  No rash noted  Psychiatric: She has a normal mood and affect  Her behavior is normal    Nursing note and vitals reviewed

## 2020-03-09 NOTE — ASSESSMENT & PLAN NOTE
Requesting number for GI and colonoscopy - number given, encouraged Imodium prn and avoid triggers, call with new/worse symptoms

## 2020-03-10 DIAGNOSIS — K21.9 GASTROESOPHAGEAL REFLUX DISEASE WITHOUT ESOPHAGITIS: ICD-10-CM

## 2020-03-11 RX ORDER — FAMOTIDINE 10 MG
20 TABLET ORAL 2 TIMES DAILY
Qty: 180 TABLET | Refills: 0 | OUTPATIENT
Start: 2020-03-11

## 2020-03-16 DIAGNOSIS — K21.9 GASTROESOPHAGEAL REFLUX DISEASE WITHOUT ESOPHAGITIS: ICD-10-CM

## 2020-03-16 RX ORDER — FAMOTIDINE 10 MG
20 TABLET ORAL 2 TIMES DAILY
Qty: 180 TABLET | Refills: 0 | Status: SHIPPED | OUTPATIENT
Start: 2020-03-16 | End: 2020-08-03 | Stop reason: SDUPTHER

## 2020-03-16 NOTE — TELEPHONE ENCOUNTER
This needs a prior auth for 10 mg-2 tablets two times a day  The pharm said to put a script in for 20 mg 1 tab twice a day  Please approve if this is OK with you  Thanks

## 2020-06-02 DIAGNOSIS — I10 ESSENTIAL HYPERTENSION: ICD-10-CM

## 2020-06-02 DIAGNOSIS — F33.1 MODERATE EPISODE OF RECURRENT MAJOR DEPRESSIVE DISORDER (HCC): ICD-10-CM

## 2020-06-02 RX ORDER — PAROXETINE HYDROCHLORIDE 40 MG/1
40 TABLET, FILM COATED ORAL DAILY
Qty: 90 TABLET | Refills: 0 | Status: SHIPPED | OUTPATIENT
Start: 2020-06-02 | End: 2020-08-14 | Stop reason: SDUPTHER

## 2020-06-02 RX ORDER — AMLODIPINE BESYLATE 5 MG/1
5 TABLET ORAL DAILY
Qty: 90 TABLET | Refills: 0 | Status: SHIPPED | OUTPATIENT
Start: 2020-06-02 | End: 2020-08-27

## 2020-06-23 DIAGNOSIS — R79.89 ELEVATED TSH: ICD-10-CM

## 2020-06-23 DIAGNOSIS — E03.9 HYPOTHYROIDISM, UNSPECIFIED TYPE: ICD-10-CM

## 2020-06-23 DIAGNOSIS — E03.9 HYPOTHYROIDISM, UNSPECIFIED TYPE: Primary | ICD-10-CM

## 2020-06-23 LAB
ALBUMIN SERPL-MCNC: 4.1 G/DL (ref 3.8–4.8)
ALBUMIN/GLOB SERPL: 1.6 {RATIO} (ref 1.2–2.2)
ALP SERPL-CCNC: 62 IU/L (ref 39–117)
ALT SERPL-CCNC: 17 IU/L (ref 0–32)
AST SERPL-CCNC: 17 IU/L (ref 0–40)
BASOPHILS # BLD AUTO: 0 X10E3/UL (ref 0–0.2)
BASOPHILS NFR BLD AUTO: 0 %
BILIRUB SERPL-MCNC: <0.2 MG/DL (ref 0–1.2)
BUN SERPL-MCNC: 14 MG/DL (ref 6–24)
BUN/CREAT SERPL: 18 (ref 9–23)
CALCIUM SERPL-MCNC: 9.5 MG/DL (ref 8.7–10.2)
CHLORIDE SERPL-SCNC: 107 MMOL/L (ref 96–106)
CHOLEST SERPL-MCNC: 196 MG/DL (ref 100–199)
CHOLEST/HDLC SERPL: 4.3 RATIO (ref 0–4.4)
CO2 SERPL-SCNC: 21 MMOL/L (ref 20–29)
CREAT SERPL-MCNC: 0.76 MG/DL (ref 0.57–1)
EOSINOPHIL # BLD AUTO: 0.2 X10E3/UL (ref 0–0.4)
EOSINOPHIL NFR BLD AUTO: 3 %
ERYTHROCYTE [DISTWIDTH] IN BLOOD BY AUTOMATED COUNT: 14.2 % (ref 11.7–15.4)
GLOBULIN SER-MCNC: 2.6 G/DL (ref 1.5–4.5)
GLUCOSE SERPL-MCNC: 89 MG/DL (ref 65–99)
HCT VFR BLD AUTO: 41.8 % (ref 34–46.6)
HDLC SERPL-MCNC: 46 MG/DL
HGB BLD-MCNC: 14.2 G/DL (ref 11.1–15.9)
IMM GRANULOCYTES # BLD: 0 X10E3/UL (ref 0–0.1)
IMM GRANULOCYTES NFR BLD: 0 %
LDLC SERPL CALC-MCNC: 121 MG/DL (ref 0–99)
LYMPHOCYTES # BLD AUTO: 2.4 X10E3/UL (ref 0.7–3.1)
LYMPHOCYTES NFR BLD AUTO: 33 %
MCH RBC QN AUTO: 30.8 PG (ref 26.6–33)
MCHC RBC AUTO-ENTMCNC: 34 G/DL (ref 31.5–35.7)
MCV RBC AUTO: 91 FL (ref 79–97)
MONOCYTES # BLD AUTO: 0.4 X10E3/UL (ref 0.1–0.9)
MONOCYTES NFR BLD AUTO: 5 %
NEUTROPHILS # BLD AUTO: 4.2 X10E3/UL (ref 1.4–7)
NEUTROPHILS NFR BLD AUTO: 59 %
PLATELET # BLD AUTO: 291 X10E3/UL (ref 150–450)
POTASSIUM SERPL-SCNC: 4.8 MMOL/L (ref 3.5–5.2)
PROT SERPL-MCNC: 6.7 G/DL (ref 6–8.5)
RBC # BLD AUTO: 4.61 X10E6/UL (ref 3.77–5.28)
SL AMB EGFR AFRICAN AMERICAN: 109 ML/MIN/1.73
SL AMB EGFR NON AFRICAN AMERICAN: 94 ML/MIN/1.73
SL AMB VLDL CHOLESTEROL CALC: 29 MG/DL (ref 5–40)
SODIUM SERPL-SCNC: 140 MMOL/L (ref 134–144)
T4 FREE SERPL-MCNC: 1.35 NG/DL (ref 0.82–1.77)
TRIGL SERPL-MCNC: 143 MG/DL (ref 0–149)
TSH SERPL DL<=0.005 MIU/L-ACNC: 8.5 UIU/ML (ref 0.45–4.5)
WBC # BLD AUTO: 7.2 X10E3/UL (ref 3.4–10.8)

## 2020-06-23 RX ORDER — LEVOTHYROXINE SODIUM 0.2 MG/1
200 TABLET ORAL DAILY
Qty: 90 TABLET | Refills: 0 | Status: SHIPPED | OUTPATIENT
Start: 2020-06-23 | End: 2020-09-22 | Stop reason: SDUPTHER

## 2020-06-23 NOTE — TELEPHONE ENCOUNTER
Please notify pt that her BW yesterday showed her TSH was elevated - indicating we needed to increase her levothyroxine - new rx sent for 200 mcg 1 tab PO q am, will need to check thyroid labs in 6 wks - is due for f/u at that time as well, ensure she knows to take first thing in am 1 hr before other meds/vitamins/supplements and prior to eating as well

## 2020-07-29 LAB
T4 FREE SERPL-MCNC: 1.45 NG/DL (ref 0.82–1.77)
TSH SERPL DL<=0.005 MIU/L-ACNC: 7.44 UIU/ML (ref 0.45–4.5)

## 2020-08-03 ENCOUNTER — OFFICE VISIT (OUTPATIENT)
Dept: FAMILY MEDICINE CLINIC | Facility: HOSPITAL | Age: 45
End: 2020-08-03
Payer: COMMERCIAL

## 2020-08-03 VITALS
SYSTOLIC BLOOD PRESSURE: 118 MMHG | HEIGHT: 70 IN | BODY MASS INDEX: 40.4 KG/M2 | HEART RATE: 98 BPM | WEIGHT: 282.2 LBS | DIASTOLIC BLOOD PRESSURE: 84 MMHG | TEMPERATURE: 99 F

## 2020-08-03 DIAGNOSIS — E03.9 HYPOTHYROIDISM, UNSPECIFIED TYPE: Primary | ICD-10-CM

## 2020-08-03 DIAGNOSIS — E66.01 MORBID OBESITY WITH BMI OF 40.0-44.9, ADULT (HCC): ICD-10-CM

## 2020-08-03 DIAGNOSIS — K21.9 GASTROESOPHAGEAL REFLUX DISEASE WITHOUT ESOPHAGITIS: Primary | ICD-10-CM

## 2020-08-03 DIAGNOSIS — G89.29 CHRONIC NONINTRACTABLE HEADACHE, UNSPECIFIED HEADACHE TYPE: ICD-10-CM

## 2020-08-03 DIAGNOSIS — F33.1 MODERATE EPISODE OF RECURRENT MAJOR DEPRESSIVE DISORDER (HCC): ICD-10-CM

## 2020-08-03 DIAGNOSIS — R51.9 CHRONIC NONINTRACTABLE HEADACHE, UNSPECIFIED HEADACHE TYPE: ICD-10-CM

## 2020-08-03 DIAGNOSIS — K21.9 GASTROESOPHAGEAL REFLUX DISEASE WITHOUT ESOPHAGITIS: ICD-10-CM

## 2020-08-03 DIAGNOSIS — M54.2 NECK PAIN, CHRONIC: ICD-10-CM

## 2020-08-03 DIAGNOSIS — G89.29 NECK PAIN, CHRONIC: ICD-10-CM

## 2020-08-03 PROCEDURE — 3008F BODY MASS INDEX DOCD: CPT | Performed by: INTERNAL MEDICINE

## 2020-08-03 PROCEDURE — 3074F SYST BP LT 130 MM HG: CPT | Performed by: INTERNAL MEDICINE

## 2020-08-03 PROCEDURE — 4004F PT TOBACCO SCREEN RCVD TLK: CPT | Performed by: INTERNAL MEDICINE

## 2020-08-03 PROCEDURE — 99214 OFFICE O/P EST MOD 30 MIN: CPT | Performed by: INTERNAL MEDICINE

## 2020-08-03 PROCEDURE — 3079F DIAST BP 80-89 MM HG: CPT | Performed by: INTERNAL MEDICINE

## 2020-08-03 RX ORDER — LEVOTHYROXINE SODIUM 0.03 MG/1
TABLET ORAL
Qty: 90 TABLET | Refills: 1 | Status: SHIPPED | OUTPATIENT
Start: 2020-08-03 | End: 2020-12-15 | Stop reason: SDUPTHER

## 2020-08-03 RX ORDER — FAMOTIDINE 10 MG
20 TABLET ORAL 2 TIMES DAILY
Qty: 180 TABLET | Refills: 1 | Status: SHIPPED | OUTPATIENT
Start: 2020-08-03 | End: 2020-12-15 | Stop reason: SDUPTHER

## 2020-08-03 RX ORDER — DULOXETIN HYDROCHLORIDE 20 MG/1
20 CAPSULE, DELAYED RELEASE ORAL DAILY
Status: CANCELLED | OUTPATIENT
Start: 2020-08-03

## 2020-08-03 RX ORDER — DULOXETIN HYDROCHLORIDE 30 MG/1
30 CAPSULE, DELAYED RELEASE ORAL DAILY
Qty: 90 CAPSULE | Refills: 0 | Status: SHIPPED | OUTPATIENT
Start: 2020-08-03 | End: 2020-09-22 | Stop reason: SDUPTHER

## 2020-08-03 RX ORDER — FAMOTIDINE 20 MG/1
TABLET, FILM COATED ORAL
Qty: 60 TABLET | Refills: 1 | Status: SHIPPED | OUTPATIENT
Start: 2020-08-03 | End: 2020-09-22

## 2020-08-03 NOTE — ASSESSMENT & PLAN NOTE
Pt was intermittent but is now constant - likely triggering headaches, wishes to try trial of Cymbalta (failed Gabapentin and Lyrica not covered), SE reviewed, re-eval in 6 wks,  d/w pt that it takes 4-6 wks to get maximum benefit of med and that med has to be taken every day and to not miss doses of med, call with SE/new/worse symptoms

## 2020-08-03 NOTE — ASSESSMENT & PLAN NOTE
Started up again as neck pain is worse, pt wishes to try trial of Cymbalta (  No benefit with Gabapentin and insurance wouldn't cover Lyrica), Se reviewed, rx sent, re-eval in 6 wks

## 2020-08-03 NOTE — PROGRESS NOTES
Assessment/Plan:    Hypothyroidism  Clinically with fatigue and wgt gain and chronic diarrhea, TSH elevated - increase levothyroxine to 225 mcg Mon, Wed, Fri, Sun and con't 200 mcg all other days, recheck TFT's in 6 wk - order given, stressed importance of taking in am by itself without other meds and prior to eating    Chronic headache  Started up again as neck pain is worse, pt wishes to try trial of Cymbalta (  No benefit with Gabapentin and insurance wouldn't cover Lyrica), Se reviewed, rx sent, re-eval in 6 wks    Neck pain, chronic  Pt was intermittent but is now constant - likely triggering headaches, wishes to try trial of Cymbalta (failed Gabapentin and Lyrica not covered), SE reviewed, re-eval in 6 wks,  d/w pt that it takes 4-6 wks to get maximum benefit of med and that med has to be taken every day and to not miss doses of med, call with SE/new/worse symptoms      Episode of recurrent major depressive disorder ([facility] [address] )  Mood better with Paxil but pain not improved, wean off Paxil and try trial of Cymbalta, re-eval in 6 wks,   d/w pt that it takes 4-6 wks to get maximum benefit of med and that med has to be taken every day and to not miss doses of med, call with new/worse mood    Gastroesophageal reflux disease without esophagitis  Symptoms not controlled but she never restarted Pepcid - rx resent, diet/wgt loss encouraged       Diagnoses and all orders for this visit:    Hypothyroidism, unspecified type  -     levothyroxine 25 mcg tablet; 25 mcg 1 tab PO q am with 200 mcg on Mon, Wed, Fri, Sun ONLY , con't 200 mcg all other days  -     TSH, 3rd generation; Future  -     T4, free; Future  -     TSH, 3rd generation  -     T4, free    Chronic nonintractable headache, unspecified headache type    Neck pain, chronic  -     DULoxetine (CYMBALTA) 30 mg delayed release capsule;  Take 1 capsule (30 mg total) by mouth daily    Moderate episode of recurrent major depressive disorder (HCC)    Gastroesophageal reflux disease without esophagitis  -     famotidine (PEPCID) 10 mg tablet; Take 2 tablets (20 mg total) by mouth 2 (two) times a day    Morbid obesity with BMI of 40 0-44 9, adult (HCC)  Comments:  Diet/exercise/wgt loss    Other orders  -     Cancel: DULoxetine (CYMBALTA) 20 mg capsule; Take 1 capsule (20 mg total) by mouth daily      Mammo - overdue - order given again today and urged to do    PAP 11/19    Fasting labs 6/20        Subjective:      Patient ID: Zandra Alcocer is a 54 y o  female  HPI Pt here for follow up appt and BW results    BW results were d/w pt in detail: TSH improved but still elevated at 7 440, FT4 1 45  Pt is taking their thyroid medication daily w/o any other meds and prior to eating  They deny any significant C/tremor/palp/hair loss or skin changes  She has had some gradual wgt gain and fatigue  She has IBS-D and has intermittent diarrhea  BMI reviewed  She has been having a lot of issues with migraines again  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 from her chronic cervical dz  She had surgery Dec 2015  She notes numbness and tingling and weakness of RUE since the surgery  She notes early on the symptoms were intermittent but are now more frequent and almost constant  She has been on Gabapentin but she states it did not work  She had a rx for Lyrica but states it was not covered in the past   She had a friend that has tried Cymbalta and has had benefit with it  Pt is asking to try the Cymbalta  Last visit we increase her Paxil d/t mood not being at goal  She is having financial issues and may lose her house  Her mood is down  She is not anxious/irritable - feels the increase in Paxil has helped that significantly  She notes no SI/panic attacks  Mammo - overdue    PAP 11/19    Fasting labs 6/20      Review of Systems   Constitutional: Positive for fatigue and unexpected weight change  Negative for chills and fever  HENT: Positive for sore throat  Negative for congestion  Eyes: Negative for pain and redness  Respiratory: Negative for cough and shortness of breath  Cardiovascular: Negative for chest pain and palpitations  Gastrointestinal: Negative for abdominal pain, diarrhea, nausea and vomiting  Genitourinary: Negative for dysuria  Musculoskeletal: Positive for arthralgias and neck pain  Skin: Negative for rash  Neurological: Positive for headaches  Negative for dizziness  Hematological: Negative for adenopathy  Psychiatric/Behavioral: Positive for dysphoric mood  Negative for confusion and suicidal ideas  The patient is not nervous/anxious  Objective:    /84   Pulse 98   Temp 99 °F (37 2 °C)   Ht 5' 10"   Wt 128 kg (282 lb 3 2 oz)   BMI 40 49 kg/m²      Physical Exam   Constitutional: She appears well-developed  She does not appear ill  No distress  HENT:   Head: Normocephalic and atraumatic  Eyes: Conjunctivae are normal  Right eye exhibits no discharge  Left eye exhibits no discharge  Neck: Neck supple  No tracheal deviation present  Cardiovascular: Normal rate, regular rhythm and normal heart sounds  Exam reveals no friction rub  No murmur heard  Pulmonary/Chest: Effort normal and breath sounds normal  No respiratory distress  She has no wheezes  She has no rhonchi  She has no rales  Abdominal: Soft  She exhibits no distension  There is no abdominal tenderness  There is no guarding  Musculoskeletal:      Comments: No pain with palp of spinous processes of cervical spine, 5/5 B/L UE muscle strength   Neurological: She is alert  She displays normal reflexes  No sensory deficit  She exhibits normal muscle tone  Gait normal    Skin: Skin is warm and dry  No rash noted  No pallor  Psychiatric: Her behavior is normal  Mood, judgment and thought content normal    Nursing note and vitals reviewed  BMI Counseling: Body mass index is 40 49 kg/m²   The BMI is above normal  Nutrition recommendations include reducing portion sizes, 3-5 servings of fruits/vegetables daily, consuming healthier snacks, moderation in carbohydrate intake, increasing intake of lean protein and reducing intake of saturated fat and trans fat  Exercise recommendations include exercising 3-5 times per week

## 2020-08-03 NOTE — ASSESSMENT & PLAN NOTE
Mood better with Paxil but pain not improved, wean off Paxil and try trial of Cymbalta, re-eval in 6 wks,   d/w pt that it takes 4-6 wks to get maximum benefit of med and that med has to be taken every day and to not miss doses of med, call with new/worse mood

## 2020-08-03 NOTE — PATIENT INSTRUCTIONS
Obesity   AMBULATORY CARE:   Obesity  is when your body mass index (BMI) is greater than 30  Your healthcare provider will use your height and weight to measure your BMI  The risks of obesity include  many health problems, such as injuries or physical disability  You may need tests to check for the following:  · Diabetes     · High blood pressure or high cholesterol     · Heart disease     · Gallbladder or liver disease     · Cancer of the colon, breast, prostate, liver, or kidney     · Sleep apnea     · Arthritis or gout  Seek care immediately if:   · You have a severe headache, confusion, or difficulty speaking  · You have weakness on one side of your body  · You have chest pain, sweating, or shortness of breath  Contact your healthcare provider if:   · You have symptoms of gallbladder or liver disease, such as pain in your upper abdomen  · You have knee or hip pain and discomfort while walking  · You have symptoms of diabetes, such as intense hunger and thirst, and frequent urination  · You have symptoms of sleep apnea, such as snoring or daytime sleepiness  · You have questions or concerns about your condition or care  Treatment for obesity  focuses on helping you lose weight to improve your health  Even a small decrease in BMI can reduce the risk for many health problems  Your healthcare provider will help you set a weight-loss goal   · Lifestyle changes  are the first step in treating obesity  These include making healthy food choices and getting regular physical activity  Your healthcare provider may suggest a weight-loss program that involves coaching, education, and therapy  · Medicine  may help you lose weight when it is used with a healthy diet and physical activity  · Surgery  can help you lose weight if you are very obese and have other health problems  There are several types of weight-loss surgery  Ask your healthcare provider for more information    Be successful losing weight:   · Set small, realistic goals  An example of a small goal is to walk for 20 minutes 5 days a week  Anther goal is to lose 5% of your body weight  · Tell friends, family members, and coworkers about your goals  and ask for their support  Ask a friend to lose weight with you, or join a weight-loss support group  · Identify foods or triggers that may cause you to overeat , and find ways to avoid them  Remove tempting high-calorie foods from your home and workplace  Place a bowl of fresh fruit on your kitchen counter  If stress causes you to eat, then find other ways to cope with stress  · Keep a diary to track what you eat and drink  Also write down how many minutes of physical activity you do each day  Weigh yourself once a week and record it in your diary  Eating changes: You will need to eat 500 to 1,000 fewer calories each day than you currently eat to lose 1 to 2 pounds a week  The following changes will help you cut calories:  · Eat smaller portions  Use small plates, no larger than 9 inches in diameter  Fill your plate half full of fruits and vegetables  Measure your food using measuring cups until you know what a serving size looks like  · Eat 3 meals and 1 or 2 snacks each day  Plan your meals in advance  [employer] and eat at home most of the time  Eat slowly  · Eat fruits and vegetables at every meal   They are low in calories and high in fiber, which makes you feel full  Do not add butter, margarine, or cream sauce to vegetables  Use herbs to season steamed vegetables  · Eat less fat and fewer fried foods  Eat more baked or grilled chicken and fish  These protein sources are lower in calories and fat than red meat  Limit fast food  Dress your salads with olive oil and vinegar instead of bottled dressing  · Limit the amount of sugar you eat  Do not drink sugary beverages  Limit alcohol  Activity changes:  Physical activity is good for your body in many ways   It helps you burn calories and build strong muscles  It decreases stress and depression, and improves your mood  It can also help you sleep better  Talk to your healthcare provider before you begin an exercise program   · Exercise for at least 30 minutes 5 days a week  Start slowly  Set aside time each day for physical activity that you enjoy and that is convenient for you  It is best to do both weight training and an activity that increases your heart rate, such as walking, bicycling, or swimming  · Find ways to be more active  Do yard work and housecleaning  Walk up the stairs instead of using elevators  Spend your leisure time going to events that require walking, such as outdoor festivals or fairs  This extra physical activity can help you lose weight and keep it off  Follow up with your healthcare provider as directed: You may need to meet with a dietitian  Write down your questions so you remember to ask them during your visits  © 2017 [employer] [employer] Information is for End User's use only and may not be sold, redistributed or otherwise used for commercial purposes  All illustrations and images included in CareNotes® are the copyrighted property of [employer]  or [employer] Contreras  The above information is an  only  It is not intended as medical advice for individual conditions or treatments  Talk to your doctor, nurse or pharmacist before following any medical regimen to see if it is safe and effective for you  Heart Healthy Diet   AMBULATORY CARE:   A heart healthy diet  is an eating plan low in total fat, unhealthy fats, and sodium (salt)  A heart healthy diet helps decrease your risk for heart disease and stroke  Limit the amount of fat you eat to 25% to 35% of your total daily calories  Limit sodium to less than 2,300 mg each day  Healthy fats:  Healthy fats can help improve cholesterol levels   The risk for heart disease is decreased when cholesterol levels are normal  Choose healthy fats, such as the following:  · Unsaturated fat  is found in foods such as soybean, canola, olive, corn, and safflower oils  It is also found in soft tub margarine that is made with liquid vegetable oil  · Omega-3 fat  is found in certain fish, such as salmon, tuna, and trout, and in walnuts and flaxseed  Unhealthy fats:  Unhealthy fats can cause unhealthy cholesterol levels in your blood and increase your risk of heart disease  Limit unhealthy fats, such as the following:  · Cholesterol  is found in animal foods, such as eggs and lobster, and in dairy products made from whole milk  Limit cholesterol to less than 300 milligrams (mg) each day  You may need to limit cholesterol to 200 mg each day if you have heart disease  · Saturated fat  is found in meats, such as benítez and hamburger  It is also found in chicken or turkey skin, whole milk, and butter  Limit saturated fat to less than 7% of your total daily calories  Limit saturated fat to less than 6% if you have heart disease or are at increased risk for it  · Trans fat  is found in packaged foods, such as potato chips and cookies  It is also in hard margarine, some fried foods, and shortening  Avoid trans fats as much as possible    Heart healthy foods and drinks to include:  Ask your dietitian or healthcare provider how many servings to have from each of the following food groups:  · Grains:      ¨ Whole-wheat breads, cereals, and pastas, and brown rice    ¨ Low-fat, low-sodium crackers and chips    · Vegetables:      ¨ Broccoli, green beans, green peas, and spinach    ¨ Collards, kale, and lima beans    ¨ Carrots, sweet potatoes, tomatoes, and peppers    ¨ Canned vegetables with no salt added    · Fruits:      ¨ Bananas, peaches, pears, and pineapple    ¨ Grapes, raisins, and dates    ¨ Oranges, tangerines, grapefruit, orange juice, and grapefruit juice    ¨ Apricots, mangoes, melons, and papaya    ¨ Raspberries and strawberries    ¨ Canned fruit with no added sugar    · Low-fat dairy products:      ¨ Nonfat (skim) milk, 1% milk, and low-fat almond, cashew, or soy milks fortified with calcium    ¨ Low-fat cheese, regular or frozen yogurt, and cottage cheese    · Meats and proteins , such as lean cuts of beef and pork (loin, leg, round), skinless chicken and turkey, legumes, soy products, egg whites, and nuts  Foods and drinks to limit or avoid:  Ask your dietitian or healthcare provider about these and other foods that are high in unhealthy fat, sodium, and sugar:  · Snack or packaged foods , such as frozen dinners, cookies, macaroni and cheese, and cereals with more than 300 mg of sodium per serving    · Canned or dry mixes  for cakes, soups, sauces, or gravies    · Vegetables with added sodium , such as instant potatoes, vegetables with added sauces, or regular canned vegetables    · Other foods high in sodium , such as ketchup, barbecue sauce, salad dressing, pickles, olives, soy sauce, and miso    · High-fat dairy foods  such as whole or 2% milk, cream cheese, or sour cream, and cheeses     · High-fat protein foods  such as high-fat cuts of beef (T-bone steaks, ribs), chicken or turkey with skin, and organ meats, such as liver    · Cured or smoked meats , such as hot dogs, benítez, and sausage    · Unhealthy fats and oils , such as butter, stick margarine, shortening, and cooking oils such as coconut or palm oil    · Food and drinks high in sugar , such as soft drinks (soda), sports drinks, sweetened tea, candy, cake, cookies, pies, and doughnuts  Other diet guidelines to follow:   · Eat more foods containing omega-3 fats  Eat fish high in omega-3 fats at least 2 times a week  · Limit alcohol  Too much alcohol can damage your heart and raise your blood pressure  Women should limit alcohol to 1 drink a day  Men should limit alcohol to 2 drinks a day   A drink of alcohol is 12 ounces of beer, 5 ounces of wine, or 1½ ounces of liquor  · Choose low-sodium foods  High-sodium foods can lead to high blood pressure  Add little or no salt to food you prepare  Use herbs and spices in place of salt  · Eat more fiber  to help lower cholesterol levels  Eat at least 5 servings of fruits and vegetables each day  Eat 3 ounces of whole-grain foods each day  Legumes (beans) are also a good source of fiber  Lifestyle guidelines:   · Do not smoke  Nicotine and other chemicals in cigarettes and cigars can cause lung and heart damage  Ask your healthcare provider for information if you currently smoke and need help to quit  E-cigarettes or smokeless tobacco still contain nicotine  Talk to your healthcare provider before you use these products  · Exercise regularly  to help you maintain a healthy weight and improve your blood pressure and cholesterol levels  Ask your healthcare provider about the best exercise plan for you  Do not start an exercise program without asking your healthcare provider  Follow up with your healthcare provider as directed:  Write down your questions so you remember to ask them during your visits  © 2017 [facility] [facility] Information is for End User's use only and may not be sold, redistributed or otherwise used for commercial purposes  All illustrations and images included in CareNotes® are the copyrighted property of [employer]  or [employer] Contreras  The above information is an  only  It is not intended as medical advice for individual conditions or treatments  Talk to your doctor, nurse or pharmacist before following any medical regimen to see if it is safe and effective for you

## 2020-08-03 NOTE — ASSESSMENT & PLAN NOTE
Clinically with fatigue and wgt gain and chronic diarrhea, TSH elevated - increase levothyroxine to 225 mcg Mon, Wed, Fri, Sun and con't 200 mcg all other days, recheck TFT's in 6 wk - order given, stressed importance of taking in am by itself without other meds and prior to eating

## 2020-08-04 ENCOUNTER — TELEMEDICINE (OUTPATIENT)
Dept: FAMILY MEDICINE CLINIC | Facility: HOSPITAL | Age: 45
End: 2020-08-04
Payer: COMMERCIAL

## 2020-08-04 ENCOUNTER — TELEPHONE (OUTPATIENT)
Dept: FAMILY MEDICINE CLINIC | Facility: HOSPITAL | Age: 45
End: 2020-08-04

## 2020-08-04 VITALS — BODY MASS INDEX: 40.37 KG/M2 | WEIGHT: 282 LBS | TEMPERATURE: 100.5 F | HEIGHT: 70 IN

## 2020-08-04 DIAGNOSIS — J06.9 VIRAL UPPER RESPIRATORY TRACT INFECTION: ICD-10-CM

## 2020-08-04 DIAGNOSIS — J06.9 VIRAL UPPER RESPIRATORY TRACT INFECTION: Primary | ICD-10-CM

## 2020-08-04 PROCEDURE — 99214 OFFICE O/P EST MOD 30 MIN: CPT | Performed by: NURSE PRACTITIONER

## 2020-08-04 PROCEDURE — 3008F BODY MASS INDEX DOCD: CPT | Performed by: NURSE PRACTITIONER

## 2020-08-04 PROCEDURE — U0003 INFECTIOUS AGENT DETECTION BY NUCLEIC ACID (DNA OR RNA); SEVERE ACUTE RESPIRATORY SYNDROME CORONAVIRUS 2 (SARS-COV-2) (CORONAVIRUS DISEASE [COVID-19]), AMPLIFIED PROBE TECHNIQUE, MAKING USE OF HIGH THROUGHPUT TECHNOLOGIES AS DESCRIBED BY CMS-2020-01-R: HCPCS | Performed by: NURSE PRACTITIONER

## 2020-08-04 PROCEDURE — 4004F PT TOBACCO SCREEN RCVD TLK: CPT | Performed by: NURSE PRACTITIONER

## 2020-08-04 NOTE — PROGRESS NOTES
COVID-19 Virtual Visit     Assessment/Plan:    Problem List Items Addressed This Visit     None      Visit Diagnoses     Viral upper respiratory tract infection    -  Primary    test for covid today given viral sx's, continue to isolate while symptomatic & until result available; call w/worse sx's    Relevant Orders    Novel Coronavirus (COVID-19), PCR [facility] - Collected at [facility] or [facility]        This virtual check-in was done via [employer] and patient was informed that this is not a secure, HIPAA-complaint platform  She agrees to proceed     Disposition:      I referred Priscilla to one of our centralized sites for a COVID-19 swab    I spent 10 minutes with patient today in which greater than 50% of the time was spent in counseling/coordination of care regarding symptoms and plan of care    Encounter provider KENNETH Weber    Provider located at [address] [place] [address]  [address] [place] [address]    Recent Visits  Date Type Provider Dept   08/03/20 Office Visit DO Michael Mckeon Md   Showing recent visits within past 7 days and meeting all other requirements     Today's Visits  Date Type Provider Dept   08/04/20 Telemedicine KENNETH Weber Pg, Md   08/04/20 Telephone Virginia Goodman Md   Showing today's visits and meeting all other requirements     Future Appointments  No visits were found meeting these conditions  Showing future appointments within next 150 days and meeting all other requirements        Patient agrees to participate in a virtual check in via telephone or video visit instead of presenting to the office to address urgent/immediate medical needs  Patient is aware this is a billable service  After connecting through [email or web address], the patient was identified by name and date of birth   Aubrey Guevara was informed that this was a telemedicine visit and that the exam was being conducted confidentially over secure lines  My office door was closed  No one else was in the room  Leonel Green acknowledged consent and understanding of privacy and security of the telemedicine visit  I informed the patient that I have reviewed her record in Epic and presented the opportunity for her to ask any questions regarding the visit today  The patient agreed to participate  Subjective  Leonel Green is a 54 y o  female who is concerned about COVID-19  She reports fever, chills, cough, shortness of breath, sore throat, anorexia and fatigue  She has not experienced myalgias, anosmia, abdominal pain, diarrhea, nausea and vomiting She has not had contact with a person who is under investigation for or who is positive for COVID-19 within the last 14 days  She has not been hospitalized recently for fever and/or lower respiratory symptoms      Past Medical History:   Diagnosis Date    Anxiety     Asthma     Disc disorder     Hypertension     Hypothyroidism     Migraine        Past Surgical History:   Procedure Laterality Date    BACK SURGERY      CERVICAL BIOPSY  W/ LOOP ELECTRODE EXCISION      CERVICAL FUSION       SECTION      CHOLECYSTECTOMY      FOOT SURGERY Bilateral     KNEE SURGERY      TUBAL LIGATION Bilateral        Current Outpatient Medications   Medication Sig Dispense Refill    albuterol (VENTOLIN HFA) 90 mcg/act inhaler Inhale 2 puffs every 6 (six) hours as needed for wheezing 18 g 0    amLODIPine (NORVASC) 5 mg tablet Take 1 tablet (5 mg total) by mouth daily 90 tablet 0    cholecalciferol (VITAMIN D3) 1,000 units tablet Take 2,000 Units by mouth daily      DULoxetine (CYMBALTA) 30 mg delayed release capsule Take 1 capsule (30 mg total) by mouth daily 90 capsule 0    famotidine (PEPCID) 20 mg tablet TAKE ONE TABLET BY MOUTH TWICE DAILY 60 tablet 1    levothyroxine 200 mcg tablet Take 1 tablet (200 mcg total) by mouth daily 90 tablet 0    levothyroxine 25 mcg tablet 25 mcg 1 tab PO q am with 200 mcg on Mon, Wed, Fri, Sun ONLY , con't 200 mcg all other days 90 tablet 1    multivitamin (THERAGRAN) TABS Take 1 tablet by mouth daily      ondansetron (ZOFRAN-ODT) 4 mg disintegrating tablet Take 1 tablet (4 mg total) by mouth every 8 (eight) hours as needed for nausea or vomiting 12 tablet 0    PARoxetine (PAXIL) 40 MG tablet Take 1 tablet (40 mg total) by mouth daily 90 tablet 0    famotidine (PEPCID) 10 mg tablet Take 2 tablets (20 mg total) by mouth 2 (two) times a day (Patient not taking: Reported on 8/4/2020) 180 tablet 1     No current facility-administered medications for this visit  Allergies   Allergen Reactions    Acetaminophen-Codeine      Pt states she does not remember having a reaction to this medication    Hydrocodone Other (See Comments)    Prednisolone        Review of Systems   Constitutional: Positive for appetite change (light foods, drinking hot tea), chills, fatigue and fever (?, thermometer not working)  HENT: Positive for sore throat  Negative for congestion and ear pain  Respiratory: Positive for cough and shortness of breath (due to cough)  Cardiovascular: Positive for chest pain ("chest on fire" with cough)  Gastrointestinal: Negative for diarrhea, nausea and vomiting  Neurological: Negative for headaches  Video Exam    Vitals:    08/04/20 1410   Temp: 100 5 °F (38 1 °C)   TempSrc: Tympanic   Weight: 128 kg (282 lb)   Height: 5' 10"         Priscilla appears alert, no distress, cooperative, pale  Physical Exam   Constitutional: She is oriented to person, place, and time  She appears ill  HENT:   Head: Normocephalic and atraumatic  Pulmonary/Chest: Effort normal  No respiratory distress  Neurological: She is alert and oriented to person, place, and time  Psychiatric: Her behavior is normal  Mood, judgment and thought content normal    Vitals reviewed         VIRTUAL VISIT DISCLAIMER    Marni Cabrera acknowledges that she has consented to an online visit or consultation  She understands that the online visit is based solely on information provided by her, and that, in the absence of a face-to-face physical evaluation by the physician, the diagnosis she receives is both limited and provisional in terms of accuracy and completeness  This is not intended to replace a full medical face-to-face evaluation by the physician  Diana Barone understands and accepts these terms

## 2020-08-04 NOTE — TELEPHONE ENCOUNTER
Pt was in the office yesterday, she said it took a turn for the worse, sore throat is a little less but her chest is very tight and painful when coughing  Not bringing much phlegm up at all if any  No sob, no GI issues but she does have ibsd  Cant stop coughing  She said it feels like bronchitis  Should she get tested?  Please advise

## 2020-08-06 LAB — SARS-COV-2 RNA SPEC QL NAA+PROBE: NOT DETECTED

## 2020-08-14 ENCOUNTER — TELEPHONE (OUTPATIENT)
Dept: FAMILY MEDICINE CLINIC | Facility: HOSPITAL | Age: 45
End: 2020-08-14

## 2020-08-14 DIAGNOSIS — F33.1 MODERATE EPISODE OF RECURRENT MAJOR DEPRESSIVE DISORDER (HCC): ICD-10-CM

## 2020-08-14 RX ORDER — PAROXETINE 10 MG/1
10 TABLET, FILM COATED ORAL DAILY
Qty: 6 TABLET | Refills: 0 | Status: SHIPPED | OUTPATIENT
Start: 2020-08-14 | End: 2020-09-22

## 2020-08-14 NOTE — TELEPHONE ENCOUNTER
She took 1/2 tablet once a day for 3 days and then she was told to stop all together for 3 days and then start cymbalta  Pt is asking if she should take the 1/4 tablet in addition to the cymbalta over the weekend?

## 2020-08-14 NOTE — TELEPHONE ENCOUNTER
Pt is complaining of dizziness and extreme fatigue  Had a virtual visit with Mohsen Zheng and was negative for covid at that time  Pt is also having nausea and lingering cough  Dr Jennifer Purdy is weaning pt off of Paxil and is supposed to be starting cymbalta tomorrow  Pt is not sure if her dizziness is possibly withdrawal from the medication  Please advise what she can do

## 2020-08-14 NOTE — TELEPHONE ENCOUNTER
It probably is side effect of Paxil elimination especially coming off of a 40mg dose  How did she taper it? If she has any tablets left she could possibly use 1/4 tablet for the weekend as she starts on the new medication

## 2020-08-27 DIAGNOSIS — F33.1 MODERATE EPISODE OF RECURRENT MAJOR DEPRESSIVE DISORDER (HCC): ICD-10-CM

## 2020-08-27 DIAGNOSIS — I10 ESSENTIAL HYPERTENSION: ICD-10-CM

## 2020-08-27 RX ORDER — PAROXETINE HYDROCHLORIDE 40 MG/1
40 TABLET, FILM COATED ORAL DAILY
Qty: 90 TABLET | Refills: 0 | OUTPATIENT
Start: 2020-08-27

## 2020-08-27 RX ORDER — AMLODIPINE BESYLATE 5 MG/1
5 TABLET ORAL DAILY
Qty: 90 TABLET | Refills: 0 | Status: SHIPPED | OUTPATIENT
Start: 2020-08-27 | End: 2020-11-24

## 2020-09-19 LAB
T4 FREE SERPL-MCNC: 1.47 NG/DL (ref 0.82–1.77)
TSH SERPL DL<=0.005 MIU/L-ACNC: 2.23 UIU/ML (ref 0.45–4.5)

## 2020-09-22 ENCOUNTER — OFFICE VISIT (OUTPATIENT)
Dept: FAMILY MEDICINE CLINIC | Facility: HOSPITAL | Age: 45
End: 2020-09-22
Payer: COMMERCIAL

## 2020-09-22 VITALS
DIASTOLIC BLOOD PRESSURE: 84 MMHG | TEMPERATURE: 98.3 F | WEIGHT: 279 LBS | SYSTOLIC BLOOD PRESSURE: 126 MMHG | HEIGHT: 70 IN | BODY MASS INDEX: 39.94 KG/M2 | HEART RATE: 90 BPM

## 2020-09-22 DIAGNOSIS — G89.29 NECK PAIN, CHRONIC: ICD-10-CM

## 2020-09-22 DIAGNOSIS — M25.50 ARTHRALGIA, UNSPECIFIED JOINT: ICD-10-CM

## 2020-09-22 DIAGNOSIS — R21 RASH AND NONSPECIFIC SKIN ERUPTION: ICD-10-CM

## 2020-09-22 DIAGNOSIS — I10 ESSENTIAL HYPERTENSION: ICD-10-CM

## 2020-09-22 DIAGNOSIS — E03.9 HYPOTHYROIDISM, UNSPECIFIED TYPE: Primary | ICD-10-CM

## 2020-09-22 DIAGNOSIS — M54.2 NECK PAIN, CHRONIC: ICD-10-CM

## 2020-09-22 PROCEDURE — 99214 OFFICE O/P EST MOD 30 MIN: CPT | Performed by: INTERNAL MEDICINE

## 2020-09-22 PROCEDURE — 4004F PT TOBACCO SCREEN RCVD TLK: CPT | Performed by: INTERNAL MEDICINE

## 2020-09-22 PROCEDURE — 3079F DIAST BP 80-89 MM HG: CPT | Performed by: INTERNAL MEDICINE

## 2020-09-22 RX ORDER — LEVOTHYROXINE SODIUM 0.2 MG/1
200 TABLET ORAL DAILY
Qty: 90 TABLET | Refills: 1 | Status: SHIPPED | OUTPATIENT
Start: 2020-09-22 | End: 2020-12-17 | Stop reason: SDUPTHER

## 2020-09-22 RX ORDER — DULOXETIN HYDROCHLORIDE 60 MG/1
60 CAPSULE, DELAYED RELEASE ORAL DAILY
Qty: 90 CAPSULE | Refills: 0 | Status: SHIPPED | OUTPATIENT
Start: 2020-09-22 | End: 2020-12-17 | Stop reason: SDUPTHER

## 2020-09-22 NOTE — PROGRESS NOTES
Assessment/Plan:    Hypothyroidism  Clinically euthyroid, TSH back to goal with slight increase in levothyroxine, con't current regimen for now    Neck pain, chronic  No great benefit with starting Cymbalta - no significant SE once she adjusted to the medication - increase to 60 mg 1 tab PO q day, re-eval in 4-6 wks, call with SE or new/worse symptoms, may need to try TCA or try to prior-auth Lyrica if no benefit at 60 mg    Hypertension  BP better on recheck by end of appt, con't current meds, recheck in 4-6 wks       Diagnoses and all orders for this visit:    Hypothyroidism, unspecified type  -     levothyroxine 200 mcg tablet; Take 1 tablet (200 mcg total) by mouth daily  -     C-reactive protein  -     Cyclic citrul peptide antibody, IgG  -     Lyme Antibody Profile with reflex to WB  -     RIVAS w/Reflex if Positive; Future  -     Rheumatoid Arthritis Factor; Future  -     RIVAS w/Reflex if Positive  -     Rheumatoid Arthritis Factor    Neck pain, chronic  -     DULoxetine (CYMBALTA) 60 mg delayed release capsule; Take 1 capsule (60 mg total) by mouth daily  -     C-reactive protein  -     Cyclic citrul peptide antibody, IgG  -     Lyme Antibody Profile with reflex to WB  -     RIVAS w/Reflex if Positive; Future  -     Rheumatoid Arthritis Factor; Future  -     RIVAS w/Reflex if Positive  -     Rheumatoid Arthritis Factor    Arthralgia, unspecified joint  Comments:  Never had complete rheum labs to her knowledge - will check rheum labs and titrate up Cymbalta - re-sedrick in 4-6 wks  Orders:  -     C-reactive protein  -     Cyclic citrul peptide antibody, IgG  -     Lyme Antibody Profile with reflex to WB  -     RIVAS w/Reflex if Positive; Future  -     Rheumatoid Arthritis Factor;  Future  -     RIVAS w/Reflex if Positive  -     Rheumatoid Arthritis Factor    Rash and nonspecific skin eruption  Comments:  distribution can be seen with certain autoimmune diseases, will check rheum labs and re-eval in 4-6 wks, call with new/worse rash  Orders:  -     C-reactive protein  -     Cyclic citrul peptide antibody, IgG  -     Lyme Antibody Profile with reflex to WB  -     RIVAS w/Reflex if Positive; Future  -     Rheumatoid Arthritis Factor; Future  -     RIVAS w/Reflex if Positive  -     Rheumatoid Arthritis Factor    Essential hypertension      Mammo - still not done, has order at home, recommendations reviewed    PAP 11/19    BW 6/20    Out of flu vaccine to give pt today - will check with pharmacy        Subjective:      Patient ID: Aubrey Guevara is a 54 y o  female  HPI Pt here for follow up appt    Last visit in Aug pts TSH was noted to be elevated  We increased her levothyroxine to 225 mcg M/W/F/Sun and told her to con't 200 mcg all other days  She has repeat TFT's 9/18/20 and levels were perfect  Pt is taking their thyroid medication daily w/o any other meds and prior to eating  They deny any significant changes with wgt /fatigue/C/D/tremor/palp/hair loss or skin changes  Pt was started on Cymbalta last visit for chronic HA's and neck pain and worsening mood  She was given directions on how to wean off the Paxil  Eric Ingles She has already tried and failed Gabapentin and insurance would not cover Lyrica  She weaned off the Paxil and started the Cymbalta  She feels "all there nerve pain started coming out"  She notes the past few days the nerve pain has been a bit better  She notes mood is fine as before  She did have some withdrawal symptoms weaning off the Paxil  She notes no other SE with the Cymbalta  Notes redness on ant chest wall and mo shape distribution on back  She notes some dry/white flaking skin on her palms  She does not recall if she has had complete rheum labs in the past with previous PCP  Bp up on presentation  Better on recheck at end of appt  She is taking BP med daily w/o SE and denies missing doses  She notes unchanged HA's and denies dizziness/double vision/CP       Mammo - still not done, has order at home, recommendations reviewed    PAP 11/19    BW 6/20      Review of Systems   Constitutional: Negative for chills, fatigue, fever and unexpected weight change  HENT: Negative for congestion and sore throat  Eyes: Negative for pain and visual disturbance  Respiratory: Negative for cough and shortness of breath  Cardiovascular: Negative for chest pain and palpitations  Gastrointestinal: Positive for diarrhea  Negative for abdominal pain, blood in stool, constipation, nausea and vomiting  Endocrine: Negative for polydipsia and polyuria  Genitourinary: Negative for difficulty urinating and dysuria  Musculoskeletal: Positive for arthralgias and neck pain  Negative for back pain  Skin: Positive for color change and rash  Negative for wound  Neurological: Positive for headaches  Negative for dizziness and light-headedness  Hematological: Negative for adenopathy  Psychiatric/Behavioral: Negative for behavioral problems, confusion and dysphoric mood  The patient is not nervous/anxious  Objective:    /84   Pulse 90   Temp 98 3 °F (36 8 °C) (Tympanic)   Ht 5' 10" (1 778 m)   Wt 127 kg (279 lb)   BMI 40 03 kg/m²      Physical Exam  Vitals signs and nursing note reviewed  Constitutional:       General: She is not in acute distress  Appearance: She is well-developed  She is obese  She is not ill-appearing  HENT:      Head: Normocephalic and atraumatic  Eyes:      General:         Right eye: No discharge  Left eye: No discharge  Conjunctiva/sclera: Conjunctivae normal    Neck:      Musculoskeletal: Neck supple  Trachea: No tracheal deviation  Cardiovascular:      Rate and Rhythm: Normal rate and regular rhythm  Heart sounds: Normal heart sounds  No murmur  No friction rub  Pulmonary:      Effort: Pulmonary effort is normal  No respiratory distress  Breath sounds: Normal breath sounds  No wheezing, rhonchi or rales     Abdominal: General: There is no distension  Palpations: Abdomen is soft  Tenderness: There is no abdominal tenderness  There is no guarding or rebound  Skin:     General: Skin is warm and dry  Coloration: Skin is not pale  Comments: Very mild generalized erythematous coloring to ant chest wall and upper back - no papules/pustules/nodules noted   Neurological:      General: No focal deficit present  Mental Status: She is alert  Motor: No abnormal muscle tone  Gait: Gait normal    Psychiatric:         Mood and Affect: Mood normal          Behavior: Behavior normal          Thought Content:  Thought content normal          Judgment: Judgment normal

## 2020-09-22 NOTE — ASSESSMENT & PLAN NOTE
No great benefit with starting Cymbalta - no significant SE once she adjusted to the medication - increase to 60 mg 1 tab PO q day, re-eval in 4-6 wks, call with SE or new/worse symptoms, may need to try TCA or try to prior-auth Lyrica if no benefit at 60 mg

## 2020-09-22 NOTE — ASSESSMENT & PLAN NOTE
Clinically euthyroid, TSH back to goal with slight increase in levothyroxine, con't current regimen for now

## 2020-10-27 DIAGNOSIS — G89.29 NECK PAIN, CHRONIC: ICD-10-CM

## 2020-10-27 DIAGNOSIS — M54.2 NECK PAIN, CHRONIC: ICD-10-CM

## 2020-10-27 RX ORDER — DULOXETIN HYDROCHLORIDE 30 MG/1
30 CAPSULE, DELAYED RELEASE ORAL DAILY
Qty: 90 CAPSULE | Refills: 0 | OUTPATIENT
Start: 2020-10-27

## 2020-11-24 DIAGNOSIS — I10 ESSENTIAL HYPERTENSION: ICD-10-CM

## 2020-11-24 RX ORDER — AMLODIPINE BESYLATE 5 MG/1
5 TABLET ORAL DAILY
Qty: 90 TABLET | Refills: 1 | Status: SHIPPED | OUTPATIENT
Start: 2020-11-24 | End: 2021-03-23 | Stop reason: SDUPTHER

## 2020-12-15 ENCOUNTER — OFFICE VISIT (OUTPATIENT)
Dept: FAMILY MEDICINE CLINIC | Facility: HOSPITAL | Age: 45
End: 2020-12-15
Payer: COMMERCIAL

## 2020-12-15 VITALS
HEART RATE: 88 BPM | SYSTOLIC BLOOD PRESSURE: 148 MMHG | WEIGHT: 273.2 LBS | TEMPERATURE: 96.5 F | BODY MASS INDEX: 39.11 KG/M2 | HEIGHT: 70 IN | DIASTOLIC BLOOD PRESSURE: 96 MMHG

## 2020-12-15 DIAGNOSIS — Z23 ENCOUNTER FOR IMMUNIZATION: ICD-10-CM

## 2020-12-15 DIAGNOSIS — M79.18 MYOFASCIAL PAIN SYNDROME: ICD-10-CM

## 2020-12-15 DIAGNOSIS — K21.9 GASTROESOPHAGEAL REFLUX DISEASE WITHOUT ESOPHAGITIS: ICD-10-CM

## 2020-12-15 DIAGNOSIS — I10 ESSENTIAL HYPERTENSION: ICD-10-CM

## 2020-12-15 DIAGNOSIS — G89.29 NECK PAIN, CHRONIC: Primary | ICD-10-CM

## 2020-12-15 DIAGNOSIS — M54.2 NECK PAIN, CHRONIC: Primary | ICD-10-CM

## 2020-12-15 DIAGNOSIS — E03.9 HYPOTHYROIDISM, UNSPECIFIED TYPE: ICD-10-CM

## 2020-12-15 DIAGNOSIS — Z12.31 ENCOUNTER FOR SCREENING MAMMOGRAM FOR BREAST CANCER: ICD-10-CM

## 2020-12-15 DIAGNOSIS — F33.1 MODERATE EPISODE OF RECURRENT MAJOR DEPRESSIVE DISORDER (HCC): ICD-10-CM

## 2020-12-15 DIAGNOSIS — R20.0 RIGHT ARM NUMBNESS: ICD-10-CM

## 2020-12-15 PROCEDURE — 99214 OFFICE O/P EST MOD 30 MIN: CPT | Performed by: INTERNAL MEDICINE

## 2020-12-15 PROCEDURE — 90732 PPSV23 VACC 2 YRS+ SUBQ/IM: CPT | Performed by: INTERNAL MEDICINE

## 2020-12-15 PROCEDURE — 3077F SYST BP >= 140 MM HG: CPT | Performed by: INTERNAL MEDICINE

## 2020-12-15 PROCEDURE — 90686 IIV4 VACC NO PRSV 0.5 ML IM: CPT | Performed by: INTERNAL MEDICINE

## 2020-12-15 PROCEDURE — 90472 IMMUNIZATION ADMIN EACH ADD: CPT | Performed by: INTERNAL MEDICINE

## 2020-12-15 PROCEDURE — 90471 IMMUNIZATION ADMIN: CPT | Performed by: INTERNAL MEDICINE

## 2020-12-15 PROCEDURE — 3725F SCREEN DEPRESSION PERFORMED: CPT | Performed by: INTERNAL MEDICINE

## 2020-12-15 PROCEDURE — 3080F DIAST BP >= 90 MM HG: CPT | Performed by: INTERNAL MEDICINE

## 2020-12-15 PROCEDURE — 4004F PT TOBACCO SCREEN RCVD TLK: CPT | Performed by: INTERNAL MEDICINE

## 2020-12-15 PROCEDURE — 3008F BODY MASS INDEX DOCD: CPT | Performed by: INTERNAL MEDICINE

## 2020-12-15 RX ORDER — FAMOTIDINE 10 MG
20 TABLET ORAL 2 TIMES DAILY
Qty: 180 TABLET | Refills: 1 | Status: SHIPPED | OUTPATIENT
Start: 2020-12-15 | End: 2020-12-15 | Stop reason: SDUPTHER

## 2020-12-15 RX ORDER — CYCLOBENZAPRINE HCL 5 MG
5 TABLET ORAL 3 TIMES DAILY PRN
Qty: 60 TABLET | Refills: 1 | Status: SHIPPED | OUTPATIENT
Start: 2020-12-15 | End: 2021-01-12

## 2020-12-15 RX ORDER — FAMOTIDINE 20 MG/1
20 TABLET, FILM COATED ORAL 2 TIMES DAILY
Qty: 180 TABLET | Refills: 1 | Status: SHIPPED | OUTPATIENT
Start: 2020-12-15 | End: 2021-03-23

## 2020-12-15 RX ORDER — PREGABALIN 50 MG/1
50 CAPSULE ORAL 2 TIMES DAILY
Qty: 180 CAPSULE | Refills: 0 | Status: SHIPPED | OUTPATIENT
Start: 2020-12-15 | End: 2021-03-09

## 2020-12-15 RX ORDER — LEVOTHYROXINE SODIUM 0.03 MG/1
TABLET ORAL
Qty: 90 TABLET | Refills: 1 | Status: SHIPPED | OUTPATIENT
Start: 2020-12-15 | End: 2021-03-02 | Stop reason: SDUPTHER

## 2020-12-17 DIAGNOSIS — G89.29 NECK PAIN, CHRONIC: ICD-10-CM

## 2020-12-17 DIAGNOSIS — E03.9 HYPOTHYROIDISM, UNSPECIFIED TYPE: ICD-10-CM

## 2020-12-17 DIAGNOSIS — M54.2 NECK PAIN, CHRONIC: ICD-10-CM

## 2020-12-18 RX ORDER — LEVOTHYROXINE SODIUM 0.2 MG/1
200 TABLET ORAL DAILY
Qty: 90 TABLET | Refills: 0 | Status: SHIPPED | OUTPATIENT
Start: 2020-12-18 | End: 2021-03-02 | Stop reason: SDUPTHER

## 2020-12-18 RX ORDER — DULOXETIN HYDROCHLORIDE 60 MG/1
60 CAPSULE, DELAYED RELEASE ORAL DAILY
Qty: 90 CAPSULE | Refills: 0 | Status: SHIPPED | OUTPATIENT
Start: 2020-12-18 | End: 2021-03-09

## 2021-01-12 DIAGNOSIS — G89.29 NECK PAIN, CHRONIC: ICD-10-CM

## 2021-01-12 DIAGNOSIS — M54.2 NECK PAIN, CHRONIC: ICD-10-CM

## 2021-01-12 RX ORDER — CYCLOBENZAPRINE HCL 5 MG
5 TABLET ORAL 3 TIMES DAILY PRN
Qty: 60 TABLET | Refills: 1 | Status: SHIPPED | OUTPATIENT
Start: 2021-01-12 | End: 2021-03-23

## 2021-02-03 ENCOUNTER — HOSPITAL ENCOUNTER (EMERGENCY)
Facility: HOSPITAL | Age: 46
Discharge: HOME | End: 2021-02-03
Attending: EMERGENCY MEDICINE
Payer: COMMERCIAL

## 2021-02-03 VITALS
HEIGHT: 70 IN | BODY MASS INDEX: 39.08 KG/M2 | WEIGHT: 273 LBS | TEMPERATURE: 98.2 F | OXYGEN SATURATION: 98 % | DIASTOLIC BLOOD PRESSURE: 82 MMHG | HEART RATE: 80 BPM | RESPIRATION RATE: 22 BRPM | SYSTOLIC BLOOD PRESSURE: 138 MMHG

## 2021-02-03 DIAGNOSIS — B34.9 ACUTE VIRAL SYNDROME: ICD-10-CM

## 2021-02-03 DIAGNOSIS — R07.89 ATYPICAL CHEST PAIN: Primary | ICD-10-CM

## 2021-02-03 LAB
ALBUMIN SERPL BCP-MCNC: 3.6 G/DL (ref 3.5–5)
ALP SERPL-CCNC: 62 U/L (ref 46–116)
ALT SERPL W P-5'-P-CCNC: 26 U/L (ref 12–78)
ANION GAP SERPL CALCULATED.3IONS-SCNC: 12 MMOL/L (ref 4–13)
AST SERPL W P-5'-P-CCNC: 14 U/L (ref 5–45)
BASOPHILS # BLD AUTO: 0.03 THOUSANDS/ΜL (ref 0–0.1)
BASOPHILS NFR BLD AUTO: 0 % (ref 0–1)
BILIRUB SERPL-MCNC: 0.3 MG/DL (ref 0.2–1)
BUN SERPL-MCNC: 25 MG/DL (ref 5–25)
CALCIUM SERPL-MCNC: 9.1 MG/DL (ref 8.3–10.1)
CHLORIDE SERPL-SCNC: 105 MMOL/L (ref 100–108)
CO2 SERPL-SCNC: 21 MMOL/L (ref 21–32)
CREAT SERPL-MCNC: 0.76 MG/DL (ref 0.6–1.3)
EOSINOPHIL # BLD AUTO: 0.1 THOUSAND/ΜL (ref 0–0.61)
EOSINOPHIL NFR BLD AUTO: 1 % (ref 0–6)
ERYTHROCYTE [DISTWIDTH] IN BLOOD BY AUTOMATED COUNT: 14 % (ref 11.6–15.1)
GFR SERPL CREATININE-BSD FRML MDRD: 94 ML/MIN/1.73SQ M
GLUCOSE SERPL-MCNC: 85 MG/DL (ref 65–140)
HCT VFR BLD AUTO: 42.2 % (ref 34.8–46.1)
HGB BLD-MCNC: 13.9 G/DL (ref 11.5–15.4)
IMM GRANULOCYTES # BLD AUTO: 0.02 THOUSAND/UL (ref 0–0.2)
IMM GRANULOCYTES NFR BLD AUTO: 0 % (ref 0–2)
LYMPHOCYTES # BLD AUTO: 2.29 THOUSANDS/ΜL (ref 0.6–4.47)
LYMPHOCYTES NFR BLD AUTO: 30 % (ref 14–44)
MCH RBC QN AUTO: 29.8 PG (ref 26.8–34.3)
MCHC RBC AUTO-ENTMCNC: 32.9 G/DL (ref 31.4–37.4)
MCV RBC AUTO: 90 FL (ref 82–98)
MONOCYTES # BLD AUTO: 0.29 THOUSAND/ΜL (ref 0.17–1.22)
MONOCYTES NFR BLD AUTO: 4 % (ref 4–12)
NEUTROPHILS # BLD AUTO: 4.92 THOUSANDS/ΜL (ref 1.85–7.62)
NEUTS SEG NFR BLD AUTO: 65 % (ref 43–75)
NRBC BLD AUTO-RTO: 0 /100 WBCS
PLATELET # BLD AUTO: 290 THOUSANDS/UL (ref 149–390)
PMV BLD AUTO: 10.2 FL (ref 8.9–12.7)
POTASSIUM SERPL-SCNC: 4 MMOL/L (ref 3.5–5.3)
PROT SERPL-MCNC: 7.5 G/DL (ref 6.4–8.2)
RBC # BLD AUTO: 4.67 MILLION/UL (ref 3.81–5.12)
SODIUM SERPL-SCNC: 138 MMOL/L (ref 136–145)
TROPONIN I SERPL-MCNC: <0.02 NG/ML
TROPONIN I SERPL-MCNC: <0.02 NG/ML
WBC # BLD AUTO: 7.65 THOUSAND/UL (ref 4.31–10.16)

## 2021-02-03 PROCEDURE — 36415 COLL VENOUS BLD VENIPUNCTURE: CPT

## 2021-02-03 PROCEDURE — 93005 ELECTROCARDIOGRAM TRACING: CPT

## 2021-02-03 PROCEDURE — 99285 EMERGENCY DEPT VISIT HI MDM: CPT

## 2021-02-03 PROCEDURE — 85025 COMPLETE CBC W/AUTO DIFF WBC: CPT | Performed by: EMERGENCY MEDICINE

## 2021-02-03 PROCEDURE — 99284 EMERGENCY DEPT VISIT MOD MDM: CPT | Performed by: EMERGENCY MEDICINE

## 2021-02-03 PROCEDURE — 80053 COMPREHEN METABOLIC PANEL: CPT | Performed by: EMERGENCY MEDICINE

## 2021-02-03 PROCEDURE — 84484 ASSAY OF TROPONIN QUANT: CPT | Performed by: EMERGENCY MEDICINE

## 2021-02-03 NOTE — DISCHARGE INSTRUCTIONS
Today's vital signs, examination and testing were within normal range  Try to eat well, get enough sleep and get some exercise  Follow-up with your PCP if not improving in 2 days  Continue present medications  Return if needed

## 2021-02-03 NOTE — ED PROVIDER NOTES
History  Chief Complaint   Patient presents with    Chest Pain     Pt c/o high blood pressures x 2 years  Pt reports chest tightness, SOB, nausea and weakness x 3 days  This 58-year-old female with history of hypertension, obesity, anxiety and asthma states that over the past 3 days she has had fatigue with intermittent episodes of nausea and change in vision  She says that at times it seems to her that what she is looking at slowly shifts laterally  She denies vertigo lightheadedness syncope  At times she feels her heart pounding at regular rate  She has been checking blood pressure at home and has been as high as 181 systolic and 1 O2 diastolic  Currently is 140/82  The past hour she has had retrosternal chest pressure as well  This is mild and has improved  Patient thinks she might be slightly short of breath today but denies cough, fever, sore throat, earache, nasal congestion vomiting, diarrhea, bloody stool, dysuria and rash  She always has myalgias which have not worsened  Symptoms have no exacerbating or alleviating factors  She works at [employer]  Patient denies any recent exposure to known COVID positive people  Prior to Admission Medications   Prescriptions Last Dose Informant Patient Reported? Taking?    DULoxetine (CYMBALTA) 60 mg delayed release capsule   No No   Sig: Take 1 capsule (60 mg total) by mouth daily   albuterol (VENTOLIN HFA) 90 mcg/act inhaler   No No   Sig: Inhale 2 puffs every 6 (six) hours as needed for wheezing   amLODIPine (NORVASC) 5 mg tablet   No No   Sig: Take 1 tablet (5 mg total) by mouth daily   cholecalciferol (VITAMIN D3) 1,000 units tablet   Yes No   Sig: Take 2,000 Units by mouth daily   cyclobenzaprine (FLEXERIL) 5 mg tablet   No No   Sig: Take 1 tablet (5 mg total) by mouth 3 (three) times a day as needed for muscle spasms   famotidine (PEPCID) 20 mg tablet   No No   Sig: Take 1 tablet (20 mg total) by mouth 2 (two) times a day   levothyroxine 200 mcg tablet   No No   Sig: Take 1 tablet (200 mcg total) by mouth daily   levothyroxine 25 mcg tablet   No No   Si mcg 1 tab PO q am with 200 mcg on Mon, Wed, Fri, Sun ONLY , con't 200 mcg all other days   multivitamin (THERAGRAN) TABS   Yes No   Sig: Take 1 tablet by mouth daily   ondansetron (ZOFRAN-ODT) 4 mg disintegrating tablet   No No   Sig: Take 1 tablet (4 mg total) by mouth every 8 (eight) hours as needed for nausea or vomiting   pregabalin (LYRICA) 50 mg capsule   No No   Sig: Take 1 capsule (50 mg total) by mouth 2 (two) times a day      Facility-Administered Medications: None       Past Medical History:   Diagnosis Date    Anxiety     Asthma     Disc disorder     Hypertension     Hypothyroidism     Migraine        Past Surgical History:   Procedure Laterality Date    BACK SURGERY      CERVICAL BIOPSY  W/ LOOP ELECTRODE EXCISION      CERVICAL FUSION       SECTION      CHOLECYSTECTOMY      FOOT SURGERY Bilateral     KNEE SURGERY      TUBAL LIGATION Bilateral 2012       Family History   Adopted: Yes   Problem Relation Age of Onset    Breast cancer additional onset Maternal Grandmother     Coronary artery disease Maternal Grandmother     Coronary artery disease Maternal Aunt      I have reviewed and agree with the history as documented  E-Cigarette/Vaping    E-Cigarette Use Never User      E-Cigarette/Vaping Substances    Nicotine No     Flavoring No      Social History     Tobacco Use    Smoking status: Current Every Day Smoker     Packs/day: 1 00     Types: Cigarettes    Smokeless tobacco: Never Used   Substance Use Topics    Alcohol use: Never     Frequency: Never     Comment: socially    Drug use: No       Review of Systems   Constitutional: Positive for appetite change and fatigue  Negative for fever  HENT: Negative  Eyes: Positive for visual disturbance  Negative for photophobia, pain and redness  Respiratory: Negative  Negative for cough  Cardiovascular: Positive for chest pain (Past several hours) and palpitations ( regular rate)  Negative for leg swelling  Gastrointestinal: Positive for nausea  Negative for abdominal pain, blood in stool, diarrhea and vomiting  Endocrine: Negative  Genitourinary: Negative  Musculoskeletal: Positive for back pain ( chronic) and myalgias ( chronic)  Chronic pain along the left 5th metatarsal and dorsum of the left foot  Skin: Negative  Allergic/Immunologic: Negative  Neurological: Positive for headaches ( frequent)  Negative for dizziness, tremors, seizures, syncope, facial asymmetry, speech difficulty, weakness, light-headedness and numbness  Hematological: Negative  Psychiatric/Behavioral: Positive for dysphoric mood  Negative for self-injury and sleep disturbance  The patient is nervous/anxious  All other systems reviewed and are negative  Physical Exam  Physical Exam  Vitals signs and nursing note reviewed  Constitutional:       General: She is not in acute distress  Appearance: She is well-developed  She is not ill-appearing, toxic-appearing or diaphoretic  HENT:      Head: Normocephalic and atraumatic  Eyes:      Conjunctiva/sclera: Conjunctivae normal       Pupils: Pupils are equal, round, and reactive to light  Neck:      Musculoskeletal: Normal range of motion and neck supple  Thyroid: No thyromegaly  Vascular: No JVD  Cardiovascular:      Rate and Rhythm: Normal rate and regular rhythm  Pulses:           Carotid pulses are 1+ on the right side and 1+ on the left side  Radial pulses are 1+ on the right side and 1+ on the left side  Dorsalis pedis pulses are 1+ on the right side and 1+ on the left side  Posterior tibial pulses are 1+ on the right side and 1+ on the left side  Heart sounds: No murmur  Pulmonary:      Effort: Pulmonary effort is normal       Breath sounds: Normal breath sounds     Chest:      Chest wall: No deformity or tenderness  Abdominal:      General: Bowel sounds are normal       Palpations: Abdomen is soft  There is no fluid wave or mass  Musculoskeletal: Normal range of motion  Right lower leg: She exhibits no tenderness  No edema  Left lower leg: She exhibits no tenderness  No edema  Comments: Mild tenderness along the dorsum and lateral aspect of the left foot  Skin:     General: Skin is warm and dry  Capillary Refill: Capillary refill takes less than 2 seconds  Findings: No rash  Neurological:      General: No focal deficit present  Mental Status: She is alert and oriented to person, place, and time  Cranial Nerves: No cranial nerve deficit  Motor: No weakness  Coordination: Coordination normal       Deep Tendon Reflexes: Reflexes are normal and symmetric  Psychiatric:         Mood and Affect: Mood is anxious           Behavior: Behavior normal          Vital Signs  ED Triage Vitals [02/03/21 1101]   Temperature Pulse Respirations Blood Pressure SpO2   98 2 °F (36 8 °C) 91 20 154/96 98 %      Temp Source Heart Rate Source Patient Position - Orthostatic VS BP Location FiO2 (%)   Temporal Monitor Sitting Left arm --      Pain Score       6           Vitals:    02/03/21 1101 02/03/21 1200 02/03/21 1445   BP: 154/96 140/82 138/82   Pulse: 91 80 80   Patient Position - Orthostatic VS: Sitting Lying Lying         Visual Acuity      ED Medications  Medications - No data to display    Diagnostic Studies  Results Reviewed     Procedure Component Value Units Date/Time    Troponin I repeat in 3hrs [[id number]]  (Normal) Collected: 02/03/21 1446    Lab Status: Final result Specimen: Blood from Arm, Left Updated: 02/03/21 1509     Troponin I <0 02 ng/mL     Troponin I [[id number]]  (Normal) Collected: 02/03/21 1108    Lab Status: Final result Specimen: Blood from Arm, Right Updated: 02/03/21 1149     Troponin I <0 02 ng/mL     Comprehensive metabolic panel [[id number]] Collected: 02/03/21 1108    Lab Status: Final result Specimen: Blood from Arm, Right Updated: 02/03/21 1147     Sodium 138 mmol/L      Potassium 4 0 mmol/L      Chloride 105 mmol/L      CO2 21 mmol/L      ANION GAP 12 mmol/L      BUN 25 mg/dL      Creatinine 0 76 mg/dL      Glucose 85 mg/dL      Calcium 9 1 mg/dL      AST 14 U/L      ALT 26 U/L      Alkaline Phosphatase 62 U/L      Total Protein 7 5 g/dL      Albumin 3 6 g/dL      Total Bilirubin 0 30 mg/dL      eGFR 94 ml/min/1 73sq m     Narrative:      National Kidney Disease Foundation guidelines for Chronic Kidney Disease (CKD):     Stage 1 with normal or high GFR (GFR > 90 mL/min/1 73 square meters)    Stage 2 Mild CKD (GFR = 60-89 mL/min/1 73 square meters)    Stage 3A Moderate CKD (GFR = 45-59 mL/min/1 73 square meters)    Stage 3B Moderate CKD (GFR = 30-44 mL/min/1 73 square meters)    Stage 4 Severe CKD (GFR = 15-29 mL/min/1 73 square meters)    Stage 5 End Stage CKD (GFR <15 mL/min/1 73 square meters)  Note: GFR calculation is accurate only with a steady state creatinine    CBC and differential [[id number]] Collected: 02/03/21 1108    Lab Status: Final result Specimen: Blood from Arm, Right Updated: 02/03/21 1124     WBC 7 65 Thousand/uL      RBC 4 67 Million/uL      Hemoglobin 13 9 g/dL      Hematocrit 42 2 %      MCV 90 fL      MCH 29 8 pg      MCHC 32 9 g/dL      RDW 14 0 %      MPV 10 2 fL      Platelets 330 Thousands/uL      nRBC 0 /100 WBCs      Neutrophils Relative 65 %      Immat GRANS % 0 %      Lymphocytes Relative 30 %      Monocytes Relative 4 %      Eosinophils Relative 1 %      Basophils Relative 0 %      Neutrophils Absolute 4 92 Thousands/µL      Immature Grans Absolute 0 02 Thousand/uL      Lymphocytes Absolute 2 29 Thousands/µL      Monocytes Absolute 0 29 Thousand/µL      Eosinophils Absolute 0 10 Thousand/µL      Basophils Absolute 0 03 Thousands/µL                  No orders to display Procedures  Procedures         ED Course  ED Course as of Feb 03 1612   Wed Feb 03, 2021   1224 Monitor is normal sinus rhythm      1508 Repeat EKG at 2:43 p m  Shows no change      1556 Patient feels better, just hungry  Delta troponin remains within normal range                HEART Risk Score      Most Recent Value   Heart Score Risk Calculator   History  0 Filed at: 02/03/2021 1223   ECG  0 Filed at: 02/03/2021 1223   Age  1 Filed at: 02/03/2021 1223   Risk Factors  1 Filed at: 02/03/2021 1223   Troponin  0 Filed at: 02/03/2021 1223   HEART Score  2 Filed at: 02/03/2021 1223                      SBIRT 22yo+      Most Recent Value   SBIRT (25 yo +)   In order to provide better care to our patients, we are screening all of our patients for alcohol and drug use  Would it be okay to ask you these screening questions? Yes Filed at: 02/03/2021 1103   Initial Alcohol Screen: US AUDIT-C    1  How often do you have a drink containing alcohol?  0 Filed at: 02/03/2021 1103   2  How many drinks containing alcohol do you have on a typical day you are drinking? 0 Filed at: 02/03/2021 1103   3a  Male UNDER 65: How often do you have five or more drinks on one occasion? 0 Filed at: 02/03/2021 1103   3b  FEMALE Any Age, or MALE 65+: How often do you have 4 or more drinks on one occassion? 0 Filed at: 02/03/2021 1103   Audit-C Score  0 Filed at: 02/03/2021 1103   HERLINDA: How many times in the past year have you    Used an illegal drug or used a prescription medication for non-medical reasons? Never Filed at: 02/03/2021 1103                    MDM  Number of Diagnoses or Management Options  Acute viral syndrome: new and requires workup  Atypical chest pain: new and requires workup  Diagnosis management comments: Patient with atypical chest pain, possible early viral syndrome  Very low HEART score  Doubt very much this is ACS or other significant issue  Patient remained hemodynamically stable    Workup essentially unremarkable  Instructed to follow-up with PCP  Return instructions discussed  Amount and/or Complexity of Data Reviewed  Clinical lab tests: ordered and reviewed  Review and summarize past medical records: yes  Independent visualization of images, tracings, or specimens: yes        Disposition  Final diagnoses:   Atypical chest pain   Acute viral syndrome     Time reflects when diagnosis was documented in both MDM as applicable and the Disposition within this note     Time User Action Codes Description Comment    2/3/2021  3:58 PM Mey Lopez Add [R07 89] Atypical chest pain     2/3/2021  3:58 PM Mey Lopez Add [B34 9] Acute viral syndrome       ED Disposition     ED Disposition Condition Date/Time Comment    Discharge Stable Wed Feb 3, 2021  3:58 PM Nathan Otero discharge to home/self care              Follow-up Information     Follow up With Specialties Details Why Prince Wall, DO Internal Medicine, Family Medicine Call  As needed Yong  [address] [place] [address]  [phone]            Discharge Medication List as of 2/3/2021  3:59 PM      CONTINUE these medications which have NOT CHANGED    Details   albuterol (VENTOLIN HFA) 90 mcg/act inhaler Inhale 2 puffs every 6 (six) hours as needed for wheezing, Starting Sun 9/29/2019, Normal      amLODIPine (NORVASC) 5 mg tablet Take 1 tablet (5 mg total) by mouth daily, Starting Tue 11/24/2020, Normal      cholecalciferol (VITAMIN D3) 1,000 units tablet Take 2,000 Units by mouth daily, Historical Med      cyclobenzaprine (FLEXERIL) 5 mg tablet Take 1 tablet (5 mg total) by mouth 3 (three) times a day as needed for muscle spasms, Starting Tue 1/12/2021, Normal      DULoxetine (CYMBALTA) 60 mg delayed release capsule Take 1 capsule (60 mg total) by mouth daily, Starting Fri 12/18/2020, Normal      famotidine (PEPCID) 20 mg tablet Take 1 tablet (20 mg total) by mouth 2 (two) times a day, Starting Tue 12/15/2020, Normal      !! levothyroxine 200 mcg tablet Take 1 tablet (200 mcg total) by mouth daily, Starting Fri 12/18/2020, Normal      !! levothyroxine 25 mcg tablet 25 mcg 1 tab PO q am with 200 mcg on Mon, Wed, Fri, Sun ONLY , con't 200 mcg all other days, Normal      multivitamin (THERAGRAN) TABS Take 1 tablet by mouth daily, Historical Med      ondansetron (ZOFRAN-ODT) 4 mg disintegrating tablet Take 1 tablet (4 mg total) by mouth every 8 (eight) hours as needed for nausea or vomiting, Starting Fri 2/21/2020, Normal      pregabalin (LYRICA) 50 mg capsule Take 1 capsule (50 mg total) by mouth 2 (two) times a day, Starting Tue 12/15/2020, Normal       !! - Potential duplicate medications found  Please discuss with provider  No discharge procedures on file      PDMP Review       Value Time User    PDMP Reviewed  Yes 2/3/2021  3:58 PM Arvind Green DO          ED Provider  Electronically Signed by           Arvind Green DO  02/03/21 0350

## 2021-02-04 LAB
ATRIAL RATE: 83 BPM
P AXIS: 42 DEGREES
PR INTERVAL: 172 MS
QRS AXIS: 58 DEGREES
QRSD INTERVAL: 84 MS
QT INTERVAL: 360 MS
QTC INTERVAL: 423 MS
T WAVE AXIS: 29 DEGREES
VENTRICULAR RATE: 83 BPM

## 2021-02-04 PROCEDURE — 93010 ELECTROCARDIOGRAM REPORT: CPT | Performed by: INTERNAL MEDICINE

## 2021-02-08 ENCOUNTER — VBI (OUTPATIENT)
Dept: ADMINISTRATIVE | Facility: OTHER | Age: 46
End: 2021-02-08

## 2021-02-08 NOTE — TELEPHONE ENCOUNTER
Dario Acosta    ED Visit Information     Ed visit date:2/3/2021  Diagnosis Description: Chest Pain   In Network? Yes  [place]  Discharge status: Home  Discharged with meds ? No  Number of ED visits to date: 1  ED Severity:N/A     Outreach Information    Outreach successful: Yes 3  Date letter mailed:2/10/2021  Date [address] [facility]    Follow up appointment with pcp: no N/A  Transportation issues ?  NA    Value [facility] type: 7 Day Outreach        02/08/2021 09:17 AM Phone (VBI) Lexie Stern (Self) [phone] (H)  Left Message    By Peter Rubin         02/09/2021 08:07 AM Phone (VBI) Lexie Stern (Self) [phone] (H)   Left Message    By My Muhammad MA

## 2021-02-08 NOTE — LETTER
[facility]  [address] [facility] [address]    Date: 02/10/21  [address]  [place] [address]    Dear Chloe Morgan:                                                                                                                              Thank you for choosing [facility] emergency department for care  As your primary care provider we want to make sure that your ongoing medical care is being addressed  If you require follow up care as a result of your emergency department visit, there are a few things we would like you to know  As part of our continuing commitment to caring for our patient, we have added more same day appointments and have extended our office hours to meet your medical needs  After hours, on-call physicians are available via our main office line  We encourage you to contact our office prior to seeking treatment to discuss your symptoms with our medical staff  Together, we can determine the correct course of action  A majority of non-emergent conditions such as: common cold, flu-like symptoms, fevers, strains/sprains, dislocations, minor burns, cuts and animal bites can be treated at [facility] facilities  Diagnostic testing is available at some sites  Of course, if you are experiencing a life threatening medical emergency call 911 or proceed directly to the nearest emergency room  Your nearest [facility] is conveniently located at:    [facility], [address]  [address] [facility] offered at most [address] your spot online at www [email or web address] or on the [facility]    Sincerely,    [facility]  No information on file

## 2021-02-24 ENCOUNTER — OFFICE VISIT (OUTPATIENT)
Dept: URGENT CARE | Facility: CLINIC | Age: 46
End: 2021-02-24
Payer: COMMERCIAL

## 2021-02-24 VITALS
WEIGHT: 285 LBS | BODY MASS INDEX: 40.8 KG/M2 | RESPIRATION RATE: 18 BRPM | TEMPERATURE: 98.9 F | DIASTOLIC BLOOD PRESSURE: 70 MMHG | OXYGEN SATURATION: 99 % | HEART RATE: 92 BPM | SYSTOLIC BLOOD PRESSURE: 136 MMHG | HEIGHT: 70 IN

## 2021-02-24 DIAGNOSIS — M79.672 ACUTE PAIN OF LEFT FOOT: Primary | ICD-10-CM

## 2021-02-24 PROCEDURE — 99283 EMERGENCY DEPT VISIT LOW MDM: CPT | Performed by: PHYSICIAN ASSISTANT

## 2021-02-24 PROCEDURE — G0382 LEV 3 HOSP TYPE B ED VISIT: HCPCS | Performed by: PHYSICIAN ASSISTANT

## 2021-02-24 NOTE — PATIENT INSTRUCTIONS
Wear boot until see ortho -may remove to shower  Ibuprofen/ tylenol   Elevate  Ice/heat  F/u  With ortho -referral placed for you today

## 2021-02-24 NOTE — PROGRESS NOTES
NAME: Jose Luis Mcpherson is a 54 y o  female  : 1975    MRN: [id number]      Assessment and Plan   Acute pain of left foot [M79 182]  1  Acute pain of left foot  XR foot 3+ vw left    Ambulatory referral to Orthopedic Surgery       X-ray left foot: possible area of fx to the navicular bone visible on AP and lateral films     Will treat as fracture - patient placed in a CAM boot - patient reports improvement in pain with walking in CAM boot  Referral to ortho placed  Ice, elevate, ibuprofen in the meantime  She acknowledges  Patient Instructions     Patient Instructions   Wear boot until see ortho -may remove to shower  Ibuprofen/ tylenol   Elevate  Ice/heat  F/u  With ortho -referral placed for you today     Proceed to ER if symptoms worsen  Chief Complaint     Chief Complaint   Patient presents with    Foot Pain     left  For 2 months has had pain  No trama to foot         History of Present Illness   Patient presents complaining of 2 month hx of left foot pain  States she does not remember any injury or inciting event that occurred but states she just remembers having pain  States she cannot remember if the pain came on gradually or suddenly but states it is getting worse  Reports she has pain and difficulty walking due to the pain and is now having 7/10 pain at rest as well  Denies any numbness/tingling to the toes  Reports surgery to her achilles on this foot but no other hx with this foot  Has been applying ice and heat as well as taking OTC pain meds without improvement  Pain is on the medial and lateral aspects of the mid foot and radiates across  Review of Systems   Review of Systems   Constitutional: Negative for chills and fever  Musculoskeletal:        Left foot pain   Neurological: Negative for weakness and numbness           Current Medications       Current Outpatient Medications:     albuterol (VENTOLIN HFA) 90 mcg/act inhaler, Inhale 2 puffs every 6 (six) hours as needed for wheezing, Disp: 18 g, Rfl: 0    amLODIPine (NORVASC) 5 mg tablet, Take 1 tablet (5 mg total) by mouth daily, Disp: 90 tablet, Rfl: 1    cholecalciferol (VITAMIN D3) 1,000 units tablet, Take 2,000 Units by mouth daily, Disp: , Rfl:     cyclobenzaprine (FLEXERIL) 5 mg tablet, Take 1 tablet (5 mg total) by mouth 3 (three) times a day as needed for muscle spasms, Disp: 60 tablet, Rfl: 1    DULoxetine (CYMBALTA) 60 mg delayed release capsule, Take 1 capsule (60 mg total) by mouth daily, Disp: 90 capsule, Rfl: 0    famotidine (PEPCID) 20 mg tablet, Take 1 tablet (20 mg total) by mouth 2 (two) times a day, Disp: 180 tablet, Rfl: 1    levothyroxine 200 mcg tablet, Take 1 tablet (200 mcg total) by mouth daily, Disp: 90 tablet, Rfl: 0    levothyroxine 25 mcg tablet, 25 mcg 1 tab PO q am with 200 mcg on Mon, Wed, Fri, Sun ONLY , con't 200 mcg all other days, Disp: 90 tablet, Rfl: 1    multivitamin (THERAGRAN) TABS, Take 1 tablet by mouth daily, Disp: , Rfl:     ondansetron (ZOFRAN-ODT) 4 mg disintegrating tablet, Take 1 tablet (4 mg total) by mouth every 8 (eight) hours as needed for nausea or vomiting, Disp: 12 tablet, Rfl: 0    pregabalin (LYRICA) 50 mg capsule, Take 1 capsule (50 mg total) by mouth 2 (two) times a day, Disp: 180 capsule, Rfl: 0    Current Allergies     Allergies as of 2021 - Reviewed 2021   Allergen Reaction Noted    Acetaminophen-codeine  10/23/2017    Hydrocodone Other (See Comments) 2016    Prednisolone  10/23/2017              Past Medical History:   Diagnosis Date    Anxiety     Asthma     Disc disorder     Hypertension     Hypothyroidism     Migraine        Past Surgical History:   Procedure Laterality Date    BACK SURGERY      CERVICAL BIOPSY  W/ LOOP ELECTRODE EXCISION      CERVICAL FUSION       SECTION      CHOLECYSTECTOMY      FOOT SURGERY Bilateral     KNEE SURGERY      TUBAL LIGATION Bilateral        Family History   Adopted: Yes   Problem Relation Age of Onset    Breast cancer additional onset Maternal Grandmother     Coronary artery disease Maternal Grandmother     Coronary artery disease Maternal Aunt          Medications have been verified  The following portions of the patient's history were reviewed and updated as appropriate: allergies, current medications, past family history, past medical history, past social history, past surgical history and problem list     Objective   /70   Pulse 92   Temp 98 9 °F (37 2 °C)   Resp 18   Ht 5' 10" (1 778 m)   Wt 129 kg (285 lb)   SpO2 99%   BMI 40 89 kg/m²      Physical Exam     Physical Exam  Vitals signs and nursing note reviewed  Constitutional:       General: She is not in acute distress  Appearance: Normal appearance  She is not ill-appearing, toxic-appearing or diaphoretic  Musculoskeletal:      Comments: Left foot: no erythema, edema, ecchymosis or abrasions  TTP over the mid foot on the medial, dorsal and lateral aspects  No proximal 5th metatarsal tenderness  No tenderness anywhere in the ankle  No tenderness to the toes  Full AROM of ankle with mild pain  Full strength against resistance with pain  Flexion and extension of toes with pain  Sensation to light touch is intact  Cap refill at toes < 2 seconds  Pedal pulses 2+ b/l    Neurological:      Mental Status: She is alert

## 2021-03-02 DIAGNOSIS — E03.9 HYPOTHYROIDISM, UNSPECIFIED TYPE: ICD-10-CM

## 2021-03-03 RX ORDER — LEVOTHYROXINE SODIUM 0.2 MG/1
200 TABLET ORAL DAILY
Qty: 90 TABLET | Refills: 0 | Status: SHIPPED | OUTPATIENT
Start: 2021-03-03 | End: 2021-07-13 | Stop reason: SDUPTHER

## 2021-03-03 RX ORDER — LEVOTHYROXINE SODIUM 0.03 MG/1
TABLET ORAL
Qty: 90 TABLET | Refills: 0 | Status: SHIPPED | OUTPATIENT
Start: 2021-03-03 | End: 2021-08-08 | Stop reason: SDUPTHER

## 2021-03-09 DIAGNOSIS — G89.29 NECK PAIN, CHRONIC: ICD-10-CM

## 2021-03-09 DIAGNOSIS — M54.2 NECK PAIN, CHRONIC: ICD-10-CM

## 2021-03-09 DIAGNOSIS — M79.18 MYOFASCIAL PAIN SYNDROME: ICD-10-CM

## 2021-03-09 RX ORDER — CYCLOBENZAPRINE HCL 5 MG
5 TABLET ORAL 3 TIMES DAILY PRN
Qty: 60 TABLET | Refills: 1 | OUTPATIENT
Start: 2021-03-09

## 2021-03-09 RX ORDER — PREGABALIN 50 MG/1
50 CAPSULE ORAL 2 TIMES DAILY
Qty: 180 CAPSULE | Refills: 0 | Status: SHIPPED | OUTPATIENT
Start: 2021-03-09 | End: 2021-03-23 | Stop reason: SDUPTHER

## 2021-03-09 RX ORDER — DULOXETIN HYDROCHLORIDE 60 MG/1
60 CAPSULE, DELAYED RELEASE ORAL DAILY
Qty: 90 CAPSULE | Refills: 0 | Status: SHIPPED | OUTPATIENT
Start: 2021-03-09 | End: 2021-06-16 | Stop reason: SDUPTHER

## 2021-03-10 ENCOUNTER — OFFICE VISIT (OUTPATIENT)
Dept: OBGYN CLINIC | Facility: CLINIC | Age: 46
End: 2021-03-10
Payer: COMMERCIAL

## 2021-03-10 ENCOUNTER — APPOINTMENT (OUTPATIENT)
Dept: RADIOLOGY | Facility: CLINIC | Age: 46
End: 2021-03-10
Payer: COMMERCIAL

## 2021-03-10 VITALS
TEMPERATURE: 98.1 F | BODY MASS INDEX: 40.52 KG/M2 | SYSTOLIC BLOOD PRESSURE: 118 MMHG | WEIGHT: 283 LBS | DIASTOLIC BLOOD PRESSURE: 78 MMHG | HEIGHT: 70 IN

## 2021-03-10 DIAGNOSIS — M19.072 PRIMARY OSTEOARTHRITIS OF LEFT ANKLE: Primary | ICD-10-CM

## 2021-03-10 DIAGNOSIS — M79.672 ACUTE PAIN OF LEFT FOOT: ICD-10-CM

## 2021-03-10 DIAGNOSIS — M25.572 PAIN, JOINT, ANKLE AND FOOT, LEFT: ICD-10-CM

## 2021-03-10 PROCEDURE — 73610 X-RAY EXAM OF ANKLE: CPT

## 2021-03-10 PROCEDURE — 99203 OFFICE O/P NEW LOW 30 MIN: CPT | Performed by: ORTHOPAEDIC SURGERY

## 2021-03-10 PROCEDURE — 20605 DRAIN/INJ JOINT/BURSA W/O US: CPT | Performed by: ORTHOPAEDIC SURGERY

## 2021-03-10 RX ORDER — BETAMETHASONE SODIUM PHOSPHATE AND BETAMETHASONE ACETATE 3; 3 MG/ML; MG/ML
6 INJECTION, SUSPENSION INTRA-ARTICULAR; INTRALESIONAL; INTRAMUSCULAR; SOFT TISSUE
Status: COMPLETED | OUTPATIENT
Start: 2021-03-10 | End: 2021-03-10

## 2021-03-10 RX ORDER — LIDOCAINE HYDROCHLORIDE 10 MG/ML
2 INJECTION, SOLUTION INFILTRATION; PERINEURAL
Status: COMPLETED | OUTPATIENT
Start: 2021-03-10 | End: 2021-03-10

## 2021-03-10 RX ADMIN — BETAMETHASONE SODIUM PHOSPHATE AND BETAMETHASONE ACETATE 6 MG: 3; 3 INJECTION, SUSPENSION INTRA-ARTICULAR; INTRALESIONAL; INTRAMUSCULAR; SOFT TISSUE at 15:10

## 2021-03-10 RX ADMIN — LIDOCAINE HYDROCHLORIDE 2 ML: 10 INJECTION, SOLUTION INFILTRATION; PERINEURAL at 15:39

## 2021-03-10 NOTE — ASSESSMENT & PLAN NOTE
Findings consistent with left ankle osteoarthritis  I reviewed patient's left ankle and foot x-ray with her  There is evidence on x-ray of joint space narrowing  I discussed prognosis of her condition  Patient received a steroid injection in the anterolateral aspect of her left ankle which she tolerated well  Provided patient with a script for physical therapy to begin in 2 weeks  Cold compress over the injection site today  She will continue with the CAM Boot  She may use anti-inflammatories, Voltaren gel or Aspercreme for pain relief  Follow up in 6 weeks  All patient's questions were answered to her satisfaction  This note is created using dictation transcription  It may contain typographical errors, grammatical errors, improperly dictated words, background noise and other errors

## 2021-03-10 NOTE — PROGRESS NOTES
Assessment:     1  Primary osteoarthritis of left ankle    2  Acute pain of left foot    3  Pain, joint, ankle and foot, left        Plan:     Problem List Items Addressed This Visit        Musculoskeletal and Integument    Primary osteoarthritis of left ankle - Primary     Findings consistent with left ankle osteoarthritis  I reviewed patient's left ankle and foot x-ray with her  There is evidence on x-ray of joint space narrowing  I discussed prognosis of her condition  Patient received a steroid injection in the anterolateral aspect of her left ankle which she tolerated well  Provided patient with a script for physical therapy to begin in 2 weeks  Cold compress over the injection site today  She will continue with the CAM Boot  She may use anti-inflammatories, Voltaren gel or Aspercreme for pain relief  Follow up in 6 weeks  All patient's questions were answered to her satisfaction  This note is created using dictation transcription  It may contain typographical errors, grammatical errors, improperly dictated words, background noise and other errors  Relevant Medications    lidocaine (XYLOCAINE) 1 % injection 2 mL (Completed)    betamethasone acetate-betamethasone sodium phosphate (CELESTONE) injection 6 mg (Completed)    Other Relevant Orders    Ambulatory referral to Physical Therapy    Medium joint arthrocentesis: L ankle (Completed)      Other Visit Diagnoses     Acute pain of left foot        Pain, joint, ankle and foot, left        Relevant Orders    XR ankle 3+ vw left    XR ankle 3+ vw left    Ambulatory referral to Physical Therapy         Subjective:     Patient ID: Dario Acosta is a 54 y o  female  Chief Complaint: Left Foot and Ankle Pain    HPI:  Patient is a 54year old who presents here today for an initial evaluation of her left foot and ankle pain  She states that she has had foot/ankle pain for 2 months without any known mechanism of injury   She states that pain has gradually gotten worse  She notes a 7/10 pain with ambulation and rest  She states her pain is on the medial and lateral aspects of the mid foot and radiates across  She was seen at the [facility] on 2021 and was placed in a CAM boot, which is helping  Denies any numbness/tingling to the toes  She reports surgery to her achilles on this foot but no other hx with this foot  Has been applying ice and heat as well as taking OTC pain meds without improvement  Information on patient's intake form was reviewed  Allergy:  Allergies   Allergen Reactions    Acetaminophen-Codeine      Pt states she does not remember having a reaction to this medication    Hydrocodone Other (See Comments)    Prednisolone      Medications:  all current active meds have been reviewed  Past Medical History:  Past Medical History:   Diagnosis Date    Anxiety     Asthma     Disc disorder     Hypertension     Hypothyroidism     Migraine      Past Surgical History:  Past Surgical History:   Procedure Laterality Date    BACK SURGERY      CERVICAL BIOPSY  W/ LOOP ELECTRODE EXCISION      CERVICAL FUSION       SECTION      CHOLECYSTECTOMY      FOOT SURGERY Bilateral     KNEE SURGERY      TUBAL LIGATION Bilateral 2012     Family History:  Family History   Adopted: Yes   Problem Relation Age of Onset    Breast cancer additional onset Maternal Grandmother     Coronary artery disease Maternal Grandmother     Coronary artery disease Maternal Aunt      Social History:  Social History     Substance and Sexual Activity   Alcohol Use Never    Frequency: Never    Comment: socially     Social History     Substance and Sexual Activity   Drug Use No     Social History     Tobacco Use   Smoking Status Current Every Day Smoker    Packs/day: 1 00    Types: Cigarettes   Smokeless Tobacco Never Used     Review of Systems   Constitutional: Positive for activity change and fatigue  HENT: Negative  Eyes: Negative  Respiratory: Negative  Cardiovascular: Negative  Gastrointestinal: Negative  Endocrine: Negative  Genitourinary: Negative  Musculoskeletal: Positive for arthralgias (Left ankle and foot), gait problem (Antalgic) and joint swelling  Skin: Negative  Allergic/Immunologic: Negative  Hematological: Negative  Psychiatric/Behavioral: Negative  Objective:  BP Readings from Last 1 Encounters:   03/10/21 118/78      Wt Readings from Last 1 Encounters:   03/10/21 128 kg (283 lb)      BMI:   Estimated body mass index is 40 61 kg/m² as calculated from the following:    Height as of this encounter: 5' 10" (1 778 m)  Weight as of this encounter: 128 kg (283 lb)  BSA:   Estimated body surface area is 2 42 meters squared as calculated from the following:    Height as of this encounter: 5' 10" (1 778 m)  Weight as of this encounter: 128 kg (283 lb)  Physical Exam  Vitals signs and nursing note reviewed  Constitutional:       Appearance: Normal appearance  She is well-developed  HENT:      Head: Normocephalic and atraumatic  Right Ear: External ear normal       Left Ear: External ear normal    Eyes:      Extraocular Movements: Extraocular movements intact  Conjunctiva/sclera: Conjunctivae normal    Neck:      Musculoskeletal: Neck supple  Pulmonary:      Effort: Pulmonary effort is normal    Skin:     General: Skin is warm and dry  Neurological:      Mental Status: She is alert and oriented to person, place, and time  Deep Tendon Reflexes: Reflexes are normal and symmetric  Psychiatric:         Mood and Affect: Mood normal          Behavior: Behavior normal        Left Ankle Exam     Tenderness   Left ankle tenderness location: Anterior and anterolateral    Swelling: mild    Range of Motion   Left ankle inversion: pain       Other   Erythema: absent  Sensation: normal  Pulse: present    Comments:  Tenderness over anterior and anterolateral joint  Foot:  No tenderness with palpation over the Lisfranc joint  I have personally reviewed pertinent films in PACS and my interpretation is as follows: x-rays of the left ankle demonstrates dengerative changes in the joint  No acute fractures of dislocations appreciated  X-ray's of the left foot demonstrates no actue fractures of dislocations appreciated  Mild degenerative changes  Os navicularum is present  Medium joint arthrocentesis: L ankle  Universal Protocol:  Consent: Verbal consent obtained    Risks and benefits: risks, benefits and alternatives were discussed  Consent given by: patient  Patient understanding: patient states understanding of the procedure being performed  Site marked: the operative site was marked  Supporting Documentation  Indications: pain and joint swelling   Procedure Details  Location: ankle - L ankle  Needle size: 25 G  Ultrasound guidance: no  Approach: anterolateral  Medications administered: 6 mg betamethasone acetate-betamethasone sodium phosphate 6 (3-3) mg/mL; 2 mL lidocaine 1 %    Patient tolerance: patient tolerated the procedure well with no immediate complications  Dressing:  Sterile dressing applied        Scribe Attestation    I,:  Angelique Mcgee am acting as a scribe while in the presence of the attending physician :       I,:  Ines Crowe MD personally performed the services described in this documentation    as scribed in my presence :

## 2021-03-15 ENCOUNTER — HOSPITAL ENCOUNTER (OUTPATIENT)
Dept: MAMMOGRAPHY | Facility: IMAGING CENTER | Age: 46
Discharge: HOME | End: 2021-03-15
Payer: COMMERCIAL

## 2021-03-15 VITALS — WEIGHT: 283 LBS | BODY MASS INDEX: 40.52 KG/M2 | HEIGHT: 70 IN

## 2021-03-15 DIAGNOSIS — Z12.31 ENCOUNTER FOR SCREENING MAMMOGRAM FOR BREAST CANCER: ICD-10-CM

## 2021-03-15 PROCEDURE — 77067 SCR MAMMO BI INCL CAD: CPT

## 2021-03-15 PROCEDURE — 77063 BREAST TOMOSYNTHESIS BI: CPT

## 2021-03-18 NOTE — LETTER
September 20, 2018     Patient: Lorne Neil   YOB: 1975   Date of Visit: 9/20/2018       To Whom It May Concern: It is my medical opinion that Lorne Neil may return to work on 09/22/2018  If you have any questions or concerns, please don't hesitate to call           Sincerely,        KENNETH Costello    CC: No Recipients
No

## 2021-03-23 ENCOUNTER — OFFICE VISIT (OUTPATIENT)
Dept: FAMILY MEDICINE CLINIC | Facility: HOSPITAL | Age: 46
End: 2021-03-23
Payer: COMMERCIAL

## 2021-03-23 VITALS
DIASTOLIC BLOOD PRESSURE: 98 MMHG | HEIGHT: 70 IN | TEMPERATURE: 97.8 F | WEIGHT: 280.4 LBS | BODY MASS INDEX: 40.14 KG/M2 | SYSTOLIC BLOOD PRESSURE: 134 MMHG | HEART RATE: 68 BPM

## 2021-03-23 DIAGNOSIS — K21.9 GASTROESOPHAGEAL REFLUX DISEASE WITHOUT ESOPHAGITIS: ICD-10-CM

## 2021-03-23 DIAGNOSIS — M25.551 HIP PAIN, ACUTE, RIGHT: ICD-10-CM

## 2021-03-23 DIAGNOSIS — I10 ESSENTIAL HYPERTENSION: Primary | ICD-10-CM

## 2021-03-23 DIAGNOSIS — M54.50 CHRONIC RIGHT-SIDED LOW BACK PAIN WITHOUT SCIATICA: ICD-10-CM

## 2021-03-23 DIAGNOSIS — M54.2 NECK PAIN, CHRONIC: ICD-10-CM

## 2021-03-23 DIAGNOSIS — G89.29 NECK PAIN, CHRONIC: ICD-10-CM

## 2021-03-23 DIAGNOSIS — M79.18 MYOFASCIAL PAIN SYNDROME: ICD-10-CM

## 2021-03-23 DIAGNOSIS — G89.29 CHRONIC RIGHT-SIDED LOW BACK PAIN WITHOUT SCIATICA: ICD-10-CM

## 2021-03-23 PROCEDURE — 99214 OFFICE O/P EST MOD 30 MIN: CPT | Performed by: INTERNAL MEDICINE

## 2021-03-23 RX ORDER — METAXALONE 800 MG/1
800 TABLET ORAL 3 TIMES DAILY PRN
Qty: 30 TABLET | Refills: 1 | Status: SHIPPED | OUTPATIENT
Start: 2021-03-23 | End: 2022-02-16

## 2021-03-23 RX ORDER — PREGABALIN 75 MG/1
75 CAPSULE ORAL 2 TIMES DAILY
Qty: 60 CAPSULE | Refills: 1 | Status: SHIPPED | OUTPATIENT
Start: 2021-03-23 | End: 2021-05-13 | Stop reason: SDUPTHER

## 2021-03-23 RX ORDER — FAMOTIDINE 20 MG/1
20 TABLET, FILM COATED ORAL DAILY
Qty: 180 TABLET | Refills: 1
Start: 2021-03-23 | End: 2021-05-04

## 2021-03-23 RX ORDER — AMLODIPINE BESYLATE 10 MG/1
10 TABLET ORAL DAILY
Qty: 90 TABLET | Refills: 1 | Status: SHIPPED | OUTPATIENT
Start: 2021-03-23 | End: 2021-10-24 | Stop reason: SDUPTHER

## 2021-03-23 NOTE — PROGRESS NOTES
Assessment/Plan:    Hypertension  BP not at goal, recently elevated at ED visit as well, increase Amlodipine to 10 mg 1 tab PO q day, urged diet/exercise/wgt loss, recheck in 4-6 wks, call with SE    Neck pain, chronic  Notes benefit with both Lyrica and Cymbalta together - pain could still be better - increase Lyrica to 75 mg bid, con't current Cymbalta, no great benefit with Flexeril so will try trial of Skelaxin, deferring PT d/t time and needing to be with son, is agreeable to pain mgt, has EMG scheduled but no appt till July    Myofascial pain syndrome  Notes benefit with both Lyrica and Cymbalta together - pain could still be better - increase Lyrica to 75 mg bid, con't current Cymbalta, no great benefit with Flexeril so will try trial of Skelaxin    Chronic right-sided low back pain without sciatica  Worse past 2 wks, reassured no radicular symptoms on exam, increase Lyrica as discussed, con't Cymbalta, stop Flexeril d/t lack of benefit and try Skelaxin, referral to Pain mgt given, red flag symptoms reviewed - call asap if occur, re-eval in 6 wks, if not better T/C MRI of lumbar spine    Gastroesophageal reflux disease without esophagitis  No significant symptoms, only taking Pepcid daily w/good control - med list updated, call with relapse in symptoms       Diagnoses and all orders for this visit:    Essential hypertension  -     amLODIPine (NORVASC) 10 mg tablet; Take 1 tablet (10 mg total) by mouth daily    Neck pain, chronic  -     metaxalone (Skelaxin) 800 mg tablet; Take 1 tablet (800 mg total) by mouth 3 (three) times a day as needed for muscle spasms  -     pregabalin (LYRICA) 75 mg capsule; Take 1 capsule (75 mg total) by mouth 2 (two) times a day  -     Ambulatory referral to Pain Management; Future    Myofascial pain syndrome  -     metaxalone (Skelaxin) 800 mg tablet;  Take 1 tablet (800 mg total) by mouth 3 (three) times a day as needed for muscle spasms  -     pregabalin (LYRICA) 75 mg capsule; Take 1 capsule (75 mg total) by mouth 2 (two) times a day  -     Ambulatory referral to Pain Management; Future    Hip pain, acute, right  Comments:  Start with Xray - likely referred from R low back, con't current pain regimen and referral to Pain mgt provided as well, deferring PT  Orders:  -     XR hip/pelv 2-3 vws right if performed; Future    Chronic right-sided low back pain without sciatica  -     XR spine lumbar minimum 4 views non injury; Future  -     Ambulatory referral to Pain Management; Future    Gastroesophageal reflux disease without esophagitis  -     famotidine (PEPCID) 20 mg tablet; Take 1 tablet (20 mg total) by mouth daily      Mammo 3/21    PAP 11/19    BW 9/20  FLP 6/20    Subjective:      Patient ID: Rose Echevarria is a 54 y o  female  HPI pt here for concerns    Pt was in ED 2/3/21 for CP and "just not feeling right"  ED notes reviewed  In the ED BP was 154/96 but rest of vitals were stable  Her BW showed no acute abnormality  ECG showed no acute abnormality  She was discharged home and told to f/u with PCP  She had an appt with [facility] today for her Dept shot and her BP was 147/85  She has not checked BP at home otherwise  She notes some intermittent HA's but she thought it was related to her spine issues  She notes no double vision/blurry vision/dizziness  She is currently taking Amlodipine 5 mg 1 tab daily  She denies missing doses  She does not add salt to her diet  She does not feel she has a high sodium diet otherwise  She is using Excedrin daily for neck pain  She con't to note chronic neck pain  She is taking Lyrica 50 mg bid and Cymbalta 60 mg daily  She is noting no great benefit with the Flexeril  She has not been on any other muscle relaer in the past   She still notes rare intermittent muscle spasms  She has noted increase in lower back and R hip pain/groin pain over the past 2 wks  She notes no specific fall/injury/trauma provoking the symptoms    Pain does radiate to her knee  She notes no LE numbness/tingling  She has felt her RLE could give out but she has not fallen  She notes no loss of control of bowel or bladder function  She has had benefit with the Lyrica and Cymbalta but feels pain could still be better  GERD better  Able to decrease Pepcid to once daily w/o relapse in symptoms  Notes no abd pain/N/V/D/Blood in stool  Mammo 3/21    PAP 11/19    BW 9/20  FLP 6/20      Review of Systems   Constitutional: Negative for chills and fever  HENT: Negative for congestion and sore throat  Eyes: Negative for pain and visual disturbance  Respiratory: Negative for cough and shortness of breath  Cardiovascular: Positive for palpitations  Negative for chest pain and leg swelling  Gastrointestinal: Negative for abdominal pain, blood in stool, constipation, diarrhea, nausea and vomiting  Genitourinary: Negative for difficulty urinating and dysuria  Musculoskeletal: Positive for arthralgias, back pain and neck pain  Skin: Negative for rash and wound  Neurological: Positive for weakness  Negative for dizziness, light-headedness, numbness and headaches  Hematological: Negative for adenopathy  Psychiatric/Behavioral: Negative for behavioral problems and confusion  Objective:    /98   Pulse 68   Temp 97 8 °F (36 6 °C) (Temporal)   Ht 5' 10" (1 778 m)   Wt 127 kg (280 lb 6 4 oz)   BMI 40 23 kg/m²      Physical Exam  Vitals signs and nursing note reviewed  Constitutional:       General: She is not in acute distress  Appearance: She is well-developed  She is obese  She is not ill-appearing  HENT:      Head: Normocephalic and atraumatic  Eyes:      General:         Right eye: No discharge  Left eye: No discharge  Conjunctiva/sclera: Conjunctivae normal    Neck:      Musculoskeletal: Neck supple  Trachea: No tracheal deviation  Cardiovascular:      Rate and Rhythm: Normal rate and regular rhythm  Heart sounds: Normal heart sounds  No murmur  No friction rub  Pulmonary:      Effort: Pulmonary effort is normal  No respiratory distress  Breath sounds: Normal breath sounds  No wheezing, rhonchi or rales  Abdominal:      General: There is no distension  Palpations: Abdomen is soft  Tenderness: There is no abdominal tenderness  There is no guarding  Musculoskeletal:      Comments: Ambulates with stooped posture and slight limp w/o assistance  Lumbar spine: no pain with palp of spinous processes, + pain with palp of L SI joint/paraspinal musculature, 4/5 B/L LE muscle strength   Skin:     General: Skin is warm  Coloration: Skin is not pale  Findings: No rash  Neurological:      Mental Status: She is alert  Motor: No abnormal muscle tone  Comments: Sensation intact to light touch B/L LE, + R SLR test, neg L SLR test, 2/4 patellar DTR B/L LE   Psychiatric:         Mood and Affect: Mood normal          Behavior: Behavior normal          Thought Content:  Thought content normal          Judgment: Judgment normal

## 2021-03-23 NOTE — ASSESSMENT & PLAN NOTE
BP not at goal, recently elevated at ED visit as well, increase Amlodipine to 10 mg 1 tab PO q day, urged diet/exercise/wgt loss, recheck in 4-6 wks, call with SE

## 2021-03-23 NOTE — ASSESSMENT & PLAN NOTE
No significant symptoms, only taking Pepcid daily w/good control - med list updated, call with relapse in symptoms

## 2021-03-23 NOTE — ASSESSMENT & PLAN NOTE
Notes benefit with both Lyrica and Cymbalta together - pain could still be better - increase Lyrica to 75 mg bid, con't current Cymbalta, no great benefit with Flexeril so will try trial of Skelaxin, deferring PT d/t time and needing to be with son, is agreeable to pain mgt, has EMG scheduled but no appt till July

## 2021-03-23 NOTE — ASSESSMENT & PLAN NOTE
Notes benefit with both Lyrica and Cymbalta together - pain could still be better - increase Lyrica to 75 mg bid, con't current Cymbalta, no great benefit with Flexeril so will try trial of Skelaxin

## 2021-03-25 ENCOUNTER — TELEPHONE (OUTPATIENT)
Dept: FAMILY MEDICINE CLINIC | Facility: HOSPITAL | Age: 46
End: 2021-03-25

## 2021-03-25 NOTE — TELEPHONE ENCOUNTER
----- Message from Ashlyn Mendoza DO sent at 3/25/2021 12:34 PM EDT -----  Please notify pt that that I received a 3 page detailed form to be filled out for her job for accomodations/restrictions for work  Questions very detailed and I was not aware pt was not working at last appt  Needs additional 40 min appt so I can appropriately fill out the form

## 2021-03-30 ENCOUNTER — TELEMEDICINE (OUTPATIENT)
Dept: FAMILY MEDICINE CLINIC | Facility: HOSPITAL | Age: 46
End: 2021-03-30
Payer: COMMERCIAL

## 2021-03-30 VITALS
BODY MASS INDEX: 40.09 KG/M2 | HEIGHT: 70 IN | WEIGHT: 280 LBS | TEMPERATURE: 97.4 F | SYSTOLIC BLOOD PRESSURE: 126 MMHG | DIASTOLIC BLOOD PRESSURE: 86 MMHG

## 2021-03-30 DIAGNOSIS — J45.20 MILD INTERMITTENT ASTHMA WITHOUT COMPLICATION: Primary | ICD-10-CM

## 2021-03-30 PROCEDURE — 4004F PT TOBACCO SCREEN RCVD TLK: CPT | Performed by: INTERNAL MEDICINE

## 2021-03-30 PROCEDURE — 3008F BODY MASS INDEX DOCD: CPT | Performed by: INTERNAL MEDICINE

## 2021-03-30 PROCEDURE — 3079F DIAST BP 80-89 MM HG: CPT | Performed by: INTERNAL MEDICINE

## 2021-03-30 PROCEDURE — 3074F SYST BP LT 130 MM HG: CPT | Performed by: INTERNAL MEDICINE

## 2021-03-30 PROCEDURE — 99213 OFFICE O/P EST LOW 20 MIN: CPT | Performed by: INTERNAL MEDICINE

## 2021-03-30 NOTE — ASSESSMENT & PLAN NOTE
D/w pt that asthma is NOT a contraindication to wear a mask and is actually a dx that we should stress she should wear a mask, she has tried mult types of mask but feels she cannot breath when it covers her nose, work will allow her to wear a face mask, d/w pt that face masks alone are not as good as mask but I would allow her to wear a face mask WITH a face shield, forms filled out and will be faxed, call with any new/worse resp symptoms

## 2021-03-31 DIAGNOSIS — Z23 ENCOUNTER FOR IMMUNIZATION: ICD-10-CM

## 2021-04-28 ENCOUNTER — OFFICE VISIT (OUTPATIENT)
Dept: OBGYN CLINIC | Facility: CLINIC | Age: 46
End: 2021-04-28
Payer: COMMERCIAL

## 2021-04-28 VITALS
TEMPERATURE: 98.5 F | HEIGHT: 70 IN | DIASTOLIC BLOOD PRESSURE: 80 MMHG | SYSTOLIC BLOOD PRESSURE: 120 MMHG | BODY MASS INDEX: 40.09 KG/M2 | WEIGHT: 280 LBS

## 2021-04-28 DIAGNOSIS — M76.72 PERONEAL TENDONITIS, LEFT: ICD-10-CM

## 2021-04-28 DIAGNOSIS — M19.072 PRIMARY OSTEOARTHRITIS OF LEFT ANKLE: Primary | ICD-10-CM

## 2021-04-28 PROCEDURE — 99213 OFFICE O/P EST LOW 20 MIN: CPT | Performed by: ORTHOPAEDIC SURGERY

## 2021-04-28 PROCEDURE — 3074F SYST BP LT 130 MM HG: CPT | Performed by: ORTHOPAEDIC SURGERY

## 2021-04-28 PROCEDURE — 3079F DIAST BP 80-89 MM HG: CPT | Performed by: ORTHOPAEDIC SURGERY

## 2021-04-28 PROCEDURE — 3008F BODY MASS INDEX DOCD: CPT | Performed by: ORTHOPAEDIC SURGERY

## 2021-04-28 PROCEDURE — 4004F PT TOBACCO SCREEN RCVD TLK: CPT | Performed by: ORTHOPAEDIC SURGERY

## 2021-04-28 NOTE — PROGRESS NOTES
Assessment:     1  Primary osteoarthritis of left ankle    2  Peroneal tendonitis, left        Plan:     Problem List Items Addressed This Visit        Musculoskeletal and Integument    Primary osteoarthritis of left ankle - Primary    Relevant Orders    Ambulatory referral to Physical Therapy    Peroneal tendonitis, left    Relevant Orders    Ambulatory referral to Physical Therapy          The patient was seen and examined by Dr Brenda Rabago and myself  Findings consistent with left ankle osteoarthritis and peroneal tendinitis  Findings and treatment options were discussed with the patient  Discussed importance of her attending physical therapy at this time to treat these conditions  She is to go back into the Cam walker if she is having significant pain when ambulating  When she does transition to a sneaker, recommend using insole with good arch support  Follow-up in 4-6 weeks for re-evaluation  All questions were answered to patient's satisfaction  Subjective:     Patient ID: Blas Conrad is a 54 y o  female  Chief Complaint: Left Foot and Ankle Pain    HPI:  This is a 58-year-old white female here for follow-up left ankle osteoarthritis  She was last seen 6 weeks ago received a cortisone injection to the ankle joint  She states she had a few days improvement and then the pain returned  The pain is intermittent and worse with prolonged walking and standing  Some days she has minimal pain  She states the pain is mostly over the posterior lateral aspect of the foot and ankle  She stopped wearing the cam walker 4 days after last visit  She did not attend physical therapy since she states she does not have enough time      Allergy:  Allergies   Allergen Reactions    Acetaminophen-Codeine      Pt states she does not remember having a reaction to this medication    Hydrocodone Other (See Comments)    Prednisolone      Medications:  all current active meds have been reviewed  Past Medical History:  Past Medical History:   Diagnosis Date    Anxiety     Asthma     Disc disorder     Hypertension     Hypothyroidism     Migraine      Past Surgical History:  Past Surgical History:   Procedure Laterality Date    BACK SURGERY      CERVICAL BIOPSY  W/ LOOP ELECTRODE EXCISION      CERVICAL FUSION       SECTION      CHOLECYSTECTOMY      FOOT SURGERY Bilateral     KNEE SURGERY      TUBAL LIGATION Bilateral 2012     Family History:  Family History   Adopted: Yes   Problem Relation Age of Onset    No Known Problems Mother     No Known Problems Father     Coronary artery disease Maternal Grandmother     Breast cancer Maternal Grandmother         age unknown    Coronary artery disease Maternal Aunt     No Known Problems Maternal Grandfather     No Known Problems Paternal Grandmother     No Known Problems Paternal Grandfather     No Known Problems Maternal Aunt     No Known Problems Maternal Aunt      Social History:  Social History     Substance and Sexual Activity   Alcohol Use Never    Frequency: Never    Comment: socially     Social History     Substance and Sexual Activity   Drug Use No     Social History     Tobacco Use   Smoking Status Current Every Day Smoker    Packs/day: 1 00    Types: Cigarettes   Smokeless Tobacco Never Used     Review of Systems   Constitutional: Negative  HENT: Negative  Eyes: Negative  Respiratory: Negative  Cardiovascular: Negative  Gastrointestinal: Negative  Endocrine: Negative  Genitourinary: Negative  Musculoskeletal: Positive for arthralgias (Left ankle and foot) and gait problem (Antalgic)  Skin: Negative  Allergic/Immunologic: Negative  Hematological: Negative  Psychiatric/Behavioral: Negative            Objective:  BP Readings from Last 1 Encounters:   21 120/80      Wt Readings from Last 1 Encounters:   21 127 kg (280 lb)      BMI:   Estimated body mass index is 40 18 kg/m² as calculated from the following: Height as of this encounter: 5' 10" (1 778 m)  Weight as of this encounter: 127 kg (280 lb)  BSA:   Estimated body surface area is 2 41 meters squared as calculated from the following:    Height as of this encounter: 5' 10" (1 778 m)  Weight as of this encounter: 127 kg (280 lb)  Physical Exam  Vitals signs and nursing note reviewed  Constitutional:       Appearance: Normal appearance  She is well-developed  HENT:      Head: Normocephalic and atraumatic  Right Ear: External ear normal       Left Ear: External ear normal    Eyes:      Extraocular Movements: Extraocular movements intact  Conjunctiva/sclera: Conjunctivae normal    Neck:      Musculoskeletal: Neck supple  Pulmonary:      Effort: Pulmonary effort is normal    Skin:     General: Skin is warm and dry  Neurological:      Mental Status: She is alert and oriented to person, place, and time  Deep Tendon Reflexes: Reflexes are normal and symmetric  Psychiatric:         Mood and Affect: Mood normal          Behavior: Behavior normal        Left Ankle Exam     Tenderness   Left ankle tenderness location: peroneal tendon  Swelling: none    Range of Motion   The patient has normal left ankle ROM  Muscle Strength   Dorsiflexion:  5/5   Plantar flexion:  5/5   Posterior tibial:  5/5  Peroneal muscle:  5/5    Other   Erythema: absent  Sensation: normal  Pulse: present    Comments:    Pain with resisted plantar flexion and eversion            No new imaging       Procedures

## 2021-05-04 DIAGNOSIS — K21.9 GASTROESOPHAGEAL REFLUX DISEASE WITHOUT ESOPHAGITIS: ICD-10-CM

## 2021-05-04 RX ORDER — FAMOTIDINE 20 MG/1
TABLET, FILM COATED ORAL
Qty: 180 TABLET | Refills: 1 | Status: SHIPPED | OUTPATIENT
Start: 2021-05-04 | End: 2021-10-27 | Stop reason: SDUPTHER

## 2021-05-05 ENCOUNTER — EVALUATION (OUTPATIENT)
Dept: PHYSICAL THERAPY | Facility: CLINIC | Age: 46
End: 2021-05-05
Payer: COMMERCIAL

## 2021-05-05 DIAGNOSIS — M76.72 PERONEAL TENDINITIS OF LEFT LOWER EXTREMITY: ICD-10-CM

## 2021-05-05 DIAGNOSIS — M19.072 PRIMARY OSTEOARTHRITIS, LEFT ANKLE AND FOOT: Primary | ICD-10-CM

## 2021-05-05 DIAGNOSIS — M19.072 PRIMARY OSTEOARTHRITIS OF LEFT ANKLE: ICD-10-CM

## 2021-05-05 DIAGNOSIS — M76.72 PERONEAL TENDONITIS, LEFT: ICD-10-CM

## 2021-05-05 PROCEDURE — 97162 PT EVAL MOD COMPLEX 30 MIN: CPT | Performed by: PHYSICAL THERAPIST

## 2021-05-05 PROCEDURE — 97110 THERAPEUTIC EXERCISES: CPT | Performed by: PHYSICAL THERAPIST

## 2021-05-05 NOTE — PROGRESS NOTES
PT Evaluation     Today's date: 2021  Patient name: Britany Jordan  : 1975  MRN: [id number]  Referring provider: Paolo Whittington PA-C  Dx:   Encounter Diagnosis     ICD-10-CM    1  Primary osteoarthritis, left ankle and foot  M19 072    2  Peroneal tendinitis of left lower extremity  M76 72                   Assessment  Assessment details: Britany Jordan is a 54 y o  female who presents with increased L ankle pain consistent with referring diagnosis of Primary osteoarthritis, left ankle and foot  (primary encounter diagnosis)  Peroneal tendinitis of left lower extremity that is complex secondary to chronic insidious onset, moderate fear avoidance and moderate to high pain levels with noted effusion and + imaging findings of L ankle OA  Clinically demonstrates decreased L ankle ROM, strength, proprioception and poor overall joint mobility with INV/EV due to prior surgery and degenerative conditions leading to pain with ADLs and exercise and noted effusion  This suggests the need for skilled OPPT to address the above listed impairments, achieve established goals and return to PLOF pain-free  If you have any questions or concerns please contact me at [phone]  Thank you!   Impairments: abnormal gait, abnormal muscle firing, abnormal muscle tone, abnormal or restricted ROM, impaired balance, impaired physical strength, pain with function and poor body mechanics    Symptom irritability: moderateBarriers to therapy: + OA and effusion, fear avoidance and prolonged standing required for work   Understanding of Dx/Px/POC: good   Prognosis: fair  Prognosis details: + OA, obesity, prior surgery and active work    Goals  Short Term Goals (4 weeks)  1 ) Establish independence with HEP  2 ) Decrease subjective pain levels from PRS at least to 2-5/10 at rest and with activity  3 ) Improve L ankle ROM at least 5-10 degrees into all planes to allow for improved ease of movement with less guarding    Long Term Goals (8 weeks)  1 ) Improve L ankle ROM to WNL in all planes to restore normal movement with ADLs and function  2 ) Improve L ankle strength to 5/5 in all planes in order to return to pain-free ADLs and function  3 ) Improve FOTO score at least to 75 points showing improved self reported disability   4 ) Improve balance in SLS to good > 20 seconds on L LE consistent with age appropriate norms    Plan  Plan details: Initiate POC for L ankle ROM, strength and proprioception, monitor sxs and progress as able in order to return to pain-free ADLs at [facility]  Patient would benefit from: PT eval and skilled speech therapy  Planned modality interventions: cryotherapy, electrical stimulation/Russian stimulation and TENS  Planned therapy interventions: manual therapy, joint mobilization, neuromuscular re-education, muscle pump exercises, self care, postural training, patient education, ADL training, ADL retraining, activity modification, coordination, compression, flexibility, functional ROM exercises, gait training, graded activity, therapeutic exercise, therapeutic activities, stretching, strengthening, therapeutic training, graded exercise and home exercise program  Frequency: 2x week  Duration in visits: 16  Duration in weeks: 8  Plan of Care beginning date: 5/5/2021  Plan of Care expiration date: 7/5/2021  Treatment plan discussed with: patient        Subjective Evaluation    History of Present Illness  Date of onset: 2/5/2021  Mechanism of injury: Amirah Tom is a 54 y o  female who presents with increased L ankle pain starting ~ 3 months ago with [facility]  Decided to seek out MD consult at [facility]gent [facility]- x-rays were given with potential for fx and CAM boot given for 3 weeks- was referred to Dr Maddie Polo- new provided and (-) for fx but noted potential growth deformity and shot of cortisone with little response and told to get back into CAM boot    Notes with working at [employer] having to stand for prolonged periods and having good and bad days related to standing  Denies night pain or changes in bowel or bladder function  Denies any NT signs or sxs, notes that her lateral ankle tendons and grinding with walking  F/U with MD in 6 weeks  Wishes to return to PLOF pain-free  Recurrent probem    Quality of life: good    Pain  Current pain ratin  At best pain rating: 3  At worst pain rating: 10  Location: L ankle  Quality: dull ache, knife-like, tight, sharp and grinding  Relieving factors: ice, change in position, heat, medications, relaxation and support  Aggravating factors: standing, walking and stair climbing  Progression: no change    Social Support  3  Lives in: trailer  Lives with: young children and significant other    Employment status: working  Exercise history: No  Life stress: Son has ADHD, learning disability       Diagnostic Tests  X-ray: abnormal (OA)  Treatments  Previous treatment: physical therapy  Current treatment: physical therapy  Patient Goals  Patient goals for therapy: decreased edema, decreased pain, improved balance, increased motion, return to sport/leisure activities, independence with ADLs/IADLs and increased strength  Patient goal: Be out of pain        Objective     Static Posture   General Observations  Asymmetrical weight bearing and shifted right  Ankle/Foot   Ankle/Foot (Left): Hallux valgus, pes planus and pronated  Ankle/Foot (Right): Hallux valgus and pes planus  Observations   Left Ankle/Foot   Positive for edema  Right Ankle/Foot   Positive for edema and effusion       Neurological Testing     Sensation     Ankle/Foot   Left Ankle/Foot   Diminished: light touch    Right Ankle/Foot   Intact: light touch     Reflexes   Left   Patellar (L4): normal (2+)    Right   Patellar (L4): normal (2+)    Active Range of Motion   Left Ankle/Foot   Dorsiflexion (ke): 5 degrees   Dorsiflexion (kf): 8 degrees   Plantar flexion: 26 degrees   Inversion: 10 degrees   Eversion: 15 degrees Right Ankle/Foot   Dorsiflexion (ke): 5 degrees   Dorsiflexion (kf): 7 degrees   Plantar flexion: 25 degrees   Inversion: 22 degrees   Eversion: 16 degrees     Additional Active Range of Motion Details  Pain with all L ankle ROM    Strength/Myotome Testing     Left Ankle/Foot   Dorsiflexion: 4  Plantar flexion: 4  Inversion: 4+  Eversion: 4    Right Ankle/Foot   Dorsiflexion: 4+  Plantar flexion: 3+  Inversion: 4+  Eversion: 4+    Additional Strength Details  Pain with all L ankle MMT, difficulty with SL HR on L    Tests   Left Ankle/Foot   Positive for valgus stress metatarsophalangeal, varus stress metatarsophalangeal, valgus tilt and varus tilt  Negative for anterior drawer and Tinel's sign (tarsal tunnel)  Swelling   Left Ankle/Foot   Figure 8: 57 5 cm  Malleoli: 27 5 cm    Right Ankle/Foot   Figure 8: 56 cm  Malleoli: 26 cm    Functional Assessment      Squat    Right within functional limits and left valgus       Single Leg Stance   Left: 5 seconds  Right: 15 seconds             Precautions: HTN, Asthma, chest pain history      Manuals             L ankle PROM             Talar joint mobs             Peroneal IASTM                          Neuro Re-Ed             Foam romberg             Tandem             SLS                                                                 Ther Ex             Bike             Ankle iso INV/EV             Tband 4 way             HR/TR                                                                 Ther Activity                                       Gait Training                                       Modalities

## 2021-05-13 ENCOUNTER — CONSULT (OUTPATIENT)
Dept: PAIN MEDICINE | Facility: CLINIC | Age: 46
End: 2021-05-13
Payer: COMMERCIAL

## 2021-05-13 ENCOUNTER — APPOINTMENT (OUTPATIENT)
Dept: RADIOLOGY | Facility: CLINIC | Age: 46
End: 2021-05-13
Payer: COMMERCIAL

## 2021-05-13 VITALS
WEIGHT: 289 LBS | TEMPERATURE: 98.2 F | BODY MASS INDEX: 41.37 KG/M2 | SYSTOLIC BLOOD PRESSURE: 140 MMHG | HEART RATE: 82 BPM | HEIGHT: 70 IN | DIASTOLIC BLOOD PRESSURE: 80 MMHG

## 2021-05-13 DIAGNOSIS — M54.2 NECK PAIN, CHRONIC: ICD-10-CM

## 2021-05-13 DIAGNOSIS — M54.12 CERVICAL RADICULOPATHY: ICD-10-CM

## 2021-05-13 DIAGNOSIS — G89.29 CHRONIC RIGHT-SIDED LOW BACK PAIN WITHOUT SCIATICA: ICD-10-CM

## 2021-05-13 DIAGNOSIS — Z98.1 S/P CERVICAL SPINAL FUSION: ICD-10-CM

## 2021-05-13 DIAGNOSIS — M79.18 MYOFASCIAL PAIN SYNDROME: ICD-10-CM

## 2021-05-13 DIAGNOSIS — R20.2 NUMBNESS AND TINGLING IN BOTH HANDS: ICD-10-CM

## 2021-05-13 DIAGNOSIS — R20.0 NUMBNESS AND TINGLING IN BOTH HANDS: ICD-10-CM

## 2021-05-13 DIAGNOSIS — G89.29 NECK PAIN, CHRONIC: ICD-10-CM

## 2021-05-13 DIAGNOSIS — G89.4 CHRONIC PAIN SYNDROME: Primary | ICD-10-CM

## 2021-05-13 DIAGNOSIS — M54.50 CHRONIC RIGHT-SIDED LOW BACK PAIN WITHOUT SCIATICA: ICD-10-CM

## 2021-05-13 DIAGNOSIS — M25.551 HIP PAIN, ACUTE, RIGHT: ICD-10-CM

## 2021-05-13 DIAGNOSIS — R29.898 BILATERAL LEG WEAKNESS: ICD-10-CM

## 2021-05-13 PROCEDURE — 73502 X-RAY EXAM HIP UNI 2-3 VIEWS: CPT

## 2021-05-13 PROCEDURE — 99244 OFF/OP CNSLTJ NEW/EST MOD 40: CPT | Performed by: NURSE PRACTITIONER

## 2021-05-13 PROCEDURE — 72050 X-RAY EXAM NECK SPINE 4/5VWS: CPT

## 2021-05-13 PROCEDURE — 72110 X-RAY EXAM L-2 SPINE 4/>VWS: CPT

## 2021-05-13 RX ORDER — PREGABALIN 75 MG/1
CAPSULE ORAL
Qty: 90 CAPSULE | Refills: 1 | Status: SHIPPED | OUTPATIENT
Start: 2021-05-13 | End: 2021-06-16 | Stop reason: SDUPTHER

## 2021-05-13 NOTE — PROGRESS NOTES
Assessment:  1  Chronic pain syndrome    2  Neck pain, chronic    3  Myofascial pain syndrome    4  Chronic right-sided low back pain without sciatica    5  S/P cervical spinal fusion    6  Cervical radiculopathy    7  Numbness and tingling in both hands    8  Bilateral leg weakness        Plan:    While the patient was in the office today, I did have a thorough conversation with the patient regarding their chronic pain syndrome, symptoms, medication regimen, and treatment plan  I discussed with the patient at this point time I do agree with her primary care provider that it would be beneficial proceed with x-rays of the lumbar spine and bilateral hips as well as cervical spine to better evaluate the structural integrity of her spine and also see if there is any instability  I also explained that we need to obtain the x-rays as part of the process of evaluating the underlying causes of her pain and so we can eventually get the approval for the MRIs as well  I advised the patient that as soon as we have results of the x-rays, our office will give her a call to review the results  The patient was agreeable and verbalized an understanding  I explained the patient that I do feel it is in her best interest to proceed with the bilateral upper extremity EMG as that will also give us better information as to what exactly is causing her pain symptoms with regards to whether not is carpal tunnel syndrome versus cervical radiculopathy versus neuropathy  I advised the patient that I would do my best to see if there is anything we can do from our sided things to help her get her EMG scheduled sooner than later  I advised the patient once we have those results we will give her a call and discuss that as well  The patient was agreeable and verbalized an understanding             I did discussed the patient at this point I feel would be beneficial for her to proceed with the physical therapy and I will also give her prescription gear towards physical therapy for her low back and leg pain  I explained the patient that hopefully she can complete the 6 weeks of therapy and from there we will decide as to whether not to proceed with the MRI of the lumbar spine  The patient is to let our office know if she is not able to tolerate the physical therapy or for makes her pain worse  The patient was agreeable and verbalized an understanding  With regards to her medication regimen and treatment plan, I did explain to the patient that overall I definitely feel there is a significant underlying neuropathic and myofascial component to her pain symptoms and since she was put on the Cymbalta for anxiety, she should continue the Cymbalta as prescribed by her primary care provider  However, for now, I am going to take over prescribing the Lyrica as she is still on a very low and subtherapeutic dose and since she has been on the 75 mg b i d  for almost a month I feel would be beneficial at this point to further titrate the Lyrica to 75 mg t i d  for the next several weeks and see how she does  I advised the patient that if they experience any side effects or issues with the changes in their medication regiment, they should give our office a call to discuss  I also advised the patient not to drive or operate machinery until they see how the changes in the medication regimen affects them  The patient was agreeable and verbalized an understanding  While the patient was in the office today, I discussed with the patient that with medication management our overall goal is to reduce the pain symptoms by 50%, 50% of the time, on the least amount medications, with the least amount side effects  The patient was agreeable and verbalized an understanding         The patient is schedule follow-up office visit in 6-7 weeks and at that point time we will re-evaluate her pain symptoms, medication regimen, and treatment plan options as appropriate  The patient was agreeable and verbalized an understanding  The above plan of care was reviewed and agreed upon by Dr Gerardo Dutton  History of Present Illness: The patient is a 54 y o  female who presents for consultation in regards to Neck Pain, Back Pain, Arm Pain, Hand Pain, Foot Pain, and Leg Pain  Symptoms have been present for  approximately 9 years  Symptoms began following an injury at work  The patient subsequently had an anterior cervical fusion at [facility] in December of 2015  The patient reports that since then she has continued to have issues with chronic cervical pain and upper extremity radicular symptoms, numbness, and tingling  The patient is convinced that at the very least she has carpal tunnel syndrome as she reports that on previous EMG use she was told she had carpal tunnel syndrome and it is only worsening especially when she wakes up 1st thing in the morning her hands are almost completely numb  The patient does have a bilateral upper extremity EMG ordered from her primary care provider, however, she reports that when she called to get the EMG scheduled they were booked out past July and would not give her an appointment  She reports that since her initial injury she has also developed worsening low back and leg pain and most recently left foot and ankle pain with off and on swelling of the left greater than right foot  The patient reports that overall over the past year she has gained weight due to her inactivity secondary to her pain and is just frustrated with the overall lack of improvement and the significant interference with activities of daily living and her overall quality of life  She was previously working on her pain management with [facility], however, once her worker's comp case was closed, there office no longer could see her because they do not take her current medical insurance    She reports that she was given prescriptions for x-rays of the lumbosacral spine and hips, however, has not proceed with the x-rays as she was not sure why she should proceed with x-rays as they are not going to show what we need to know how to help her pain  The patient is starting physical therapy for her left foot and ankle as she did go for the evaluation but is not previously completed any physical therapy for her cervical or lumbar spine as she feels we should proceed with the MRIs 1st to see what is going on or exactly causing her pain  Pain is reported to be 8 on the numeric rating scale  Symptoms are felt nearly constantly and worst in the evening  Symptoms are characterized as burning, cramping, shooting, sharp, dull/aching, numbing and throbbing  Symptoms are associated with bilateral arm weakness and bilateral leg weakness  Aggravating factors include standing, bending, leaning forward, leaning bckward and walking  Relieving factors include lying down and exercise  No change in symptoms with sitting, coughing/sneezing and bowel movements  Treatments that have been helpful include surgery , TENS unit and heat/ice  prior injections including epidural steroid injections, physical therapy and traction have provided no relief  Medications to relieve symptoms include  Cymbalta 60 mg daily and Lyrica 75 mg b i d  as well as metaxalone 800 mg t i d  p r n  for pain and spasm  She reports that her current medication regimen does provide minimal to moderate relief at best without any significant side effects and feels that the Lyrica has been more helpful than the gabapentin as she was on 1200 mg a day of the gabapentin without any relief or improvement  However, even the Lyrica provides minimal relief but she does notice a difference  She also reports that in the past she has tried and failed acetaminophen aspirin, ibuprofen nortriptyline, Paxil, oxycodone, tramadol, Toradol, Imitrex, and Topamax    The patient reports that her overall goal is to try to find a medication regimen and treatment plan that allows her to function more consistently on a regular basis with predictable and manageable pain  Review of Systems:    Review of Systems   Constitutional: Positive for unexpected weight change  Negative for fever  HENT: Negative for trouble swallowing  Eyes: Negative for visual disturbance  Respiratory: Negative for shortness of breath and wheezing  Cardiovascular: Negative for chest pain and palpitations  Gastrointestinal: Negative for constipation, diarrhea, nausea and vomiting  Endocrine: Positive for polydipsia  Negative for cold intolerance and heat intolerance  Genitourinary: Negative for difficulty urinating and frequency  Musculoskeletal: Positive for joint swelling (joint pain) and myalgias  Negative for arthralgias and gait problem  Skin: Positive for rash  Neurological: Positive for numbness and headaches  Negative for dizziness, seizures, syncope and weakness  Hematological: Does not bruise/bleed easily  Psychiatric/Behavioral: Negative for dysphoric mood  All other systems reviewed and are negative          Past Medical History:   Diagnosis Date    Anxiety     Arthritis     Asthma     Depression     Disc disorder     Fibromyalgia     Hypertension     Hypothyroidism     Migraine        Past Surgical History:   Procedure Laterality Date    BACK SURGERY      CERVICAL BIOPSY  W/ LOOP ELECTRODE EXCISION      CERVICAL FUSION       SECTION      CHOLECYSTECTOMY      FOOT SURGERY Bilateral     KNEE SURGERY      TUBAL LIGATION Bilateral 2012       Family History   Adopted: Yes   Problem Relation Age of Onset    No Known Problems Mother     No Known Problems Father     Coronary artery disease Maternal Grandmother     Breast cancer Maternal Grandmother         age unknown    Coronary artery disease Maternal Aunt     No Known Problems Maternal Grandfather     No Known Problems Paternal Grandmother     No Known Problems Paternal Grandfather     No Known Problems Maternal Aunt     No Known Problems Maternal Aunt        Social History     Occupational History    Occupation: [employer]   Tobacco Use    Smoking status: Current Every Day Smoker     Packs/day: 0 50     Types: Cigarettes    Smokeless tobacco: Never Used   Substance and Sexual Activity    Alcohol use: Never     Frequency: Never     Comment: socially    Drug use: No    Sexual activity: Not Currently         Current Outpatient Medications:     albuterol (VENTOLIN HFA) 90 mcg/act inhaler, Inhale 2 puffs every 6 (six) hours as needed for wheezing, Disp: 18 g, Rfl: 0    amLODIPine (NORVASC) 10 mg tablet, Take 1 tablet (10 mg total) by mouth daily, Disp: 90 tablet, Rfl: 1    Aspirin-Acetaminophen-Caffeine (EXCEDRIN PO), Take by mouth, Disp: , Rfl:     cholecalciferol (VITAMIN D3) 1,000 units tablet, Take 2,000 Units by mouth daily, Disp: , Rfl:     DULoxetine (CYMBALTA) 60 mg delayed release capsule, Take 1 capsule (60 mg total) by mouth daily, Disp: 90 capsule, Rfl: 0    famotidine (PEPCID) 20 mg tablet, Take 1 tablet (20 mg total) by mouth 2 (two) times a day, Disp: 180 tablet, Rfl: 1    levothyroxine 200 mcg tablet, Take 1 tablet (200 mcg total) by mouth daily, Disp: 90 tablet, Rfl: 0    levothyroxine 25 mcg tablet, 25 mcg 1 tab PO q am with 200 mcg on Mon, Wed, Fri, Sun ONLY , con't 200 mcg all other days, Disp: 90 tablet, Rfl: 0    metaxalone (Skelaxin) 800 mg tablet, Take 1 tablet (800 mg total) by mouth 3 (three) times a day as needed for muscle spasms, Disp: 30 tablet, Rfl: 1    multivitamin (THERAGRAN) TABS, Take 1 tablet by mouth daily, Disp: , Rfl:     ondansetron (ZOFRAN-ODT) 4 mg disintegrating tablet, Take 1 tablet (4 mg total) by mouth every 8 (eight) hours as needed for nausea or vomiting, Disp: 12 tablet, Rfl: 0    pregabalin (LYRICA) 75 mg capsule, Take 1 in AM and 2 PO HS , Disp: 90 capsule, Rfl: 1    Allergies   Allergen Reactions    Acetaminophen-Codeine      Pt states she does not remember having a reaction to this medication    Hydrocodone Other (See Comments)    Latex Hives     Latex powder      Prednisolone        Physical Exam:    /80 (BP Location: Left arm, Patient Position: Sitting, Cuff Size: Standard)   Pulse 82   Temp 98 2 °F (36 8 °C)   Ht 5' 10" (1 778 m)   Wt 131 kg (289 lb)   BMI 41 47 kg/m²     Constitutional: normal, well developed, well nourished, alert, in no distress and non-toxic and no overt pain behavior  and obese  Eyes: anicteric  HEENT: grossly intact  Neck: supple, symmetric, trachea midline and no masses   Pulmonary:even and unlabored  Cardiovascular:No edema or pitting edema present  Skin:Normal without rashes or lesions and well hydrated  Psychiatric:Mood and affect appropriate  Neurologic:Cranial Nerves II-XII grossly intact  Musculoskeletal:The patient's gait is slightly antalgic, waddling, but steady without the use of any assistive devices      Cervical Spine Exam    Appearance:  Normal lordosis  Palpation/Tenderness:  left cervical paraspinal tenderness  right cervical paraspinal tenderness  left trapezium tenderness  right trapezium tenderness  trigger points palpable  Sensory:  no sensory deficits noted  Range of Motion:  Flexion:  Minimally limited  without pain  Extension:  Moderately limited  with pain  Rotation - Left:  Moderately limited  with pain  Rotation - Right:  Moderately limited  with pain  Motor Strength:  Left Arm Flexion  5/5  Left Arm Extension  5/5  Right Arm Flexion  5/5  Right Arm Extension  5/5  Left Wrist Flexion  5/5  Left Wrist Extension  5/5  Left Finger Abduction  5/5  Right Finger Abduction  5/5  Left Pincer Grasp  5/5  Right Pincer Grasp  5/5  Left    5/5  Right   5/5  Reflexes:  Left Biceps:  absent   Right Biceps:  absent   Left Brachioradialis:  absent   Right Brachioradialis:  absent   Left Triceps:  absent Right Triceps:  absent     Lumbar Spine Exam    Appearance:  Normal lordosis  Palpation/Tenderness:  left lumbar paraspinal tenderness  right lumbar paraspinal tenderness  left sacroiliac joint tenderness  right sacroiliac joint tenderness  Sensory:  no sensory deficits noted  Range of Motion:  Flexion: Moderately limited  with pain  Extension:  Minimally limited  with pain  Rotation - Left:  Moderately limited  with pain  Rotation - Right:  Moderately limited  with pain  Motor Strength:  Left hip flexion:  5/5  Left hip extension:  5/5  Right hip flexion:  5/5  Right hip extension:  5/5  Left knee flexion:  5/5  Left knee extension:  5/5  Right knee flexion:  5/5  Right knee extension:  5/5  Left foot dorsiflexion:  5/5  Left foot plantar flexion:  5/5  Right foot dorsiflexion:  5/5  Right foot plantar flexion:  5/5  Reflexes:  Left Patellar:  absent   Right Patellar:  absent   Left Achilles:  absent   Right Achilles:  absent   Special Tests:  Left Straight Leg Test:  positive  Right Straight Leg Test:  positive  Left Dale's Maneuver:  positive  Right Dale's Maneuver:  positive  Left Gaenslen's Test:  positive  Right Gaenslen's Test:  positive      Imaging    XRAY LEFT ANKLE 3/10/2021   INDICATION:   M25 572: Pain in left ankle and joints of left foot      COMPARISON:  2/24/2021     VIEWS:  XR ANKLE 3+ VW LEFT         FINDINGS:     There is no acute fracture or dislocation  Postoperative change again identified reflecting Achilles tendon repair      Again identified are mild degenerative changes in the midfoot and tibiotalar joint similar the prior exam   Prominent plantar and small posterior calcaneal enthesophytes are again identified      No lytic or blastic osseous lesion      Soft tissues are unremarkable      IMPRESSION:     No acute abnormality  Stable postoperative and degenerative change as noted        XRAY LEFT FOOT 2/24/2021   INDICATION:   A98 777: Pain in left foot    Lateral and dorsal foot pain for 2 months with weightbearing and rest   No history of injury or trauma      COMPARISON:  None     VIEWS:  XR FOOT 3+ VW LEFT         FINDINGS:     There is no acute fracture or dislocation    Postoperative changes from a Achilles tendon repair are stable without evidence for hardware failure      Calcaneal spur(s) noted      No lytic or blastic osseous lesion      Soft tissues are unremarkable      IMPRESSION:     No acute osseous abnormality      Postoperative changes      Degenerative changes as described   Samul Force

## 2021-05-19 ENCOUNTER — OFFICE VISIT (OUTPATIENT)
Dept: PHYSICAL THERAPY | Facility: CLINIC | Age: 46
End: 2021-05-19
Payer: COMMERCIAL

## 2021-05-19 DIAGNOSIS — M76.72 PERONEAL TENDONITIS, LEFT: ICD-10-CM

## 2021-05-19 DIAGNOSIS — M19.072 PRIMARY OSTEOARTHRITIS, LEFT ANKLE AND FOOT: Primary | ICD-10-CM

## 2021-05-19 DIAGNOSIS — M76.72 PERONEAL TENDINITIS OF LEFT LOWER EXTREMITY: ICD-10-CM

## 2021-05-19 DIAGNOSIS — M19.072 PRIMARY OSTEOARTHRITIS OF LEFT ANKLE: ICD-10-CM

## 2021-05-19 PROCEDURE — 97112 NEUROMUSCULAR REEDUCATION: CPT | Performed by: PHYSICAL THERAPIST

## 2021-05-19 PROCEDURE — 97140 MANUAL THERAPY 1/> REGIONS: CPT | Performed by: PHYSICAL THERAPIST

## 2021-05-19 PROCEDURE — 97110 THERAPEUTIC EXERCISES: CPT | Performed by: PHYSICAL THERAPIST

## 2021-05-19 NOTE — PROGRESS NOTES
Daily Note     Today's date: 2021  Patient name: Bart Jara  : 1975  MRN: [id number]  Referring provider: Sandy Garrison PA-C  Dx:   Encounter Diagnosis     ICD-10-CM    1  Primary osteoarthritis, left ankle and foot  M19 072    2  Peroneal tendinitis of left lower extremity  M76 72    3  Peroneal tendonitis, left  M76 72    4  Primary osteoarthritis of left ankle  M19 072        Start Time: 1415          Subjective: Feeling okay, still with tightness and limited ability to perform prolonged standing or walking  Objective: See treatment diary below      Assessment: Demonstrates improved ability to perform all ankle AROM in standing but still pain with active EV  Continues to have burning pain and discomfort with repeated active EV and passive INV  Good balance in SLS  Less 5th ray and cuboid mobility leads to overuse- likely with improved mobility will have less discomfort  Plan: Continue per plan of care        Precautions: HTN, Asthma, chest pain history      Manuals             L ankle PROM PF            Talar joint mobs PF            Peroneal IASTM PF             15'            Neuro Re-Ed             Foam romberg 3x30"            Tandem 3x30"            SLS 3x30"                                                                Ther Ex             Bike 6 min            Ankle iso INV/EV 10x10            Tband 4 way GTB 20x ea            HR/TR 20x            Side stepping in PF 3 laps            Gastroc stretch at block 3x20"                                      Ther Activity                                       Gait Training                                       Modalities

## 2021-05-21 ENCOUNTER — TELEPHONE (OUTPATIENT)
Dept: PAIN MEDICINE | Facility: CLINIC | Age: 46
End: 2021-05-21

## 2021-05-21 NOTE — TELEPHONE ENCOUNTER
Can you please call the patient and advise her that her B/L Hip/Pelvis x-rays showed mild, stable deg changes/OA w/out any evidence of fractures  Her lumbar spine x-rays showed mod to severe deg disc space changes at L5-S1 and mild at L4-5 without any evidence of instability or fractures  Her Cervical spine x-rays showed stabled post deg chagnes/OA with her previous spine surgery hardware intact with right greater than left foraminal stenosis at C3-4 and C4-5, without any evidence of fractures  At this point, I would recommend that she give the increase in Lyrica some time and proceed with the physical therapy as discussed  We will re-group at her next OV and proceed from there as appropriate  Thank you

## 2021-05-26 ENCOUNTER — EVALUATION (OUTPATIENT)
Dept: PHYSICAL THERAPY | Facility: CLINIC | Age: 46
End: 2021-05-26
Payer: COMMERCIAL

## 2021-05-26 DIAGNOSIS — M76.72 PERONEAL TENDINITIS OF LEFT LOWER EXTREMITY: ICD-10-CM

## 2021-05-26 DIAGNOSIS — M54.50 CHRONIC RIGHT-SIDED LOW BACK PAIN WITHOUT SCIATICA: ICD-10-CM

## 2021-05-26 DIAGNOSIS — G89.29 CHRONIC RIGHT-SIDED LOW BACK PAIN WITHOUT SCIATICA: ICD-10-CM

## 2021-05-26 DIAGNOSIS — M76.72 PERONEAL TENDONITIS, LEFT: ICD-10-CM

## 2021-05-26 DIAGNOSIS — M19.072 PRIMARY OSTEOARTHRITIS OF LEFT ANKLE: ICD-10-CM

## 2021-05-26 DIAGNOSIS — R29.898 BILATERAL LEG WEAKNESS: ICD-10-CM

## 2021-05-26 DIAGNOSIS — M19.072 PRIMARY OSTEOARTHRITIS, LEFT ANKLE AND FOOT: Primary | ICD-10-CM

## 2021-05-26 PROCEDURE — 97110 THERAPEUTIC EXERCISES: CPT | Performed by: PHYSICAL THERAPIST

## 2021-05-26 PROCEDURE — 97140 MANUAL THERAPY 1/> REGIONS: CPT | Performed by: PHYSICAL THERAPIST

## 2021-05-26 PROCEDURE — 97164 PT RE-EVAL EST PLAN CARE: CPT | Performed by: PHYSICAL THERAPIST

## 2021-05-26 PROCEDURE — 97112 NEUROMUSCULAR REEDUCATION: CPT | Performed by: PHYSICAL THERAPIST

## 2021-05-26 NOTE — PROGRESS NOTES
PT Re-Evaluation    Today's date: 2021  Patient name: Tonie Lesch  : 1975  MRN: [id number]  Referring provider: Lori Traylor PA-C  Dx:   Encounter Diagnosis     ICD-10-CM    1  Primary osteoarthritis, left ankle and foot  M19 072    2  Peroneal tendinitis of left lower extremity  M76 72    3  Peroneal tendonitis, left  M76 72    4  Primary osteoarthritis of left ankle  M19 072    5  Chronic right-sided low back pain without sciatica  M54 5     G89 29    6  Bilateral leg weakness  R29 898        Start Time: 1415          Assessment  Assessment details: Tonie Lesch is a 54 y o  female who presents with increased L ankle pain consistent with referring diagnosis of Primary osteoarthritis, left ankle and foot  (primary encounter diagnosis)  Peroneal tendinitis of left lower extremity that is complex secondary to chronic insidious onset, moderate fear avoidance and moderate to high pain levels with noted effusion and + imaging findings of L ankle OA  Clinically demonstrates decreased L ankle ROM, strength, proprioception and poor overall joint mobility with INV/EV due to prior surgery and degenerative conditions leading to pain with ADLs and exercise and noted effusion  This suggests the need for skilled OPPT to address the above listed impairments, achieve established goals and return to PLOF pain-free  If you have any questions or concerns please contact me at [phone]  Thank you! 21:  Lisandra Sal reports a minimal change in L ankle function and pain since starting PT  Regarding her L/S presents with increased overall LBP consistent with referring diagnosis of chronic LBP without sciatica that is highly complex due to chronic sxs, prior post op status and high fear avoidance  Clinically demonstrates decreased L/S ROM, strength and stability as well as limited core and hip strength leading to poor ability to balance and ambulate or perform prolonged standing required for work    This suggests the need for continued skilled OPPT to address the above listed impairments, achieve established goals and return to PLOF pain-free  Impairments: abnormal gait, abnormal muscle firing, abnormal muscle tone, abnormal or restricted ROM, impaired balance, impaired physical strength, pain with function and poor body mechanics    Symptom irritability: moderateBarriers to therapy: + OA and effusion, fear avoidance and prolonged standing required for work   Understanding of Dx/Px/POC: good   Prognosis: fair  Prognosis details: + OA, obesity, prior surgery and active work    Goals  Short Term Goals (4 weeks)  1 ) Establish independence with HEP  2 ) Decrease subjective pain levels from PRS at least to 2-5/10 at rest and with activity  3 ) Improve L ankle ROM at least 5-10 degrees into all planes to allow for improved ease of movement with less guarding    Short Term Goals for L/S (4 weeks)  1 ) Improve L/S ROM at least 5-10 degrees into flex/ext  2 ) Decreased NPRS scale at least 2 points showing improved self reported disability   3 ) Improved B/L hip ABD strength at least 1/2 to 1 MMT    Long Term Goals (8 weeks)  1 ) Improve L ankle ROM to WNL in all planes to restore normal movement with ADLs and function  2 ) Improve L ankle strength to 5/5 in all planes in order to return to pain-free ADLs and function  3 ) Improve FOTO score at least to 75 points showing improved self reported disability   4 ) Improve balance in SLS to good > 20 seconds on L LE consistent with age appropriate norms    Long Term Goals for L/S (8 weeks)  1 ) Improve SLS to good- able to balance > 25 seconds B/L  2 ) Improve B/L hip ABD strength to 5/5   3 ) Improve L/S ROM to pain-free WFL in all planes  4 ) Improve FOTO score at least to 54 showing 28[address] Poppy Rahman details: Initiate POC for L ankle ROM, strength and proprioception, monitor sxs and progress as able in order to return to pain-free ADLs at PLOF     Initiate POC for L/S extension bias and gluteal strength, core stability as able  Patient would benefit from: PT eval and skilled speech therapy  Planned modality interventions: cryotherapy, electrical stimulation/Russian stimulation and TENS  Planned therapy interventions: manual therapy, joint mobilization, neuromuscular re-education, muscle pump exercises, self care, postural training, patient education, ADL training, ADL retraining, activity modification, coordination, compression, flexibility, functional ROM exercises, gait training, graded activity, therapeutic exercise, therapeutic activities, stretching, strengthening, therapeutic training, graded exercise and home exercise program  Frequency: 2x week  Duration in visits: 16  Duration in weeks: 8  Plan of Care beginning date: 5/26/2021  Plan of Care expiration date: 7/21/2021  Treatment plan discussed with: patient        Subjective Evaluation    History of Present Illness  Date of onset: 2/5/2021  Mechanism of injury: Lenin Manzanares is a 54 y o  female who presents with increased L ankle pain starting ~ 3 months ago with ERIC  Decided to seek out MD consult at [facility]gent [facility]- x-rays were given with potential for fx and CAM boot given for 3 weeks- was referred to Dr Alejandra Hernández- new provided and (-) for fx but noted potential growth deformity and shot of cortisone with little response and told to get back into CAM boot  Notes with working at [employer] having to stand for prolonged periods and having good and bad days related to standing  Denies night pain or changes in bowel or bladder function  Denies any NT signs or sxs, notes that her lateral ankle tendons and grinding with walking  F/U with MD in 6 weeks  Wishes to return to PLOF pain-free  5/26/21:  Regarding L/S Priscilla presents with increased LBP starting post 2016 C5 fusion  Notes post fusion that her L/S began to compensating with throbbing at R sided L/S    Notes soreness/tightness at R side and R groin pain/discomofrt worse with crossing her legs  More recently was placed in a CAM boot secondary to L ankle pain which she feels aggravated her R sided L/S  Denies any change in bowel or bladder  Notes burning pain into anterior thigh- nothing past the knee  Denies numbness down LEs  F/U with PA-HENRY   Recurrent probem    Quality of life: good    Pain  Current pain ratin  At best pain rating: 3  At worst pain rating: 10  Location: L ankle  Quality: dull ache, knife-like, tight, sharp and grinding  Relieving factors: ice, change in position, heat, medications, relaxation and support  Aggravating factors: standing, walking and stair climbing  Progression: no change    Social Support  3  Lives in: trailer  Lives with: young children and significant other    Employment status: working  Exercise history: No  Life stress: Son has ADHD, learning disability       Diagnostic Tests  X-ray: abnormal (OA)  Treatments  Previous treatment: physical therapy  Current treatment: physical therapy  Patient Goals  Patient goals for therapy: decreased edema, decreased pain, improved balance, increased motion, return to sport/leisure activities, independence with ADLs/IADLs and increased strength  Patient goal: Be out of pain        Objective     Static Posture   General Observations  Asymmetrical weight bearing and shifted right  Ankle/Foot   Ankle/Foot (Left): Hallux valgus, pes planus and pronated  Ankle/Foot (Right): Hallux valgus and pes planus  Observations   Left Ankle/Foot   Positive for edema  Right Ankle/Foot   Positive for edema and effusion       Neurological Testing     Sensation     Lumbar   Left   Intact: light touch    Right   Intact: light touch    Ankle/Foot   Left Ankle/Foot   Diminished: light touch    Right Ankle/Foot   Intact: light touch     Reflexes   Left   Patellar (L4): normal (2+)    Right   Patellar (L4): normal (2+)    Active Range of Motion     Lumbar   Flexion: 65 degrees   Extension: 5 degrees   Left lateral flexion: 15 degrees       Right lateral flexion: 30 degrees   Left rotation: 45 degrees   Right rotation: 15 degrees   Left Hip   External rotation (90/90): 35 degrees   Internal rotation (90/90): 16 degrees     Right Hip   External rotation (90/90): 25 degrees   Internal rotation (90/90): 28 degrees   Left Ankle/Foot   Dorsiflexion (ke): 5 degrees   Dorsiflexion (kf): 8 degrees   Plantar flexion: 26 degrees   Inversion: 10 degrees   Eversion: 15 degrees     Right Ankle/Foot   Dorsiflexion (ke): 5 degrees   Dorsiflexion (kf): 7 degrees   Plantar flexion: 25 degrees   Inversion: 22 degrees   Eversion: 16 degrees     Additional Active Range of Motion Details  Pain with SBing L and rotation R   Pain with all L ankle ROM    Strength/Myotome Testing     Left Hip   Planes of Motion   Flexion: 5  Abduction: 4+  Adduction: 5  External rotation: 4+  Internal rotation: 4+    Right Hip   Planes of Motion   Flexion: 4  Abduction: 3+  Adduction: 5  External rotation: 4+  Internal rotation: 4+    Left Knee   Flexion: 5  Extension: 5    Right Knee   Flexion: 5  Extension: 5    Left Ankle/Foot   Dorsiflexion: 4  Plantar flexion: 4  Inversion: 4+  Eversion: 4    Right Ankle/Foot   Dorsiflexion: 4+  Plantar flexion: 3+  Inversion: 4+  Eversion: 4+    Additional Strength Details  Pain with all L ankle MMT, difficulty with SL HR on L    Tests   Left Ankle/Foot   Positive for valgus stress metatarsophalangeal, varus stress metatarsophalangeal, valgus tilt and varus tilt  Negative for anterior drawer and Tinel's sign (tarsal tunnel)  Swelling   Left Ankle/Foot   Figure 8: 57 5 cm  Malleoli: 27 5 cm    Right Ankle/Foot   Figure 8: 56 cm  Malleoli: 26 cm    Functional Assessment      Squat    Right within functional limits and left valgus       Single Leg Stance   Left: 5 seconds  Right: 15 seconds             Precautions: HTN, Asthma, chest pain history  EPOC: 7/21/21      Manuals 5/19 5/26           L ankle PROM PF            Talar joint mobs PF            Peroneal IASTM PF            B/L hip PROM and HS stretch  15'            15"            Re-evaluation (add L/S)  PF 30'           Neuro Re-Ed             Foam romberg 3x30"            Tandem 3x30"            SLS 3x30"            NSP marching  20x           TA contraction with VCs  5'                                     Ther Ex             Bike 6 min            Marc stretch B/L  3x15"           Ankle iso INV/EV 10x10            Tband 4 way GTB 20x ea            HR/TR 20x            Side stepping in PF 3 laps            Gastroc stretch at block 3x20"            Clamshell  OTB 2x10           Sling hip ABD  2x10           Ther Activity                                       Gait Training                                       Modalities

## 2021-06-02 ENCOUNTER — OFFICE VISIT (OUTPATIENT)
Dept: PHYSICAL THERAPY | Facility: CLINIC | Age: 46
End: 2021-06-02
Payer: COMMERCIAL

## 2021-06-02 DIAGNOSIS — M19.072 PRIMARY OSTEOARTHRITIS OF LEFT ANKLE: ICD-10-CM

## 2021-06-02 DIAGNOSIS — R29.898 BILATERAL LEG WEAKNESS: Primary | ICD-10-CM

## 2021-06-02 DIAGNOSIS — M19.072 PRIMARY OSTEOARTHRITIS, LEFT ANKLE AND FOOT: ICD-10-CM

## 2021-06-02 DIAGNOSIS — M76.72 PERONEAL TENDINITIS OF LEFT LOWER EXTREMITY: ICD-10-CM

## 2021-06-02 DIAGNOSIS — G89.29 CHRONIC RIGHT-SIDED LOW BACK PAIN WITHOUT SCIATICA: ICD-10-CM

## 2021-06-02 DIAGNOSIS — M76.72 PERONEAL TENDONITIS, LEFT: ICD-10-CM

## 2021-06-02 DIAGNOSIS — M54.50 CHRONIC RIGHT-SIDED LOW BACK PAIN WITHOUT SCIATICA: ICD-10-CM

## 2021-06-02 NOTE — PROGRESS NOTES
Daily Note     Today's date: 2021  Patient name: Oscar Angulo  : 1975  MRN: [id number]  Referring provider: Jennifer Arevalo PA-C  Dx:   Encounter Diagnosis     ICD-10-CM    1  Bilateral leg weakness  R29 898    2  Chronic right-sided low back pain without sciatica  M54 5     G89 29    3  Primary osteoarthritis of left ankle  M19 072    4  Peroneal tendonitis, left  M76 72    5  Peroneal tendinitis of left lower extremity  M76 72    6  Primary osteoarthritis, left ankle and foot  M19 072                   Subjective:       Objective: See treatment diary below      Assessment:     Plan: Continue per plan of care        Precautions: HTN, Asthma, chest pain history  EPOC: 21      Manuals           L ankle PROM PF  PF          Talar joint mobs PF  PF          Peroneal IASTM PF  PF          B/L hip PROM and HS stretch  15' PF           15"            Re-evaluation (add L/S)  PF 30'           Neuro Re-Ed             Foam romberg 3x30"            Tandem 3x30"            SLS 3x30"            NSP marching  20x           TA contraction with VCs  5'                                     Ther Ex             Bike 6 min  5 min          Marc stretch B/L  3x15" 3x15"          Ankle iso INV/EV 10x10  10x10          Tband 4 way GTB 20x ea            HR/TR 20x            Side stepping in PF 3 laps            Gastroc stretch at block 3x20"            Clamshell  OTB 2x10 OTB 2x10          Sling hip ABD  2x10 2x10          Ther Activity                                       Gait Training                                       Modalities

## 2021-06-09 ENCOUNTER — OFFICE VISIT (OUTPATIENT)
Dept: PHYSICAL THERAPY | Facility: CLINIC | Age: 46
End: 2021-06-09
Payer: COMMERCIAL

## 2021-06-09 DIAGNOSIS — G89.29 CHRONIC RIGHT-SIDED LOW BACK PAIN WITHOUT SCIATICA: ICD-10-CM

## 2021-06-09 DIAGNOSIS — M76.72 PERONEAL TENDINITIS OF LEFT LOWER EXTREMITY: ICD-10-CM

## 2021-06-09 DIAGNOSIS — M54.50 CHRONIC RIGHT-SIDED LOW BACK PAIN WITHOUT SCIATICA: ICD-10-CM

## 2021-06-09 DIAGNOSIS — M76.72 PERONEAL TENDONITIS, LEFT: ICD-10-CM

## 2021-06-09 DIAGNOSIS — M19.072 PRIMARY OSTEOARTHRITIS, LEFT ANKLE AND FOOT: ICD-10-CM

## 2021-06-09 DIAGNOSIS — R29.898 BILATERAL LEG WEAKNESS: Primary | ICD-10-CM

## 2021-06-09 DIAGNOSIS — M19.072 PRIMARY OSTEOARTHRITIS OF LEFT ANKLE: ICD-10-CM

## 2021-06-09 PROCEDURE — 97140 MANUAL THERAPY 1/> REGIONS: CPT | Performed by: PHYSICAL THERAPIST

## 2021-06-09 PROCEDURE — 97110 THERAPEUTIC EXERCISES: CPT | Performed by: PHYSICAL THERAPIST

## 2021-06-09 PROCEDURE — 97112 NEUROMUSCULAR REEDUCATION: CPT | Performed by: PHYSICAL THERAPIST

## 2021-06-09 NOTE — PROGRESS NOTES
Daily Note     Today's date: 2021  Patient name: Nirali Joiner  : 1975  MRN: [id number]  Referring provider: Kings Gordon PA-C  Dx:   Encounter Diagnosis     ICD-10-CM    1  Bilateral leg weakness  R29 898    2  Chronic right-sided low back pain without sciatica  M54 5     G89 29    3  Primary osteoarthritis of left ankle  M19 072    4  Peroneal tendonitis, left  M76 72    5  Peroneal tendinitis of left lower extremity  M76 72    6  Primary osteoarthritis, left ankle and foot  M19 072        Start Time: 1400  Stop Time: 1456  Total time in clinic (min): 56 minutes    Subjective: Notes continued R anterior thigh pain and discomfort/burning  L ankle with less pain recently  Objective: See treatment diary below      Assessment: Improved ability to perform TA contraction with isometric ADD/ABD as well as NSP marching and heel slides with BP cuff feedback  Improved ability to perform SLR with L hip ER'ed  Will continue to work on strength as able  Plan: Continue per plan of care        Precautions: HTN, Asthma, chest pain history  EPOC: 21      Manuals           L ankle PROM PF  PF          Talar joint mobs PF  PF          Peroneal IASTM PF            B/L hip PROM and HS stretch  15' PF           15"  30'          Re-evaluation (add L/S)  PF 30'           Neuro Re-Ed             Foam romberg 3x30"            Tandem 3x30"            SLS 3x30"            NSP marching  20x 20x2          TA contraction with VCs  5' 5'          NSP heel slide   20x2          NSP SLR   20x2 each          Ther Ex             Bike 6 min            Marc stretch B/L  3x15" 3x15"          Ankle iso INV/EV 10x10            Tband 4 way GTB 20x ea            HR/TR 20x            Side stepping in PF 3 laps            Gastroc stretch at block 3x20"            Clamshell  OTB 2x10           Sling hip ABD  2x10           Ther Activity                                       Gait Training Modalities

## 2021-06-16 ENCOUNTER — OFFICE VISIT (OUTPATIENT)
Dept: PAIN MEDICINE | Facility: CLINIC | Age: 46
End: 2021-06-16
Payer: COMMERCIAL

## 2021-06-16 ENCOUNTER — OFFICE VISIT (OUTPATIENT)
Dept: PHYSICAL THERAPY | Facility: CLINIC | Age: 46
End: 2021-06-16
Payer: COMMERCIAL

## 2021-06-16 VITALS
TEMPERATURE: 98.2 F | SYSTOLIC BLOOD PRESSURE: 132 MMHG | DIASTOLIC BLOOD PRESSURE: 82 MMHG | WEIGHT: 293 LBS | HEIGHT: 70 IN | HEART RATE: 87 BPM | BODY MASS INDEX: 41.95 KG/M2

## 2021-06-16 DIAGNOSIS — M54.16 LUMBAR RADICULOPATHY: ICD-10-CM

## 2021-06-16 DIAGNOSIS — M19.072 PRIMARY OSTEOARTHRITIS OF LEFT ANKLE: ICD-10-CM

## 2021-06-16 DIAGNOSIS — M76.72 PERONEAL TENDONITIS, LEFT: ICD-10-CM

## 2021-06-16 DIAGNOSIS — R51.9 CHRONIC NONINTRACTABLE HEADACHE, UNSPECIFIED HEADACHE TYPE: ICD-10-CM

## 2021-06-16 DIAGNOSIS — G89.4 CHRONIC PAIN SYNDROME: Primary | ICD-10-CM

## 2021-06-16 DIAGNOSIS — M54.50 CHRONIC RIGHT-SIDED LOW BACK PAIN WITHOUT SCIATICA: ICD-10-CM

## 2021-06-16 DIAGNOSIS — M76.72 PERONEAL TENDINITIS OF LEFT LOWER EXTREMITY: ICD-10-CM

## 2021-06-16 DIAGNOSIS — G89.29 CHRONIC RIGHT-SIDED LOW BACK PAIN WITHOUT SCIATICA: ICD-10-CM

## 2021-06-16 DIAGNOSIS — M54.12 CERVICAL RADICULOPATHY: ICD-10-CM

## 2021-06-16 DIAGNOSIS — M79.18 MYOFASCIAL PAIN SYNDROME: ICD-10-CM

## 2021-06-16 DIAGNOSIS — R20.0 NUMBNESS AND TINGLING OF BOTH UPPER EXTREMITIES: ICD-10-CM

## 2021-06-16 DIAGNOSIS — R20.2 NUMBNESS AND TINGLING OF BOTH UPPER EXTREMITIES: ICD-10-CM

## 2021-06-16 DIAGNOSIS — M54.2 NECK PAIN, CHRONIC: ICD-10-CM

## 2021-06-16 DIAGNOSIS — M47.816 LUMBAR SPONDYLOSIS: ICD-10-CM

## 2021-06-16 DIAGNOSIS — M19.072 PRIMARY OSTEOARTHRITIS, LEFT ANKLE AND FOOT: ICD-10-CM

## 2021-06-16 DIAGNOSIS — G89.29 NECK PAIN, CHRONIC: ICD-10-CM

## 2021-06-16 DIAGNOSIS — R29.898 BILATERAL LEG WEAKNESS: Primary | ICD-10-CM

## 2021-06-16 DIAGNOSIS — G89.29 CHRONIC NONINTRACTABLE HEADACHE, UNSPECIFIED HEADACHE TYPE: ICD-10-CM

## 2021-06-16 PROCEDURE — 97110 THERAPEUTIC EXERCISES: CPT | Performed by: PHYSICAL THERAPIST

## 2021-06-16 PROCEDURE — 97140 MANUAL THERAPY 1/> REGIONS: CPT | Performed by: PHYSICAL THERAPIST

## 2021-06-16 PROCEDURE — 3075F SYST BP GE 130 - 139MM HG: CPT | Performed by: NURSE PRACTITIONER

## 2021-06-16 PROCEDURE — 97112 NEUROMUSCULAR REEDUCATION: CPT | Performed by: PHYSICAL THERAPIST

## 2021-06-16 PROCEDURE — 4004F PT TOBACCO SCREEN RCVD TLK: CPT | Performed by: NURSE PRACTITIONER

## 2021-06-16 PROCEDURE — 99214 OFFICE O/P EST MOD 30 MIN: CPT | Performed by: NURSE PRACTITIONER

## 2021-06-16 PROCEDURE — 3079F DIAST BP 80-89 MM HG: CPT | Performed by: NURSE PRACTITIONER

## 2021-06-16 PROCEDURE — 3008F BODY MASS INDEX DOCD: CPT | Performed by: NURSE PRACTITIONER

## 2021-06-16 RX ORDER — PREGABALIN 50 MG/1
CAPSULE ORAL
Qty: 60 CAPSULE | Refills: 1
Start: 2021-06-16 | End: 2021-08-11 | Stop reason: SDUPTHER

## 2021-06-16 RX ORDER — DULOXETIN HYDROCHLORIDE 30 MG/1
CAPSULE, DELAYED RELEASE ORAL
Qty: 90 CAPSULE | Refills: 0 | Status: SHIPPED | OUTPATIENT
Start: 2021-06-16 | End: 2021-08-11 | Stop reason: SDUPTHER

## 2021-06-16 RX ORDER — DULOXETIN HYDROCHLORIDE 60 MG/1
60 CAPSULE, DELAYED RELEASE ORAL DAILY
Qty: 90 CAPSULE | Refills: 0 | Status: SHIPPED | OUTPATIENT
Start: 2021-06-16 | End: 2021-08-11 | Stop reason: SDUPTHER

## 2021-06-16 NOTE — PROGRESS NOTES
Daily Note     Today's date: 2021  Patient name: Ginny Mabry  : 1975  MRN: [id number]  Referring provider: Anoop Anaya PA-C  Dx:   Encounter Diagnosis     ICD-10-CM    1  Bilateral leg weakness  R29 898    2  Chronic right-sided low back pain without sciatica  M54 5     G89 29    3  Primary osteoarthritis of left ankle  M19 072    4  Peroneal tendonitis, left  M76 72    5  Peroneal tendinitis of left lower extremity  M76 72    6  Primary osteoarthritis, left ankle and foot  M19 072        Start Time: 0845          Subjective: Notes less pain in her ankle recently  Still with R sided gluteal pain and discomfort as well as anterior hip burning at times  Objective: See treatment diary below      Assessment: Limited session today due to appointment with MD upstairs at [facility]  Still with increased R gluteal pain and discomfort improved post STM and TFM to gluteal mms  Reviewed avoidance of placement of wallet in back pocket of R hip  Improved ability to perform clamshells and hip ABD today with less pain shooting down R lateral hip  Plan: Continue per plan of care        Precautions: HTN, Asthma, chest pain history  EPOC: 21      Manuals          L ankle PROM PF  PF          Talar joint mobs PF  PF          Peroneal IASTM PF            B/L hip PROM and HS stretch  15' PF PF          15"  30' 10'         Re-evaluation (add L/S)  PF 30'           Neuro Re-Ed             Foam romberg 3x30"            Tandem 3x30"            SLS 3x30"            NSP marching  20x 20x2 20x2         TA contraction with VCs  5' 5'          NSP heel slide   20x2          NSP SLR   20x2 each          Ther Ex             Bike 6 min            Marc stretch B/L  3x15" 3x15"          Ankle iso INV/EV 10x10            Tband 4 way GTB 20x ea            HR/TR 20x            Side stepping in PF 3 laps            Gastroc stretch at block 3x20"            Clamshell  OTB 2x10  30x Sling hip ABD  2x10  2x10         ISO ABD/ADD hooklying    10x5" each         Ther Activity                                       Gait Training                                       Modalities

## 2021-06-16 NOTE — PROGRESS NOTES
Assessment:  1  Chronic pain syndrome    2  Neck pain, chronic    3  Chronic right-sided low back pain without sciatica    4  Chronic nonintractable headache, unspecified headache type    5  Myofascial pain syndrome    6  Lumbar radiculopathy    7  Lumbar spondylosis    8  Numbness and tingling of both upper extremities    9  Cervical radiculopathy        Plan:   While the patient was in the office today, I did have a thorough conversation with the patient regarding their chronic pain syndrome, symptoms, medication regimen, and treatment plan  I discussed with the patient that at this point time with regards to her worsening low back and leg pain, since she has been doing the physical therapy since May 19, 2021 to the present date, without improvement, and with worsening low back and leg pain symptoms, and her most recent x-rays on our sacral spine did show moderate to significant degenerative changes, feel would be beneficial at this point to proceed with the MRI of the lumbosacral spine to evaluate for worsening underlying etiology and explanation of her pain symptoms  I explained to the patient once we have the results of the MRI, our office will give her a call to review the results and discuss the next step in her treatment plan which may include an epidural steroid injection with Dr Mine jones a surgical consultation  The patient was agreeable and verbalized an understanding  With regards to the cervical pain and upper extremity radicular symptoms I still would like the patient to proceed with the bilateral upper extremity EMG to better evaluate her neuropathic symptoms and origins  I put in a new order and advised her to call and make the appointment and then our office would see with a could do with regards to getting her appointment moved up if needed  The patient verbalized understanding        With regards to her medication regimen, I discussed with the patient at this point time I do feel may be possible with the swelling in her feet that she is having some fluid retention as a result of increasing the Lyrica as this can be a common side effect and could cause the weight gain, especially since she reports she has not changed her eating or exercise habits  At this point because she feels the Lyrica is still help we are going to have her decrease the Lyrica back down to the 50 mg b i d  and I am going to take over prescribing her Cymbalta and increase his Cymbalta to 90 mg a day to see if that will provide better overall relief of her chronic pain symptoms as well as her anxiety and depression  I advised the patient that if they experience any side effects or issues with the changes in their medication regiment, they should give our office a call to discuss  I also advised the patient not to drive or operate machinery until they see how the changes in the medication regimen affects them  The patient was agreeable and verbalized an understanding  For now, the patient can continue with the physical therapy and home exercise program and try to slowly and steadily increase her activity, as tolerated, allowing pain be her guide  The patient was agreeable and verbalized an understanding  The patient will follow-up in 8 weeks for medication prescription refill and reevaluation  The patient was advised to contact the office should their symptoms worsen in the interim  The patient was agreeable and verbalized an understanding  History of Present Illness: The patient is a 51 y o  female last seen on 5-13-21 who presents for a follow up office visit in regards to Chronic pain syndrome secondary to lumbar spondylosis with radiculopathy as well as cervical radiculopathy and diffuse myofascial pain    The patient currently reports that since her last office visit overall she feels her pain symptoms have worsened despite the increase in the Lyrica as she is currently taking 75 mg b i d  and has reported a 10 lb weight gain and feels as if her legs and feet are swollen  The patient did proceed with physical therapy and has been doing physical therapy since May 19, 2021 to the present day and at this point is not seeing any improvement in her low back and leg pain and actually feels her pain symptoms continue to worsen despite the physical therapy  Since her last office visit she did proceed with updated cervical and lumbar spine x-rays and the cervical spine x-ray showed stable postoperative degenerative changes with stable hardware and right greater than left foraminal stenosis at the C3-C4 and C4-C5 levels and the lumbar x-ray showed moderate to severe degenerative changes at L5-S1 with mild degenerative changes at L4-L5 with no evidence of fractures or instability  The patient reports that she is not yet scheduled the bilateral upper extremity EMG as we had previously discussed because she thought our office was going to try to get her in sooner but misunderstood that she was supposed to call to try to schedule so then we can get her scheduled sooner  The patient also reports that although she continues with the cervical pain and upper extremity radicular symptoms increasing the Lyrica has definitely seem to be helpful for that and overall for her neuropathy pain the Lyrica seems helpful and she is afraid to come off the Lyrica but also does not like the weight gain and the swelling in her feet  The patient presents today frustrated and ready to discuss any other treatment plan recommendations or options  Current pain medications includes:   Lyrica 75 mg b i d  and Cymbalta 60 mg daily  The patient reports that this regimen is providing  Minimal to moderate pain relief  The patient is reporting weight gain and edema of the lower extremity from this pain medication regimen      I have personally reviewed and/or updated the patient's past medical history, past surgical history, family history, social history, current medications, allergies, and vital signs today  Review of Systems:    Review of Systems   Constitutional: Negative for fever and unexpected weight change  HENT: Negative for trouble swallowing  Eyes: Negative for visual disturbance  Respiratory: Negative for shortness of breath and wheezing  Cardiovascular: Negative for chest pain and palpitations  Gastrointestinal: Negative for constipation, diarrhea, nausea and vomiting  Endocrine: Negative for cold intolerance, heat intolerance and polydipsia  Genitourinary: Negative for difficulty urinating and frequency  Musculoskeletal: Positive for gait problem  Negative for arthralgias, joint swelling (joint stiffness) and myalgias  Skin: Negative for rash  Neurological: Negative for dizziness, seizures, syncope, weakness and headaches  Hematological: Does not bruise/bleed easily  Psychiatric/Behavioral: Negative for dysphoric mood  All other systems reviewed and are negative          Past Medical History:   Diagnosis Date    Anxiety     Arthritis     Asthma     Depression     Disc disorder     Fibromyalgia     Hypertension     Hypothyroidism     Migraine        Past Surgical History:   Procedure Laterality Date    BACK SURGERY      CERVICAL BIOPSY  W/ LOOP ELECTRODE EXCISION      CERVICAL FUSION       SECTION      CHOLECYSTECTOMY      FOOT SURGERY Bilateral     KNEE SURGERY      TUBAL LIGATION Bilateral 2012       Family History   Adopted: Yes   Problem Relation Age of Onset    No Known Problems Mother     No Known Problems Father     Coronary artery disease Maternal Grandmother     Breast cancer Maternal Grandmother         age unknown    Coronary artery disease Maternal Aunt     No Known Problems Maternal Grandfather     No Known Problems Paternal Grandmother     No Known Problems Paternal Grandfather     No Known Problems Maternal Aunt     No Known Problems Maternal Aunt Social History     Occupational History    Occupation: [employer]   Tobacco Use    Smoking status: Current Every Day Smoker     Packs/day: 0 50     Types: Cigarettes    Smokeless tobacco: Never Used   Vaping Use    Vaping Use: Never used   Substance and Sexual Activity    Alcohol use: Never     Comment: socially    Drug use: No    Sexual activity: Not Currently         Current Outpatient Medications:     amLODIPine (NORVASC) 10 mg tablet, Take 1 tablet (10 mg total) by mouth daily, Disp: 90 tablet, Rfl: 1    Aspirin-Acetaminophen-Caffeine (EXCEDRIN PO), Take by mouth, Disp: , Rfl:     cholecalciferol (VITAMIN D3) 1,000 units tablet, Take 2,000 Units by mouth daily, Disp: , Rfl:     DULoxetine (CYMBALTA) 60 mg delayed release capsule, Take 1 capsule (60 mg total) by mouth daily, Disp: 90 capsule, Rfl: 0    famotidine (PEPCID) 20 mg tablet, Take 1 tablet (20 mg total) by mouth 2 (two) times a day, Disp: 180 tablet, Rfl: 1    levothyroxine 200 mcg tablet, Take 1 tablet (200 mcg total) by mouth daily, Disp: 90 tablet, Rfl: 0    levothyroxine 25 mcg tablet, 25 mcg 1 tab PO q am with 200 mcg on Mon, Wed, Fri, Sun ONLY , con't 200 mcg all other days, Disp: 90 tablet, Rfl: 0    metaxalone (Skelaxin) 800 mg tablet, Take 1 tablet (800 mg total) by mouth 3 (three) times a day as needed for muscle spasms, Disp: 30 tablet, Rfl: 1    multivitamin (THERAGRAN) TABS, Take 1 tablet by mouth daily, Disp: , Rfl:     ondansetron (ZOFRAN-ODT) 4 mg disintegrating tablet, Take 1 tablet (4 mg total) by mouth every 8 (eight) hours as needed for nausea or vomiting, Disp: 12 tablet, Rfl: 0    albuterol (VENTOLIN HFA) 90 mcg/act inhaler, Inhale 2 puffs every 6 (six) hours as needed for wheezing, Disp: 18 g, Rfl: 0    DULoxetine (CYMBALTA) 30 mg delayed release capsule, Take 1 PO HS with 60 mg to equal 90 mg daily  , Disp: 90 capsule, Rfl: 0    pregabalin (LYRICA) 50 mg capsule, Take 1 PO BID , Disp: 60 capsule, Rfl: 1    Allergies   Allergen Reactions    Acetaminophen-Codeine      Pt states she does not remember having a reaction to this medication    Hydrocodone Other (See Comments)    Latex Hives     Latex powder      Prednisolone        Physical Exam:    /82 (BP Location: Left arm, Patient Position: Sitting, Cuff Size: Standard)   Pulse 87   Temp 98 2 °F (36 8 °C)   Ht 5' 10" (1 778 m)   Wt 136 kg (299 lb)   BMI 42 90 kg/m²     Constitutional:normal, well developed, well nourished, alert, in no distress and non-toxic and no overt pain behavior  and obese  Eyes:anicteric  HEENT:grossly intact  Neck:supple, symmetric, trachea midline and no masses   Pulmonary:even and unlabored  Cardiovascular:No edema or pitting edema present  Skin:Normal without rashes or lesions and well hydrated  Psychiatric:Mood and affect appropriate  Neurologic:Cranial Nerves II-XII grossly intact  Musculoskeletal:The patient's gait is slightly antalgic, but steady without the use of any assistive devices        Imaging  MRI lumbar spine without contrast    (Results Pending)         Orders Placed This Encounter   Procedures    MRI lumbar spine without contrast    EMG 2 Limb Upper Extremity

## 2021-06-16 NOTE — PATIENT INSTRUCTIONS
1  Decrease Lyrica to 50 mg, 1 pil twice daily  2  Increase Cymbalta to 60 mg every other day and on the opposite days, 90 mg x 1 week, then take 90 mg daily  Call with any side effects or issues

## 2021-06-17 ENCOUNTER — TELEPHONE (OUTPATIENT)
Dept: PAIN MEDICINE | Facility: CLINIC | Age: 46
End: 2021-06-17

## 2021-06-17 NOTE — TELEPHONE ENCOUNTER
Patient would like to speak with DG if he could prescribe a water pill       Please advise    Thank you     [phone]

## 2021-06-17 NOTE — TELEPHONE ENCOUNTER
S/w pt  Pt states she has gained weight and is retaining fluid  Pt is requesting a water pill  RN advised pt that our office does not prescribe this type of medication and she will need to contact her PCP regarding this concern  Pt verbalized understanding and appreciation

## 2021-06-23 ENCOUNTER — OFFICE VISIT (OUTPATIENT)
Dept: PHYSICAL THERAPY | Facility: CLINIC | Age: 46
End: 2021-06-23
Payer: COMMERCIAL

## 2021-06-23 DIAGNOSIS — M19.072 PRIMARY OSTEOARTHRITIS, LEFT ANKLE AND FOOT: ICD-10-CM

## 2021-06-23 DIAGNOSIS — G89.29 CHRONIC RIGHT-SIDED LOW BACK PAIN WITHOUT SCIATICA: ICD-10-CM

## 2021-06-23 DIAGNOSIS — R29.898 BILATERAL LEG WEAKNESS: Primary | ICD-10-CM

## 2021-06-23 DIAGNOSIS — M76.72 PERONEAL TENDINITIS OF LEFT LOWER EXTREMITY: ICD-10-CM

## 2021-06-23 DIAGNOSIS — M54.50 CHRONIC RIGHT-SIDED LOW BACK PAIN WITHOUT SCIATICA: ICD-10-CM

## 2021-06-23 DIAGNOSIS — M76.72 PERONEAL TENDONITIS, LEFT: ICD-10-CM

## 2021-06-23 DIAGNOSIS — M19.072 PRIMARY OSTEOARTHRITIS OF LEFT ANKLE: ICD-10-CM

## 2021-06-23 PROCEDURE — 97140 MANUAL THERAPY 1/> REGIONS: CPT | Performed by: PHYSICAL THERAPIST

## 2021-06-23 PROCEDURE — 97110 THERAPEUTIC EXERCISES: CPT | Performed by: PHYSICAL THERAPIST

## 2021-06-23 PROCEDURE — 97035 APP MDLTY 1+ULTRASOUND EA 15: CPT | Performed by: PHYSICAL THERAPIST

## 2021-06-23 NOTE — PROGRESS NOTES
Daily Note     Today's date: 2021  Patient name: Guillermo Stubbs  : 1975  MRN: [id number]  Referring provider: Renée Davies PA-C  Dx:   Encounter Diagnosis     ICD-10-CM    1  Bilateral leg weakness  R29 898    2  Chronic right-sided low back pain without sciatica  M54 5     G89 29    3  Primary osteoarthritis of left ankle  M19 072    4  Peroneal tendonitis, left  M76 72    5  Peroneal tendinitis of left lower extremity  M76 72    6  Primary osteoarthritis, left ankle and foot  M19 072        Start Time: 1445  Stop Time: 1535  Total time in clinic (min): 50 minutes    Subjective: Reports feeling improved overall pain after last session for a few days at R hip  Still with L ankle pain laterally  Will be going on vacation for a few weeks until   Objective: See treatment diary below      Assessment: Continues to have TFL pain and referral improved post MFR and TFM and deep pressure to R gluteal region and ITB  Continues to have pain and discomfort with amrc stretching  Most pain at L ankle with resisted peroneal longus activation improved with trial of US and repeated isometrics  Still with effusion and pain with 5th ray mobs  Plan: Continue per plan of care        Precautions: HTN, Asthma, chest pain history  EPOC: 21      Manuals         L ankle PROM PF  PF  PF        Talar joint mobs PF  PF  PF        Peroneal IASTM PF    PF        B/L hip PROM and HS stretch  15' PF PF PF         15"  30' 10' 30'        Re-evaluation (add L/S)  PF 30'           Neuro Re-Ed             Foam romberg 3x30"            Tandem 3x30"            SLS 3x30"            NSP marching  20x 20x2 20x2         TA contraction with VCs  5' 5'          NSP heel slide   20x2          NSP SLR   20x2 each          Ther Ex             Bike 6 min            Marc stretch B/L  3x15" 3x15"  3x15"        Ankle iso INV/EV 10x10            Tband 4 way GTB 20x ea            HR/TR 20x Side stepping in PF 3 laps            Gastroc stretch at block 3x20"            Clamshell  OTB 2x10  30x 30x        Sling hip ABD  2x10  2x10 2x10        ISO ABD/ADD hooklying    10x5" each         Ther Activity                                       Gait Training                                       Modalities             US continuous 1 mA lateral L ankle     8'

## 2021-06-30 ENCOUNTER — APPOINTMENT (OUTPATIENT)
Dept: PHYSICAL THERAPY | Facility: CLINIC | Age: 46
End: 2021-06-30
Payer: COMMERCIAL

## 2021-07-07 ENCOUNTER — APPOINTMENT (OUTPATIENT)
Dept: PHYSICAL THERAPY | Facility: CLINIC | Age: 46
End: 2021-07-07
Payer: COMMERCIAL

## 2021-07-13 DIAGNOSIS — E03.9 HYPOTHYROIDISM, UNSPECIFIED TYPE: ICD-10-CM

## 2021-07-13 RX ORDER — LEVOTHYROXINE SODIUM 0.2 MG/1
200 TABLET ORAL DAILY
Qty: 90 TABLET | Refills: 0 | Status: SHIPPED | OUTPATIENT
Start: 2021-07-13 | End: 2021-09-24 | Stop reason: SDUPTHER

## 2021-07-14 ENCOUNTER — OFFICE VISIT (OUTPATIENT)
Dept: PHYSICAL THERAPY | Facility: CLINIC | Age: 46
End: 2021-07-14
Payer: COMMERCIAL

## 2021-07-14 DIAGNOSIS — M19.072 PRIMARY OSTEOARTHRITIS, LEFT ANKLE AND FOOT: ICD-10-CM

## 2021-07-14 DIAGNOSIS — G89.29 CHRONIC RIGHT-SIDED LOW BACK PAIN WITHOUT SCIATICA: ICD-10-CM

## 2021-07-14 DIAGNOSIS — M54.50 CHRONIC RIGHT-SIDED LOW BACK PAIN WITHOUT SCIATICA: ICD-10-CM

## 2021-07-14 DIAGNOSIS — R29.898 BILATERAL LEG WEAKNESS: Primary | ICD-10-CM

## 2021-07-14 DIAGNOSIS — M19.072 PRIMARY OSTEOARTHRITIS OF LEFT ANKLE: ICD-10-CM

## 2021-07-14 DIAGNOSIS — M76.72 PERONEAL TENDONITIS, LEFT: ICD-10-CM

## 2021-07-14 DIAGNOSIS — M76.72 PERONEAL TENDINITIS OF LEFT LOWER EXTREMITY: ICD-10-CM

## 2021-07-14 PROCEDURE — 97110 THERAPEUTIC EXERCISES: CPT | Performed by: PHYSICAL THERAPIST

## 2021-07-14 PROCEDURE — 97140 MANUAL THERAPY 1/> REGIONS: CPT | Performed by: PHYSICAL THERAPIST

## 2021-07-14 PROCEDURE — 97035 APP MDLTY 1+ULTRASOUND EA 15: CPT | Performed by: PHYSICAL THERAPIST

## 2021-07-14 NOTE — PROGRESS NOTES
Daily Note     Today's date: 2021  Patient name: Reyna Barroso  : 1975  MRN: [id number]  Referring provider: Jluis Rivera PA-C  Dx:   Encounter Diagnosis     ICD-10-CM    1  Bilateral leg weakness  R29 898    2  Chronic right-sided low back pain without sciatica  M54 5     G89 29    3  Primary osteoarthritis of left ankle  M19 072    4  Peroneal tendonitis, left  M76 72    5  Peroneal tendinitis of left lower extremity  M76 72    6  Primary osteoarthritis, left ankle and foot  M19 072                   Subjective: Was away on vacation for a few weeks  Notes less R leg pain and burning, still with lateral ankle pain and burning recently with return to work  Objective: See treatment diary below      Assessment: Increased digit extension with ambulation leading to lateral pinching at sinus tarsi region  Continues to have fair response to US and STM   (-) tinels to common peroneal nerve  Will progress with strength in standing as able  Denies R hip pain or LE referral today  Plan: Continue per plan of care   Continues to work on tendon glides, re-assess     Precautions: HTN, Asthma, chest pain history  EPOC: 21      Manuals        L ankle PROM PF  PF  PF PF       Talar joint mobs PF  PF  PF PF       Peroneal IASTM PF    PF PF       B/L hip PROM and HS stretch  15' PF PF PF         15"  30' 10' 30' 30"       Re-evaluation (add L/S)  PF 30'           Neuro Re-Ed             Foam romberg 3x30"            Tandem 3x30"            SLS 3x30"            NSP marching  20x 20x2 20x2         TA contraction with VCs  5' 5'          NSP heel slide   20x2          NSP SLR   20x2 each          Ther Ex             Bike 6 min            Marc stretch B/L  3x15" 3x15"  3x15"        Tendon glides INV/EV and PF/DF      30x ea       Ankle iso INV/EV 10x10            Tband 4 way GTB 20x ea     GTB 20x       HR/TR 20x            Side stepping in PF 3 laps            Gastroc stretch at block 3x20"     3x15" strap       Clamshell  OTB 2x10  30x 30x        Sling hip ABD  2x10  2x10 2x10        ISO ABD/ADD hooklying    10x5" each         Ther Activity                                       Gait Training                                       Modalities             US continuous 1 mA lateral L ankle     8' 8'

## 2021-07-28 ENCOUNTER — EVALUATION (OUTPATIENT)
Dept: PHYSICAL THERAPY | Facility: CLINIC | Age: 46
End: 2021-07-28
Payer: COMMERCIAL

## 2021-07-28 DIAGNOSIS — M54.50 CHRONIC RIGHT-SIDED LOW BACK PAIN WITHOUT SCIATICA: ICD-10-CM

## 2021-07-28 DIAGNOSIS — M76.72 PERONEAL TENDONITIS, LEFT: ICD-10-CM

## 2021-07-28 DIAGNOSIS — M19.072 PRIMARY OSTEOARTHRITIS, LEFT ANKLE AND FOOT: ICD-10-CM

## 2021-07-28 DIAGNOSIS — M19.072 PRIMARY OSTEOARTHRITIS OF LEFT ANKLE: ICD-10-CM

## 2021-07-28 DIAGNOSIS — R29.898 BILATERAL LEG WEAKNESS: Primary | ICD-10-CM

## 2021-07-28 DIAGNOSIS — G89.29 CHRONIC RIGHT-SIDED LOW BACK PAIN WITHOUT SCIATICA: ICD-10-CM

## 2021-07-28 DIAGNOSIS — M76.72 PERONEAL TENDINITIS OF LEFT LOWER EXTREMITY: ICD-10-CM

## 2021-07-28 NOTE — PROGRESS NOTES
PT Re-Evaluation    Today's date: 2021  Patient name: Denny Ryan  : 1975  MRN: [id number]  Referring provider: Nena Ramírez PA-C  Dx:   Encounter Diagnosis     ICD-10-CM    1  Bilateral leg weakness  R29 898    2  Chronic right-sided low back pain without sciatica  M54 5     G89 29    3  Primary osteoarthritis of left ankle  M19 072    4  Peroneal tendonitis, left  M76 72    5  Peroneal tendinitis of left lower extremity  M76 72    6  Primary osteoarthritis, left ankle and foot  M19 072                   Assessment  Assessment details: Denny Ryan is a 54 y o  female who presents with increased L ankle pain consistent with referring diagnosis of Primary osteoarthritis, left ankle and foot  (primary encounter diagnosis)  Peroneal tendinitis of left lower extremity that is complex secondary to chronic insidious onset, moderate fear avoidance and moderate to high pain levels with noted effusion and + imaging findings of L ankle OA  Clinically demonstrates decreased L ankle ROM, strength, proprioception and poor overall joint mobility with INV/EV due to prior surgery and degenerative conditions leading to pain with ADLs and exercise and noted effusion  This suggests the need for skilled OPPT to address the above listed impairments, achieve established goals and return to PLOF pain-free  If you have any questions or concerns please contact me at [phone]  Thank you! 21:  Lennox Anger reports a minimal change in L ankle function and pain since starting PT  Regarding her L/S presents with increased overall LBP consistent with referring diagnosis of chronic LBP without sciatica that is highly complex due to chronic sxs, prior post op status and high fear avoidance  Clinically demonstrates decreased L/S ROM, strength and stability as well as limited core and hip strength leading to poor ability to balance and ambulate or perform prolonged standing required for work    This suggests the need for continued skilled OPPT to address the above listed impairments, achieve established goals and return to PLOF pain-free  Re-assessment 7/28/21:  Juan Goodson has attended 8 visits to date and reports a % GROC since the initiation of PT    Demonstrates improved   Impairments: abnormal gait, abnormal muscle firing, abnormal muscle tone, abnormal or restricted ROM, impaired balance, impaired physical strength, pain with function and poor body mechanics    Symptom irritability: moderateBarriers to therapy: + OA and effusion, fear avoidance and prolonged standing required for work   Understanding of Dx/Px/POC: good   Prognosis: fair  Prognosis details: + OA, obesity, prior surgery and active work    Goals  Short Term Goals (4 weeks)  1 ) Establish independence with HEP- MET  2 ) Decrease subjective pain levels from PRS at least to 2-5/10 at rest and with activity- MET partially  3 ) Improve L ankle ROM at least 5-10 degrees into all planes to allow for improved ease of movement with less guarding- MET    Short Term Goals for L/S (4 weeks)  1 ) Improve L/S ROM at least 5-10 degrees into flex/ext- MET  2 ) Decreased NPRS scale at least 2 points showing improved self reported disability- MET   3 ) Improved B/L hip ABD strength at least 1/2 to 1 MMT- Not MET    Long Term Goals (8 weeks)  1 ) Improve L ankle ROM to WNL in all planes to restore normal movement with ADLs and function- Partially MET  2 ) Improve L ankle strength to 5/5 in all planes in order to return to pain-free ADLs and function- Partially MET  3 ) Improve FOTO score at least to 75 points showing improved self reported disability - Not MET  4 ) Improve balance in SLS to good > 20 seconds on L LE consistent with age appropriate norms- Not MET    Long Term Goals for L/S (8 weeks)  1 ) Improve SLS to good- able to balance > 25 seconds B/L- Not MET  2 ) Improve B/L hip ABD strength to 5/5 - Not MET  3 ) Improve L/S ROM to pain-free WFL in all planes- Not MET  4 ) Improve FOTO score at least to 54 showing [employer]- Not Met    Plan  Plan details: Continue POC for L ankle ROM, strength and proprioception, monitor sxs and progress as able in order to return to pain-free ADLs at OF  Continue POC for L/S extension bias and gluteal strength, core stability as able  Patient would benefit from: PT eval and skilled speech therapy  Planned modality interventions: cryotherapy, electrical stimulation/Russian stimulation and TENS  Planned therapy interventions: manual therapy, joint mobilization, neuromuscular re-education, muscle pump exercises, self care, postural training, patient education, ADL training, ADL retraining, activity modification, coordination, compression, flexibility, functional ROM exercises, gait training, graded activity, therapeutic exercise, therapeutic activities, stretching, strengthening, therapeutic training, graded exercise and home exercise program  Frequency: 2x week  Duration in visits: 16  Duration in weeks: 8  Plan of Care beginning date: 7/28/2021  Plan of Care expiration date: 9/21/2021  Treatment plan discussed with: patient        Subjective Evaluation    History of Present Illness  Date of onset: 2/5/2021  Mechanism of injury: Riki Vitcor is a 54 y o  female who presents with increased L ankle pain starting ~ 3 months ago with ERIC  Decided to seek out [facility] consult at [facility]- x-rays were given with potential for fx and CAM boot given for 3 weeks- was referred to Dr Yaritza Coronel- dorian provided and (-) for fx but noted potential growth deformity and shot of cortisone with little response and told to get back into CAM boot  Notes with working at [employer] having to stand for prolonged periods and having good and bad days related to standing  Denies night pain or changes in bowel or bladder function  Denies any NT signs or sxs, notes that her lateral ankle tendons and grinding with walking  F/U with MD in 6 weeks  Wishes to return to OF pain-free  21:  Regarding L/S Priscilla presents with increased LBP starting post 2016 C5 fusion  Notes post fusion that her L/S began to compensating with throbbing at R sided L/S  Notes soreness/tightness at R side and R groin pain/discomofrt worse with crossing her legs  More recently was placed in a CAM boot secondary to L ankle pain which she feels aggravated her R sided L/S  Denies any change in bowel or bladder  Notes burning pain into anterior thigh- nothing past the knee  Denies numbness down LEs  F/U with PA-C   Re-assessment (21)  Priscilla reports a % GROC since the initiation of PT  Reports improved LBP and less radicular sxs, improved L ankle pain also but sxs come and go and are worse with work related activity  Recurrent probem    Quality of life: good    Pain  Current pain ratin  At best pain rating: 3  At worst pain rating: 10  Location: L ankle  Quality: dull ache, knife-like, tight, sharp and grinding  Relieving factors: ice, change in position, heat, medications, relaxation and support  Aggravating factors: standing, walking and stair climbing  Progression: no change    Social Support  3  Lives in: trailer  Lives with: young children and significant other    Employment status: working  Exercise history: No  Life stress: Son has ADHD, learning disability       Diagnostic Tests  X-ray: abnormal (OA)  Treatments  Previous treatment: physical therapy  Current treatment: physical therapy  Patient Goals  Patient goals for therapy: decreased edema, decreased pain, improved balance, increased motion, return to sport/leisure activities, independence with ADLs/IADLs and increased strength  Patient goal: Be out of pain        Objective     Static Posture   General Observations  Asymmetrical weight bearing and shifted right  Ankle/Foot   Ankle/Foot (Left): Hallux valgus, pes planus and pronated  Ankle/Foot (Right): Hallux valgus and pes planus       Observations   Left Ankle/Foot   Positive for edema  Right Ankle/Foot   Positive for edema and effusion       Neurological Testing     Sensation     Lumbar   Left   Intact: light touch    Right   Intact: light touch    Ankle/Foot   Left Ankle/Foot   Diminished: light touch    Right Ankle/Foot   Intact: light touch     Reflexes   Left   Patellar (L4): normal (2+)    Right   Patellar (L4): normal (2+)    Active Range of Motion     Lumbar   Flexion: 65 degrees   Extension: 5 degrees   Left lateral flexion: 15 degrees       Right lateral flexion: 30 degrees   Left rotation: 45 degrees   Right rotation: 15 degrees   Left Hip   External rotation (90/90): 35 degrees   Internal rotation (90/90): 16 degrees     Right Hip   External rotation (90/90): 25 degrees   Internal rotation (90/90): 28 degrees   Left Ankle/Foot   Dorsiflexion (ke): 5 degrees   Dorsiflexion (kf): 8 degrees   Plantar flexion: 26 degrees   Inversion: 10 degrees   Eversion: 15 degrees     Right Ankle/Foot   Dorsiflexion (ke): 5 degrees   Dorsiflexion (kf): 7 degrees   Plantar flexion: 25 degrees   Inversion: 22 degrees   Eversion: 16 degrees     Additional Active Range of Motion Details  Pain with SBing L and rotation R   Pain with all L ankle ROM    Strength/Myotome Testing     Left Hip   Planes of Motion   Flexion: 5  Abduction: 4+  Adduction: 5  External rotation: 4+  Internal rotation: 4+    Right Hip   Planes of Motion   Flexion: 4  Abduction: 3+  Adduction: 5  External rotation: 4+  Internal rotation: 4+    Left Knee   Flexion: 5  Extension: 5    Right Knee   Flexion: 5  Extension: 5    Left Ankle/Foot   Dorsiflexion: 4  Plantar flexion: 4  Inversion: 4+  Eversion: 4    Right Ankle/Foot   Dorsiflexion: 4+  Plantar flexion: 3+  Inversion: 4+  Eversion: 4+    Additional Strength Details  Pain with all L ankle MMT, difficulty with SL HR on L    Tests   Left Ankle/Foot   Positive for valgus stress metatarsophalangeal, varus stress metatarsophalangeal, valgus tilt and varus tilt  Negative for anterior drawer and Tinel's sign (tarsal tunnel)  Swelling   Left Ankle/Foot   Figure 8: 57 5 cm  Malleoli: 27 5 cm    Right Ankle/Foot   Figure 8: 56 cm  Malleoli: 26 cm    Functional Assessment      Squat    Right within functional limits and left valgus       Single Leg Stance   Left: 5 seconds  Right: 15 seconds             Precautions: HTN, Asthma, chest pain history  EPOC: 9/21/21      Manuals 5/19 5/26 6/9 6/16 6/23 7/14 7/28      L ankle PROM PF  PF  PF PF PF      Talar joint mobs PF  PF  PF PF PF      Peroneal IASTM PF    PF PF PF      B/L hip PROM and HS stretch  15' PF PF PF  PF       15"  30' 10' 30' 30"       Re-evaluation (add L/S)  PF 30'     PF      Neuro Re-Ed             Foam romberg 3x30"            Tandem 3x30"            SLS 3x30"            NSP marching  20x 20x2 20x2         TA contraction with VCs  5' 5'          NSP heel slide   20x2          NSP SLR   20x2 each          Ther Ex             Bike 6 min            Marc stretch B/L  3x15" 3x15"  3x15"        Tendon glides INV/EV and PF/DF      30x ea       Ankle iso INV/EV 10x10            Tband 4 way GTB 20x ea     GTB 20x       HR/TR 20x            Side stepping in PF 3 laps            Gastroc stretch at block 3x20"     3x15" strap       Clamshell  OTB 2x10  30x 30x        Sling hip ABD  2x10  2x10 2x10        ISO ABD/ADD hooklying    10x5" each         Ther Activity                                       Gait Training                                       Modalities             US continuous 1 mA lateral L ankle     8' 8'

## 2021-08-03 ENCOUNTER — HOSPITAL ENCOUNTER (OUTPATIENT)
Dept: MRI IMAGING | Facility: HOSPITAL | Age: 46
Discharge: HOME | End: 2021-08-03
Payer: COMMERCIAL

## 2021-08-03 DIAGNOSIS — M47.816 LUMBAR SPONDYLOSIS: ICD-10-CM

## 2021-08-03 DIAGNOSIS — G89.29 CHRONIC RIGHT-SIDED LOW BACK PAIN WITHOUT SCIATICA: ICD-10-CM

## 2021-08-03 DIAGNOSIS — M54.50 CHRONIC RIGHT-SIDED LOW BACK PAIN WITHOUT SCIATICA: ICD-10-CM

## 2021-08-03 DIAGNOSIS — M54.16 LUMBAR RADICULOPATHY: ICD-10-CM

## 2021-08-03 PROCEDURE — G1004 CDSM NDSC: HCPCS

## 2021-08-03 PROCEDURE — 72148 MRI LUMBAR SPINE W/O DYE: CPT

## 2021-08-04 ENCOUNTER — EVALUATION (OUTPATIENT)
Dept: PHYSICAL THERAPY | Facility: CLINIC | Age: 46
End: 2021-08-04
Payer: COMMERCIAL

## 2021-08-04 DIAGNOSIS — M19.072 PRIMARY OSTEOARTHRITIS, LEFT ANKLE AND FOOT: ICD-10-CM

## 2021-08-04 DIAGNOSIS — M19.072 PRIMARY OSTEOARTHRITIS OF LEFT ANKLE: ICD-10-CM

## 2021-08-04 DIAGNOSIS — G89.29 CHRONIC RIGHT-SIDED LOW BACK PAIN WITHOUT SCIATICA: ICD-10-CM

## 2021-08-04 DIAGNOSIS — M54.50 CHRONIC RIGHT-SIDED LOW BACK PAIN WITHOUT SCIATICA: ICD-10-CM

## 2021-08-04 DIAGNOSIS — R29.898 BILATERAL LEG WEAKNESS: Primary | ICD-10-CM

## 2021-08-04 DIAGNOSIS — M76.72 PERONEAL TENDONITIS, LEFT: ICD-10-CM

## 2021-08-04 DIAGNOSIS — M76.72 PERONEAL TENDINITIS OF LEFT LOWER EXTREMITY: ICD-10-CM

## 2021-08-04 PROCEDURE — 97110 THERAPEUTIC EXERCISES: CPT | Performed by: PHYSICAL THERAPIST

## 2021-08-04 PROCEDURE — 97164 PT RE-EVAL EST PLAN CARE: CPT | Performed by: PHYSICAL THERAPIST

## 2021-08-04 PROCEDURE — 97140 MANUAL THERAPY 1/> REGIONS: CPT | Performed by: PHYSICAL THERAPIST

## 2021-08-04 NOTE — PROGRESS NOTES
PT Re-Evaluation    Today's date: 2021  Patient name: Tony España  : 1975  MRN: [id number]  Referring provider: Rola Niño PA-C  Dx:   Encounter Diagnosis     ICD-10-CM    1  Bilateral leg weakness  R29 898    2  Chronic right-sided low back pain without sciatica  M54 5     G89 29    3  Primary osteoarthritis of left ankle  M19 072    4  Peroneal tendonitis, left  M76 72    5  Peroneal tendinitis of left lower extremity  M76 72    6  Primary osteoarthritis, left ankle and foot  M19 072                   Assessment  Assessment details: Tony España is a 54 y o  female who presents with increased L ankle pain consistent with referring diagnosis of Primary osteoarthritis, left ankle and foot  (primary encounter diagnosis)  Peroneal tendinitis of left lower extremity that is complex secondary to chronic insidious onset, moderate fear avoidance and moderate to high pain levels with noted effusion and + imaging findings of L ankle OA  Clinically demonstrates decreased L ankle ROM, strength, proprioception and poor overall joint mobility with INV/EV due to prior surgery and degenerative conditions leading to pain with ADLs and exercise and noted effusion  This suggests the need for skilled OPPT to address the above listed impairments, achieve established goals and return to PLOF pain-free  If you have any questions or concerns please contact me at [phone]  Thank you! 21:  Laurie Adams reports a minimal change in L ankle function and pain since starting PT  Regarding her L/S presents with increased overall LBP consistent with referring diagnosis of chronic LBP without sciatica that is highly complex due to chronic sxs, prior post op status and high fear avoidance  Clinically demonstrates decreased L/S ROM, strength and stability as well as limited core and hip strength leading to poor ability to balance and ambulate or perform prolonged standing required for work    This suggests the need for continued skilled OPPT to address the above listed impairments, achieve established goals and return to PLOF pain-free  Re-Assessment 8/4/21:  Oumar James has attended 8 visits of OPPT and reports a 50"% GROC  Clinically demonstrates improved L ankle ROM and strength but with continued fluctuations of swelling and pain based on 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  Her L/S and R hip has improved overall in strength but still with discomfort that comes and goes based on activity levels and work requirements  Her R hip ABD remains weak also leading to continued discomfort as well as limited B/L glute max strength leading to pain and discomfort  This suggests the need for continued skilled OPPT to address her remaining impairments, achieve established goals and return to PLOF pain-free     Impairments: abnormal gait, abnormal muscle firing, abnormal muscle tone, abnormal or restricted ROM, impaired balance, impaired physical strength, pain with function and poor body mechanics    Symptom irritability: moderateBarriers to therapy: + OA and effusion, fear avoidance and prolonged standing required for work   Understanding of Dx/Px/POC: good   Prognosis: fair  Prognosis details: + OA, obesity, prior surgery and active work    Goals  Short Term Goals (4 weeks)  1 ) Establish independence with HEP- MET  2 ) Decrease subjective pain levels from PRS at least to 2-5/10 at rest and with activity- Partially MET  3 ) Improve L ankle ROM at least 5-10 degrees into all planes to allow for improved ease of movement with less guarding- Partially MET    Short Term Goals for L/S (4 weeks)  1 ) Improve L/S ROM at least 5-10 degrees into flex/ext- Partially MET  2 ) Decreased NPRS scale at least 2 points showing improved self reported disability- Partially MET   3 ) Improved B/L hip ABD strength at least 1/2 to 1 MMT- Partially MET    Long Term Goals (8 weeks)  1 ) Improve L ankle ROM to WNL in all planes to restore normal movement with ADLs and function- Partially MET  2 ) Improve L ankle strength to 5/5 in all planes in order to return to pain-free ADLs and function- Partially MET  3 ) Improve FOTO score at least to 75 points showing improved self reported disability - Not MET  4 ) Improve balance in SLS to good > 20 seconds on L LE consistent with age appropriate norms- Not MET    Long Term Goals for L/S (8 weeks)  1 ) Improve SLS to good- able to balance > 25 seconds B/L- not Met  2 ) Improve B/L hip ABD strength to 5/5 - Not met  3 ) Improve L/S ROM to pain-free WFL in all planes- MET  4 ) Improve FOTO score at least to 54 showing MDC- Not MET    Plan  Plan details: Continue POC for L ankle ROM, strength and proprioception, monitor sxs and progress as able in order to return to pain-free ADLs at PLOF  Continue POC for L/S extension bias and gluteal strength, core stability as able  Patient would benefit from: PT eval and skilled speech therapy  Planned modality interventions: cryotherapy, electrical stimulation/Russian stimulation and TENS  Planned therapy interventions: manual therapy, joint mobilization, neuromuscular re-education, muscle pump exercises, self care, postural training, patient education, ADL training, ADL retraining, activity modification, coordination, compression, flexibility, functional ROM exercises, gait training, graded activity, therapeutic exercise, therapeutic activities, stretching, strengthening, therapeutic training, graded exercise and home exercise program  Frequency: 2x week  Duration in visits: 16  Duration in weeks: 8  Plan of Care beginning date: 8/4/2021  Plan of Care expiration date: 10/6/2021  Treatment plan discussed with: patient        Subjective Evaluation    History of Present Illness  Date of onset: 2/5/2021  Mechanism of injury: Alvarado Bales is a 54 y o  female who presents with increased L ankle pain starting ~ 3 months ago with ERIC    Decided to seek out MD consult at [facility]- x-rays were given with potential for fx and CAM boot given for 3 weeks- was referred to Dr Rolando Ramirez- new provided and (-) for fx but noted potential growth deformity and shot of cortisone with little response and told to get back into CAM boot  Notes with working at [employer] having to stand for prolonged periods and having good and bad days related to standing  Denies night pain or changes in bowel or bladder function  Denies any NT signs or sxs, notes that her lateral ankle tendons and grinding with walking  F/U with MD in 6 weeks  Wishes to return to PLOF pain-free  21:  Regarding L/S Priscilla presents with increased LBP starting post 2016 C5 fusion  Notes post fusion that her L/S began to compensating with throbbing at R sided L/S  Notes soreness/tightness at R side and R groin pain/discomofrt worse with crossing her legs  More recently was placed in a CAM boot secondary to L ankle pain which she feels aggravated her R sided L/S  Denies any change in bowel or bladder  Notes burning pain into anterior thigh- nothing past the knee  Denies numbness down LEs  F/U with PAMerrittC 21:  Re-assessment Priscilla 7 visits since the start of PT  Notes feels good periods on and off with her back and ankle  Still having lateral foot pain at times  Notes pain in B/L thighs and shins while sleeping at times, pulsing- notes it will wake her up at times without improvement  Still working 5 days a week  Still taking aleve daily to get through her days  Worse aching without the aleve  No longer getting R hip discomfort or referral today  L/S pain and discomfort with transitions from sitting to standing but no trouble with sitting  Had recent MRI yesterday for L/S and EMG for UE numbness   F/U with MD on 21            Recurrent probem    Quality of life: good    Pain  Current pain ratin  At best pain rating: 3  At worst pain ratin  Location: L ankle  Quality: dull ache, knife-like, tight, sharp and grinding  Relieving factors: ice, change in position, heat, medications, relaxation and support  Aggravating factors: standing, walking and stair climbing  Progression: no change    Social Support  3  Lives in: trailer  Lives with: young children and significant other    Employment status: working  Exercise history: No  Life stress: Son has ADHD, learning disability       Diagnostic Tests  X-ray: abnormal (OA)  Treatments  Previous treatment: physical therapy  Current treatment: physical therapy  Patient Goals  Patient goals for therapy: decreased edema, decreased pain, improved balance, increased motion, return to sport/leisure activities, independence with ADLs/IADLs and increased strength  Patient goal: Be out of pain        Objective     Static Posture   General Observations  Asymmetrical weight bearing and shifted right  Ankle/Foot   Ankle/Foot (Left): Hallux valgus, pes planus and pronated  Ankle/Foot (Right): Hallux valgus and pes planus  Observations   Left Ankle/Foot   Positive for edema  Right Ankle/Foot   Positive for edema and effusion       Neurological Testing     Sensation     Lumbar   Left   Intact: light touch    Right   Intact: light touch    Ankle/Foot   Left Ankle/Foot   Diminished: light touch    Right Ankle/Foot   Intact: light touch     Reflexes   Left   Patellar (L4): normal (2+)    Right   Patellar (L4): normal (2+)    Active Range of Motion     Lumbar   Flexion: 85 degrees   Extension: 35 degrees   Left lateral flexion: 25 degrees       Right lateral flexion: 25 degrees   Left rotation: 45 degrees  WFL  Right rotation: 45 degrees  WFL  Left Hip   External rotation (90/90): 35 degrees   Internal rotation (90/90): 16 degrees     Right Hip   External rotation (90/90): 25 degrees   Internal rotation (90/90): 28 degrees   Left Ankle/Foot   Dorsiflexion (ke): 15 degrees   Dorsiflexion (kf): 15 degrees   Plantar flexion: 40 degrees   Inversion: 30 degrees Eversion: 15 degrees     Right Ankle/Foot   Dorsiflexion (ke): 5 degrees   Dorsiflexion (kf): 7 degrees   Plantar flexion: 25 degrees   Inversion: 22 degrees   Eversion: 16 degrees     Additional Active Range of Motion Details  Pain with SBing L and rotation R   Pain with all L ankle ROM    Strength/Myotome Testing     Lumbar   Left   Heel walk: normal  Toe walk: normal    Right   Heel walk: normal  Toe walk: normal    Left Hip   Planes of Motion   Flexion: 5  Extension: 4  Abduction: 5  Adduction: 5  External rotation: 5  Internal rotation: 5    Right Hip   Planes of Motion   Flexion: 5  Extension: 4  Abduction: 4-  Adduction: 5  External rotation: 5  Internal rotation: 5    Left Knee   Flexion: 5  Extension: 5    Right Knee   Flexion: 5  Extension: 5    Left Ankle/Foot   Dorsiflexion: 5  Plantar flexion: 5  Inversion: 4+  Eversion: 4+    Right Ankle/Foot   Dorsiflexion: 5  Plantar flexion: 5  Inversion: 5  Eversion: 5    Additional Strength Details  Pain with all L ankle MMT, difficulty with SL HR on L    Tests     Lumbar     Left   Positive quadrant  Negative crossed SLR  Right   Positive crossed SLR and quadrant  Left Pelvic Girdle/Sacrum   Negative: active SLR test      Right Pelvic Girdle/Sacrum   Positive: active SLR test    Left Ankle/Foot   Positive for valgus stress metatarsophalangeal, varus stress metatarsophalangeal, valgus tilt and varus tilt  Negative for anterior drawer and Tinel's sign (tarsal tunnel)  Swelling   Left Ankle/Foot   Figure 8: 55 5 cm  Malleoli: 27 5 cm    Right Ankle/Foot   Figure 8: 56 cm  Malleoli: 26 cm    Functional Assessment      Squat    Right within functional limits and left valgus       Single Leg Stance   Left: 6 seconds  Right: 30 seconds             Precautions: HTN, Asthma, chest pain history  EPOC: 7/21/21      Manuals 5/19 5/26 6/9 6/16 6/23 7/14 8/4      L ankle PROM PF  PF  PF PF PF      Talar joint mobs PF  PF  PF PF       Peroneal IASTM PF    PF PF B/L hip PROM and HS stretch  15' PF PF PF         15"  30' 10' 30' 30"       Re-evaluation (add L/S)  PF 30'     PF 35'      Neuro Re-Ed             Foam romberg 3x30"            Tandem 3x30"            SLS 3x30"      3x30"      NSP marching  20x 20x2 20x2         TA contraction with VCs  5' 5'          NSP heel slide   20x2          NSP SLR   20x2 each    10x ea      Ther Ex             Bike 6 min            Marc stretch B/L  3x15" 3x15"  3x15"        Tendon glides INV/EV and PF/DF      30x ea       Ankle iso INV/EV 10x10            Tband 4 way GTB 20x ea     GTB 20x       HR/TR 20x      20x      Side stepping in PF 3 laps            Gastroc stretch at block 3x20"     3x15" strap       Clamshell  OTB 2x10  30x 30x        Sling hip ABD  2x10  2x10 2x10        ISO ABD/ADD hooklying    10x5" each         Ther Activity                                       Gait Training                                       Modalities             US continuous 1 mA lateral L ankle     8' 8'

## 2021-08-08 DIAGNOSIS — G89.29 NECK PAIN, CHRONIC: ICD-10-CM

## 2021-08-08 DIAGNOSIS — G89.29 CHRONIC RIGHT-SIDED LOW BACK PAIN WITHOUT SCIATICA: ICD-10-CM

## 2021-08-08 DIAGNOSIS — M54.2 NECK PAIN, CHRONIC: ICD-10-CM

## 2021-08-08 DIAGNOSIS — G89.29 CHRONIC NONINTRACTABLE HEADACHE, UNSPECIFIED HEADACHE TYPE: ICD-10-CM

## 2021-08-08 DIAGNOSIS — M79.18 MYOFASCIAL PAIN SYNDROME: ICD-10-CM

## 2021-08-08 DIAGNOSIS — E03.9 HYPOTHYROIDISM, UNSPECIFIED TYPE: ICD-10-CM

## 2021-08-08 DIAGNOSIS — R51.9 CHRONIC NONINTRACTABLE HEADACHE, UNSPECIFIED HEADACHE TYPE: ICD-10-CM

## 2021-08-08 DIAGNOSIS — M54.50 CHRONIC RIGHT-SIDED LOW BACK PAIN WITHOUT SCIATICA: ICD-10-CM

## 2021-08-08 DIAGNOSIS — G89.4 CHRONIC PAIN SYNDROME: ICD-10-CM

## 2021-08-09 ENCOUNTER — TELEPHONE (OUTPATIENT)
Dept: PAIN MEDICINE | Facility: CLINIC | Age: 46
End: 2021-08-09

## 2021-08-09 RX ORDER — LEVOTHYROXINE SODIUM 0.03 MG/1
TABLET ORAL
Qty: 90 TABLET | Refills: 0 | Status: SHIPPED | OUTPATIENT
Start: 2021-08-09 | End: 2021-09-24 | Stop reason: SDUPTHER

## 2021-08-09 RX ORDER — DULOXETIN HYDROCHLORIDE 30 MG/1
CAPSULE, DELAYED RELEASE ORAL
Qty: 90 CAPSULE | Refills: 0 | OUTPATIENT
Start: 2021-08-09

## 2021-08-09 NOTE — TELEPHONE ENCOUNTER
S/w pt, advised of above  Pt verbalized understanding and appreciation  Will await a cb from [address] [place] [address] to schedule LESI and tfesi as discussed

## 2021-08-09 NOTE — TELEPHONE ENCOUNTER
Can you please call the patient and advise her that her lumbar spine MRI showed mild to mod deg changes/OA with mild central stenosis L4-5 as well as a disc protrusion at L5-S1 that abuts the Rigth S1 nerve root with mild to moderate central stenosis and moderate B/L L5-S1 foraminal stenosis  I would recommend proceeding with an L5-S1 LESI with Dr Vicky Mendiola and then a B/L L5 TFESI  Let me know if she would like to proceed  Thank you

## 2021-08-09 NOTE — TELEPHONE ENCOUNTER
I see she has an OV on 8/11/21, does she want to just review everything and schedule everything from there?

## 2021-08-09 NOTE — TELEPHONE ENCOUNTER
S/w pt, confirmed pt has enough cymbalta to get to her ov on 8/11/2021  Pt will proceed w/ ov on 8/11/2021 to discuss imaging, schedule procedure and review medications  Advised pt to cb sooner if questions or issues arise  Pt verbalized understanding and appreciation

## 2021-08-11 ENCOUNTER — OFFICE VISIT (OUTPATIENT)
Dept: PAIN MEDICINE | Facility: CLINIC | Age: 46
End: 2021-08-11
Payer: COMMERCIAL

## 2021-08-11 ENCOUNTER — OFFICE VISIT (OUTPATIENT)
Dept: PHYSICAL THERAPY | Facility: CLINIC | Age: 46
End: 2021-08-11
Payer: COMMERCIAL

## 2021-08-11 VITALS
HEIGHT: 70 IN | SYSTOLIC BLOOD PRESSURE: 134 MMHG | HEART RATE: 88 BPM | DIASTOLIC BLOOD PRESSURE: 82 MMHG | TEMPERATURE: 98.6 F | BODY MASS INDEX: 41.95 KG/M2 | WEIGHT: 293 LBS

## 2021-08-11 DIAGNOSIS — M54.50 CHRONIC RIGHT-SIDED LOW BACK PAIN WITHOUT SCIATICA: ICD-10-CM

## 2021-08-11 DIAGNOSIS — R51.9 CHRONIC NONINTRACTABLE HEADACHE, UNSPECIFIED HEADACHE TYPE: ICD-10-CM

## 2021-08-11 DIAGNOSIS — M47.816 LUMBAR SPONDYLOSIS: ICD-10-CM

## 2021-08-11 DIAGNOSIS — F32.A DEPRESSION, UNSPECIFIED DEPRESSION TYPE: ICD-10-CM

## 2021-08-11 DIAGNOSIS — M76.72 PERONEAL TENDINITIS OF LEFT LOWER EXTREMITY: ICD-10-CM

## 2021-08-11 DIAGNOSIS — G89.29 CHRONIC RIGHT-SIDED LOW BACK PAIN WITHOUT SCIATICA: ICD-10-CM

## 2021-08-11 DIAGNOSIS — M54.2 NECK PAIN, CHRONIC: ICD-10-CM

## 2021-08-11 DIAGNOSIS — M48.062 SPINAL STENOSIS OF LUMBAR REGION WITH NEUROGENIC CLAUDICATION: ICD-10-CM

## 2021-08-11 DIAGNOSIS — M76.72 PERONEAL TENDONITIS, LEFT: ICD-10-CM

## 2021-08-11 DIAGNOSIS — G89.29 CHRONIC NONINTRACTABLE HEADACHE, UNSPECIFIED HEADACHE TYPE: ICD-10-CM

## 2021-08-11 DIAGNOSIS — M79.18 MYOFASCIAL PAIN SYNDROME: ICD-10-CM

## 2021-08-11 DIAGNOSIS — G89.4 CHRONIC PAIN SYNDROME: Primary | ICD-10-CM

## 2021-08-11 DIAGNOSIS — G89.29 NECK PAIN, CHRONIC: ICD-10-CM

## 2021-08-11 DIAGNOSIS — M19.072 PRIMARY OSTEOARTHRITIS, LEFT ANKLE AND FOOT: ICD-10-CM

## 2021-08-11 DIAGNOSIS — M19.072 PRIMARY OSTEOARTHRITIS OF LEFT ANKLE: ICD-10-CM

## 2021-08-11 DIAGNOSIS — R29.898 BILATERAL LEG WEAKNESS: Primary | ICD-10-CM

## 2021-08-11 DIAGNOSIS — M51.27 HERNIATED NUCLEUS PULPOSUS, L5-S1: ICD-10-CM

## 2021-08-11 PROCEDURE — 3075F SYST BP GE 130 - 139MM HG: CPT | Performed by: NURSE PRACTITIONER

## 2021-08-11 PROCEDURE — 3079F DIAST BP 80-89 MM HG: CPT | Performed by: NURSE PRACTITIONER

## 2021-08-11 PROCEDURE — 99214 OFFICE O/P EST MOD 30 MIN: CPT | Performed by: NURSE PRACTITIONER

## 2021-08-11 PROCEDURE — 97140 MANUAL THERAPY 1/> REGIONS: CPT | Performed by: PHYSICAL THERAPIST

## 2021-08-11 PROCEDURE — 3008F BODY MASS INDEX DOCD: CPT | Performed by: NURSE PRACTITIONER

## 2021-08-11 PROCEDURE — 97110 THERAPEUTIC EXERCISES: CPT | Performed by: PHYSICAL THERAPIST

## 2021-08-11 PROCEDURE — 4004F PT TOBACCO SCREEN RCVD TLK: CPT | Performed by: NURSE PRACTITIONER

## 2021-08-11 RX ORDER — DULOXETIN HYDROCHLORIDE 60 MG/1
60 CAPSULE, DELAYED RELEASE ORAL DAILY
Qty: 90 CAPSULE | Refills: 0 | Status: SHIPPED | OUTPATIENT
Start: 2021-08-11 | End: 2021-09-24 | Stop reason: SDUPTHER

## 2021-08-11 RX ORDER — PREGABALIN 75 MG/1
CAPSULE ORAL
Qty: 60 CAPSULE | Refills: 0 | Status: SHIPPED | OUTPATIENT
Start: 2021-08-11 | End: 2021-09-24 | Stop reason: SDUPTHER

## 2021-08-11 RX ORDER — DULOXETIN HYDROCHLORIDE 30 MG/1
CAPSULE, DELAYED RELEASE ORAL
Qty: 90 CAPSULE | Refills: 0 | Status: SHIPPED | OUTPATIENT
Start: 2021-08-11 | End: 2021-09-24 | Stop reason: SDUPTHER

## 2021-08-11 NOTE — PROGRESS NOTES
Assessment:  1  Chronic pain syndrome    2  Chronic right-sided low back pain without sciatica    3  Neck pain, chronic    4  Chronic nonintractable headache, unspecified headache type    5  Myofascial pain syndrome    6  Lumbar spondylosis    7  Herniated nucleus pulposus, L5-S1    8  Spinal stenosis of lumbar region with neurogenic claudication    9  Depression, unspecified depression type        Plan:    While the patient was in the office today, I did have a thorough conversation with the patient regarding their chronic pain syndrome, symptoms, medication regimen, and treatment plan  I discussed with the patient at this point time with her most recent MRI of the lumbosacral spine showing the herniated disc with the stenosis and she is having the low back pain and posterior lower extremity radicular symptoms, to address inflammatory and neuropathic component, I do feel would be beneficial proceed with an L5-S1 interlaminar lumbar epidural steroid injection with Dr Alisa Duenas  I did explain to the patient that depending on the results of the injection, it may need to be repeated and utilizing a bilateral S1 transforaminal epidural steroid injection approach to allow for better concentration of the medication along the affected nerve roots and not only help the low back pain but also the lower extremity radicular symptoms as well  The patient was agreeable and verbalized an understanding  Complete risks and benefits including bleeding, infection, tissue reaction, nerve injury and allergic reaction were discussed  The approach was demonstrated using models and literature was provided  Verbal and written consent was obtained              With regards to her medication regimen, I explained to the patient that at this point as per her request we will continue the Cymbalta 90 mg a day since it does seem to be helping her anxiety and depression and I would like to further titrate the Lyrica to 150 mg a day for the next 4 weeks and see how she does  The patient is to call our office at least 4-5 days before she would be out of the Lyrica, or if she has side effects from the increase in Lyrica, and at that point we would decide whether not to further titrate the Lyrica to 200 mg a day before her next office visit in 8 weeks  I advised the patient that if they experience any side effects or issues with the changes in their medication regiment, they should give our office a call to discuss  I also advised the patient not to drive or operate machinery until they see how the changes in the medication regimen affects them  The patient was agreeable and verbalized an understanding  While the patient was in the office today, we also had a thorough conversation regarding her chronic depression, anxiety and pain symptoms and at this point I advised the patient that I feel she would benefit from a consultation with our therapist who specializes in cognitive behavioral therapy for patients with chronic pain  The patient was agreeable and I did put in a referral for our therapist, Chioma Dougherty  The patient will follow-up in 8 weeks for medication prescription refill and reevaluation  The patient was advised to contact the office should their symptoms worsen in the interim  The patient was agreeable and verbalized an understanding  History of Present Illness: The patient is a 51 y o  female last seen on 6/16/2021 who presents for a follow up office visit in regards to Chronic pain syndrome secondary to herniated lumbar discs with stenosis, spondylosis as well as chronic myofascial pain and neck pain  The patient currently reports that since her last office visit she did proceed with the MRI of the lumbosacral spine which did show a herniated L5-S1 disc with mild to moderate spondylosis and stenosis    The patient continues with the chronic low back and leg pain and presents today for regular medication follow-up and to discuss the results of her most recent MRI as well as her treatment plan options  She does report that since her last office visit she also increase the Cymbalta to the 90 mg a day as we had discussed and has continue with the Lyrica at the 50 mg b i d  The patient reports although she does not feel that the increase in the Cymbalta has provided any significant relief of her chronic pain symptoms, she does feel it is helping her depression and anxiety, which she continues to deal with, especially with her overall life stressors and her chronic pain  The patient reports that she is also starting to notice the return of some of the cervical pain and thoracic pain that had improved once she started the Lyrica at this point since she is not convinced that the Lyrica is causing the weight gain, she would like to discuss the possibility of increasing the Lyrica  Current pain medications includes: Cymbalta 90 mg daily and Lyrica 50 mg b i d     The patient reports that this regimen is providing 50% pain relief  The patient is reporting no side effects from this pain medication regimen  I have personally reviewed and/or updated the patient's past medical history, past surgical history, family history, social history, current medications, allergies, and vital signs today  Review of Systems:    Review of Systems   Respiratory: Negative for shortness of breath  Cardiovascular: Negative for chest pain  Gastrointestinal: Negative for constipation, diarrhea, nausea and vomiting  Musculoskeletal: Positive for gait problem and joint swelling (joint stiffness)  Negative for arthralgias and myalgias  Skin: Negative for rash  Neurological: Negative for dizziness, seizures and weakness  All other systems reviewed and are negative          Past Medical History:   Diagnosis Date    Anxiety     Arthritis     Asthma     Depression     Disc disorder     Fibromyalgia     Hypertension     Hypothyroidism  Migraine        Past Surgical History:   Procedure Laterality Date    BACK SURGERY      CERVICAL BIOPSY  W/ LOOP ELECTRODE EXCISION      CERVICAL FUSION       SECTION      CHOLECYSTECTOMY      FOOT SURGERY Bilateral     KNEE SURGERY      TUBAL LIGATION Bilateral 2012       Family History   Adopted: Yes   Problem Relation Age of Onset    No Known Problems Mother     No Known Problems Father     Coronary artery disease Maternal Grandmother     Breast cancer Maternal Grandmother         age unknown    Coronary artery disease Maternal Aunt     No Known Problems Maternal Grandfather     No Known Problems Paternal Grandmother     No Known Problems Paternal Grandfather     No Known Problems Maternal Aunt     No Known Problems Maternal Aunt        Social History     Occupational History    Occupation: [employer]   Tobacco Use    Smoking status: Current Every Day Smoker     Packs/day: 0 50     Types: Cigarettes    Smokeless tobacco: Never Used   Vaping Use    Vaping Use: Never used   Substance and Sexual Activity    Alcohol use: Never     Comment: socially    Drug use: No    Sexual activity: Not Currently         Current Outpatient Medications:     albuterol (VENTOLIN HFA) 90 mcg/act inhaler, Inhale 2 puffs every 6 (six) hours as needed for wheezing, Disp: 18 g, Rfl: 0    amLODIPine (NORVASC) 10 mg tablet, Take 1 tablet (10 mg total) by mouth daily, Disp: 90 tablet, Rfl: 1    cholecalciferol (VITAMIN D3) 1,000 units tablet, Take 2,000 Units by mouth daily, Disp: , Rfl:     famotidine (PEPCID) 20 mg tablet, Take 1 tablet (20 mg total) by mouth 2 (two) times a day, Disp: 180 tablet, Rfl: 1    levothyroxine 200 mcg tablet, Take 1 tablet (200 mcg total) by mouth daily, Disp: 90 tablet, Rfl: 0    levothyroxine 25 mcg tablet, 25 mcg 1 tab PO q am with 200 mcg on Mon, Wed, Fri, Sun ONLY , con't 200 mcg all other days, Disp: 90 tablet, Rfl: 0    metaxalone (Skelaxin) 800 mg tablet, Take 1 tablet (800 mg total) by mouth 3 (three) times a day as needed for muscle spasms, Disp: 30 tablet, Rfl: 1    multivitamin (THERAGRAN) TABS, Take 1 tablet by mouth daily, Disp: , Rfl:     ondansetron (ZOFRAN-ODT) 4 mg disintegrating tablet, Take 1 tablet (4 mg total) by mouth every 8 (eight) hours as needed for nausea or vomiting, Disp: 12 tablet, Rfl: 0    Aspirin-Acetaminophen-Caffeine (EXCEDRIN PO), Take by mouth (Patient not taking: Reported on 8/11/2021), Disp: , Rfl:     DULoxetine (CYMBALTA) 30 mg delayed release capsule, Take 1 PO HS with 60 mg to equal 90 mg daily  , Disp: 90 capsule, Rfl: 0    DULoxetine (CYMBALTA) 60 mg delayed release capsule, Take 1 capsule (60 mg total) by mouth daily, Disp: 90 capsule, Rfl: 0    pregabalin (LYRICA) 75 mg capsule, Take 1 PO BID , Disp: 60 capsule, Rfl: 0    Allergies   Allergen Reactions    Acetaminophen-Codeine      Pt states she does not remember having a reaction to this medication    Hydrocodone Other (See Comments)    Latex Hives     Latex powder      Prednisolone        Physical Exam:    /82 (BP Location: Left arm, Patient Position: Sitting, Cuff Size: Standard)   Pulse 88   Temp 98 6 °F (37 °C)   Ht 5' 10" (1 778 m)   Wt (!) 137 kg (302 lb)   BMI 43 33 kg/m²     Constitutional:normal, well developed, well nourished, alert, in no distress and non-toxic and no overt pain behavior  and obese  Eyes:anicteric  HEENT:grossly intact  Neck:supple, symmetric, trachea midline and no masses   Pulmonary:even and unlabored  Cardiovascular:No edema or pitting edema present  Skin:Normal without rashes or lesions and well hydrated  Psychiatric:Mood and affect appropriate  Neurologic:Cranial Nerves II-XII grossly intact  Musculoskeletal:The patient's gait is slightly antalgic, painful, but steady without the use of any assistive devices        Imaging  FL spine and pain procedure    (Results Pending)   FL spine and pain procedure    (Results Pending) Orders Placed This Encounter   Procedures    FL spine and pain procedure    FL spine and pain procedure    Ambulatory referral to Psychiatry

## 2021-08-11 NOTE — PATIENT INSTRUCTIONS
Epidural Steroid Injection   AMBULATORY CARE:   What you need to know about an epidural steroid injection (MARCIE):  An MARCIE is a procedure to inject steroid medicine into the epidural space  The epidural space is between your spinal cord and vertebrae  Steroids reduce inflammation and fluid buildup in your spine that may be causing pain  You may be given pain medicine along with the steroids  How to prepare for an MARCIE:  Your healthcare provider will talk to you about how to prepare for your procedure  He or she will tell you what medicines to take or not take on the day of your procedure  You may need to stop taking blood thinners or other medicines several days before your procedure  You may need to adjust any diabetes medicine you take on the day of your procedure  Steroid medicine can increase your blood sugar level  Arrange for someone to drive you home when you are discharged  What will happen during an MARCIE:   · You will be given medicine to numb the procedure area  You will be awake for the procedure, but you will not feel pain  You may also be given medicine to help you relax  Contrast liquid will be used to help your healthcare provider see the area better  Tell the healthcare provider if you have ever had an allergic reaction to contrast liquid  · Your healthcare provider may place the needle into your neck area, middle of your back, or tailbone area  He may inject the medicine next to the nerves that are causing your pain  He may instead inject the medicine into a larger area of the epidural space  This helps the medicine spread to more nerves  Your healthcare provider will use a fluoroscope to help guide the needle to the right place  A fluoroscope is a type of x-ray  After the procedure, a bandage will be placed over the injection site to prevent infection  What will happen after an MARCIE:  You will have a bandage over the injection site to prevent infection   Your healthcare provider will tell you when you can bathe and any activity guidelines  You will be able to go home  Risks of an MARCIE:  You may have temporary or permanent nerve damage or paralysis  You may have bleeding or develop a serious infection, such as meningitis (swelling of the brain coverings)  An abscess may also develop  An abscess is a pus-filled area under the skin  You may need surgery to fix the abscess  You may have a seizure, anxiety, or trouble sleeping  If you are a man, you may have temporary erectile dysfunction (not able to have an erection)  Call your local emergency number (911 in the [address] [place] [address],[address]) if:   · You have a seizure  · You have trouble moving your legs  Seek care immediately if:   · Blood soaks through your bandage  · You have a fever or chills, severe back pain, and the procedure area is sensitive to the touch  · You cannot control when you urinate or have a bowel movement  Call your doctor if:   · You have weakness or numbness in your legs  · Your wound is red, swollen, or draining pus  · You have nausea or are vomiting  · Your face or neck is red and you feel warm  · You have more pain than you had before the procedure  · You have swelling in your hands or feet  · You have questions or concerns about your condition or care  Care for your wound as directed: You may remove the bandage before you go to bed the day of your procedure  You may take a shower, but do not take a bath for at least 24 hours  Self-care:   · Do not drive,  use machines, or do strenuous activity for 24 hours after your procedure or as directed  · Continue other treatments  as directed  Steroid injections alone will not control your pain  The injections are meant to be used with other treatments, such as physical therapy  Follow up with your doctor as directed:  Write down your questions so you remember to ask them during your visits     © Copyright [employer] 2021 Information is for End User's use only and may not be sold, redistributed or otherwise used for commercial purposes  All illustrations and images included in CareNotes® are the copyrighted property of A D A M [employer] Inc  or Jaylon Guerrier  The above information is an  only  It is not intended as medical advice for individual conditions or treatments  Talk to your doctor, nurse or pharmacist before following any medical regimen to see if it is safe and effective for you

## 2021-08-11 NOTE — PROGRESS NOTES
Daily Note     Today's date: 2021  Patient name: Lorne Neil  : 1975  MRN: [id number]  Referring provider: Ann Skiff, PA-C  Dx:   Encounter Diagnosis     ICD-10-CM    1  Bilateral leg weakness  R29 898    2  Chronic right-sided low back pain without sciatica  M54 5     G89 29    3  Primary osteoarthritis of left ankle  M19 072    4  Peroneal tendonitis, left  M76 72    5  Peroneal tendinitis of left lower extremity  M76 72    6  Primary osteoarthritis, left ankle and foot  M19 072                   Subjective: Had consult with Pa[facility] today and scheduled for injection for R sided L5-S1 cortisone injection  Continues to have discomfort down R LE and shooting cramping in B/L anterior shins  Objective: See treatment diary below      Assessment: + response to R sided roadkill ANAND for R foraminal closure  Denies any SLR signs supine  Trial of PPT in standing to reduce impingment at L5-S1 with good response  Reviewed performance with standing and ambulation as able  Will continue to work on core strength as able  Little L ankle discomfort today only with fast oni during ambultion and continued EV resistance  Will continue to benefit from core strength and L ankle stability  Plan: Continue per plan of care        Precautions: HTN, Asthma, chest pain history  EPOC: 21      Manuals  6 8/     L ankle PROM PF  PF  PF PF PF PF     Talar joint mobs PF  PF  PF PF  PF     Peroneal IASTM PF    PF PF  PF     B/L hip PROM and HS stretch  15' PF PF PF   PF      15"  30' 10' 30' 30"  25'     Re-evaluation (add L/S)  PF 30'     PF 35'      Neuro Re-Ed             Foam romberg 3x30"            Tandem 3x30"            SLS 3x30"      3x30"      NSP marching  20x 20x2 20x2    20x     TA contraction with VCs  5' 5'          NSP heel slide   20x2     20x     NSP SLR   20x2 each    10x ea      PPT supine and standing        10x each     Ther Ex             Bike 6 min Marc stretch B/L  3x15" 3x15"  3x15"        Tendon glides INV/EV and PF/DF      30x ea  20x     Ankle iso INV/EV 10x10            Tband 4 way GTB 20x ea     GTB 20x       HR/TR 20x      20x 10x ea     Side stepping in PF 3 laps            Gastroc stretch at block 3x20"     3x15" strap       Clamshell  OTB 2x10  30x 30x        Sling hip ABD  2x10  2x10 2x10        ISO ABD/ADD hooklying    10x5" each         Ther Activity             Bike        5 mins                  Gait Training                                       Modalities             US continuous 1 mA lateral L ankle     8' 8'

## 2021-08-18 ENCOUNTER — APPOINTMENT (OUTPATIENT)
Dept: PHYSICAL THERAPY | Facility: CLINIC | Age: 46
End: 2021-08-18
Payer: COMMERCIAL

## 2021-08-25 ENCOUNTER — OFFICE VISIT (OUTPATIENT)
Dept: PHYSICAL THERAPY | Facility: CLINIC | Age: 46
End: 2021-08-25
Payer: COMMERCIAL

## 2021-08-25 DIAGNOSIS — M76.72 PERONEAL TENDONITIS, LEFT: ICD-10-CM

## 2021-08-25 DIAGNOSIS — M54.50 CHRONIC RIGHT-SIDED LOW BACK PAIN WITHOUT SCIATICA: ICD-10-CM

## 2021-08-25 DIAGNOSIS — R29.898 BILATERAL LEG WEAKNESS: Primary | ICD-10-CM

## 2021-08-25 DIAGNOSIS — M76.72 PERONEAL TENDINITIS OF LEFT LOWER EXTREMITY: ICD-10-CM

## 2021-08-25 DIAGNOSIS — M19.072 PRIMARY OSTEOARTHRITIS OF LEFT ANKLE: ICD-10-CM

## 2021-08-25 DIAGNOSIS — G89.29 CHRONIC RIGHT-SIDED LOW BACK PAIN WITHOUT SCIATICA: ICD-10-CM

## 2021-08-25 DIAGNOSIS — M19.072 PRIMARY OSTEOARTHRITIS, LEFT ANKLE AND FOOT: ICD-10-CM

## 2021-08-25 PROCEDURE — 97140 MANUAL THERAPY 1/> REGIONS: CPT | Performed by: PHYSICAL THERAPIST

## 2021-08-25 PROCEDURE — 97110 THERAPEUTIC EXERCISES: CPT | Performed by: PHYSICAL THERAPIST

## 2021-08-25 NOTE — PROGRESS NOTES
Daily Note     Today's date: 2021  Patient name: Elcieo Arguello  : 1975  MRN: [id number]  Referring provider: Lindy Resendiz PA-C  Dx:   Encounter Diagnosis     ICD-10-CM    1  Bilateral leg weakness  R29 898    2  Chronic right-sided low back pain without sciatica  M54 5     G89 29    3  Primary osteoarthritis of left ankle  M19 072    4  Peroneal tendonitis, left  M76 72    5  Peroneal tendinitis of left lower extremity  M76 72    6  Primary osteoarthritis, left ankle and foot  M19 072                   Subjective: Notes feeling continued pain in B/L LE at times due to prolonged standing at work  Objective: See treatment diary below      Assessment: Continues to have pain and discomfort with performance of any lumbar extension or long axis loading  Improved sxs with PPT in standing as well as performance of ana m pose and angry cat pose  Still with limited ability to perform prolonged standing as a result of lower lumbar compression  Will await result from epidural injection tomorrow  Plan: Continue per plan of care    Await response to epidural     Precautions: HTN, Asthma, chest pain history  EPOC: 21      Manuals     L ankle PROM PF  PF  PF PF PF PF PF    Talar joint mobs PF  PF  PF PF  PF     Peroneal IASTM PF    PF PF  PF     B/L hip PROM and HS stretch  15' PF PF PF   PF PF    Lumbar MFR         PF     15"  30' 10' 30' 30"  25' 30'    Re-evaluation (add L/S)  PF 30'     PF 35'      Neuro Re-Ed             Foam romberg 3x30"            Tandem 3x30"            SLS 3x30"      3x30"      NSP marching  20x 20x2 20x2    20x 20x    TA contraction with VCs  5' 5'          NSP heel slide   20x2     20x     NSP SLR   20x2 each    10x ea      PPT supine and standing        10x each 10x ea    Ther Ex             Bike 6 min            Marc stretch B/L  3x15" 3x15"  3x15"        Tendon glides INV/EV and PF/DF      30x ea  20x     Ankle iso INV/EV 10x10            Tband 4 way GTB 20x ea     GTB 20x       HR/TR 20x      20x 10x ea     Side stepping in PF 3 laps            Gastroc stretch at block 3x20"     3x15" strap       Clamshell  OTB 2x10  30x 30x        Prone modified plank         3x15'    Barry pose and cat/cow         5x10" ea    Sling hip ABD  2x10  2x10 2x10        ISO ABD/ADD hooklying    10x5" each     10x10" ea    Ther Activity             Bike        5 mins                  Gait Training                                       Modalities             US continuous 1 mA lateral L ankle     8' 8'

## 2021-08-26 ENCOUNTER — HOSPITAL ENCOUNTER (OUTPATIENT)
Dept: RADIOLOGY | Facility: CLINIC | Age: 46
Discharge: HOME | End: 2021-08-26
Attending: ANESTHESIOLOGY | Admitting: ANESTHESIOLOGY
Payer: COMMERCIAL

## 2021-08-26 VITALS
OXYGEN SATURATION: 96 % | RESPIRATION RATE: 18 BRPM | TEMPERATURE: 97.3 F | SYSTOLIC BLOOD PRESSURE: 128 MMHG | DIASTOLIC BLOOD PRESSURE: 76 MMHG | HEART RATE: 84 BPM

## 2021-08-26 DIAGNOSIS — M51.27 HERNIATED NUCLEUS PULPOSUS, L5-S1: ICD-10-CM

## 2021-08-26 DIAGNOSIS — M48.062 SPINAL STENOSIS OF LUMBAR REGION WITH NEUROGENIC CLAUDICATION: ICD-10-CM

## 2021-08-26 DIAGNOSIS — G89.29 CHRONIC RIGHT-SIDED LOW BACK PAIN WITHOUT SCIATICA: ICD-10-CM

## 2021-08-26 DIAGNOSIS — M54.50 CHRONIC RIGHT-SIDED LOW BACK PAIN WITHOUT SCIATICA: ICD-10-CM

## 2021-08-26 RX ORDER — METHYLPREDNISOLONE ACETATE 80 MG/ML
80 INJECTION, SUSPENSION INTRA-ARTICULAR; INTRALESIONAL; INTRAMUSCULAR; PARENTERAL; SOFT TISSUE ONCE
Status: COMPLETED | OUTPATIENT
Start: 2021-08-26 | End: 2021-08-26

## 2021-08-26 RX ADMIN — METHYLPREDNISOLONE ACETATE 80 MG: 80 INJECTION, SUSPENSION INTRA-ARTICULAR; INTRALESIONAL; INTRAMUSCULAR; SOFT TISSUE at 14:50

## 2021-08-26 RX ADMIN — IOHEXOL 1 ML: 300 INJECTION, SOLUTION INTRAVENOUS at 14:50

## 2021-08-26 NOTE — DISCHARGE INSTRUCTIONS
Epidural Steroid Injection   WHAT YOU NEED TO KNOW:   An epidural steroid injection (MARCIE) is a procedure to inject steroid medicine into the epidural space  The epidural space is between your spinal cord and vertebrae  Steroids reduce inflammation and fluid buildup in your spine that may be causing pain  You may be given pain medicine along with the steroids  ACTIVITY  · Do not drive or operate machinery today  · No strenuous activity today - bending, lifting, etc   · You may resume normal activites starting tomorrow - start slowly and as tolerated  · You may shower today, but no tub baths or hot tubs  · You may have numbness for several hours from the local anesthetic  Please use caution and common sense, especially with weight-bearing activities  CARE OF THE INJECTION SITE  · If you have soreness or pain, apply ice to the area today (20 minutes on/20 minutes off)  · Starting tomorrow, you may use warm, moist heat or ice if needed  · You may have an increase or change in your discomfort for 36-48 hours after your treatment  · Apply ice and continue with any pain medication you have been prescribed  · [facility]ify [facility] if you have any of the following: redness, drainage, swelling, headache, stiff neck or fever above 100°F     SPECIAL INSTRUCTIONS  · Our office will contact you in approximately 7 days for a progress report  MEDICATIONS  · Continue to take all routine medications  · Our office may have instructed you to hold some medications  As no general anesthesia was used in today's procedure, you should not experience any side effects related to anesthesia  If you have a problem specifically related to your procedure, please call our office at [phone]  Problems not related to your procedure should be directed to your primary care physician

## 2021-08-26 NOTE — H&P
History of Present Illness: The patient is a 51 y o  female who presents with complaints of low back and leg pain per    Patient Active Problem List   Diagnosis    Neck pain, chronic    Chronic headache    Hypothyroidism    Hypertension    Episode of recurrent major depressive disorder ([address] )    Irritable bowel syndrome with diarrhea    Gastroesophageal reflux disease without esophagitis    Myofascial pain syndrome    Primary osteoarthritis of left ankle    Chronic right-sided low back pain without sciatica    Mild intermittent asthma without complication    Peroneal tendonitis, left    Chronic pain syndrome    Lumbar spondylosis    Herniated nucleus pulposus, L5-S1    Spinal stenosis of lumbar region with neurogenic claudication       Past Medical History:   Diagnosis Date    Anxiety     Arthritis     Asthma     Depression     Disc disorder     Fibromyalgia     Hypertension     Hypothyroidism     Migraine        Past Surgical History:   Procedure Laterality Date    BACK SURGERY      CERVICAL BIOPSY  W/ LOOP ELECTRODE EXCISION      CERVICAL FUSION       SECTION      CHOLECYSTECTOMY      FOOT SURGERY Bilateral     KNEE SURGERY      TUBAL LIGATION Bilateral          Current Outpatient Medications:     albuterol (VENTOLIN HFA) 90 mcg/act inhaler, Inhale 2 puffs every 6 (six) hours as needed for wheezing, Disp: 18 g, Rfl: 0    amLODIPine (NORVASC) 10 mg tablet, Take 1 tablet (10 mg total) by mouth daily, Disp: 90 tablet, Rfl: 1    Aspirin-Acetaminophen-Caffeine (EXCEDRIN PO), Take by mouth (Patient not taking: Reported on 2021), Disp: , Rfl:     cholecalciferol (VITAMIN D3) 1,000 units tablet, Take 2,000 Units by mouth daily, Disp: , Rfl:     DULoxetine (CYMBALTA) 30 mg delayed release capsule, Take 1 PO HS with 60 mg to equal 90 mg daily  , Disp: 90 capsule, Rfl: 0    DULoxetine (CYMBALTA) 60 mg delayed release capsule, Take 1 capsule (60 mg total) by mouth daily, Disp: 90 capsule, Rfl: 0    famotidine (PEPCID) 20 mg tablet, Take 1 tablet (20 mg total) by mouth 2 (two) times a day, Disp: 180 tablet, Rfl: 1    levothyroxine 200 mcg tablet, Take 1 tablet (200 mcg total) by mouth daily, Disp: 90 tablet, Rfl: 0    levothyroxine 25 mcg tablet, 25 mcg 1 tab PO q am with 200 mcg on Mon, Wed, Fri, Sun ONLY , con't 200 mcg all other days, Disp: 90 tablet, Rfl: 0    metaxalone (Skelaxin) 800 mg tablet, Take 1 tablet (800 mg total) by mouth 3 (three) times a day as needed for muscle spasms, Disp: 30 tablet, Rfl: 1    multivitamin (THERAGRAN) TABS, Take 1 tablet by mouth daily, Disp: , Rfl:     ondansetron (ZOFRAN-ODT) 4 mg disintegrating tablet, Take 1 tablet (4 mg total) by mouth every 8 (eight) hours as needed for nausea or vomiting, Disp: 12 tablet, Rfl: 0    pregabalin (LYRICA) 75 mg capsule, Take 1 PO BID , Disp: 60 capsule, Rfl: 0    Current Facility-Administered Medications:     iohexol (OMNIPAQUE) 300 mg/mL injection 50 mL, 50 mL, Epidural, Once, Edmond Morrow DO    methylPREDNISolone acetate (DEPO-MEDROL) injection 80 mg, 80 mg, Epidural, Once, [employer], DO    Allergies   Allergen Reactions    Acetaminophen-Codeine      Pt states she does not remember having a reaction to this medication    Hydrocodone Other (See Comments)    Latex Hives     Latex powder      Prednisolone        Physical Exam:   General: Awake, Alert, Oriented x 3, Mood and affect appropriate  Respiratory: Respirations even and unlabored  Cardiovascular: Peripheral pulses intact; no edema  Musculoskeletal Exam:  Decreased range of motion lumbar spine    ASA Score: III         Assessment:  Lumbar disc disruption with lumbar radiculitis    Plan: L5-S1 LESI

## 2021-08-30 ENCOUNTER — TELEPHONE (OUTPATIENT)
Dept: ADMINISTRATIVE | Facility: OTHER | Age: 46
End: 2021-08-30

## 2021-08-30 NOTE — TELEPHONE ENCOUNTER
Upon review of the In Basket request we have noted that HM Need to update for Covid Imm ; Please follow the [employer]'s updated workflow to complete this request at the office level  This request will now be completed  Thank you  ,Any additional questions or concerns should be emailed to the Practice Liaisons via [email or web address] email, please do not reply via In Basket      Thank you  Shawn Lanier MA

## 2021-08-30 NOTE — TELEPHONE ENCOUNTER
----- Message from Cameron sent at 2021 12:58 PM EDT -----  [facility]  21 12:58 PM    Hello, our patient Lorne Neil has had Immunization(s) completed/performed  Please assist in updating the patient chart by pulling the document from the Media Tab  The date of service is 21, 21       Thank you,  Sean Brady  [phone]  [phone]

## 2021-09-01 ENCOUNTER — APPOINTMENT (OUTPATIENT)
Dept: PHYSICAL THERAPY | Facility: CLINIC | Age: 46
End: 2021-09-01
Payer: COMMERCIAL

## 2021-09-02 ENCOUNTER — TELEPHONE (OUTPATIENT)
Dept: PAIN MEDICINE | Facility: CLINIC | Age: 46
End: 2021-09-02

## 2021-09-02 NOTE — TELEPHONE ENCOUNTER
1st attempt  Lm to cb with % improvement and pain level.       S/p L5-S1 LESI 8/26, 9/13 TFESI #2

## 2021-09-07 ENCOUNTER — HOSPITAL ENCOUNTER (OUTPATIENT)
Dept: NEUROLOGY | Facility: CLINIC | Age: 46
Discharge: HOME | End: 2021-09-07
Payer: COMMERCIAL

## 2021-09-07 DIAGNOSIS — R20.2 NUMBNESS AND TINGLING OF BOTH UPPER EXTREMITIES: ICD-10-CM

## 2021-09-07 DIAGNOSIS — M54.2 NECK PAIN, CHRONIC: ICD-10-CM

## 2021-09-07 DIAGNOSIS — G89.29 NECK PAIN, CHRONIC: ICD-10-CM

## 2021-09-07 DIAGNOSIS — R20.0 NUMBNESS AND TINGLING OF BOTH UPPER EXTREMITIES: ICD-10-CM

## 2021-09-07 DIAGNOSIS — M54.12 CERVICAL RADICULOPATHY: ICD-10-CM

## 2021-09-07 PROCEDURE — 95886 MUSC TEST DONE W/N TEST COMP: CPT | Performed by: PSYCHIATRY & NEUROLOGY

## 2021-09-07 PROCEDURE — 95911 NRV CNDJ TEST 9-10 STUDIES: CPT | Performed by: PSYCHIATRY & NEUROLOGY

## 2021-09-08 ENCOUNTER — OFFICE VISIT (OUTPATIENT)
Dept: PHYSICAL THERAPY | Facility: CLINIC | Age: 46
End: 2021-09-08
Payer: COMMERCIAL

## 2021-09-08 DIAGNOSIS — M76.72 PERONEAL TENDINITIS OF LEFT LOWER EXTREMITY: ICD-10-CM

## 2021-09-08 DIAGNOSIS — R29.898 BILATERAL LEG WEAKNESS: Primary | ICD-10-CM

## 2021-09-08 DIAGNOSIS — M19.072 PRIMARY OSTEOARTHRITIS OF LEFT ANKLE: ICD-10-CM

## 2021-09-08 DIAGNOSIS — G89.29 CHRONIC RIGHT-SIDED LOW BACK PAIN WITHOUT SCIATICA: ICD-10-CM

## 2021-09-08 DIAGNOSIS — M54.50 CHRONIC RIGHT-SIDED LOW BACK PAIN WITHOUT SCIATICA: ICD-10-CM

## 2021-09-08 DIAGNOSIS — M19.072 PRIMARY OSTEOARTHRITIS, LEFT ANKLE AND FOOT: ICD-10-CM

## 2021-09-08 DIAGNOSIS — M76.72 PERONEAL TENDONITIS, LEFT: ICD-10-CM

## 2021-09-08 PROCEDURE — 97110 THERAPEUTIC EXERCISES: CPT | Performed by: PHYSICAL THERAPIST

## 2021-09-08 PROCEDURE — 97140 MANUAL THERAPY 1/> REGIONS: CPT | Performed by: PHYSICAL THERAPIST

## 2021-09-08 NOTE — PROGRESS NOTES
Daily Note     Today's date: 2021  Patient name: Janet Bautista  : 1975  MRN: [id number]  Referring provider: Joe Sánchez PA-C  Dx:   Encounter Diagnosis     ICD-10-CM    1  Bilateral leg weakness  R29 898    2  Chronic right-sided low back pain without sciatica  M54 5     G89 29    3  Primary osteoarthritis of left ankle  M19 072    4  Peroneal tendonitis, left  M76 72    5  Peroneal tendinitis of left lower extremity  M76 72    6  Primary osteoarthritis, left ankle and foot  M19 072                   Subjective: Notes having recent fluorscopy with good response- less B/L shin splint feeling still with spasm at L/S PSM  Objective: See treatment diary below      Assessment: Progress with TM walking and lunges on incline without sharp pain  Able to perform all TE today in std including hip 3 way and RDLs without sharp pain or referral   + response to flexion bias to L/S with sven flexion supine  (-) SLR or slump signs noted today  Plan: Continue per plan of care        Precautions: HTN, Asthma, chest pain history  EPOC: 21      Manuals  8   L ankle PROM PF  PF  PF PF PF PF PF PF   Talar joint mobs PF  PF  PF PF  PF     Peroneal IASTM PF    PF PF  PF     B/L hip PROM and HS stretch  15' PF PF PF   PF PF PF   Lumbar MFR         PF PF    15"  30' 10' 30' 30"  25' 30' 25'   Re-evaluation (add L/S)  PF 30'     PF 35'      Neuro Re-Ed             Foam romberg 3x30"            Tandem 3x30"            SLS 3x30"      3x30"   3x30"   NSP marching  20x 20x2 20x2    20x 20x    TA contraction with VCs  5' 5'          NSP heel slide   20x2     20x     NSP SLR   20x2 each    10x ea      PPT supine and standing        10x each 10x ea 10x spasm   Ther Ex             Bike 6 min         TM 6 min    Marc stretch B/L  3x15" 3x15"  3x15"        Tendon glides INV/EV and PF/DF      30x ea  20x     Ankle iso INV/EV 10x10            Tband 4 way GTB 20x ea GTB 20x       HR/TR 20x      20x 10x ea  20x slant board   Side stepping in PF 3 laps         OTB 3 laps   Gastroc stretch at block 3x20"     3x15" strap    3x15"   Clamshell  OTB 2x10  30x 30x        Prone modified plank         3x15' 3x15"   Barry pose and cat/cow         5x10" ea    Sling hip ABD  2x10  2x10 2x10        STD hip 3 way           OTB 3x10   Step up and down          8" 2x10 ea   ISO ABD/ADD hooklying    10x5" each     10x10" ea    Ther Activity             Bike        5 mins                  Gait Training                                       Modalities             US continuous 1 mA lateral L ankle     8' 8'

## 2021-09-15 ENCOUNTER — OFFICE VISIT (OUTPATIENT)
Dept: PHYSICAL THERAPY | Facility: CLINIC | Age: 46
End: 2021-09-15
Payer: COMMERCIAL

## 2021-09-15 DIAGNOSIS — M19.072 PRIMARY OSTEOARTHRITIS, LEFT ANKLE AND FOOT: ICD-10-CM

## 2021-09-15 DIAGNOSIS — M54.50 CHRONIC RIGHT-SIDED LOW BACK PAIN WITHOUT SCIATICA: ICD-10-CM

## 2021-09-15 DIAGNOSIS — R29.898 BILATERAL LEG WEAKNESS: Primary | ICD-10-CM

## 2021-09-15 DIAGNOSIS — M76.72 PERONEAL TENDINITIS OF LEFT LOWER EXTREMITY: ICD-10-CM

## 2021-09-15 DIAGNOSIS — M76.72 PERONEAL TENDONITIS, LEFT: ICD-10-CM

## 2021-09-15 DIAGNOSIS — G89.29 CHRONIC RIGHT-SIDED LOW BACK PAIN WITHOUT SCIATICA: ICD-10-CM

## 2021-09-15 DIAGNOSIS — M19.072 PRIMARY OSTEOARTHRITIS OF LEFT ANKLE: ICD-10-CM

## 2021-09-15 PROCEDURE — 97140 MANUAL THERAPY 1/> REGIONS: CPT | Performed by: PHYSICAL THERAPIST

## 2021-09-15 PROCEDURE — 97110 THERAPEUTIC EXERCISES: CPT | Performed by: PHYSICAL THERAPIST

## 2021-09-15 NOTE — PROGRESS NOTES
Daily Note     Today's date: 9/15/2021  Patient name: Dakota Silva  : 1975  MRN: [id number]  Referring provider: Salma Mcmullen PA-C  Dx:   Encounter Diagnosis     ICD-10-CM    1  Bilateral leg weakness  R29 898    2  Chronic right-sided low back pain without sciatica  M54 5     G89 29    3  Peroneal tendonitis, left  M76 72    4  Primary osteoarthritis of left ankle  M19 072    5  Peroneal tendinitis of left lower extremity  M76 72    6  Primary osteoarthritis, left ankle and foot  M19 072                   Subjective: Notes no burning in LEs  Just aching in anterior hips today  Objective: See treatment diary below      Assessment:  Less sxs in B/L LEs post BKTC and prone knee flexion B/L for rectus release  No pain with  to L/S or LE referral with deep pressure  (-) SLR signs B/L today  Improved tolerance to all ankle TE  Plan: Continue per plan of care  Re-assess next session        Precautions: HTN, Asthma, chest pain history  EPOC: 21      Manuals 9/15 5/26 6/9 6/16 6/23 7/14 8/4 8/11 8/25 9/8   L ankle PROM PF  PF  PF PF PF PF PF PF   Talar joint mobs PF  PF  PF PF  PF     Peroneal IASTM PF    PF PF  PF     B/L hip PROM and HS stretch PF 15' PF PF PF   PF PF PF   Lumbar MFR PF        PF PF    30'  30' 10' 30' 30"  25' 30' 25'   Re-evaluation (add L/S)  PF 30'     PF 35'      Neuro Re-Ed             Foam romberg             Tandem             SLS       3x30"   3x30"   NSP marching  20x 20x2 20x2    20x 20x    TA contraction with VCs  5' 5'          NSP heel slide   20x2     20x     NSP SLR   20x2 each    10x ea      PPT supine and standing        10x each 10x ea 10x spasm   Ther Ex             Bike          TM 6 min    Marc stretch B/L 3x15" 3x15" 3x15"  3x15"        Tendon glides INV/EV and PF/DF      30x ea  20x     Ankle iso INV/EV             Tband 4 way      GTB 20x       HR/TR       20x 10x ea  20x slant board   Side stepping in PF          OTB 3 laps   Gastroc stretch at block 3x15"     3x15" strap    3x15"   Clamshell  OTB 2x10  30x 30x        Prone modified plank         3x15' 3x15"   Barry pose and cat/cow 3x15        5x10" ea    Sling hip ABD  2x10  2x10 2x10        STD hip 3 way           OTB 3x10   Step up and down          8" 2x10 ea   ISO ABD/ADD hooklying 10x5"   10x5" each     10x10" ea    Ther Activity             Bike        5 mins                  Gait Training                                       Modalities             US continuous 1 mA lateral L ankle     8' 8'

## 2021-09-16 ENCOUNTER — OFFICE VISIT (OUTPATIENT)
Dept: DERMATOLOGY | Facility: CLINIC | Age: 46
End: 2021-09-16
Payer: COMMERCIAL

## 2021-09-16 VITALS — TEMPERATURE: 98.4 F | BODY MASS INDEX: 41.95 KG/M2 | WEIGHT: 293 LBS | HEIGHT: 70 IN

## 2021-09-16 DIAGNOSIS — L30.9 DERMATITIS: ICD-10-CM

## 2021-09-16 DIAGNOSIS — L30.9 HAND DERMATITIS: ICD-10-CM

## 2021-09-16 DIAGNOSIS — D22.70 MULTIPLE BENIGN NEVI OF UPPER EXTREMITY, LOWER EXTREMITY, AND TRUNK: Primary | ICD-10-CM

## 2021-09-16 DIAGNOSIS — D22.5 MULTIPLE BENIGN NEVI OF UPPER EXTREMITY, LOWER EXTREMITY, AND TRUNK: Primary | ICD-10-CM

## 2021-09-16 DIAGNOSIS — D22.60 MULTIPLE BENIGN NEVI OF UPPER EXTREMITY, LOWER EXTREMITY, AND TRUNK: Primary | ICD-10-CM

## 2021-09-16 PROCEDURE — 99204 OFFICE O/P NEW MOD 45 MIN: CPT | Performed by: DERMATOLOGY

## 2021-09-16 NOTE — PROGRESS NOTES
[facility] Note     Patient Name: Kaylyn Virk  Encounter Date: 9/16/2021     Have you been cared for by a [facility] Dermatologist in the last 3 years and, if so, which one? No    · Have you traveled outside of the [address] [place] [address] in the past 3 months or outside of the [facility] area in the last 2 weeks? No     May we call your Preferred Phone number to discuss your specific medical information? Yes     May we leave a detailed message that includes your specific medical information? Yes      Today's Chief Concerns:   Concern #1:  Full body skin check   Concern #2:  Rash on hands and chest    Past Medical History:  Have you personally ever had or currently have any of the following? · Skin cancer (such as Melanoma, Basal Cell Carcinoma, Squamous Cell Carcinoma? (If Yes, please provide more detail)- No  · Eczema: No  · Psoriasis: No  · HIV/AIDS: No  · Hepatitis B or C: No  · Tuberculosis: No  · Systemic Immunosuppression such as Diabetes, Biologic or Immunotherapy, Chemotherapy, Organ Transplantation, Bone Marrow Transplantation (If YES, please provide more detail): No  · Radiation Treatment (If YES, please provide more detail): No  · Any other major medical conditions/concerns? (If Yes, which types)- No      Family History:  Have any of your "first degree relatives" (parent, brother, sister, or child) had any of the following       · Skin cancer such as Melanoma or Merkel Cell Carcinoma or Pancreatic Cancer? YES, Mom has melanoma   · Eczema, Asthma, Hay Fever or Seasonal Allergies: No  · Psoriasis or Psoriatic Arthritis: No  · Do any other medical conditions seem to run in your family? If Yes, what condition and which relatives?   No    Current Medications:       Current Outpatient Medications:     albuterol (VENTOLIN HFA) 90 mcg/act inhaler, Inhale 2 puffs every 6 (six) hours as needed for wheezing, Disp: 18 g, Rfl: 0    amLODIPine (NORVASC) 10 mg tablet, Take 1 tablet (10 mg total) by mouth daily, Disp: 90 tablet, Rfl: 1    Aspirin-Acetaminophen-Caffeine (EXCEDRIN PO), Take by mouth (Patient not taking: Reported on 8/11/2021), Disp: , Rfl:     cholecalciferol (VITAMIN D3) 1,000 units tablet, Take 2,000 Units by mouth daily, Disp: , Rfl:     DULoxetine (CYMBALTA) 30 mg delayed release capsule, Take 1 PO HS with 60 mg to equal 90 mg daily  , Disp: 90 capsule, Rfl: 0    DULoxetine (CYMBALTA) 60 mg delayed release capsule, Take 1 capsule (60 mg total) by mouth daily, Disp: 90 capsule, Rfl: 0    famotidine (PEPCID) 20 mg tablet, Take 1 tablet (20 mg total) by mouth 2 (two) times a day, Disp: 180 tablet, Rfl: 1    levothyroxine 200 mcg tablet, Take 1 tablet (200 mcg total) by mouth daily, Disp: 90 tablet, Rfl: 0    levothyroxine 25 mcg tablet, 25 mcg 1 tab PO q am with 200 mcg on Mon, Wed, Fri, Sun ONLY , con't 200 mcg all other days, Disp: 90 tablet, Rfl: 0    metaxalone (Skelaxin) 800 mg tablet, Take 1 tablet (800 mg total) by mouth 3 (three) times a day as needed for muscle spasms, Disp: 30 tablet, Rfl: 1    multivitamin (THERAGRAN) TABS, Take 1 tablet by mouth daily, Disp: , Rfl:     ondansetron (ZOFRAN-ODT) 4 mg disintegrating tablet, Take 1 tablet (4 mg total) by mouth every 8 (eight) hours as needed for nausea or vomiting, Disp: 12 tablet, Rfl: 0    pregabalin (LYRICA) 75 mg capsule, Take 1 PO BID , Disp: 60 capsule, Rfl: 0      Review of Systems:  Have you recently had or currently have any of the following? If YES, what are you doing for the problem? · Fever, chills or unintended weight loss: No  · Sudden loss or change in your vision: No  · Nausea, vomiting or blood in your stool: No  · Painful or swollen joints: YES,   · Wheezing or cough: No  · Changing mole or non-healing wound: No  · Nosebleeds: No  · Excessive sweating: No  · Easy or prolonged bleeding?   No  · Over the last 2 weeks, how often have you been bothered by the following problems? · Taking little interest or pleasure in doing things: 1 - Not at All  · Feeling down, depressed, or hopeless: 1 - Not at All  · Rapid heartbeat with epinephrine:  No    · FEMALES ONLY:    · Are you pregnant or planning to become pregnant? No  · Are you currently or planning to be nursing or breast feeding? No    · Any known allergies? Allergies   Allergen Reactions    Acetaminophen-Codeine      Pt states she does not remember having a reaction to this medication    Hydrocodone Other (See Comments)    Latex Hives     Latex powder      Prednisolone    ·       Physical Exam:     Was a chaperone (Derm Clinical Assistant) present throughout the entire Physical Exam? Yes     Did the Dermatology Team specifically  the patient on the importance of a Full Skin Exam to be sure that nothing is missed clinically? Yes}  o Did the patient ultimately request or accept a Full Skin Exam?  Yes  o Did the patient specifically refuse to have the areas "under-the-bra" examined by the Dermatologist? No  o Did the patient specifically refuse to have the areas "under-the-underwear" examined by the Dermatologist? No    CONSTITUTIONAL:   There were no vitals filed for this visit      PSYCH: Normal mood and affect  EYES: Normal conjunctiva  ENT: Normal lips and oral mucosa  CARDIOVASCULAR: No edema  RESPIRATORY: Normal respirations  HEME/LYMPH/IMMUNO:  No regional lymphadenopathy except as noted below in "ASSESSMENT AND PLAN BY DIAGNOSIS"    SKIN:  FULL ORGAN SYSTEM EXAM   Hair, Scalp, Ears, Face Normal except as noted below in Assessment   Neck, Cervical Chain Nodes Normal except as noted below in Assessment   Right Arm/Hand/Fingers Normal except as noted below in Assessment   Left Arm/Hand/Fingers Normal except as noted below in Assessment   Chest/Axillae Viewed areas Normal except as noted below in Assessment   Abdomen, Umbilicus Normal except as noted below in Assessment   Back/Spine Normal except as noted below in Assessment       Right Leg, Foot, Toes Normal except as noted below in Assessment   Left Leg, Foot, Toes Normal except as noted below in Assessment        Assessment and Plan by Diagnosis:    History of Present Condition:     Duration:  How long has this been an issue for you?    o  Few years   Location Affected:  Where on the body is this affecting you?    o  chest, hands   Quality:  Is there any bleeding, pain, itch, burning/irritation, or redness associated with the skin lesion?    o  Hands - pain  o Chest - itching   Severity:  Describe any bleeding, pain, itch, burning/irritation, or redness on a scale of 1 to 10 (with 10 being the worst)  o  5   Timing:  Does this condition seem to be there pretty constantly or do you notice it more at specific times throughout the day?    o  Denies   Context:  Have you ever noticed that this condition seems to be associated with specific activities you do?    o  Denies   Modifying Factors:    o Anything that seems to make the condition worse?    -  Denies  o What have you tried to do to make the condition better? -  Denies   Associated Signs and Symptoms:  Does this skin lesion seem to be associated with any of the following:  o  SL AMB DERM SIGNS AND SYMPTOMS: Redness and Itching and Scratching       1  MELANOCYTIC NEVUS ("Mole")    Assessment and Plan:  Based on a thorough discussion of this condition and the management approach to it (including a comprehensive discussion of the known risks, side effects and potential benefits of treatment), the patient (family) agrees to implement the following specific plan:   Benign; Continue to monitor for changes  Continue to use SPF 48 or higher daily, wearing sun protected clothing and hats help prevent skin cancer  Melanocytic Nevi  Melanocytic nevi ("moles") are tan or brown, raised or flat areas of the skin which have an increased number of melanocytes   Melanocytes are the cells in our body which make pigment and account for skin color  Some moles are present at birth (I e , "congenital nevi"), while others come up later in life (i e , "acquired nevi")  The sun can stimulate the body to make more moles  Sunburns are not the only thing that triggers more moles  Chronic sun exposure can do it too  Clinically distinguishing a healthy mole from melanoma may be difficult, even for experienced dermatologists  The "ABCDE's" of moles have been suggested as a means of helping to alert a person to a suspicious mole and the possible increased risk of melanoma  The suggestions for raising alert are as follows:    Asymmetry: Healthy moles tend to be symmetric, while melanomas are often asymmetric  Asymmetry means if you draw a line through the mole, the two halves do not match in color, size, shape, or surface texture  Asymmetry can be a result of rapid enlargement of a mole, the development of a raised area on a previously flat lesion, scaling, ulceration, bleeding or scabbing within the mole  Any mole that starts to demonstrate "asymmetry" should be examined promptly by a board certified dermatologist      Border: Healthy moles tend to have discrete, even borders  The border of a melanoma often blends into the normal skin and does not sharply delineate the mole from normal skin  Any mole that starts to demonstrate "uneven borders" should be examined promptly by a board certified dermatologist      Color: Healthy moles tend to be one color throughout  Melanomas tend to be made up of different colors ranging from dark black, blue, white, or red  Any mole that demonstrates a color change should be examined promptly by a board certified dermatologist      Diameter: Healthy moles tend to be smaller than 0 6 cm in size; an exception are "congenital nevi" that can be larger  Melanomas tend to grow and can often be greater than 0 6 cm (1/4 of an inch, or the size of a pencil eraser)   This is only a guideline, and many normal moles may be larger than 0 6 cm without being unhealthy  Any mole that starts to change in size (small to bigger or bigger to smaller) should be examined promptly by a board certified dermatologist      Evolving: Healthy moles tend to "stay the same "  Melanomas may often show signs of change or evolution such as a change in size, shape, color, or elevation  Any mole that starts to itch, bleed, crust, burn, hurt, or ulcerate or demonstrate a change or evolution should be examined promptly by a board certified dermatologist       Dysplastic Nevi  Dysplastic moles are moles that fit the ABCDE rules of melanoma but are not identified as melanomas when examined under the microscope  They may indicate an increased risk of melanoma in that person  If there is a family history of melanoma, most experts agree that the person may be at an increased risk for developing a melanoma  Experts still do not agree on what dysplastic moles mean in patients without a personal or family history of melanoma  Dysplastic moles are usually larger than common moles and have different colors within it with irregular borders  The appearance can be very similar to a melanoma  Biopsies of dysplastic moles may show abnormalities which are different from a regular mole  Melanoma  Malignant melanoma is a type of skin cancer that can be deadly if it spreads throughout the body  The incidence of melanoma in the [place] is growing faster than any other cancer  Melanoma usually grows near the surface of the skin for a period of time, and then begins to grow deeper into the skin  Once it grows deeper into the skin, the risk of spread to other organs greatly increases  Therefore, early detection and removal of a malignant melanoma may result in a better chance at a complete cure; removal after the tumor has spread may not be as effective, leading to worse clinical outcomes such as death      The true rate of nevus transformation into a melanoma is unknown  It has been estimated that the lifetime risk for any acquired melanocytic nevus on any 21year-old individual transforming into melanoma by age [de-identified] is 0 03% (1 in 3,164) for men and 0 009% (1 in 10,800) for women  The appearance of a "new mole" remains one of the most reliable methods for identifying a malignant melanoma  Occasionally, melanomas appear as rapidly growing, blue-black, dome-shaped bumps within a previous mole or previous area of normal skin  Other times, melanomas are suspected when a mole suddenly appears or changes  Itching, burning, or pain in a pigmented lesion should increase suspicion, but most patients with early melanoma have no skin discomfort whatsoever  Melanoma can occur anywhere on the skin, including areas that are difficult for self-examination  Many melanomas are first noticed by other family members  Suspicious-looking moles may be removed for microscopic examination  You may be able to prevent death from melanoma by doing two simple things:    1  Try to avoid unnecessary sun exposure and protect your skin when it is exposed to the sun  People who live near the equator, people who have intermittent exposures to large amounts of sun, and people who have had sunburns in childhood or adolescence have an increased risk for melanoma  Sun sense and vigilant sun protection may be keys to helping to prevent melanoma  We recommend wearing UPF-rated sun protective clothing and sunglasses whenever possible and applying a moisturizer-sunscreen combination product (SPF 50+) such as Neutrogena Daily Defense to sun exposed areas of skin at least three times a day  2  Have your moles regularly examined by a board certified dermatologist AND by yourself or a family member/friend at home    We recommend that you have your moles examined at least once a year by a board certified dermatologist   Use your birthday as an annual reminder to have your "Birthday Suit" (I e , your skin) examined; it is a nice birthday gift to yourself to know that your skin is healthy appearing! Additionally, at-home self examinations may be helpful for detecting a possible melanoma  Use the ABCDEs we discussed and check your moles once a month at home  2   ATYPICAL NEVUS    Physical Exam:   Anatomic Location Affected:  Right lower back; 1 0 cm x 0 4 cm   Morphological Description:  Brown macule    Additional History of Present Condition:  Present for years  Assessment and Plan:  Based on a thorough discussion of this condition and the management approach to it (including a comprehensive discussion of the known risks, side effects and potential benefits of treatment), the patient (family) agrees to implement the following specific plan:   I OFFERED HER BUT SHE DECLINED A BIOPSY TODAY  Follow up in November or December 2021 to come back and look at the spot  Patient deferred wanting a biopsy done today  3  Irritant contact or other eczematous DERMATITIS    Physical Exam:   Anatomic Location Affected:  Bilateral hands/palms   Morphological Description:  Erythematous eczematous scaly patch   Severity: mild                Additional History of Present Condition:  Present for several years  Denies using any products, bothersome and itches  Assessment and Plan:  Based on a thorough discussion of this condition and the management approach to it (including a comprehensive discussion of the known risks, side effects and potential benefits of treatment), the patient (family) agrees to implement the following specific plan:        Assessment and Plan:   Atopic Dermatitis is a chronic, itchy skin condition that is very common in children but may occur at any age  It is also known as eczema or atopic eczema   It is the most common form of dermatitis  Atopic dermatitis usually occurs in people who have an atopic tendency    This means they may develop any or all of these closely linked conditions: Atopic dermatitis, asthma, hay fever (allergic rhinitis), eosinophilic esophagitis, and gastroenteritis  Often these conditions run within families with a parent, child or sibling also affected  A family history of asthma, eczema or hay fever is particularly useful in diagnosing atopic dermatitis in infants  Atopic dermatitis arises because of a complex interaction of genetic and environmental factors  These include defects in skin barrier function making the skin more susceptible to irritation by soap and other contact irritants, the weather, temperature and non-specific triggers  There is also an element of immune system dysregulation that is often present  By definition, it is chronic and has a "waxing-waning" nature; flares should be expected but with good education and treatment strategies can be minimized  Some specific tips we discussed:   Dry skin care   Usingonly mild cleansers (hypoallergenic and without fragrances) and fragrance free detergent (not unscented products which contain a masking agent); we discussed avoiding irritants/fragranced products   The importance of regular application of moisturizers daily (at least 3 times a day)   The known and theoretical side effects of steroids at length, including but not limited to atrophy of skin and increased pressure in eye (glaucoma) and clouding of the eye's lens (cataracts) if used in or around the eye for extended durations   The specific over-the-counter interventions and medications   Side effects, risks and benefits of topical and oral medications discussed     After lengthy discussion of etiology and treatment options, we decided to implement the following personalized treatment plan:      YOUR PERSONALIZED ECZEMA ACTION PLAN    FOR ACUTE FLARING    1) Antimicrobials  a) None    b) Clindamycin 75mg/5mL solution:  Take by mouth THREE TIMES A DAY for 10 days straight to help reduce the amount of presumed Staph aureus colonizing the skin  c) Bleach baths:  Soak in a bleach bath (recipe provided via email) about 3 times a week for about 5-10 minutes a day; crucially, make sure to rinse thoroughly with fresh water and apply moisturizer within 3 minutes of toweling off gently  2) Anti-Inflammatories  a) During periods of acute flaring, apply the Hydrocortisone 2 5% ointment to the face and neck TWICE A DAY for 2 weeks straight  To give you an idea of how much medication to use: You should be using at least a single full 30-gram tube of this medication EACH WEEK  b) During periods of acute flaring, apply the Triamcinolone 0 1% ointment to the body TWICE A DAY for 2 weeks straight  To give you an idea of how much medication to use: You should be using at least two full 30-gram tubes of this medication EACH WEEK  Do NOT apply this stronger medication to the face or groin area as the skin is too thin and at greater risk for side effects  3) Moisturizers  a) Apply Eucerin cream or Aquaphor ointment at least 3 times a day  It is best to use moisturizers and prescription medications at DIFFERENT times during the day (ideally, about 30 minutes apart)  If you must apply your prescription medication and your moisturizer at the same time, then ALWAYS apply the prescription medication FIRST (i e , directly to the skin); as we discussed, this allows the prescription medication to reach the skin without being blocked by the thick moisturizer! 4) Oral Antihistamines  a) Cetirizine (Zyrtec): Take 5 mL (1 teaspoon) of 5mg/5mL oral suspension EACH MORNING through allergy season  b) Hydroxyzine (Atarax): Take 5 mL (1 teaspoon) of 12 5mg/5mL oral solution about 1 hour before bedtime for the next 3-5 evenings to help reduce scratching        FOR CHRONIC MAINTENANCE    1) Antimicrobials  a) None    b) Bleach baths:  Soak in a bleach bath (recipe provided via email) about 3 times a week for about 5-10 minutes a day; crucially, make sure to rinse thoroughly with fresh water and apply moisturizer within 3 minutes of toweling off gently  2) Anti-Inflammatories  a) Once in the chronic maintenance phase apply Hydrocortisone 2 5% ointment to the face ONCE A DAY and only on Mondays, Wednesdays and Fridays to help decrease the inflammation; try to decrease to [facility] and Fridays if possible  b) Once in the chronic maintenance phase apply Triamcinolone 0 1% ointment to the body ONCE A DAY and only on Mondays, Wednesdays and Fridays to help decrease the inflammation; try to decrease to [facility] and Fridays if possible  Do NOT apply this stronger medication to your face or groin area as the skin is too thin and at greater risk for side effects  c) Apply crisaborole 2% Ramiro Ratel) ointment TWICE A DAY on Tuesdays, Thursdays, Saturdays and Sundays (i e , the days you are not applying a topical steroid) to both the face/neck and body  As we discussed, this product is approved for the topical treatment of mild-to-moderate eczema in patients 3years of age and older; use of the medication in kids younger than 2 is considered off label and has not been formally studied  Burning and stinging are the most commonly reported side effects of this medication  Rarely, this product has been known to cause hives and hypersensitivity reactions; discontinue its use if you develop severe itching, swelling, or redness in the area of application  3) Moisturizers  a) Continue to apply Eucerin cream or Aquaphor ointment at least 3 times a day  It is best to use moisturizers and prescription medications at DIFFERENT times during the day (ideally, about 30 minutes apart)   If you must apply your prescription medication and your moisturizer at the same time, then ALWAYS apply the prescription medication FIRST (i e , directly to the skin); as we discussed, this allows the prescription medication to reach the skin without being blocked by the thick moisturizer! 4) Oral Antihistamines  Take Cetirizine (Zyrtec): Take 5 mL (1 teaspoon) of 5mg/5mL oral suspension through allergy season  EDUCATION AS INTERVENTION! WHAT IS ATOPIC DERMATITIS? Atopic dermatitis (also called eczema) is a condition of the skin where the skin is dry, red, and itchy  The main function of the skin is to provide a barrier from the environment and is also the first defense of the immune system  In atopic dermatitis the skin barrier is decreased or disrupted, and the skin is easily irritated  As a result, moisture escapes the skin more easily, and environmental allergens and microbes can enter the skin more easily  Consequently, the skin's immune system is altered  If there are increased allergic type cells in the skin, the skin may become red and hyper-excitable   This leads to itching and a subsequent rash  WHY DO PEOPLE GET ATOPIC DERMATITIS? There is no single answer because many factors are involved  It is likely a combination of genetic makeup and environmental triggers and/or exposures  Excessive drying or sweating of the skin, Irritating soaps, dust mites, and pet dander are some of the more common triggers  There is no blood test that can be done to confirm this diagnosis  The history and appearance of the skin is usually sufficient for a diagnosis  However, in some cases if the rash does not fit with the history or respond appropriately to treatment, a skin biopsy may be helpful  Many children do outgrow atopic dermatitis or get better; however, many continue to have sensitive skin into adulthood  Asthma and hay fever are often seen in many patients with atopic dermatitis; however, asthma flares do not necessarily occur at the same time as skin flares  PREVENTING FLARES OF ATOPIC DERMATITIS  The first step is to maintain the skin's barrier function  Keep the skin well moisturized    Avoid irritants and triggers  Use prescribed medicine when there are red or rough areas to help the skin to return to normal as quickly as possible  Try to limit scratching  If you keep the skin well moisturized, and avoid coming in contact with things you know irritate your child's skin, there will be less flares  However, some flares of atopic dermatitis are beyond your control  You should work with your health care provider to come up with a plan that minimizes flares while minimizing long term use of medications that suppress the immune system  WHAT ARE SOME OF THE TRIGGERS? Triggers are different for different people  The most common triggers are:   Heat and sweat for some individuals, cold weather for others   House dust mites, pet fur   Wool; synthetic fabrics like nylon; dyed fabrics   Tobacco smoke    Fragrances in: shampoos, soaps, lotions, laundry detergents, fabric softeners   Saliva or prolonged exposure to water  WHAT ABOUT FOOD ALLERGIES? This is a very controversial topic, as many believe that food allergies are responsible for skin flares  In some cases, specific foods may cause worsening of atopic dermatitis; however this occurs in a minority of cases and usually happens within a few hours of ingestion  While food allergy is more common in children with eczema, foods are specific triggers for flares in only a small percentage of children  If you notice that the skin flares after certain foods you can see if eliminating one food at time makes a difference, as long as your child can still enjoy a well-balanced diet  There are blood (RAST) and skin (PRICK) tests that can check for allergies, but they are often positive in children who are not truly allergic  Therefore it is important that you work with your allergist and dermatologist to determine which foods are relevant and causing true symptoms    Extreme food elimination diets without the guidance of your doctor, which have become more popular in recent years, may even result in worsening of the skin rash due to malnutrition and avoidance of essential nutrients  TREATMENT  Treatments are aimed at minimizing exposure to irritating factors and decreasing  the skin inflammation which results in an itchy rash  There are many different treatment options, which depend on your child's rash, its location, and severity  Topical treatments include corticosteroids and steroid-like creams such as Protopic, Elidel, and Eucrisa, which are believed to not thin the skin  Please read the discussions below regarding risks and benefits of all of these creams  Occasionally bacterial or viral infections can occur which flare the skin and require oral and/or topical antibiotics or antivirals  In some cases bleach baths 2-3 times weekly can be helpful to prevent recurrent infection  For severe disease, strong oral medications such as corticosteroids, methotrexate or azathioprine (Imuran) may be needed  These medications require close monitoring and follow-up  You should discuss the risks/ benefits/alternatives of these medications with your health care provider to come up with the best treatment plan for your child  1) Use moisturizer all over the entire body at least THREE TIMES a day  This keeps the skin moisturized to restore the barrier function  Find a cream or ointment that your child likes - this is the most important  The medicines do not work in the bottle  The thicker the moisturizer, generally the better barrier it provides  Ointments often moisturize better than creams; and creams work better than lotions  Lotions are more useful during the summer when thick greasy ointments are uncomfortable  If you put moisturizer on the skin after bathing, while the skin is damp, it is twice as effective  The moisturizer provides a seal holding the water in the skin    You may bathe your child in warm - not hot - water, for short periods of time (no more than 5-10 minutes at a time) once a day if they like  Lightly pat your child dry with a towel and, while the skin is still damp, (within 3 minutes) apply a moisturizer from head to toe  If your child is using a medicated cream, apply it and allow it to absorb completely BEFORE you apply the moisturizer  2) Apply the prescription medication TWICE A DAY to only the red, rough areas on the skin OR AS [place]  Put the medication on your fingers and gently rub it into the areas  Usually the medicine will help an area within a few days time  Try to put the medicine on for two days after you have noticed that the redness is no longer present; this will help the redness from returning  The severity of the rash and the strength and usage of the medication will determine how quickly you see improvement  It is important that you do not overuse steroid creams, and if you notice a thin, shiny appearance to the skin or broken blood vessels, you should stop using the cream and consult your health care provider regarding possible overuse/overthinning of the skin  The face, armpits and groin have particularly thin and sensitive skin and are therefore most at risk for bad results if steroids are over-used in these sites  3) Avoid triggers  Some children have specific things that trigger itching and rashes, while others may have none that can be identified  It may require a little bit of trial and error to see what applies to your child  Also, triggers can change over time for your child  The most common triggers are listed above; start with these  Avoid the use of fabric softeners in the washing machine or dryer sheets (unless they are fragrance-free)  Try to use laundry detergents, soaps and shampoos that are fragrance-free  You may find it helpful to double-rinse your clothes    Some children are sensitive to house dust mites and they may benefit from a plastic mattress wrap   While food allergy is more common in children with eczema, foods are specific triggers for flares in only a small percentage of children  If you notice that the skin flares after certain foods you can see if eliminating one food at time makes a difference, as long as your child can still enjoy a well-balanced diet  4) Consider using a medication like an anti-histamine by mouth to help control the itching  Scratching only makes the skin more reactive and the barrier function even more disrupted  It can cause both children and their parents to lose sleep! There are different types of anti-itch medications  Some cause more drowsiness than others  Both types are acceptable depending on your child and your preference  Start with Benadryl and if that does not work, ask for a prescription antihistamine      5) About the prescription creams:  Corticosteroid creams and ointments (generally things with "-one" or "jaylen" on the end of their names): The strength of the cream or ointment depends on the name of the active ingredient  The numbers at the end do not indicate the relative strength  Thus triamcinolone 0 1% ointment, considered a mid-strength corticosteroid, is much stronger than hydrocortisone 1% even though the number following the name is much lower  Topical corticosteroids are very effective in treating atopic dermatitis  When used in the manner prescribed (to rashy areas of skin and for no more than a few weeks at a time to any one area) they are very safe  These are corticosteroids and are anti-inflammatory, not the anabolic steroids like those used illegally by some athletes  Topical non-steroid creams and ointments (immunomodulators): These creams and ointments are also called topical calcineurin inhibitors (TCIs)  These include Protopic ointment and Elidel cream  Crisaborole 2% Orene Girshirley) is a prescription ointment that targets an enzyme called PDE4 (phosphodiesterase 4)    It is used on the skin topically to treat mild-to-moderate eczema in adults and children 3years of age and older  In total, these nonsteroidal prescriptions are used to help decrease itching and redness in the skin  They are not as strong as most steroid creams; however, it is believed that they do not thin the skin when overused  They are generally used as second-line medications, though they may be used alone or in conjunction with topical steroids  In sensitive areas such as the face, underarms or groin, they are often recommended  They can sting inflamed skin, but are generally well tolerated once the skin is healing  The FDA placed a black-box warning on both Elidel and Protopic in 2006 based on animal studies using the medications  Some animals developed skin cancer and lymphoma  Subsequently, the FDA released a statement that there is no causal relationship between the two medications and cancer  Because of this concern, there are ongoing studies to evaluate this relationship in humans  So far, there are studies that support the safety of these medications  One showed that the rates of cancer in patient using these medications topically were less than the rates of the general population and another showed that in patient's using the medication over a large area of the body, the levels of the medication in the blood was undetectable  As for Eucrisa, this product is only approved for the topical treatment of mild-to-moderate eczema in patients 3years of age and older; use of the medication in kids younger than 2 is considered off label and has not been formally studied  Burning and stinging are the most commonly reported side effects of this medication  Rarely, this product has been known to cause hives and hypersensitivity reactions; discontinue its use if you develop severe itching, swelling, or redness in the area of application      In rare cases, patients may present with sebaceous nevus syndrome, which includes disorders of the eye, brain, and skeletal system  Those affected may have eye tumors, asymmetrical skulls, seizures, developmental delay, and paralysis on one side of the body  Clinically, a sebaceous nevus is a type of birthmark that usually appears on the scalp, but can be present anywhere on the body  Sebaceous nevi have a characteristic clinical appearance and can be diagnosed with a thorough history and physical exam   They appear as solitary, smooth, yellow-orange hairless patches that are often oval or linear in shape  When on the scalp, they are typically associated with partial or total hair loss (alopecia)  During puberty, sebaceous nevi may become bumpy, warty, or scaly  The treatment of sebaceous nevus remains controversial  Research suggests that although the risk of developing secondary carcinomas is low, secondary benign neoplasms may be quite common  Surgical excision can be considered in late childhood, especially for large and cosmetically concerning lesions  It is also safe to simply monitor for any changes that arise         Scribe Attestation    I,:  Varun Potts am acting as a scribe while in the presence of the attending physician :       I,:  Tala Call MD personally performed the services described in this documentation    as scribed in my presence :

## 2021-09-16 NOTE — PATIENT INSTRUCTIONS
ATOPIC DERMATITIS ("childhood Eczema")    Assessment and Plan:   Atopic Dermatitis is a chronic, itchy skin condition that is very common in children but may occur at any age  It is also known as eczema or atopic eczema   It is the most common form of dermatitis  Atopic dermatitis usually occurs in people who have an atopic tendency    This means they may develop any or all of these closely linked conditions: Atopic dermatitis, asthma, hay fever (allergic rhinitis), eosinophilic esophagitis, and gastroenteritis  Often these conditions run within families with a parent, child or sibling also affected  A family history of asthma, eczema or hay fever is particularly useful in diagnosing atopic dermatitis in infants  Atopic dermatitis arises because of a complex interaction of genetic and environmental factors  These include defects in skin barrier function making the skin more susceptible to irritation by soap and other contact irritants, the weather, temperature and non-specific triggers  There is also an element of immune system dysregulation that is often present  By definition, it is chronic and has a "waxing-waning" nature; flares should be expected but with good education and treatment strategies can be minimized  Some specific tips we discussed:   Dry skin care   Usingonly mild cleansers (hypoallergenic and without fragrances) and fragrance free detergent (not unscented products which contain a masking agent); we discussed avoiding irritants/fragranced products   The importance of regular application of moisturizers daily (at least 3 times a day)   The known and theoretical side effects of steroids at length, including but not limited to atrophy of skin and increased pressure in eye (glaucoma) and clouding of the eye's lens (cataracts) if used in or around the eye for extended durations   The specific over-the-counter interventions and medications     Side effects, risks and benefits of topical and oral medications discussed   After lengthy discussion of etiology and treatment options, we decided to implement the following personalized treatment plan:      YOUR PERSONALIZED ECZEMA ACTION PLAN    FOR ACUTE FLARING    1) Antimicrobials  a) None    b) Clindamycin 75mg/5mL solution:  Take by mouth THREE TIMES A DAY for 10 days straight to help reduce the amount of presumed Staph aureus colonizing the skin  c) Bleach baths:  Soak in a bleach bath (recipe provided via email) about 3 times a week for about 5-10 minutes a day; crucially, make sure to rinse thoroughly with fresh water and apply moisturizer within 3 minutes of toweling off gently  2) Anti-Inflammatories  a) During periods of acute flaring, apply the Hydrocortisone 2 5% ointment to the face and neck TWICE A DAY for 2 weeks straight  To give you an idea of how much medication to use: You should be using at least a single full 30-gram tube of this medication EACH WEEK  b) During periods of acute flaring, apply the Triamcinolone 0 1% ointment to the body TWICE A DAY for 2 weeks straight  To give you an idea of how much medication to use: You should be using at least two full 30-gram tubes of this medication EACH WEEK  Do NOT apply this stronger medication to the face or groin area as the skin is too thin and at greater risk for side effects  3) Moisturizers  a) Apply Eucerin cream or Aquaphor ointment at least 3 times a day  It is best to use moisturizers and prescription medications at DIFFERENT times during the day (ideally, about 30 minutes apart)  If you must apply your prescription medication and your moisturizer at the same time, then ALWAYS apply the prescription medication FIRST (i e , directly to the skin); as we discussed, this allows the prescription medication to reach the skin without being blocked by the thick moisturizer! 4) Oral Antihistamines  a) Cetirizine (Zyrtec):   Take 5 mL (1 teaspoon) of 5mg/5mL oral suspension EACH MORNING through allergy season  b) Hydroxyzine (Atarax): Take 5 mL (1 teaspoon) of 12 5mg/5mL oral solution about 1 hour before bedtime for the next 3-5 evenings to help reduce scratching  FOR CHRONIC MAINTENANCE    1) Antimicrobials  a) None    b) Bleach baths:  Soak in a bleach bath (recipe provided via email) about 3 times a week for about 5-10 minutes a day; crucially, make sure to rinse thoroughly with fresh water and apply moisturizer within 3 minutes of toweling off gently  2) Anti-Inflammatories  a) Once in the chronic maintenance phase apply Hydrocortisone 2 5% ointment to the face ONCE A DAY and only on Mondays, Wednesdays and Fridays to help decrease the inflammation; try to decrease to [facility] and Fridays if possible  b) Once in the chronic maintenance phase apply Triamcinolone 0 1% ointment to the body ONCE A DAY and only on Mondays, Wednesdays and Fridays to help decrease the inflammation; try to decrease to [facility] and Fridays if possible  Do NOT apply this stronger medication to your face or groin area as the skin is too thin and at greater risk for side effects  c) Apply crisaborole 2% Eduard Braga) ointment TWICE A DAY on Tuesdays, Thursdays, Saturdays and Sundays (i e , the days you are not applying a topical steroid) to both the face/neck and body  As we discussed, this product is approved for the topical treatment of mild-to-moderate eczema in patients 3years of age and older; use of the medication in kids younger than 2 is considered off label and has not been formally studied  Burning and stinging are the most commonly reported side effects of this medication  Rarely, this product has been known to cause hives and hypersensitivity reactions; discontinue its use if you develop severe itching, swelling, or redness in the area of application      3) Moisturizers  a) Continue to apply Eucerin cream or Aquaphor ointment at least 3 times a day  It is best to use moisturizers and prescription medications at DIFFERENT times during the day (ideally, about 30 minutes apart)  If you must apply your prescription medication and your moisturizer at the same time, then ALWAYS apply the prescription medication FIRST (i e , directly to the skin); as we discussed, this allows the prescription medication to reach the skin without being blocked by the thick moisturizer! 4) Oral Antihistamines  Take Cetirizine (Zyrtec): Take 5 mL (1 teaspoon) of 5mg/5mL oral suspension through allergy season  EDUCATION AS INTERVENTION! WHAT IS ATOPIC DERMATITIS? Atopic dermatitis (also called eczema) is a condition of the skin where the skin is dry, red, and itchy  The main function of the skin is to provide a barrier from the environment and is also the first defense of the immune system  In atopic dermatitis the skin barrier is decreased or disrupted, and the skin is easily irritated  As a result, moisture escapes the skin more easily, and environmental allergens and microbes can enter the skin more easily  Consequently, the skin's immune system is altered  If there are increased allergic type cells in the skin, the skin may become red and hyper-excitable   This leads to itching and a subsequent rash  WHY DO PEOPLE GET ATOPIC DERMATITIS? There is no single answer because many factors are involved  It is likely a combination of genetic makeup and environmental triggers and/or exposures  Excessive drying or sweating of the skin, Irritating soaps, dust mites, and pet dander are some of the more common triggers  There is no blood test that can be done to confirm this diagnosis  The history and appearance of the skin is usually sufficient for a diagnosis  However, in some cases if the rash does not fit with the history or respond appropriately to treatment, a skin biopsy may be helpful    Many children do outgrow atopic dermatitis or get better; however, many continue to have sensitive skin into adulthood  Asthma and hay fever are often seen in many patients with atopic dermatitis; however, asthma flares do not necessarily occur at the same time as skin flares  PREVENTING FLARES OF ATOPIC DERMATITIS  The first step is to maintain the skin's barrier function  Keep the skin well moisturized  Avoid irritants and triggers  Use prescribed medicine when there are red or rough areas to help the skin to return to normal as quickly as possible  Try to limit scratching  If you keep the skin well moisturized, and avoid coming in contact with things you know irritate your child's skin, there will be less flares  However, some flares of atopic dermatitis are beyond your control  You should work with your health care provider to come up with a plan that minimizes flares while minimizing long term use of medications that suppress the immune system  WHAT ARE SOME OF THE TRIGGERS? Triggers are different for different people  The most common triggers are:   Heat and sweat for some individuals, cold weather for others   House dust mites, pet fur   Wool; synthetic fabrics like nylon; dyed fabrics   Tobacco smoke    Fragrances in: shampoos, soaps, lotions, laundry detergents, fabric softeners   Saliva or prolonged exposure to water  WHAT ABOUT FOOD ALLERGIES? This is a very controversial topic, as many believe that food allergies are responsible for skin flares  In some cases, specific foods may cause worsening of atopic dermatitis; however this occurs in a minority of cases and usually happens within a few hours of ingestion  While food allergy is more common in children with eczema, foods are specific triggers for flares in only a small percentage of children  If you notice that the skin flares after certain foods you can see if eliminating one food at time makes a difference, as long as your child can still enjoy a well-balanced diet  There are blood (RAST) and skin (PRICK) tests that can check for allergies, but they are often positive in children who are not truly allergic  Therefore it is important that you work with your allergist and dermatologist to determine which foods are relevant and causing true symptoms  Extreme food elimination diets without the guidance of your doctor, which have become more popular in recent years, may even result in worsening of the skin rash due to malnutrition and avoidance of essential nutrients  TREATMENT  Treatments are aimed at minimizing exposure to irritating factors and decreasing  the skin inflammation which results in an itchy rash  There are many different treatment options, which depend on your child's rash, its location, and severity  Topical treatments include corticosteroids and steroid-like creams such as Protopic, Elidel, and Eucrisa, which are believed to not thin the skin  Please read the discussions below regarding risks and benefits of all of these creams  Occasionally bacterial or viral infections can occur which flare the skin and require oral and/or topical antibiotics or antivirals  In some cases bleach baths 2-3 times weekly can be helpful to prevent recurrent infection  For severe disease, strong oral medications such as corticosteroids, methotrexate or azathioprine (Imuran) may be needed  These medications require close monitoring and follow-up  You should discuss the risks/ benefits/alternatives of these medications with your health care provider to come up with the best treatment plan for your child  1) Use moisturizer all over the entire body at least THREE TIMES a day  This keeps the skin moisturized to restore the barrier function  Find a cream or ointment that your child likes - this is the most important  The medicines do not work in the bottle  The thicker the moisturizer, generally the better barrier it provides    Ointments often moisturize better than creams; and creams work better than lotions  Lotions are more useful during the summer when thick greasy ointments are uncomfortable  If you put moisturizer on the skin after bathing, while the skin is damp, it is twice as effective  The moisturizer provides a seal holding the water in the skin  You may bathe your child in warm - not hot - water, for short periods of time (no more than 5-10 minutes at a time) once a day if they like  Lightly pat your child dry with a towel and, while the skin is still damp, (within 3 minutes) apply a moisturizer from head to toe  If your child is using a medicated cream, apply it and allow it to absorb completely BEFORE you apply the moisturizer  2) Apply the prescription medication TWICE A DAY to only the red, rough areas on the skin OR [place]  Put the medication on your fingers and gently rub it into the areas  Usually the medicine will help an area within a few days time  Try to put the medicine on for two days after you have noticed that the redness is no longer present; this will help the redness from returning  The severity of the rash and the strength and usage of the medication will determine how quickly you see improvement  It is important that you do not overuse steroid creams, and if you notice a thin, shiny appearance to the skin or broken blood vessels, you should stop using the cream and consult your health care provider regarding possible overuse/overthinning of the skin  The face, armpits and groin have particularly thin and sensitive skin and are therefore most at risk for bad results if steroids are over-used in these sites  3) Avoid triggers  Some children have specific things that trigger itching and rashes, while others may have none that can be identified  It may require a little bit of trial and error to see what applies to your child  Also, triggers can change over time for your child   The most common triggers are listed above; start with these  Avoid the use of fabric softeners in the washing machine or dryer sheets (unless they are fragrance-free)  Try to use laundry detergents, soaps and shampoos that are fragrance-free  You may find it helpful to double-rinse your clothes  Some children are sensitive to house dust mites and they may benefit from a plastic mattress wrap  While food allergy is more common in children with eczema, foods are specific triggers for flares in only a small percentage of children  If you notice that the skin flares after certain foods you can see if eliminating one food at time makes a difference, as long as your child can still enjoy a well-balanced diet  4) Consider using a medication like an anti-histamine by mouth to help control the itching  Scratching only makes the skin more reactive and the barrier function even more disrupted  It can cause both children and their parents to lose sleep! There are different types of anti-itch medications  Some cause more drowsiness than others  Both types are acceptable depending on your child and your preference  Start with Benadryl and if that does not work, ask for a prescription antihistamine      5) About the prescription creams:  Corticosteroid creams and ointments (generally things with "-one" or "jaylen" on the end of their names): The strength of the cream or ointment depends on the name of the active ingredient  The numbers at the end do not indicate the relative strength  Thus triamcinolone 0 1% ointment, considered a mid-strength corticosteroid, is much stronger than hydrocortisone 1% even though the number following the name is much lower  Topical corticosteroids are very effective in treating atopic dermatitis  When used in the manner prescribed (to rashy areas of skin and for no more than a few weeks at a time to any one area) they are very safe    These are corticosteroids and are anti-inflammatory, not the anabolic steroids like those used illegally by some athletes  Topical non-steroid creams and ointments (immunomodulators): These creams and ointments are also called topical calcineurin inhibitors (TCIs)  These include Protopic ointment and Elidel cream  Crisaborole 2% Aaliyah Oneill) is a prescription ointment that targets an enzyme called PDE4 (phosphodiesterase 4)  It is used on the skin topically to treat mild-to-moderate eczema in adults and children 3years of age and older  In total, these nonsteroidal prescriptions are used to help decrease itching and redness in the skin  They are not as strong as most steroid creams; however, it is believed that they do not thin the skin when overused  They are generally used as second-line medications, though they may be used alone or in conjunction with topical steroids  In sensitive areas such as the face, underarms or groin, they are often recommended  They can sting inflamed skin, but are generally well tolerated once the skin is healing  The FDA placed a black-box warning on both Elidel and Protopic in 2006 based on animal studies using the medications  Some animals developed skin cancer and lymphoma  Subsequently, the FDA released a statement that there is no causal relationship between the two medications and cancer  Because of this concern, there are ongoing studies to evaluate this relationship in humans  So far, there are studies that support the safety of these medications  One showed that the rates of cancer in patient using these medications topically were less than the rates of the general population and another showed that in patient's using the medication over a large area of the body, the levels of the medication in the blood was undetectable      As for Eucrisa, this product is only approved for the topical treatment of mild-to-moderate eczema in patients 3years of age and older; use of the medication in kids younger than 2 is considered off label and has not been formally studied  Burning and stinging are the most commonly reported side effects of this medication  Rarely, this product has been known to cause hives and hypersensitivity reactions; discontinue its use if you develop severe itching, swelling, or redness in the area of application  In rare cases, patients may present with sebaceous nevus syndrome, which includes disorders of the eye, brain, and skeletal system  Those affected may have eye tumors, asymmetrical skulls, seizures, developmental delay, and paralysis on one side of the body  Clinically, a sebaceous nevus is a type of birthmark that usually appears on the scalp, but can be present anywhere on the body  Sebaceous nevi have a characteristic clinical appearance and can be diagnosed with a thorough history and physical exam   They appear as solitary, smooth, yellow-orange hairless patches that are often oval or linear in shape  When on the scalp, they are typically associated with partial or total hair loss (alopecia)  During puberty, sebaceous nevi may become bumpy, warty, or scaly  The treatment of sebaceous nevus remains controversial  Research suggests that although the risk of developing secondary carcinomas is low, secondary benign neoplasms may be quite common  Surgical excision can be considered in late childhood, especially for large and cosmetically concerning lesions  It is also safe to simply monitor for any changes that arise  NEVUS SEBACEOUS    Assessment and Plan:  Based on a thorough discussion of this condition and the management approach to it (including a comprehensive discussion of the known risks, side effects and potential benefits of treatment), the patient (family) agrees to implement the following specific plan:   Follow up in December to come back and look at the spot  Patient deferred wanting a biopsy done today  We discussed the nature of this birthmark    It is an abnormality that arises due to a defect in the outer layer of the developing human embryo that gives rise to the epidermis and neural tissue  They are thought to be due to a genetic abnormality in certain cell lines  It is a benign hair follicle tumor that consists of overgrown outer layers of skin (epidermis), sebaceous glands, apocrine glands, and connective tissue  They often undergo a growth phase during puberty due to hormonal changes in the patient's body  All races are affected equally and there is no sex preference  In adulthood, the growths may develop secondary neoplasms such as basal cell carcinoma, trichoblastomas, and syringocystadenoma papilliferums  The incidence of secondary cancers may be between 0-22%  MELANOCYTIC NEVI ("Moles")    Assessment and Plan:  Based on a thorough discussion of this condition and the management approach to it (including a comprehensive discussion of the known risks, side effects and potential benefits of treatment), the patient (family) agrees to implement the following specific plan:   Benign; Continue to monitor for changes  Continue to use SPF 48 or higher daily, wearing sun protected clothing and hats help prevent skin cancer  Melanocytic Nevi  Melanocytic nevi ("moles") are tan or brown, raised or flat areas of the skin which have an increased number of melanocytes  Melanocytes are the cells in our body which make pigment and account for skin color  Some moles are present at birth (I e , "congenital nevi"), while others come up later in life (i e , "acquired nevi")  The sun can stimulate the body to make more moles  Sunburns are not the only thing that triggers more moles  Chronic sun exposure can do it too  Clinically distinguishing a healthy mole from melanoma may be difficult, even for experienced dermatologists   The "ABCDE's" of moles have been suggested as a means of helping to alert a person to a suspicious mole and the possible increased risk of melanoma  The suggestions for raising alert are as follows:    Asymmetry: Healthy moles tend to be symmetric, while melanomas are often asymmetric  Asymmetry means if you draw a line through the mole, the two halves do not match in color, size, shape, or surface texture  Asymmetry can be a result of rapid enlargement of a mole, the development of a raised area on a previously flat lesion, scaling, ulceration, bleeding or scabbing within the mole  Any mole that starts to demonstrate "asymmetry" should be examined promptly by a board certified dermatologist      Border: Healthy moles tend to have discrete, even borders  The border of a melanoma often blends into the normal skin and does not sharply delineate the mole from normal skin  Any mole that starts to demonstrate "uneven borders" should be examined promptly by a board certified dermatologist      Color: Healthy moles tend to be one color throughout  Melanomas tend to be made up of different colors ranging from dark black, blue, white, or red  Any mole that demonstrates a color change should be examined promptly by a board certified dermatologist      Diameter: Healthy moles tend to be smaller than 0 6 cm in size; an exception are "congenital nevi" that can be larger  Melanomas tend to grow and can often be greater than 0 6 cm (1/4 of an inch, or the size of a pencil eraser)  This is only a guideline, and many normal moles may be larger than 0 6 cm without being unhealthy  Any mole that starts to change in size (small to bigger or bigger to smaller) should be examined promptly by a board certified dermatologist      Evolving: Healthy moles tend to "stay the same "  Melanomas may often show signs of change or evolution such as a change in size, shape, color, or elevation    Any mole that starts to itch, bleed, crust, burn, hurt, or ulcerate or demonstrate a change or evolution should be examined promptly by a board certified dermatologist  Dysplastic Nevi  Dysplastic moles are moles that fit the ABCDE rules of melanoma but are not identified as melanomas when examined under the microscope  They may indicate an increased risk of melanoma in that person  If there is a family history of melanoma, most experts agree that the person may be at an increased risk for developing a melanoma  Experts still do not agree on what dysplastic moles mean in patients without a personal or family history of melanoma  Dysplastic moles are usually larger than common moles and have different colors within it with irregular borders  The appearance can be very similar to a melanoma  Biopsies of dysplastic moles may show abnormalities which are different from a regular mole  Melanoma  Malignant melanoma is a type of skin cancer that can be deadly if it spreads throughout the body  The incidence of melanoma in the [place] is growing faster than any other cancer  Melanoma usually grows near the surface of the skin for a period of time, and then begins to grow deeper into the skin  Once it grows deeper into the skin, the risk of spread to other organs greatly increases  Therefore, early detection and removal of a malignant melanoma may result in a better chance at a complete cure; removal after the tumor has spread may not be as effective, leading to worse clinical outcomes such as death  The true rate of nevus transformation into a melanoma is unknown  It has been estimated that the lifetime risk for any acquired melanocytic nevus on any 21year-old individual transforming into melanoma by age [de-identified] is 0 03% (1 in 3,164) for men and 0 009% (1 in 10,800) for women  The appearance of a "new mole" remains one of the most reliable methods for identifying a malignant melanoma  Occasionally, melanomas appear as rapidly growing, blue-black, dome-shaped bumps within a previous mole or previous area of normal skin    Other times, melanomas are suspected when a mole suddenly appears or changes  Itching, burning, or pain in a pigmented lesion should increase suspicion, but most patients with early melanoma have no skin discomfort whatsoever  Melanoma can occur anywhere on the skin, including areas that are difficult for self-examination  Many melanomas are first noticed by other family members  Suspicious-looking moles may be removed for microscopic examination  You may be able to prevent death from melanoma by doing two simple things:    1  Try to avoid unnecessary sun exposure and protect your skin when it is exposed to the sun  People who live near the equator, people who have intermittent exposures to large amounts of sun, and people who have had sunburns in childhood or adolescence have an increased risk for melanoma  Sun sense and vigilant sun protection may be keys to helping to prevent melanoma  We recommend wearing UPF-rated sun protective clothing and sunglasses whenever possible and applying a moisturizer-sunscreen combination product (SPF 50+) such as Neutrogena Daily Defense to sun exposed areas of skin at least three times a day  2  Have your moles regularly examined by a board certified dermatologist AND by yourself or a family member/friend at home  We recommend that you have your moles examined at least once a year by a board certified dermatologist   Use your birthday as an annual reminder to have your "Birthday Suit" (I e , your skin) examined; it is a nice birthday gift to yourself to know that your skin is healthy appearing! Additionally, at-home self examinations may be helpful for detecting a possible melanoma  Use the ABCDEs we discussed and check your moles once a month at home

## 2021-09-17 ENCOUNTER — TELEMEDICINE (OUTPATIENT)
Dept: FAMILY MEDICINE CLINIC | Facility: HOSPITAL | Age: 46
End: 2021-09-17
Payer: COMMERCIAL

## 2021-09-17 ENCOUNTER — HOSPITAL ENCOUNTER (OUTPATIENT)
Dept: RADIOLOGY | Facility: CLINIC | Age: 46
Discharge: HOME | End: 2021-09-17
Attending: ANESTHESIOLOGY
Payer: COMMERCIAL

## 2021-09-17 VITALS
OXYGEN SATURATION: 97 % | HEART RATE: 80 BPM | RESPIRATION RATE: 20 BRPM | SYSTOLIC BLOOD PRESSURE: 138 MMHG | DIASTOLIC BLOOD PRESSURE: 85 MMHG

## 2021-09-17 VITALS — HEIGHT: 70 IN | WEIGHT: 293 LBS | BODY MASS INDEX: 41.95 KG/M2

## 2021-09-17 DIAGNOSIS — E66.01 MORBID OBESITY WITH BMI OF 40.0-44.9, ADULT (HCC): ICD-10-CM

## 2021-09-17 DIAGNOSIS — M48.062 SPINAL STENOSIS OF LUMBAR REGION WITH NEUROGENIC CLAUDICATION: ICD-10-CM

## 2021-09-17 DIAGNOSIS — Z12.11 COLON CANCER SCREENING: ICD-10-CM

## 2021-09-17 DIAGNOSIS — G89.29 CHRONIC RIGHT-SIDED LOW BACK PAIN WITHOUT SCIATICA: ICD-10-CM

## 2021-09-17 DIAGNOSIS — I10 ESSENTIAL HYPERTENSION: ICD-10-CM

## 2021-09-17 DIAGNOSIS — Z13.220 SCREENING FOR CHOLESTEROL LEVEL: ICD-10-CM

## 2021-09-17 DIAGNOSIS — E03.9 HYPOTHYROIDISM, UNSPECIFIED TYPE: ICD-10-CM

## 2021-09-17 DIAGNOSIS — M51.27 HERNIATED NUCLEUS PULPOSUS, L5-S1: ICD-10-CM

## 2021-09-17 DIAGNOSIS — R63.5 WEIGHT GAIN: Primary | ICD-10-CM

## 2021-09-17 DIAGNOSIS — M54.50 CHRONIC RIGHT-SIDED LOW BACK PAIN WITHOUT SCIATICA: ICD-10-CM

## 2021-09-17 PROCEDURE — 4004F PT TOBACCO SCREEN RCVD TLK: CPT | Performed by: INTERNAL MEDICINE

## 2021-09-17 PROCEDURE — 99214 OFFICE O/P EST MOD 30 MIN: CPT | Performed by: INTERNAL MEDICINE

## 2021-09-17 PROCEDURE — 3008F BODY MASS INDEX DOCD: CPT | Performed by: INTERNAL MEDICINE

## 2021-09-17 PROCEDURE — 64483 NJX AA&/STRD TFRM EPI L/S 1: CPT | Performed by: ANESTHESIOLOGY

## 2021-09-17 RX ORDER — METHYLPREDNISOLONE ACETATE 80 MG/ML
160 INJECTION, SUSPENSION INTRA-ARTICULAR; INTRALESIONAL; INTRAMUSCULAR; PARENTERAL; SOFT TISSUE ONCE
Status: COMPLETED | OUTPATIENT
Start: 2021-09-17 | End: 2021-09-17

## 2021-09-17 RX ADMIN — IOHEXOL 1 ML: 300 INJECTION, SOLUTION INTRAVENOUS at 14:48

## 2021-09-17 RX ADMIN — METHYLPREDNISOLONE ACETATE 80 MG: 80 INJECTION, SUSPENSION INTRA-ARTICULAR; INTRALESIONAL; INTRAMUSCULAR; SOFT TISSUE at 14:49

## 2021-09-17 NOTE — PATIENT INSTRUCTIONS
Obesity   AMBULATORY CARE:   Obesity  is when your body mass index (BMI) is greater than 30  Your healthcare provider will use your height and weight to measure your BMI  The risks of obesity include  many health problems, such as injuries or physical disability  You may need tests to check for the following:  · Diabetes    · High blood pressure or high cholesterol    · Heart disease    · Gallbladder or liver disease    · Cancer of the colon, breast, prostate, liver, or kidney    · Sleep apnea    · Arthritis or gout    Seek care immediately if:   · You have a severe headache, confusion, or difficulty speaking  · You have weakness on one side of your body  · You have chest pain, sweating, or shortness of breath  Contact your healthcare provider if:   · You have symptoms of gallbladder or liver disease, such as pain in your upper abdomen  · You have knee or hip pain and discomfort while walking  · You have symptoms of diabetes, such as intense hunger and thirst, and frequent urination  · You have symptoms of sleep apnea, such as snoring or daytime sleepiness  · You have questions or concerns about your condition or care  Treatment for obesity  focuses on helping you lose weight to improve your health  Even a small decrease in BMI can reduce the risk for many health problems  Your healthcare provider will help you set a weight-loss goal   · Lifestyle changes  are the first step in treating obesity  These include making healthy food choices and getting regular physical activity  Your healthcare provider may suggest a weight-loss program that involves coaching, education, and therapy  · Medicine  may help you lose weight when it is used with a healthy diet and physical activity  · Surgery  can help you lose weight if you are very obese and have other health problems  There are several types of weight-loss surgery  Ask your healthcare provider for more information      Be successful losing weight:   · Set small, realistic goals  An example of a small goal is to walk for 20 minutes 5 days a week  Anther goal is to lose 5% of your body weight  · Tell friends, family members, and coworkers about your goals  and ask for their support  Ask a friend to lose weight with you, or join a weight-loss support group  · Identify foods or triggers that may cause you to overeat , and find ways to avoid them  Remove tempting high-calorie foods from your home and workplace  Place a bowl of fresh fruit on your kitchen counter  If stress causes you to eat, then find other ways to cope with stress  · Keep a diary to track what you eat and drink  Also write down how many minutes of physical activity you do each day  Weigh yourself once a week and record it in your diary  Eating changes: You will need to eat 500 to 1,000 fewer calories each day than you currently eat to lose 1 to 2 pounds a week  The following changes will help you cut calories:  · Eat smaller portions  Use small plates, no larger than 9 inches in diameter  Fill your plate half full of fruits and vegetables  Measure your food using measuring cups until you know what a serving size looks like  · Eat 3 meals and 1 or 2 snacks each day  Plan your meals in advance  Benoit Snyder and eat at home most of the time  Eat slowly  Do not skip meals  Skipping meals can lead to overeating later in the day  This can make it harder for you to lose weight  Talk with a dietitian to help you make a meal plan and schedule that is right for you  · Eat fruits and vegetables at every meal   They are low in calories and high in fiber, which makes you feel full  Do not add butter, margarine, or cream sauce to vegetables  Use herbs to season steamed vegetables  · Eat less fat and fewer fried foods  Eat more baked or grilled chicken and fish  These protein sources are lower in calories and fat than red meat  Limit fast food   Dress your salads with olive oil and vinegar instead of bottled dressing  · Limit the amount of sugar you eat  Do not drink sugary beverages  Limit alcohol  Activity changes:  Physical activity is good for your body in many ways  It helps you burn calories and build strong muscles  It decreases stress and depression, and improves your mood  It can also help you sleep better  Talk to your healthcare provider before you begin an exercise program   · Exercise for at least 30 minutes 5 days a week  Start slowly  Set aside time each day for physical activity that you enjoy and that is convenient for you  It is best to do both weight training and an activity that increases your heart rate, such as walking, bicycling, or swimming  · Find ways to be more active  Do yard work and housecleaning  Walk up the stairs instead of using elevators  Spend your leisure time going to events that require walking, such as outdoor festivals or fairs  This extra physical activity can help you lose weight and keep it off  Follow up with your healthcare provider as directed: You may need to meet with a dietitian  Write down your questions so you remember to ask them during your visits  © Copyright [employer] 2021 Information is for End User's use only and may not be sold, redistributed or otherwise used for commercial purposes  All illustrations and images included in CareNotes® are the copyrighted property of A D A M , Inc  or [facility] Panchito Hernandez   The above information is an  only  It is not intended as medical advice for individual conditions or treatments  Talk to your doctor, nurse or pharmacist before following any medical regimen to see if it is safe and effective for you  Heart Healthy Diet   AMBULATORY CARE:   A heart healthy diet  is an eating plan low in unhealthy fats and sodium (salt)  The plan is high in healthy fats and fiber   A heart healthy diet helps improve your cholesterol levels and lowers your risk for heart disease and stroke  A dietitian will teach you how to read and understand food labels  Heart healthy diet guidelines to follow:   · Choose foods that contain healthy fats  ? Unsaturated fats  include monounsaturated and polyunsaturated fats  Unsaturated fat is found in foods such as soybean, canola, olive, corn, and safflower oils  It is also found in soft tub margarine that is made with liquid vegetable oil  ? Omega-3 fat  is found in certain fish, such as salmon, tuna, and trout, and in walnuts and flaxseed  Eat fish high in omega-3 fats at least 2 times a week  · Get 20 to 30 grams of fiber each day  Fruits, vegetables, whole-grain foods, and legumes (cooked beans) are good sources of fiber  · Limit or do not have unhealthy fats  ? Cholesterol  is found in animal foods, such as eggs and lobster, and in dairy products made from whole milk  Limit cholesterol to less than 200 mg each day  ? Saturated fat  is found in meats, such as benítez and hamburger  It is also found in chicken or turkey skin, whole milk, and butter  Limit saturated fat to less than 7% of your total daily calories  ? Trans fat  is found in packaged foods, such as potato chips and cookies  It is also in hard margarine, some fried foods, and shortening  Do not eat foods that contain trans fats  · Limit sodium as directed  You may be told to limit sodium to 2,000 to 2,300 mg each day  Choose low-sodium or no-salt-added foods  Add little or no salt to food you prepare  Use herbs and spices in place of salt  Include the following in your heart healthy plan:  Ask your dietitian or healthcare provider how many servings to have from each of the following food groups:  · Grains:      ? Whole-wheat breads, cereals, and pastas, and brown rice    ? Low-fat, low-sodium crackers and chips    · Vegetables:      ? Broccoli, green beans, green peas, and spinach    ? Collards, kale, and lima beans    ?  Carrots, sweet potatoes, tomatoes, and peppers    ? Canned vegetables with no salt added    · Fruits:      ? Bananas, peaches, pears, and pineapple    ? Grapes, raisins, and dates    ? Oranges, tangerines, grapefruit, orange juice, and grapefruit juice    ? Apricots, mangoes, melons, and papaya    ? Raspberries and strawberries    ? Canned fruit with no added sugar    · Low-fat dairy:      ? Nonfat (skim) milk, 1% milk, and low-fat almond, cashew, or soy milks fortified with calcium    ? Low-fat cheese, regular or frozen yogurt, and cottage cheese    · Meats and proteins:      ? Lean cuts of beef and pork (loin, leg, round), skinless chicken and turkey    ? Legumes, soy products, egg whites, or nuts    Limit or do not include the following in your heart healthy plan:   · Unhealthy fats and oils:      ? Whole or 2% milk, cream cheese, sour cream, or cheese    ? High-fat cuts of beef (T-bone steaks, ribs), chicken or turkey with skin, and organ meats such as liver    ? Butter, stick margarine, shortening, and cooking oils such as coconut or palm oil    · Foods and liquids high in sodium:      ? Packaged foods, such as frozen dinners, cookies, macaroni and cheese, and cereals with more than 300 mg of sodium per serving    ? Vegetables with added sodium, such as instant potatoes, vegetables with added sauces, or regular canned vegetables    ? Cured or smoked meats, such as hot dogs, benítez, and sausage    ? High-sodium ketchup, barbecue sauce, salad dressing, pickles, olives, soy sauce, or miso    · Foods and liquids high in sugar:      ? Candy, cake, cookies, pies, or doughnuts    ? Soft drinks (soda), sports drinks, or sweetened tea    ? Canned or dry mixes for cakes, soups, sauces, or gravies    Other healthy heart guidelines:   · Do not smoke  Nicotine and other chemicals in cigarettes and cigars can cause lung and heart damage  Ask your healthcare provider for information if you currently smoke and need help to quit   E-cigarettes or smokeless tobacco still contain nicotine  Talk to your healthcare provider before you use these products  · Limit or do not drink alcohol as directed  Alcohol can damage your heart and raise your blood pressure  Your healthcare provider may give you specific daily and weekly limits  The general recommended limit is 1 drink a day for women 21 or older and for men 72 or older  Do not have more than 3 drinks in a day or 7 in a week  The recommended limit is 2 drinks a day for men 24to 59years of age  Do not have more than 4 drinks in a day or 14 in a week  A drink of alcohol is 12 ounces of beer, 5 ounces of wine, or 1½ ounces of liquor  · Exercise regularly  Exercise can help you maintain a healthy weight and improve your blood pressure and cholesterol levels  Regular exercise can also decrease your risk for heart problems  Ask your healthcare provider about the best exercise plan for you  Do not start an exercise program without asking your healthcare provider  Follow up with your doctor or cardiologist as directed:  Write down your questions so you remember to ask them during your visits  © Copyright [employer] 2021 Information is for End User's use only and may not be sold, redistributed or otherwise used for commercial purposes  All illustrations and images included in CareNotes® are the copyrighted property of A D A M , Inc  or [facility] Panchito Hernandez   The above information is an  only  It is not intended as medical advice for individual conditions or treatments  Talk to your doctor, nurse or pharmacist before following any medical regimen to see if it is safe and effective for you

## 2021-09-17 NOTE — H&P
History of Present Illness: The patient is a 51 y o  female who presents with complaints of low back and leg pain      Patient Active Problem List   Diagnosis    Neck pain, chronic    Chronic headache    Hypothyroidism    Hypertension    Episode of recurrent major depressive disorder ([address] )    Irritable bowel syndrome with diarrhea    Gastroesophageal reflux disease without esophagitis    Myofascial pain syndrome    Primary osteoarthritis of left ankle    Chronic right-sided low back pain without sciatica    Mild intermittent asthma without complication    Peroneal tendonitis, left    Chronic pain syndrome    Lumbar spondylosis    Herniated nucleus pulposus, L5-S1    Spinal stenosis of lumbar region with neurogenic claudication    Carpal tunnel syndrome, bilateral       Past Medical History:   Diagnosis Date    Anxiety     Arthritis     Depression     Disc disorder     Fibromyalgia     Hypertension     Hypothyroidism     Migraine        Past Surgical History:   Procedure Laterality Date    BACK SURGERY      CERVICAL BIOPSY  W/ LOOP ELECTRODE EXCISION      CERVICAL FUSION       SECTION      CHOLECYSTECTOMY      FOOT SURGERY Bilateral     KNEE SURGERY      TUBAL LIGATION Bilateral          Current Outpatient Medications:     albuterol (VENTOLIN HFA) 90 mcg/act inhaler, Inhale 2 puffs every 6 (six) hours as needed for wheezing, Disp: 18 g, Rfl: 0    amLODIPine (NORVASC) 10 mg tablet, Take 1 tablet (10 mg total) by mouth daily, Disp: 90 tablet, Rfl: 1    Aspirin-Acetaminophen-Caffeine (EXCEDRIN PO), Take by mouth (Patient not taking: Reported on 2021), Disp: , Rfl:     betamethasone valerate (VALISONE) 0 1 % ointment, Apply 1 to 2 times a day only as needed to finger/hand rash , Disp: 30 g, Rfl: 0    cholecalciferol (VITAMIN D3) 1,000 units tablet, Take 2,000 Units by mouth daily, Disp: , Rfl:     ciclopirox (LOPROX) 0 77 % cream, APPLY 2 TIMES per day to rash on chest for 2-4 weeks  , Disp: 30 g, Rfl: 0    DULoxetine (CYMBALTA) 30 mg delayed release capsule, Take 1 PO HS with 60 mg to equal 90 mg daily  , Disp: 90 capsule, Rfl: 0    DULoxetine (CYMBALTA) 60 mg delayed release capsule, Take 1 capsule (60 mg total) by mouth daily, Disp: 90 capsule, Rfl: 0    famotidine (PEPCID) 20 mg tablet, Take 1 tablet (20 mg total) by mouth 2 (two) times a day, Disp: 180 tablet, Rfl: 1    levothyroxine 200 mcg tablet, Take 1 tablet (200 mcg total) by mouth daily, Disp: 90 tablet, Rfl: 0    levothyroxine 25 mcg tablet, 25 mcg 1 tab PO q am with 200 mcg on Mon, Wed, Fri, Sun ONLY , con't 200 mcg all other days, Disp: 90 tablet, Rfl: 0    metaxalone (Skelaxin) 800 mg tablet, Take 1 tablet (800 mg total) by mouth 3 (three) times a day as needed for muscle spasms, Disp: 30 tablet, Rfl: 1    multivitamin (THERAGRAN) TABS, Take 1 tablet by mouth daily, Disp: , Rfl:     ondansetron (ZOFRAN-ODT) 4 mg disintegrating tablet, Take 1 tablet (4 mg total) by mouth every 8 (eight) hours as needed for nausea or vomiting, Disp: 12 tablet, Rfl: 0    pregabalin (LYRICA) 75 mg capsule, Take 1 PO BID , Disp: 60 capsule, Rfl: 0    Current Facility-Administered Medications:     iohexol (OMNIPAQUE) 300 mg/mL injection 50 mL, 50 mL, Epidural, Once, Edmond Morrow DO    methylPREDNISolone acetate (DEPO-MEDROL) injection 160 mg, 160 mg, Epidural, Once, [employer], DO    Allergies   Allergen Reactions    Acetaminophen-Codeine      Pt states she does not remember having a reaction to this medication    Hydrocodone Other (See Comments)    Latex Hives     Latex powder      Prednisolone        Physical Exam:   General: Awake, Alert, Oriented x 3, Mood and affect appropriate  Respiratory: Respirations even and unlabored  Cardiovascular: Peripheral pulses intact; no edema  Musculoskeletal Exam:  Normal gait and station    ASA Score: III         Assessment:   1   Chronic right-sided low back pain without sciatica 2  Herniated nucleus pulposus, L5-S1    3   Spinal stenosis of lumbar region with neurogenic claudication        Plan: B/L S1 TFESI #2

## 2021-09-17 NOTE — ASSESSMENT & PLAN NOTE
Urged pt to make in office eval as we have not seen her to actually check her BP - will do in new year, is do for BW - order to be mailed, cont current meds for now, diet/exercise/wgt loss encouraged

## 2021-09-17 NOTE — ASSESSMENT & PLAN NOTE
Currently with some thyroid symptoms with fatigue/wgt gain/tremor - requesting to see Endo - order given, will check TFT's - order given, con't current levothyroxine for now

## 2021-09-17 NOTE — PROGRESS NOTES
Virtual Regular Visit    Verification of patient location:    Patient is located in the following state in which I hold an active license [place]      Assessment/Plan:    Problem List Items Addressed This Visit        Endocrine    Hypothyroidism     Currently with some thyroid symptoms with fatigue/wgt gain/tremor - requesting to see Endo - order given, will check TFT's - order given, con't current levothyroxine for now         Relevant Orders    Ambulatory referral to Endocrinology    CBC and differential    Comprehensive metabolic panel    Lipid panel    TSH, 3rd generation    T4, free       Cardiovascular and Mediastinum    Hypertension     Urged pt to make in office eval as we have not seen her to actually check her BP - will do in new year, is do for BW - order to be mailed, cont current meds for now, diet/exercise/wgt loss encouraged         Relevant Orders    CBC and differential    Comprehensive metabolic panel    Lipid panel    TSH, 3rd generation    T4, free      Other Visit Diagnoses     Weight gain    -  Primary    D/w pt that not all wgt gain is d/t the thyroid, she has some other thyroid symptoms and is requesting to see Endo - order given, will check TFT's, diet/exercise/wgt loss encouraged    Screening for cholesterol level        Relevant Orders    Lipid panel    Colon cancer screening        Relevant Orders    Ambulatory referral to Gastroenterology    Morbid obesity with BMI of 40 0-44 9, adult [facility])        Diet/exercise/wgt loss reviewed           Colon cancer screening - new recommendations reviewed - number for GI to be mailed    Breast cancer screening - mammo 3/21 and UTD    Cervical cancer screening/Ovarian cancer screening - due for pelvic, PAP 11/19        Reason for visit is:   Chief Complaint   Patient presents with    Follow-up    Virtual Regular Visit        Encounter provider Bryan Lepe DO    Provider located at [address] [place] [address] [address]ARE [address]   [address] [address]      Recent Visits  No visits were found meeting these conditions  Showing recent visits within past 7 days and meeting all other requirements  Today's Visits  Date Type Provider Dept   09/17/21 Telemedicine Bryan Lepe DO [facility]   Showing today's visits and meeting all other requirements  Future Appointments  No visits were found meeting these conditions  Showing future appointments within next 150 days and meeting all other requirements       The patient was identified by name and date of birth  Gayatri Blancas was informed that this is a telemedicine visit and that the visit is being conducted through [address] Now and patient was informed that this is a secure, HIPAA-compliant platform  She agrees to proceed     My office door was closed  No one else was in the room  She acknowledged consent and understanding of privacy and security of the video platform  The patient has agreed to participate and understands they can discontinue the visit at any time  Patient is aware this is a billable service  Subjective  Gayatri Blancas is a 51 y o  female contacted via [employer] for an acute visit during [place] pandemic  HPI Pt has been following with pain doc  She has had wgt gain which she thought was d/t the Lyrica  She stopped the Lyrica and pain mgt told her it was d/t her thyroid  She notes feeling tired a lot and notes tremor in her hands  She notes no hair loss but just saw derm and was dx with eczema  She notes no C/D/unintentional wgt loss  She states "my diet hasn't changed"  She does not eat a lot of junk food and states she rarely has take out  She feels she gets adequate fruits and veggies  She does not exercise  As per wgt from pain mgt she is up 3 lbs in 3 mos       Mom just dx with melanoma and pt asking for cancer screenings      Past Medical History:   Diagnosis Date    Anxiety     Arthritis     Depression     Disc disorder     Fibromyalgia     Hypertension     Hypothyroidism     Migraine        Past Surgical History:   Procedure Laterality Date    BACK SURGERY      CERVICAL BIOPSY  W/ LOOP ELECTRODE EXCISION      CERVICAL FUSION       SECTION      CHOLECYSTECTOMY      FOOT SURGERY Bilateral     KNEE SURGERY      TUBAL LIGATION Bilateral        Current Outpatient Medications   Medication Sig Dispense Refill    albuterol (VENTOLIN HFA) 90 mcg/act inhaler Inhale 2 puffs every 6 (six) hours as needed for wheezing 18 g 0    amLODIPine (NORVASC) 10 mg tablet Take 1 tablet (10 mg total) by mouth daily 90 tablet 1    Aspirin-Acetaminophen-Caffeine (EXCEDRIN PO) Take by mouth (Patient not taking: Reported on 2021)      betamethasone valerate (VALISONE) 0 1 % ointment Apply 1 to 2 times a day only as needed to finger/hand rash  30 g 0    cholecalciferol (VITAMIN D3) 1,000 units tablet Take 2,000 Units by mouth daily      ciclopirox (LOPROX) 0 77 % cream APPLY 2 TIMES per day to rash on chest for 2-4 weeks  30 g 0    DULoxetine (CYMBALTA) 30 mg delayed release capsule Take 1 PO HS with 60 mg to equal 90 mg daily   90 capsule 0    DULoxetine (CYMBALTA) 60 mg delayed release capsule Take 1 capsule (60 mg total) by mouth daily 90 capsule 0    famotidine (PEPCID) 20 mg tablet Take 1 tablet (20 mg total) by mouth 2 (two) times a day 180 tablet 1    levothyroxine 200 mcg tablet Take 1 tablet (200 mcg total) by mouth daily 90 tablet 0    levothyroxine 25 mcg tablet 25 mcg 1 tab PO q am with 200 mcg on Mon, Wed, Fri, Sun ONLY , con't 200 mcg all other days 90 tablet 0    metaxalone (Skelaxin) 800 mg tablet Take 1 tablet (800 mg total) by mouth 3 (three) times a day as needed for muscle spasms 30 tablet 1    multivitamin (THERAGRAN) TABS Take 1 tablet by mouth daily      ondansetron (ZOFRAN-ODT) 4 mg disintegrating tablet Take 1 tablet (4 mg total) by mouth every 8 (eight) hours as needed for nausea or vomiting 12 tablet 0    pregabalin (LYRICA) 75 mg capsule Take 1 PO BID  60 capsule 0     No current facility-administered medications for this visit  Allergies   Allergen Reactions    Acetaminophen-Codeine      Pt states she does not remember having a reaction to this medication    Hydrocodone Other (See Comments)    Latex Hives     Latex powder      Prednisolone        Review of Systems   Constitutional: Positive for fatigue and unexpected weight change  Negative for chills  Eyes: Negative for visual disturbance  Respiratory: Negative for cough and shortness of breath  Cardiovascular: Negative for chest pain and palpitations  Gastrointestinal: Negative for abdominal pain, constipation and diarrhea  Genitourinary: Negative for difficulty urinating  Musculoskeletal: Positive for arthralgias and back pain  Skin: Positive for rash  Negative for wound  Neurological: Positive for numbness  Negative for dizziness and headaches  Psychiatric/Behavioral: Negative for behavioral problems and confusion  Video Exam    Vitals:    09/17/21 1256   Weight: (!) 137 kg (302 lb)   Height: 5' 10" (1 778 m)       Physical Exam  Vitals and nursing note reviewed  Constitutional:       General: She is not in acute distress  Appearance: She is obese  She is not ill-appearing  HENT:      Head: Normocephalic and atraumatic  Eyes:      General:         Right eye: No discharge  Left eye: No discharge  Conjunctiva/sclera: Conjunctivae normal    Pulmonary:      Effort: Pulmonary effort is normal  No respiratory distress  Skin:     Coloration: Skin is not pale  Findings: No rash  Psychiatric:         Mood and Affect: Mood normal          Behavior: Behavior normal          Thought Content:  Thought content normal          Judgment: Judgment normal           I spent 20 minutes with patient today in which greater than 50% of the time was spent in counseling/coordination of care regarding chronic medical problems and symptoms     VIRTUAL VISIT DISCLAIMER      Taurus Fernandes verbally agrees to participate in [employer]  Pt is aware that Virtual Care Services could be limited without vital signs or the ability to perform a full hands-on physical Kaley Quiver understands she or the provider may request at any time to terminate the video visit and request the patient to seek care or treatment in person  BMI Counseling: Body mass index is 43 33 kg/m²  The BMI is above normal  Nutrition recommendations include reducing portion sizes, 3-5 servings of fruits/vegetables daily, consuming healthier snacks, moderation in carbohydrate intake, increasing intake of lean protein and reducing intake of saturated fat and trans fat  Exercise recommendations include exercising 3-5 times per week

## 2021-09-22 ENCOUNTER — APPOINTMENT (OUTPATIENT)
Dept: PHYSICAL THERAPY | Facility: CLINIC | Age: 46
End: 2021-09-22
Payer: COMMERCIAL

## 2021-09-24 ENCOUNTER — TELEPHONE (OUTPATIENT)
Dept: PAIN MEDICINE | Facility: CLINIC | Age: 46
End: 2021-09-24

## 2021-09-24 DIAGNOSIS — M79.18 MYOFASCIAL PAIN SYNDROME: ICD-10-CM

## 2021-09-24 DIAGNOSIS — M54.50 CHRONIC RIGHT-SIDED LOW BACK PAIN WITHOUT SCIATICA: ICD-10-CM

## 2021-09-24 DIAGNOSIS — E03.9 HYPOTHYROIDISM, UNSPECIFIED TYPE: ICD-10-CM

## 2021-09-24 DIAGNOSIS — R51.9 CHRONIC NONINTRACTABLE HEADACHE, UNSPECIFIED HEADACHE TYPE: ICD-10-CM

## 2021-09-24 DIAGNOSIS — G89.29 CHRONIC RIGHT-SIDED LOW BACK PAIN WITHOUT SCIATICA: ICD-10-CM

## 2021-09-24 DIAGNOSIS — M48.062 SPINAL STENOSIS OF LUMBAR REGION WITH NEUROGENIC CLAUDICATION: ICD-10-CM

## 2021-09-24 DIAGNOSIS — M47.816 LUMBAR SPONDYLOSIS: ICD-10-CM

## 2021-09-24 DIAGNOSIS — G89.29 CHRONIC NONINTRACTABLE HEADACHE, UNSPECIFIED HEADACHE TYPE: ICD-10-CM

## 2021-09-24 DIAGNOSIS — G89.4 CHRONIC PAIN SYNDROME: ICD-10-CM

## 2021-09-24 DIAGNOSIS — M54.2 NECK PAIN, CHRONIC: ICD-10-CM

## 2021-09-24 DIAGNOSIS — M51.27 HERNIATED NUCLEUS PULPOSUS, L5-S1: ICD-10-CM

## 2021-09-24 DIAGNOSIS — G89.29 NECK PAIN, CHRONIC: ICD-10-CM

## 2021-09-24 RX ORDER — LEVOTHYROXINE SODIUM 0.2 MG/1
200 TABLET ORAL DAILY
Qty: 90 TABLET | Refills: 0 | Status: SHIPPED | OUTPATIENT
Start: 2021-09-24 | End: 2021-10-06 | Stop reason: SDUPTHER

## 2021-09-24 RX ORDER — LEVOTHYROXINE SODIUM 0.03 MG/1
TABLET ORAL
Qty: 90 TABLET | Refills: 0 | Status: SHIPPED | OUTPATIENT
Start: 2021-09-24 | End: 2021-10-24 | Stop reason: SDUPTHER

## 2021-09-24 NOTE — TELEPHONE ENCOUNTER
1st attempt  Lm to cb with % improvement and pain level.       S/p Right S1 TFESI #2 9/17 F/u 10/20    Pt will consume >75% of estimated nutrient needs.

## 2021-09-28 NOTE — TELEPHONE ENCOUNTER
Pt reports 80% improvement post inj   She said she has been getting spasms all over since the injection  Pt currently has no pain

## 2021-09-29 ENCOUNTER — APPOINTMENT (OUTPATIENT)
Dept: PHYSICAL THERAPY | Facility: CLINIC | Age: 46
End: 2021-09-29
Payer: COMMERCIAL

## 2021-09-29 RX ORDER — PREGABALIN 75 MG/1
CAPSULE ORAL
Qty: 60 CAPSULE | Refills: 0 | Status: SHIPPED | OUTPATIENT
Start: 2021-09-29 | End: 2021-10-21 | Stop reason: SDUPTHER

## 2021-09-29 RX ORDER — DULOXETIN HYDROCHLORIDE 30 MG/1
CAPSULE, DELAYED RELEASE ORAL
Qty: 90 CAPSULE | Refills: 0 | Status: SHIPPED | OUTPATIENT
Start: 2021-09-29 | End: 2021-12-01

## 2021-09-29 RX ORDER — DULOXETIN HYDROCHLORIDE 60 MG/1
60 CAPSULE, DELAYED RELEASE ORAL DAILY
Qty: 90 CAPSULE | Refills: 0 | Status: SHIPPED | OUTPATIENT
Start: 2021-09-29 | End: 2021-12-01 | Stop reason: SDUPTHER

## 2021-09-29 NOTE — TELEPHONE ENCOUNTER
S/w pt, confirmed cymbalta 30 mg and 60 mg daily for a total of 90 mg per day w/ + relief, no se's  Pt confirmed lyrica 75 mg bid w/ + relief, no se's  Advised pt, will d/w DG  Anticipate rx will be sent to the pharmacy for pu later today  The writer will cb only if there is a question or change in the plan as discussed  Pt verbalized understanding and confirmed 10/20/2021 fu ov

## 2021-10-06 DIAGNOSIS — E03.9 HYPOTHYROIDISM, UNSPECIFIED TYPE: ICD-10-CM

## 2021-10-06 RX ORDER — LEVOTHYROXINE SODIUM 0.2 MG/1
200 TABLET ORAL DAILY
Qty: 90 TABLET | Refills: 0 | Status: SHIPPED | OUTPATIENT
Start: 2021-10-06 | End: 2022-01-09 | Stop reason: SDUPTHER

## 2021-10-13 ENCOUNTER — OFFICE VISIT (OUTPATIENT)
Dept: PHYSICAL THERAPY | Facility: CLINIC | Age: 46
End: 2021-10-13
Payer: COMMERCIAL

## 2021-10-13 ENCOUNTER — TELEPHONE (OUTPATIENT)
Dept: GASTROENTEROLOGY | Facility: CLINIC | Age: 46
End: 2021-10-13

## 2021-10-13 DIAGNOSIS — R29.898 BILATERAL LEG WEAKNESS: Primary | ICD-10-CM

## 2021-10-13 DIAGNOSIS — M76.72 PERONEAL TENDINITIS OF LEFT LOWER EXTREMITY: ICD-10-CM

## 2021-10-13 DIAGNOSIS — M19.072 PRIMARY OSTEOARTHRITIS OF LEFT ANKLE: ICD-10-CM

## 2021-10-13 DIAGNOSIS — M76.72 PERONEAL TENDONITIS, LEFT: ICD-10-CM

## 2021-10-13 DIAGNOSIS — G89.29 CHRONIC RIGHT-SIDED LOW BACK PAIN WITHOUT SCIATICA: ICD-10-CM

## 2021-10-13 DIAGNOSIS — M19.072 PRIMARY OSTEOARTHRITIS, LEFT ANKLE AND FOOT: ICD-10-CM

## 2021-10-13 DIAGNOSIS — M54.50 CHRONIC RIGHT-SIDED LOW BACK PAIN WITHOUT SCIATICA: ICD-10-CM

## 2021-10-13 PROCEDURE — 97110 THERAPEUTIC EXERCISES: CPT | Performed by: PHYSICAL THERAPIST

## 2021-10-13 PROCEDURE — 97140 MANUAL THERAPY 1/> REGIONS: CPT | Performed by: PHYSICAL THERAPIST

## 2021-10-14 LAB
ALBUMIN SERPL-MCNC: 4.1 G/DL (ref 3.8–4.8)
ALBUMIN/GLOB SERPL: 1.6 {RATIO} (ref 1.2–2.2)
ALP SERPL-CCNC: 73 IU/L (ref 44–121)
ALT SERPL-CCNC: 20 IU/L (ref 0–32)
AST SERPL-CCNC: 15 IU/L (ref 0–40)
BASOPHILS # BLD AUTO: 0 X10E3/UL (ref 0–0.2)
BASOPHILS NFR BLD AUTO: 0 %
BILIRUB SERPL-MCNC: 0.4 MG/DL (ref 0–1.2)
BUN SERPL-MCNC: 19 MG/DL (ref 6–24)
BUN/CREAT SERPL: 26 (ref 9–23)
CALCIUM SERPL-MCNC: 10 MG/DL (ref 8.7–10.2)
CHLORIDE SERPL-SCNC: 106 MMOL/L (ref 96–106)
CHOLEST SERPL-MCNC: 174 MG/DL (ref 100–199)
CHOLEST/HDLC SERPL: 3.6 RATIO (ref 0–4.4)
CO2 SERPL-SCNC: 22 MMOL/L (ref 20–29)
CREAT SERPL-MCNC: 0.72 MG/DL (ref 0.57–1)
EOSINOPHIL # BLD AUTO: 0.2 X10E3/UL (ref 0–0.4)
EOSINOPHIL NFR BLD AUTO: 2 %
ERYTHROCYTE [DISTWIDTH] IN BLOOD BY AUTOMATED COUNT: 13.9 % (ref 11.7–15.4)
GLOBULIN SER-MCNC: 2.5 G/DL (ref 1.5–4.5)
GLUCOSE SERPL-MCNC: 87 MG/DL (ref 65–99)
HCT VFR BLD AUTO: 40.8 % (ref 34–46.6)
HDLC SERPL-MCNC: 49 MG/DL
HGB BLD-MCNC: 13.6 G/DL (ref 11.1–15.9)
IMM GRANULOCYTES # BLD: 0 X10E3/UL (ref 0–0.1)
IMM GRANULOCYTES NFR BLD: 0 %
LDLC SERPL CALC-MCNC: 109 MG/DL (ref 0–99)
LYMPHOCYTES # BLD AUTO: 2.5 X10E3/UL (ref 0.7–3.1)
LYMPHOCYTES NFR BLD AUTO: 29 %
MCH RBC QN AUTO: 30.1 PG (ref 26.6–33)
MCHC RBC AUTO-ENTMCNC: 33.3 G/DL (ref 31.5–35.7)
MCV RBC AUTO: 90 FL (ref 79–97)
MONOCYTES # BLD AUTO: 0.4 X10E3/UL (ref 0.1–0.9)
MONOCYTES NFR BLD AUTO: 5 %
NEUTROPHILS # BLD AUTO: 5.4 X10E3/UL (ref 1.4–7)
NEUTROPHILS NFR BLD AUTO: 64 %
PLATELET # BLD AUTO: 274 X10E3/UL (ref 150–450)
POTASSIUM SERPL-SCNC: 4.3 MMOL/L (ref 3.5–5.2)
PROT SERPL-MCNC: 6.6 G/DL (ref 6–8.5)
RBC # BLD AUTO: 4.52 X10E6/UL (ref 3.77–5.28)
SL AMB EGFR AFRICAN AMERICAN: 115 ML/MIN/1.73
SL AMB EGFR NON AFRICAN AMERICAN: 100 ML/MIN/1.73
SL AMB VLDL CHOLESTEROL CALC: 16 MG/DL (ref 5–40)
SODIUM SERPL-SCNC: 140 MMOL/L (ref 134–144)
T4 FREE SERPL-MCNC: 1.86 NG/DL (ref 0.82–1.77)
TRIGL SERPL-MCNC: 86 MG/DL (ref 0–149)
TSH SERPL DL<=0.005 MIU/L-ACNC: 2.13 UIU/ML (ref 0.45–4.5)
WBC # BLD AUTO: 8.5 X10E3/UL (ref 3.4–10.8)

## 2021-10-21 DIAGNOSIS — M79.18 MYOFASCIAL PAIN SYNDROME: ICD-10-CM

## 2021-10-21 DIAGNOSIS — M48.062 SPINAL STENOSIS OF LUMBAR REGION WITH NEUROGENIC CLAUDICATION: ICD-10-CM

## 2021-10-21 DIAGNOSIS — G89.29 CHRONIC RIGHT-SIDED LOW BACK PAIN WITHOUT SCIATICA: ICD-10-CM

## 2021-10-21 DIAGNOSIS — M51.27 HERNIATED NUCLEUS PULPOSUS, L5-S1: ICD-10-CM

## 2021-10-21 DIAGNOSIS — G89.4 CHRONIC PAIN SYNDROME: ICD-10-CM

## 2021-10-21 DIAGNOSIS — M54.50 CHRONIC RIGHT-SIDED LOW BACK PAIN WITHOUT SCIATICA: ICD-10-CM

## 2021-10-21 DIAGNOSIS — M47.816 LUMBAR SPONDYLOSIS: ICD-10-CM

## 2021-10-21 DIAGNOSIS — M54.2 NECK PAIN, CHRONIC: ICD-10-CM

## 2021-10-21 DIAGNOSIS — R51.9 CHRONIC NONINTRACTABLE HEADACHE, UNSPECIFIED HEADACHE TYPE: ICD-10-CM

## 2021-10-21 DIAGNOSIS — G89.29 CHRONIC NONINTRACTABLE HEADACHE, UNSPECIFIED HEADACHE TYPE: ICD-10-CM

## 2021-10-21 DIAGNOSIS — G89.29 NECK PAIN, CHRONIC: ICD-10-CM

## 2021-10-22 RX ORDER — PREGABALIN 75 MG/1
CAPSULE ORAL
Qty: 60 CAPSULE | Refills: 0 | Status: SHIPPED | OUTPATIENT
Start: 2021-10-22 | End: 2021-10-27 | Stop reason: SDUPTHER

## 2021-10-24 DIAGNOSIS — G89.29 NECK PAIN, CHRONIC: ICD-10-CM

## 2021-10-24 DIAGNOSIS — R51.9 CHRONIC NONINTRACTABLE HEADACHE, UNSPECIFIED HEADACHE TYPE: ICD-10-CM

## 2021-10-24 DIAGNOSIS — M51.27 HERNIATED NUCLEUS PULPOSUS, L5-S1: ICD-10-CM

## 2021-10-24 DIAGNOSIS — M47.816 LUMBAR SPONDYLOSIS: ICD-10-CM

## 2021-10-24 DIAGNOSIS — M54.2 NECK PAIN, CHRONIC: ICD-10-CM

## 2021-10-24 DIAGNOSIS — G89.29 CHRONIC RIGHT-SIDED LOW BACK PAIN WITHOUT SCIATICA: ICD-10-CM

## 2021-10-24 DIAGNOSIS — G89.29 CHRONIC NONINTRACTABLE HEADACHE, UNSPECIFIED HEADACHE TYPE: ICD-10-CM

## 2021-10-24 DIAGNOSIS — M54.50 CHRONIC RIGHT-SIDED LOW BACK PAIN WITHOUT SCIATICA: ICD-10-CM

## 2021-10-24 DIAGNOSIS — M48.062 SPINAL STENOSIS OF LUMBAR REGION WITH NEUROGENIC CLAUDICATION: ICD-10-CM

## 2021-10-24 DIAGNOSIS — E03.9 HYPOTHYROIDISM, UNSPECIFIED TYPE: ICD-10-CM

## 2021-10-24 DIAGNOSIS — M79.18 MYOFASCIAL PAIN SYNDROME: ICD-10-CM

## 2021-10-24 DIAGNOSIS — G89.4 CHRONIC PAIN SYNDROME: ICD-10-CM

## 2021-10-24 DIAGNOSIS — I10 ESSENTIAL HYPERTENSION: ICD-10-CM

## 2021-10-25 RX ORDER — AMLODIPINE BESYLATE 10 MG/1
10 TABLET ORAL DAILY
Qty: 90 TABLET | Refills: 1 | Status: SHIPPED | OUTPATIENT
Start: 2021-10-25 | End: 2022-04-20 | Stop reason: SDUPTHER

## 2021-10-25 RX ORDER — LEVOTHYROXINE SODIUM 0.03 MG/1
TABLET ORAL
Qty: 90 TABLET | Refills: 1 | Status: SHIPPED | OUTPATIENT
Start: 2021-10-25 | End: 2022-01-25 | Stop reason: SDUPTHER

## 2021-10-27 ENCOUNTER — OFFICE VISIT (OUTPATIENT)
Dept: PHYSICAL THERAPY | Facility: CLINIC | Age: 46
End: 2021-10-27
Payer: COMMERCIAL

## 2021-10-27 ENCOUNTER — TELEPHONE (OUTPATIENT)
Dept: PAIN MEDICINE | Facility: CLINIC | Age: 46
End: 2021-10-27

## 2021-10-27 DIAGNOSIS — M47.816 LUMBAR SPONDYLOSIS: ICD-10-CM

## 2021-10-27 DIAGNOSIS — M76.72 PERONEAL TENDINITIS OF LEFT LOWER EXTREMITY: ICD-10-CM

## 2021-10-27 DIAGNOSIS — M19.072 PRIMARY OSTEOARTHRITIS, LEFT ANKLE AND FOOT: ICD-10-CM

## 2021-10-27 DIAGNOSIS — M79.18 MYOFASCIAL PAIN SYNDROME: ICD-10-CM

## 2021-10-27 DIAGNOSIS — M48.062 SPINAL STENOSIS OF LUMBAR REGION WITH NEUROGENIC CLAUDICATION: ICD-10-CM

## 2021-10-27 DIAGNOSIS — M54.50 CHRONIC RIGHT-SIDED LOW BACK PAIN WITHOUT SCIATICA: ICD-10-CM

## 2021-10-27 DIAGNOSIS — R29.898 BILATERAL LEG WEAKNESS: Primary | ICD-10-CM

## 2021-10-27 DIAGNOSIS — R51.9 CHRONIC NONINTRACTABLE HEADACHE, UNSPECIFIED HEADACHE TYPE: ICD-10-CM

## 2021-10-27 DIAGNOSIS — G89.29 NECK PAIN, CHRONIC: ICD-10-CM

## 2021-10-27 DIAGNOSIS — G89.29 CHRONIC RIGHT-SIDED LOW BACK PAIN WITHOUT SCIATICA: ICD-10-CM

## 2021-10-27 DIAGNOSIS — M54.2 NECK PAIN, CHRONIC: ICD-10-CM

## 2021-10-27 DIAGNOSIS — M76.72 PERONEAL TENDONITIS, LEFT: ICD-10-CM

## 2021-10-27 DIAGNOSIS — M51.27 HERNIATED NUCLEUS PULPOSUS, L5-S1: ICD-10-CM

## 2021-10-27 DIAGNOSIS — G89.4 CHRONIC PAIN SYNDROME: ICD-10-CM

## 2021-10-27 DIAGNOSIS — M19.072 PRIMARY OSTEOARTHRITIS OF LEFT ANKLE: ICD-10-CM

## 2021-10-27 DIAGNOSIS — K21.9 GASTROESOPHAGEAL REFLUX DISEASE WITHOUT ESOPHAGITIS: ICD-10-CM

## 2021-10-27 DIAGNOSIS — G89.29 CHRONIC NONINTRACTABLE HEADACHE, UNSPECIFIED HEADACHE TYPE: ICD-10-CM

## 2021-10-27 PROCEDURE — 97112 NEUROMUSCULAR REEDUCATION: CPT | Performed by: PHYSICAL THERAPIST

## 2021-10-27 PROCEDURE — 97110 THERAPEUTIC EXERCISES: CPT | Performed by: PHYSICAL THERAPIST

## 2021-10-27 RX ORDER — DULOXETIN HYDROCHLORIDE 30 MG/1
CAPSULE, DELAYED RELEASE ORAL
Qty: 90 CAPSULE | Refills: 0 | OUTPATIENT
Start: 2021-10-27

## 2021-10-27 RX ORDER — PREGABALIN 100 MG/1
CAPSULE ORAL
Qty: 90 CAPSULE | Refills: 1 | Status: SHIPPED | OUTPATIENT
Start: 2021-10-27 | End: 2021-12-01 | Stop reason: SDUPTHER

## 2021-10-27 RX ORDER — FAMOTIDINE 20 MG/1
20 TABLET, FILM COATED ORAL 2 TIMES DAILY
Qty: 180 TABLET | Refills: 1 | Status: SHIPPED | OUTPATIENT
Start: 2021-10-27 | End: 2022-04-20 | Stop reason: SDUPTHER

## 2021-10-28 ENCOUNTER — TELEPHONE (OUTPATIENT)
Dept: GASTROENTEROLOGY | Facility: CLINIC | Age: 46
End: 2021-10-28

## 2021-11-02 ENCOUNTER — ANESTHESIA EVENT (OUTPATIENT)
Dept: GASTROENTEROLOGY | Facility: AMBULATORY SURGERY CENTER | Age: 46
End: 2021-11-02

## 2021-11-03 ENCOUNTER — ANESTHESIA (OUTPATIENT)
Dept: GASTROENTEROLOGY | Facility: AMBULATORY SURGERY CENTER | Age: 46
End: 2021-11-03

## 2021-11-03 ENCOUNTER — HOSPITAL ENCOUNTER (OUTPATIENT)
Dept: GASTROENTEROLOGY | Facility: AMBULATORY SURGERY CENTER | Age: 46
Discharge: HOME | End: 2021-11-03
Payer: COMMERCIAL

## 2021-11-03 VITALS
RESPIRATION RATE: 12 BRPM | OXYGEN SATURATION: 100 % | HEART RATE: 75 BPM | BODY MASS INDEX: 43.05 KG/M2 | SYSTOLIC BLOOD PRESSURE: 138 MMHG | TEMPERATURE: 98 F | WEIGHT: 293 LBS | DIASTOLIC BLOOD PRESSURE: 75 MMHG

## 2021-11-03 DIAGNOSIS — Z12.11 SCREENING FOR COLON CANCER: ICD-10-CM

## 2021-11-03 PROCEDURE — 45380 COLONOSCOPY AND BIOPSY: CPT | Performed by: INTERNAL MEDICINE

## 2021-11-03 RX ORDER — SODIUM CHLORIDE, SODIUM LACTATE, POTASSIUM CHLORIDE, CALCIUM CHLORIDE 600; 310; 30; 20 MG/100ML; MG/100ML; MG/100ML; MG/100ML
50 INJECTION, SOLUTION INTRAVENOUS CONTINUOUS
Status: DISCONTINUED | OUTPATIENT
Start: 2021-11-03 | End: 2021-11-07 | Stop reason: HOSPADM

## 2021-11-03 RX ORDER — PROPOFOL 10 MG/ML
INJECTION, EMULSION INTRAVENOUS AS NEEDED
Status: DISCONTINUED | OUTPATIENT
Start: 2021-11-03 | End: 2021-11-03

## 2021-11-03 RX ADMIN — PROPOFOL 50 MG: 10 INJECTION, EMULSION INTRAVENOUS at 10:09

## 2021-11-03 RX ADMIN — SODIUM CHLORIDE, SODIUM LACTATE, POTASSIUM CHLORIDE, CALCIUM CHLORIDE: 600; 310; 30; 20 INJECTION, SOLUTION INTRAVENOUS at 10:16

## 2021-11-03 RX ADMIN — PROPOFOL 100 MG: 10 INJECTION, EMULSION INTRAVENOUS at 09:59

## 2021-11-03 RX ADMIN — SODIUM CHLORIDE, SODIUM LACTATE, POTASSIUM CHLORIDE, CALCIUM CHLORIDE 50 ML/HR: 600; 310; 30; 20 INJECTION, SOLUTION INTRAVENOUS at 09:43

## 2021-11-03 RX ADMIN — PROPOFOL 100 MG: 10 INJECTION, EMULSION INTRAVENOUS at 10:01

## 2021-11-03 RX ADMIN — PROPOFOL 50 MG: 10 INJECTION, EMULSION INTRAVENOUS at 10:04

## 2021-12-01 ENCOUNTER — OFFICE VISIT (OUTPATIENT)
Dept: PAIN MEDICINE | Facility: CLINIC | Age: 46
End: 2021-12-01
Payer: COMMERCIAL

## 2021-12-01 VITALS
TEMPERATURE: 98 F | SYSTOLIC BLOOD PRESSURE: 136 MMHG | WEIGHT: 293 LBS | DIASTOLIC BLOOD PRESSURE: 82 MMHG | BODY MASS INDEX: 41.95 KG/M2 | HEART RATE: 82 BPM | HEIGHT: 70 IN

## 2021-12-01 DIAGNOSIS — M54.50 CHRONIC RIGHT-SIDED LOW BACK PAIN WITHOUT SCIATICA: ICD-10-CM

## 2021-12-01 DIAGNOSIS — G89.29 CHRONIC RIGHT-SIDED LOW BACK PAIN WITHOUT SCIATICA: ICD-10-CM

## 2021-12-01 DIAGNOSIS — M47.816 LUMBAR SPONDYLOSIS: ICD-10-CM

## 2021-12-01 DIAGNOSIS — G89.29 CHRONIC PAIN OF RIGHT KNEE: ICD-10-CM

## 2021-12-01 DIAGNOSIS — G89.29 CHRONIC NONINTRACTABLE HEADACHE, UNSPECIFIED HEADACHE TYPE: ICD-10-CM

## 2021-12-01 DIAGNOSIS — G89.29 NECK PAIN, CHRONIC: ICD-10-CM

## 2021-12-01 DIAGNOSIS — M51.27 HERNIATED NUCLEUS PULPOSUS, L5-S1: ICD-10-CM

## 2021-12-01 DIAGNOSIS — M48.062 SPINAL STENOSIS OF LUMBAR REGION WITH NEUROGENIC CLAUDICATION: ICD-10-CM

## 2021-12-01 DIAGNOSIS — M25.561 CHRONIC PAIN OF RIGHT KNEE: ICD-10-CM

## 2021-12-01 DIAGNOSIS — R51.9 CHRONIC NONINTRACTABLE HEADACHE, UNSPECIFIED HEADACHE TYPE: ICD-10-CM

## 2021-12-01 DIAGNOSIS — M79.18 MYOFASCIAL PAIN SYNDROME: ICD-10-CM

## 2021-12-01 DIAGNOSIS — M54.2 NECK PAIN, CHRONIC: ICD-10-CM

## 2021-12-01 DIAGNOSIS — G89.4 CHRONIC PAIN SYNDROME: Primary | ICD-10-CM

## 2021-12-01 PROCEDURE — 3008F BODY MASS INDEX DOCD: CPT | Performed by: NURSE PRACTITIONER

## 2021-12-01 PROCEDURE — 4004F PT TOBACCO SCREEN RCVD TLK: CPT | Performed by: NURSE PRACTITIONER

## 2021-12-01 PROCEDURE — 3075F SYST BP GE 130 - 139MM HG: CPT | Performed by: NURSE PRACTITIONER

## 2021-12-01 PROCEDURE — 3079F DIAST BP 80-89 MM HG: CPT | Performed by: NURSE PRACTITIONER

## 2021-12-01 PROCEDURE — 99214 OFFICE O/P EST MOD 30 MIN: CPT | Performed by: NURSE PRACTITIONER

## 2021-12-01 RX ORDER — PREGABALIN 150 MG/1
CAPSULE ORAL
Qty: 60 CAPSULE | Refills: 2 | Status: SHIPPED | OUTPATIENT
Start: 2021-12-01 | End: 2022-02-16 | Stop reason: SDUPTHER

## 2021-12-01 RX ORDER — DULOXETIN HYDROCHLORIDE 30 MG/1
CAPSULE, DELAYED RELEASE ORAL
Qty: 90 CAPSULE | Refills: 0 | Status: CANCELLED | OUTPATIENT
Start: 2021-12-01

## 2021-12-01 RX ORDER — DULOXETIN HYDROCHLORIDE 60 MG/1
CAPSULE, DELAYED RELEASE ORAL
Qty: 180 CAPSULE | Refills: 0 | Status: SHIPPED | OUTPATIENT
Start: 2021-12-01 | End: 2022-02-16 | Stop reason: SDUPTHER

## 2021-12-28 ENCOUNTER — TELEPHONE (OUTPATIENT)
Dept: FAMILY MEDICINE CLINIC | Facility: HOSPITAL | Age: 46
End: 2021-12-28

## 2021-12-28 PROCEDURE — U0005 INFEC AGEN DETEC AMPLI PROBE: HCPCS | Performed by: INTERNAL MEDICINE

## 2021-12-28 PROCEDURE — U0003 INFECTIOUS AGENT DETECTION BY NUCLEIC ACID (DNA OR RNA); SEVERE ACUTE RESPIRATORY SYNDROME CORONAVIRUS 2 (SARS-COV-2) (CORONAVIRUS DISEASE [COVID-19]), AMPLIFIED PROBE TECHNIQUE, MAKING USE OF HIGH THROUGHPUT TECHNOLOGIES AS DESCRIBED BY CMS-2020-01-R: HCPCS | Performed by: INTERNAL MEDICINE

## 2022-01-09 DIAGNOSIS — E03.9 HYPOTHYROIDISM, UNSPECIFIED TYPE: ICD-10-CM

## 2022-01-10 RX ORDER — LEVOTHYROXINE SODIUM 0.2 MG/1
200 TABLET ORAL DAILY
Qty: 90 TABLET | Refills: 0 | Status: SHIPPED | OUTPATIENT
Start: 2022-01-10 | End: 2022-04-11 | Stop reason: SDUPTHER

## 2022-01-11 ENCOUNTER — TELEPHONE (OUTPATIENT)
Dept: PAIN MEDICINE | Facility: CLINIC | Age: 47
End: 2022-01-11

## 2022-01-11 DIAGNOSIS — M48.062 SPINAL STENOSIS OF LUMBAR REGION WITH NEUROGENIC CLAUDICATION: ICD-10-CM

## 2022-01-11 DIAGNOSIS — M51.27 HERNIATED NUCLEUS PULPOSUS, L5-S1: Primary | ICD-10-CM

## 2022-01-11 DIAGNOSIS — G89.29 CHRONIC RIGHT-SIDED LOW BACK PAIN WITHOUT SCIATICA: ICD-10-CM

## 2022-01-11 DIAGNOSIS — M54.50 CHRONIC RIGHT-SIDED LOW BACK PAIN WITHOUT SCIATICA: ICD-10-CM

## 2022-01-11 DIAGNOSIS — M54.16 LUMBAR RADICULOPATHY: ICD-10-CM

## 2022-01-11 NOTE — TELEPHONE ENCOUNTER
Patient   [phone]  Florence Desai    Pt is calling in stating that she is having leg pain again  Up in the hip area, her pain level is 8/10 as of now  I offered pt an appt but isn't able to get off of work  Please follow up with pt

## 2022-01-11 NOTE — TELEPHONE ENCOUNTER
Does she want to try a repeat L5-S1 LESI to the Right to see if that will help? Is she taking the Cymbalta 120 mg HS and Lyrica 150 mg BID?

## 2022-01-11 NOTE — TELEPHONE ENCOUNTER
I put in the order for an L5-S1 LESI to the Right with Dr Salima Austin  Can you please call and schedule the injection? Thank you

## 2022-01-11 NOTE — TELEPHONE ENCOUNTER
S/w pt, confirmed cymbalta and Lyrica as discussed  Pt verbalized agreement with repeat LESI  Denied blood thinning medication, stated that she has been exposed to covid in the past two weeks however, she has tested 2x and is negative  Advised pt, will make dg aware  Anticipate a cb to schedule

## 2022-01-11 NOTE — TELEPHONE ENCOUNTER
Pt c/o increased pain in her R groin / thigh and 1/2 way down her R leg - causing difficulty walking and raising her leg  Per pt, symptoms began ~ 2 - 3 weeks ago with no obvious cause  Pt stated that she has been using her neuropathic medications as prescribed and NSAIDS with no improvement  Advised pt, will d/w DG and cb to advise  Pt verbalized understanding and appreciation

## 2022-01-12 NOTE — TELEPHONE ENCOUNTER
PRE OP INSTRUCTIONS:     -If you are on prescription blood thinners, you may have to hold the medication for several days before the procedure  Please call the office to    discuss medication holds at [phone]   -Do not eat or drink ONE HOUR prior to your procedure  If you are diabetic, may follow regular breakfast/lunch schedule and take usual    diabetic medications   -Lumbar( low back) procedure, please wear comfortable slacks/pants   -Cervical (neck) procedure, please wear a shirt/blouse that is easy to remove   -A  is required to take you home form your procedure   -Continue all to take prescribed medication the day of your procedure, including blood pressure medications   -If you are prescribe antibiotics, have an active infection or have an open wound, please contact the office at [phone]   -Please refrain from any vaccinations two weeks before and two weeks after injection   -Insurance authorization received in not a guarantee of payment per your insurance company's authorization disclaimer and it is    your responsibility to verify your benefits  -If you have any questions about the instructions, please call me at [phone]    Hold medication  x _ full days prior, last dose on _  Patient advised to hold medication from Date till their appointment at that time instructions to restart will be given  Patient stated verbal understanding  Aware that nursing will call her to review hold dates as well

## 2022-01-14 ENCOUNTER — TELEPHONE (OUTPATIENT)
Dept: PAIN MEDICINE | Facility: CLINIC | Age: 47
End: 2022-01-14

## 2022-01-14 DIAGNOSIS — G89.4 CHRONIC PAIN SYNDROME: Primary | ICD-10-CM

## 2022-01-14 RX ORDER — HYDROCODONE BITARTRATE AND ACETAMINOPHEN 5; 325 MG/1; MG/1
TABLET ORAL
Qty: 14 TABLET | Refills: 0 | Status: SHIPPED | OUTPATIENT
Start: 2022-01-14 | End: 2022-07-05 | Stop reason: SDUPTHER

## 2022-01-14 NOTE — TELEPHONE ENCOUNTER
S/w pt, c/o increasing pain in her R hip and pain in her L foot "like I've been on it for days"  Pt stated that her L lower leg is swollen, No change in color or temp although just above her foot / lower part of her shin is cold  Per pt, the left sided symptoms started last night  Pt stated that she is scheduled for a procedure w/ SL - questioning what to do for her pain in the meantime as she had to leave work s/t pain today  Advised pt, will d/w DG and cb to advise  Pt verbalized understanding and appreciation

## 2022-01-14 NOTE — TELEPHONE ENCOUNTER
Pt is having tremendous pain down her hip and down right leg  Her left leg is swollen as well as her foot,pt wants to know pt wants to know what she should do until he procedure on 1/21  Pt pain level is a 8-9/10   Pt ask should she go to the hospital?     Pt # [phone]

## 2022-01-14 NOTE — TELEPHONE ENCOUNTER
If she is having swelling and coldness, it makes me worry about vascular issues or blood clots  If her symptoms worsen, I would actually recommend that she goes to the ER for evaluation  I will send a small script for Norco 5/325, 1 PO BID PRN for pain, #14 pills to see if that helps  Again, if her pain/swelling worsens, go to the ER  MD at bedside.

## 2022-01-20 ENCOUNTER — TELEPHONE (OUTPATIENT)
Dept: OTHER | Facility: OTHER | Age: 47
End: 2022-01-20

## 2022-01-21 ENCOUNTER — TELEPHONE (OUTPATIENT)
Dept: RADIOLOGY | Facility: CLINIC | Age: 47
End: 2022-01-21

## 2022-01-21 NOTE — TELEPHONE ENCOUNTER
Called and [facility] for patient to return call for missed procedure appointment    Also called liz'ts friend Zayra Galvan per Medical communication consent  Zayra Galvan stated patients son was just diagnosed with COVID  Per chart:  Asim Mancini routed this conversation to [facility]     That patient called 1-20-22 at 8:58pm that patient was canceling her appointment

## 2022-01-21 NOTE — TELEPHONE ENCOUNTER
Patient called making sure that her procedure was cnl for today- states her son has Covid- She'll call us back to reschedule at a later time        [phone]

## 2022-01-21 NOTE — TELEPHONE ENCOUNTER
Patient states her son has Covid-19 & she is requesting to cancel today's procedure  She'll call us back to reschedule at a later time

## 2022-01-21 NOTE — TELEPHONE ENCOUNTER
Patient called saying that she needs to cancel her appointment for tomorrow because there is a possible case of covid in her house

## 2022-01-25 DIAGNOSIS — E03.9 HYPOTHYROIDISM, UNSPECIFIED TYPE: ICD-10-CM

## 2022-01-25 RX ORDER — LEVOTHYROXINE SODIUM 0.03 MG/1
TABLET ORAL
Qty: 90 TABLET | Refills: 0 | Status: SHIPPED | OUTPATIENT
Start: 2022-01-25 | End: 2022-04-20 | Stop reason: SDUPTHER

## 2022-02-09 ENCOUNTER — TELEPHONE (OUTPATIENT)
Dept: OBGYN CLINIC | Facility: HOSPITAL | Age: 47
End: 2022-02-09

## 2022-02-09 NOTE — TELEPHONE ENCOUNTER
Hello,    Please advise if the following patient can be forced onto the schedule:    Patient: Anton Brooks    : 1975    MRN: [id number]    Call back #: [phone]    Insurance: Jeniffer Robertson     Reason for appointment: bilateral carpal tunnel  Had EMG in  doctor/location: Estef[facility]    Thank you  E-mail sent to [facility]

## 2022-02-15 ENCOUNTER — APPOINTMENT (OUTPATIENT)
Dept: RADIOLOGY | Facility: CLINIC | Age: 47
End: 2022-02-15
Payer: COMMERCIAL

## 2022-02-15 ENCOUNTER — OFFICE VISIT (OUTPATIENT)
Dept: OBGYN CLINIC | Facility: CLINIC | Age: 47
End: 2022-02-15
Payer: COMMERCIAL

## 2022-02-15 VITALS
WEIGHT: 293 LBS | DIASTOLIC BLOOD PRESSURE: 80 MMHG | HEIGHT: 70 IN | SYSTOLIC BLOOD PRESSURE: 130 MMHG | BODY MASS INDEX: 41.95 KG/M2

## 2022-02-15 DIAGNOSIS — M25.561 CHRONIC PAIN OF RIGHT KNEE: Primary | ICD-10-CM

## 2022-02-15 DIAGNOSIS — G89.29 CHRONIC PAIN OF RIGHT KNEE: Primary | ICD-10-CM

## 2022-02-15 DIAGNOSIS — M22.2X1 RIGHT PATELLOFEMORAL SYNDROME: ICD-10-CM

## 2022-02-15 DIAGNOSIS — G89.29 CHRONIC PAIN OF RIGHT KNEE: ICD-10-CM

## 2022-02-15 DIAGNOSIS — M25.561 CHRONIC PAIN OF RIGHT KNEE: ICD-10-CM

## 2022-02-15 DIAGNOSIS — M17.11 PRIMARY OSTEOARTHRITIS OF RIGHT KNEE: ICD-10-CM

## 2022-02-15 PROCEDURE — 99214 OFFICE O/P EST MOD 30 MIN: CPT | Performed by: PHYSICIAN ASSISTANT

## 2022-02-15 PROCEDURE — 3079F DIAST BP 80-89 MM HG: CPT | Performed by: PHYSICIAN ASSISTANT

## 2022-02-15 PROCEDURE — 20610 DRAIN/INJ JOINT/BURSA W/O US: CPT | Performed by: PHYSICIAN ASSISTANT

## 2022-02-15 PROCEDURE — 73564 X-RAY EXAM KNEE 4 OR MORE: CPT

## 2022-02-15 RX ORDER — BUPIVACAINE HYDROCHLORIDE 2.5 MG/ML
2 INJECTION, SOLUTION INFILTRATION; PERINEURAL
Status: COMPLETED | OUTPATIENT
Start: 2022-02-15 | End: 2022-02-15

## 2022-02-15 RX ORDER — TRIAMCINOLONE ACETONIDE 40 MG/ML
80 INJECTION, SUSPENSION INTRA-ARTICULAR; INTRAMUSCULAR
Status: COMPLETED | OUTPATIENT
Start: 2022-02-15 | End: 2022-02-15

## 2022-02-15 RX ADMIN — BUPIVACAINE HYDROCHLORIDE 2 ML: 2.5 INJECTION, SOLUTION INFILTRATION; PERINEURAL at 10:32

## 2022-02-15 RX ADMIN — TRIAMCINOLONE ACETONIDE 80 MG: 40 INJECTION, SUSPENSION INTRA-ARTICULAR; INTRAMUSCULAR at 10:32

## 2022-02-15 NOTE — PROGRESS NOTES
Orthopaedic Surgery - Office Note  Louana Lennox (46 y o  female)   : 1975   MRN: [id number]  Encounter Date: 2/15/2022    Chief Complaint   Patient presents with    Right Knee - Pain         Assessment/Plan  Diagnoses and all orders for this visit:    Chronic pain of right knee  -     XR knee 4+ vw right injury; Future  -     Ambulatory Referral to Physical Therapy; Future    Primary osteoarthritis of right knee  -     Large joint arthrocentesis: R knee  -     Ambulatory Referral to Physical Therapy; Future    Right patellofemoral syndrome  -     Large joint arthrocentesis: R knee  -     Ambulatory Referral to Physical Therapy; Future    The diagnosis as well as treatment options were reviewed with the patient in the office today  She was advised she does have underlying osteoarthritis in her knee  Best initial treatment would be a cortisone injection today in combination with formal physical therapy to try and improve her symptoms  If conservative treatments do not resolve symptoms an MRI for a medial meniscus tear could be considered  Patient was advised she does have arthritic changes in her knee which would likely require future total knee replacement at some point  Weight loss and flexibility were encouraged to decrease risk of complication improve outcomes if any surgery is to be considered  Patient may use an oral anti-inflammatory such as Aleve 1 tablet twice daily with food stopping calling if any stomach upset occurs  The brace is not recommended at this time due to body habitus  Return 3-4 weeks with Dr Edgar Victoria  History of Present Illness  This is a new patient with right knee pain  Patient has had ongoing pain is referred here by her PCP  She has not had any recent treatment for the knee  Patient is contributing medical history of BMI of 45  Patient reports most of the pain is medial in the knee  She reports it is worse with standing for 8 hours at [address] [facility]  She denies any trauma to the knee  The pain has been present for approximately 2 months without any known injury  The pain does not radiate  She reports a contributing medical history of back pain and groin pain which she is treating with other physicians for  She states she has been out of work for the past several days due to those conditions  She has not had a cortisone injection, oral anti-inflammatory, or physical therapy for the right knee  She reports 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 that was arthroscopic and cleaned out    Review of Systems  Pertinent items are noted in HPI  All other systems were reviewed and are negative  Physical Exam  /80   Ht 5' 10" (1 778 m)   Wt (!) 141 kg (310 lb)   BMI 44 48 kg/m²   Cons: Appears well  No apparent distress  Psych: Alert  Oriented x3  Mood and affect normal   Eyes: PERRLA, EOMI  Resp: Normal effort  No audible wheezing or stridor  CV: Palpable pulse  No discernable arrhythmia  Lymph:  No palpable cervical, axillary, or inguinal lymphadenopathy  Skin: Warm  No palpable masses  No visible lesions  Neuro: Normal muscle tone  Normal and symmetric DTR's  Patient's right knee has no skin breakdown lesions or signs of infection  She has a guarded range of motion with full extension flexion to 115° in the office today  She is markedly tender on the medial joint line  She is nontender lateral joint line  She is stable to valgus and varus stressing  Gait is slightly antalgic favoring the right side  She has no signs of active infection  There is no instability to Lachman's and posterior drawer  Patient does have positive medial Karen's  Negative lateral Karen's  Mild pain with patellar apprehension  Patella is tracking midline with mild retropatellar crepitus  Studies Reviewed  X-rays performed in the office today four views of the right knee do not show any acute fractures or dislocations  There is medial joint space narrowing without bone-on-bone deformity  Moderate patellofemoral DJD is noted with lateral tracking and osteophyte formation  These are read from an orthopedic standpoint will await official radiologist interpretation  Large joint arthrocentesis: R knee  Universal Protocol:  Consent: Verbal consent obtained  Risks and benefits: risks, benefits and alternatives were discussed  Consent given by: patient  Time out: Immediately prior to procedure a "time out" was called to verify the correct patient, procedure, equipment, support staff and site/side marked as required  Patient understanding: patient states understanding of the procedure being performed  Patient consent: the patient's understanding of the procedure matches consent given  Relevant documents: relevant documents present and verified  Test results: test results available and properly labeled  Site marked: the operative site was marked  Radiology Images displayed and confirmed  If images not available, report reviewed: imaging studies available  Patient identity confirmed: verbally with patient    Supporting Documentation  Indications: pain   Procedure Details  Location: knee - R knee  Preparation: Patient was prepped and draped in the usual sterile fashion  Needle size: 22 G  Ultrasound guidance: no  Approach: anterolateral  Medications administered: 2 mL bupivacaine 0 25 %; 80 mg triamcinolone acetonide 40 mg/mL    Patient tolerance: patient tolerated the procedure well with no immediate complications  Dressing:  Sterile dressing applied    Risks and benefits of cortisone/kenalog injection reviewed with listed allergies  Medical, Surgical, Family, and Social History  The patient's medical history, family history, and social history, were reviewed and updated as appropriate      Past Medical History:   Diagnosis Date    Anxiety     Arthritis     Asthma     Chronic pain disorder     upper back    Depression  Disc disorder     Disease of thyroid gland     Fibromyalgia     Fibromyalgia, primary     GERD (gastroesophageal reflux disease)     Hypertension     Hypothyroidism     Migraine        Past Surgical History:   Procedure Laterality Date    BACK SURGERY      CERVICAL BIOPSY  W/ LOOP ELECTRODE EXCISION      CERVICAL FUSION       SECTION      CHOLECYSTECTOMY      FOOT SURGERY Bilateral     KNEE SURGERY      TUBAL LIGATION Bilateral 2012       Family History   Adopted: Yes   Problem Relation Age of Onset    No Known Problems Mother     No Known Problems Father     Coronary artery disease Maternal Grandmother     Breast cancer Maternal Grandmother         age unknown    Coronary artery disease Maternal Aunt     Melanoma Maternal Aunt     No Known Problems Maternal Grandfather     No Known Problems Paternal Grandmother     No Known Problems Paternal Grandfather     No Known Problems Maternal Aunt     No Known Problems Maternal Aunt        Social History     Occupational History    Occupation: [employer]   Tobacco Use    Smoking status: Current Every Day Smoker     Packs/day: 1 00     Types: Cigarettes    Smokeless tobacco: Never Used   Vaping Use    Vaping Use: Never used   Substance and Sexual Activity    Alcohol use: Not Currently    Drug use: No    Sexual activity: Not Currently       Allergies   Allergen Reactions    Acetaminophen-Codeine      Pt states she does not remember having a reaction to this medication    Hydrocodone Other (See Comments)    Latex Hives     Latex powder      Prednisolone Vomiting         Current Outpatient Medications:     albuterol (VENTOLIN HFA) 90 mcg/act inhaler, Inhale 2 puffs every 6 (six) hours as needed for wheezing, Disp: 18 g, Rfl: 0    amLODIPine (NORVASC) 10 mg tablet, Take 1 tablet (10 mg total) by mouth daily, Disp: 90 tablet, Rfl: 1    betamethasone valerate (VALISONE) 0 1 % ointment, Apply 1 to 2 times a day only as needed to finger/hand rash , Disp: 30 g, Rfl: 0    cholecalciferol (VITAMIN D3) 1,000 units tablet, Take 2,000 Units by mouth daily, Disp: , Rfl:     ciclopirox (LOPROX) 0 77 % cream, APPLY 2 TIMES per day to rash on chest for 2-4 weeks  , Disp: 30 g, Rfl: 0    DULoxetine (CYMBALTA) 60 mg delayed release capsule, Take 2 PO HS , Disp: 180 capsule, Rfl: 0    famotidine (PEPCID) 20 mg tablet, Take 1 tablet (20 mg total) by mouth 2 (two) times a day, Disp: 180 tablet, Rfl: 1    HYDROcodone-acetaminophen (NORCO) 5-325 mg per tablet, Take 1 PO BID PRN for pain , Disp: 14 tablet, Rfl: 0    levothyroxine 200 mcg tablet, Take 1 tablet (200 mcg total) by mouth daily, Disp: 90 tablet, Rfl: 0    levothyroxine 25 mcg tablet, 25 mcg 1 tab PO q am with 200 mcg on Mon, Wed, Fri, Sun ONLY , con't 200 mcg all other days, Disp: 90 tablet, Rfl: 0    metaxalone (Skelaxin) 800 mg tablet, Take 1 tablet (800 mg total) by mouth 3 (three) times a day as needed for muscle spasms, Disp: 30 tablet, Rfl: 1    multivitamin (THERAGRAN) TABS, Take 1 tablet by mouth daily, Disp: , Rfl:     pregabalin (LYRICA) 150 mg capsule, Take 1 PO BID, Disp: 60 capsule, Rfl: 2      Kamlesh Bowens PA-C

## 2022-02-16 ENCOUNTER — OFFICE VISIT (OUTPATIENT)
Dept: PAIN MEDICINE | Facility: CLINIC | Age: 47
End: 2022-02-16
Payer: COMMERCIAL

## 2022-02-16 VITALS
HEIGHT: 70 IN | BODY MASS INDEX: 41.95 KG/M2 | DIASTOLIC BLOOD PRESSURE: 82 MMHG | TEMPERATURE: 98.7 F | HEART RATE: 101 BPM | WEIGHT: 293 LBS | SYSTOLIC BLOOD PRESSURE: 140 MMHG

## 2022-02-16 DIAGNOSIS — R52 DIFFUSE PAIN: ICD-10-CM

## 2022-02-16 DIAGNOSIS — G89.29 CHRONIC NONINTRACTABLE HEADACHE, UNSPECIFIED HEADACHE TYPE: ICD-10-CM

## 2022-02-16 DIAGNOSIS — M48.062 SPINAL STENOSIS OF LUMBAR REGION WITH NEUROGENIC CLAUDICATION: ICD-10-CM

## 2022-02-16 DIAGNOSIS — M47.816 LUMBAR SPONDYLOSIS: ICD-10-CM

## 2022-02-16 DIAGNOSIS — M51.27 HERNIATED NUCLEUS PULPOSUS, L5-S1: ICD-10-CM

## 2022-02-16 DIAGNOSIS — R51.9 CHRONIC NONINTRACTABLE HEADACHE, UNSPECIFIED HEADACHE TYPE: ICD-10-CM

## 2022-02-16 DIAGNOSIS — G89.4 CHRONIC PAIN SYNDROME: Primary | ICD-10-CM

## 2022-02-16 DIAGNOSIS — M54.50 CHRONIC RIGHT-SIDED LOW BACK PAIN WITHOUT SCIATICA: ICD-10-CM

## 2022-02-16 DIAGNOSIS — G89.29 CHRONIC RIGHT-SIDED LOW BACK PAIN WITHOUT SCIATICA: ICD-10-CM

## 2022-02-16 DIAGNOSIS — M54.2 NECK PAIN, CHRONIC: ICD-10-CM

## 2022-02-16 DIAGNOSIS — G89.29 NECK PAIN, CHRONIC: ICD-10-CM

## 2022-02-16 DIAGNOSIS — M25.50 ARTHRALGIA, UNSPECIFIED JOINT: ICD-10-CM

## 2022-02-16 DIAGNOSIS — M79.18 MYOFASCIAL PAIN SYNDROME: ICD-10-CM

## 2022-02-16 DIAGNOSIS — M79.601 PAIN OF RIGHT UPPER EXTREMITY: ICD-10-CM

## 2022-02-16 DIAGNOSIS — R29.898 BILATERAL LEG WEAKNESS: ICD-10-CM

## 2022-02-16 PROCEDURE — 4004F PT TOBACCO SCREEN RCVD TLK: CPT | Performed by: NURSE PRACTITIONER

## 2022-02-16 PROCEDURE — 99214 OFFICE O/P EST MOD 30 MIN: CPT | Performed by: NURSE PRACTITIONER

## 2022-02-16 PROCEDURE — 3008F BODY MASS INDEX DOCD: CPT | Performed by: NURSE PRACTITIONER

## 2022-02-16 RX ORDER — PREGABALIN 200 MG/1
CAPSULE ORAL
Qty: 60 CAPSULE | Refills: 1 | Status: SHIPPED | OUTPATIENT
Start: 2022-02-16 | End: 2022-04-13 | Stop reason: SDUPTHER

## 2022-02-16 RX ORDER — HYDROCODONE BITARTRATE AND ACETAMINOPHEN 5; 325 MG/1; MG/1
TABLET ORAL
Qty: 14 TABLET | Refills: 0 | Status: CANCELLED | OUTPATIENT
Start: 2022-02-16

## 2022-02-16 RX ORDER — DULOXETIN HYDROCHLORIDE 60 MG/1
CAPSULE, DELAYED RELEASE ORAL
Qty: 180 CAPSULE | Refills: 0 | Status: SHIPPED | OUTPATIENT
Start: 2022-02-16 | End: 2022-04-13 | Stop reason: SDUPTHER

## 2022-02-16 RX ORDER — TIZANIDINE 2 MG/1
TABLET ORAL
Qty: 60 TABLET | Refills: 1 | Status: SHIPPED | OUTPATIENT
Start: 2022-02-16 | End: 2022-04-13 | Stop reason: SDUPTHER

## 2022-02-16 NOTE — PATIENT INSTRUCTIONS
Epidural Steroid Injection   AMBULATORY CARE:   What you need to know about an epidural steroid injection (MARCIE):  An MARCIE is a procedure to inject steroid medicine into the epidural space  The epidural space is between your spinal cord and vertebrae  Steroids reduce inflammation and fluid buildup in your spine that may be causing pain  You may be given pain medicine along with the steroids  How to prepare for an MARCIE:  Your healthcare provider will talk to you about how to prepare for your procedure  He or she will tell you what medicines to take or not take on the day of your procedure  You may need to stop taking blood thinners or other medicines several days before your procedure  You may need to adjust any diabetes medicine you take on the day of your procedure  Steroid medicine can increase your blood sugar level  Arrange for someone to drive you home when you are discharged  What will happen during an MARCIE:   · You will be given medicine to numb the procedure area  You will be awake for the procedure, but you will not feel pain  You may also be given medicine to help you relax  Contrast liquid will be used to help your healthcare provider see the area better  Tell the healthcare provider if you have ever had an allergic reaction to contrast liquid  · Your healthcare provider may place the needle into your neck area, middle of your back, or tailbone area  He may inject the medicine next to the nerves that are causing your pain  He may instead inject the medicine into a larger area of the epidural space  This helps the medicine spread to more nerves  Your healthcare provider will use a fluoroscope to help guide the needle to the right place  A fluoroscope is a type of x-ray  After the procedure, a bandage will be placed over the injection site to prevent infection  What will happen after an MARCIE:  You will have a bandage over the injection site to prevent infection   Your healthcare provider will tell you when you can bathe and any activity guidelines  You will be able to go home  Risks of an MARCIE:  You may have temporary or permanent nerve damage or paralysis  You may have bleeding or develop a serious infection, such as meningitis (swelling of the brain coverings)  An abscess may also develop  An abscess is a pus-filled area under the skin  You may need surgery to fix the abscess  You may have a seizure, anxiety, or trouble sleeping  If you are a man, you may have temporary erectile dysfunction (not able to have an erection)  Call your local emergency number (911 in the [address] [place] [address],[address]) if:   · You have a seizure  · You have trouble moving your legs  Seek care immediately if:   · Blood soaks through your bandage  · You have a fever or chills, severe back pain, and the procedure area is sensitive to the touch  · You cannot control when you urinate or have a bowel movement  Call your doctor if:   · You have weakness or numbness in your legs  · Your wound is red, swollen, or draining pus  · You have nausea or are vomiting  · Your face or neck is red and you feel warm  · You have more pain than you had before the procedure  · You have swelling in your hands or feet  · You have questions or concerns about your condition or care  Care for your wound as directed: You may remove the bandage before you go to bed the day of your procedure  You may take a shower, but do not take a bath for at least 24 hours  Self-care:   · Do not drive,  use machines, or do strenuous activity for 24 hours after your procedure or as directed  · Continue other treatments  as directed  Steroid injections alone will not control your pain  The injections are meant to be used with other treatments, such as physical therapy  Follow up with your doctor as directed:  Write down your questions so you remember to ask them during your visits     © Copyright "[employer]" 2021 Information is for End User's use only and may not be sold, redistributed or otherwise used for commercial purposes  All illustrations and images included in CareNotes® are the copyrighted property of A D A M [employer] Inc  or Jaylon Guerrier  The above information is an  only  It is not intended as medical advice for individual conditions or treatments  Talk to your doctor, nurse or pharmacist before following any medical regimen to see if it is safe and effective for you  Tizanidine (By mouth)   Tizanidine ([id number])  Treats muscle spasticity  Brand Name(s): Zanaflex, Zanaflex Capsule   There may be other brand names for this medicine  When This Medicine Should Not Be Used: This medicine is not right for everyone  Do not use if you had an allergic reaction to tizanidine  How to Use This Medicine:   Capsule, Tablet  · Take your medicine as directed  Your dose may need to be changed several times to find what works best for you  · You may take this medicine with or without food, but always take it the same way every time  Tizanidine works differently depending on whether you take it on an empty stomach or a full stomach  Talk to your doctor if you have any questions about this  · Missed dose: Take a dose as soon as you remember  If it is almost time for your next dose, wait until then and take a regular dose  Do not take extra medicine to make up for a missed dose  · Store the medicine in a closed container at room temperature, away from heat, moisture, and direct light  Drugs and Foods to Avoid:   Ask your doctor or pharmacist before using any other medicine, including over-the-counter medicines, vitamins, and herbal products  · Do not use this medicine together with ciprofloxacin or fluvoxamine  · Some foods and medicines can affect how tizanidine works  Tell your doctor if you are using any of the following:  ? Acyclovir, baclofen, cimetidine, famotidine, ticlopidine, verapamil, zileuton  ?  Birth control pills, blood pressure medicine, medicine for heart rhythm problems (such as amiodarone, mexiletine, propafenone), or medicine to treat an infection (such as levofloxacin, moxifloxacin)  · Do not drink alcohol while you are using this medicine  · Tell your doctor if you use anything else that makes you sleepy  Some examples are allergy medicine, narcotic pain medicine, and alcohol  Warnings While Using This Medicine:   · Tell your doctor if you are pregnant or breastfeeding, or if you have kidney disease or liver disease  · This medicine may cause the following problems:  ? Low blood pressure  ? Liver damage  · This medicine may make you dizzy or drowsy  Do not drive or do anything else that could be dangerous until you know how this medicine affects you  Stand or sit up slowly if you are dizzy  · Do not stop using this medicine suddenly  Your doctor will need to slowly decrease your dose before you stop it completely  · Your doctor will do lab tests at regular visits to check on the effects of this medicine  Keep all appointments  · Keep all medicine out of the reach of children  Never share your medicine with anyone  Possible Side Effects While Using This Medicine:   Call your doctor right away if you notice any of these side effects:  · Allergic reaction: Itching or hives, swelling in your face or hands, swelling or tingling in your mouth or throat, chest tightness, trouble breathing  · Dark urine or pale stools, nausea, vomiting, loss of appetite, stomach pain, yellow skin or eyes  · Lightheadedness, dizziness, or fainting  · Seeing or hearing things that are not really there  · Slow heartbeat  If you notice these less serious side effects, talk with your doctor:   · Dry mouth  · Drowsiness or sleepiness  · Weakness  If you notice other side effects that you think are caused by this medicine, tell your doctor  Call your doctor for medical advice about side effects   You may report side effects to FDA at 1-800-FDA-1088    © Copyright [employer] 2021 Information is for End User's use only and may not be sold, redistributed or otherwise used for commercial purposes  The above information is an  only  It is not intended as medical advice for individual conditions or treatments  Talk to your doctor, nurse or pharmacist before following any medical regimen to see if it is safe and effective for you

## 2022-02-16 NOTE — PROGRESS NOTES
Assessment:  1  Chronic pain syndrome    2  Neck pain, chronic    3  Pain of right upper extremity    4  Chronic right-sided low back pain without sciatica    5  Chronic nonintractable headache, unspecified headache type    6  Myofascial pain syndrome    7  Arthralgia, unspecified joint    8  Diffuse pain    9  Bilateral leg weakness    10  Lumbar spondylosis    11  Herniated nucleus pulposus, L5-S1    12  Spinal stenosis of lumbar region with neurogenic claudication        Plan:  While the patient was in the office today, I did have a thorough conversation with the patient regarding their chronic pain syndrome, symptoms, medication regimen, and treatment plan  I did discuss with the patient with regards to her worsening low back and leg pain symptoms since the previous injections were definitely help and she is having more trouble on the right side I do feel it would be beneficial proceed with a repeat L5-S1 interlaminar lumbar epidural steroid injection directed towards the right with Dr Meg Waters to try to address the neuropathic and inflammatory component of the pain  The patient was agreeable and verbalized an understanding  Complete risks and benefits including bleeding, infection, tissue reaction, nerve injury and allergic reaction were discussed  The approach was demonstrated using models and literature was provided  Verbal and written consent was obtained  With regards to all of her diffuse pain, joint pain, neuropathic, myofascial symptoms, I agree that I do feel that it would be in the patient's best interest to proceed with a consultation with Neurology as well as Rheumatology to be evaluated for any other underlying etiology that could explain her pain symptoms and possibly be better treated and managed to give her better overall relief and quality of life  The patient was very thankful as she just feels as though we need to make sure we are looking at all the possibilities      I did explained the patient that because I do feel there is definitely significant neuropathic and myofascial component to her pain symptoms and since she is just on the cusp of the therapeutic dose of Lyrica, without any significant side effects with some relief, I do feel that it would be beneficial to further titrate the Lyrica to 400 mg a day for the next 2 months and see how she does  For now, we will continue the Cymbalta as prescribed  I advised the patient that if they experience any side effects or issues with the changes in their medication regiment, they should give our office a call to discuss  I also advised the patient not to drive or operate machinery until they see how the changes in the medication regimen affects them  The patient was agreeable and verbalized an understanding  With regards to the p r n  Norco, I explained the patient that as I do not feel that it is in her best interest to continue to use his medication on a regular basis and for now would like her to continue to use the 5 pills she has left sparingly and hopefully the increase in the Lyrica and the injection will provide relief and she will not need p r n  Norco   The patient was agreeable and verbalized an understanding  The patient will follow-up in 8 weeks for medication prescription refill and reevaluation  The patient was advised to contact the office should their symptoms worsen in the interim  The patient was agreeable and verbalized an understanding  History of Present Illness: The patient is a 51 y o  female last seen on 12/1/2021 who presents for a follow up office visit in regards to chronic pain syndrome, as the patient's pain has been ongoing for greater than a year, secondary to herniated lumbar discs with spondylosis, stenosis radiculopathy as well as chronic and diffuse joint pain and myofascial pain symptoms with worsening leg weakness and right upper extremity radicular symptoms and pain    The patient currently reports that since her last office visit over the past several weeks especially, her pain is continued to worsen as some days she has significant swelling of her right upper and lower extremities and that in general she is having issues that sometimes she has to leave work early because of the pain because it is difficult for her to stand and walk and she is just frustrated as she has responsibilities and a son to take care of  The patient reports that since her last office visit she did increase the Lyrica to the 300 mg a day dosing as we had discussed and in general the diffuse upper extremity pain and left arm symptoms have remained improved as a result the Lyrica and overall her anxiety and depression is managed relatively well with the Cymbalta, especially the increase of the maximum dosage  The patient denies any recent trauma or injury that could have brought on the worsening pain symptoms and presents today as she would like to discuss the possibility of a repeat epidural steroid injection since it has been 6 months see if that would help some the back and leg pain for now, however, reports that she has also been talking to a friend in doing some individual research and is wondering if maybe some of her symptoms could be related to the possibility that she may have multiple sclerosis and want to know if we would consider putting in a consultation for Neurology and also if maybe she needs to be re-evaluated by a rheumatologist as she had been seeing a rheumatologist many years ago before she lost her insurance  The patient reports that at this point she just wants to make sure we fully understand what is causing her pain symptoms and make sure that she is on the best treatment plan to keep her pain stable and manageable  The patient reports that she did use the p r n   Norco but does not like being on opioid medications and still has 5 pills left on hand and only takes it when the pain is severe but that it does provide a little bit of relief  The patient presents today to discuss her medication regimen and treatment plan options  Current pain medications includes:  Lyrica 150 mg b i d  and Cymbalta 120 mg at bedtime  The patient reports that this regimen is providing 10% pain relief  The patient is reporting no side effects from this pain medication regimen  I have personally reviewed and/or updated the patient's past medical history, past surgical history, family history, social history, current medications, allergies, and vital signs today  Review of Systems:    Review of Systems   Respiratory: Negative for shortness of breath  Cardiovascular: Negative for chest pain  Gastrointestinal: Negative for constipation, diarrhea, nausea and vomiting  Musculoskeletal: Positive for gait problem and joint swelling (joint stiffness)  Negative for arthralgias and myalgias  Skin: Positive for rash  Neurological: Positive for weakness  Negative for dizziness and seizures  All other systems reviewed and are negative          Past Medical History:   Diagnosis Date    Anxiety     Arthritis     Asthma     Chronic pain disorder     upper back    Depression     Disc disorder     Disease of thyroid gland     Fibromyalgia     Fibromyalgia, primary     GERD (gastroesophageal reflux disease)     Hypertension     Hypothyroidism     Migraine        Past Surgical History:   Procedure Laterality Date    BACK SURGERY      CERVICAL BIOPSY  W/ LOOP ELECTRODE EXCISION      CERVICAL FUSION       SECTION      CHOLECYSTECTOMY      FOOT SURGERY Bilateral     KNEE SURGERY      TUBAL LIGATION Bilateral 2012       Family History   Adopted: Yes   Problem Relation Age of Onset    No Known Problems Mother     No Known Problems Father     Coronary artery disease Maternal Grandmother     Breast cancer Maternal Grandmother         age unknown    Coronary artery disease Maternal Aunt     Melanoma Maternal Aunt     No Known Problems Maternal Grandfather     No Known Problems Paternal Grandmother     No Known Problems Paternal Grandfather     No Known Problems Maternal Aunt     No Known Problems Maternal Aunt        Social History     Occupational History    Occupation: [employer]   Tobacco Use    Smoking status: Current Every Day Smoker     Packs/day: 1 00     Types: Cigarettes    Smokeless tobacco: Never Used   Vaping Use    Vaping Use: Never used   Substance and Sexual Activity    Alcohol use: Not Currently    Drug use: No    Sexual activity: Not Currently         Current Outpatient Medications:     albuterol (VENTOLIN HFA) 90 mcg/act inhaler, Inhale 2 puffs every 6 (six) hours as needed for wheezing, Disp: 18 g, Rfl: 0    amLODIPine (NORVASC) 10 mg tablet, Take 1 tablet (10 mg total) by mouth daily, Disp: 90 tablet, Rfl: 1    betamethasone valerate (VALISONE) 0 1 % ointment, Apply 1 to 2 times a day only as needed to finger/hand rash , Disp: 30 g, Rfl: 0    cholecalciferol (VITAMIN D3) 1,000 units tablet, Take 2,000 Units by mouth daily, Disp: , Rfl:     ciclopirox (LOPROX) 0 77 % cream, APPLY 2 TIMES per day to rash on chest for 2-4 weeks  , Disp: 30 g, Rfl: 0    famotidine (PEPCID) 20 mg tablet, Take 1 tablet (20 mg total) by mouth 2 (two) times a day, Disp: 180 tablet, Rfl: 1    HYDROcodone-acetaminophen (NORCO) 5-325 mg per tablet, Take 1 PO BID PRN for pain , Disp: 14 tablet, Rfl: 0    levothyroxine 200 mcg tablet, Take 1 tablet (200 mcg total) by mouth daily, Disp: 90 tablet, Rfl: 0    levothyroxine 25 mcg tablet, 25 mcg 1 tab PO q am with 200 mcg on Mon, Wed, Fri, Sun ONLY , con't 200 mcg all other days, Disp: 90 tablet, Rfl: 0    multivitamin (THERAGRAN) TABS, Take 1 tablet by mouth daily, Disp: , Rfl:     DULoxetine (CYMBALTA) 60 mg delayed release capsule, Take 2 PO HS , Disp: 180 capsule, Rfl: 0    pregabalin (LYRICA) 200 MG capsule, Take 1 PO BID, Disp: 60 capsule, Rfl: 1    tiZANidine (ZANAFLEX) 2 mg tablet, Take 1 PO BID PRN for pain and spasms  , Disp: 60 tablet, Rfl: 1    Allergies   Allergen Reactions    Acetaminophen-Codeine      Pt states she does not remember having a reaction to this medication    Hydrocodone Other (See Comments)    Latex Hives     Latex powder      Prednisolone Vomiting       Physical Exam:    /82 (BP Location: Left arm, Patient Position: Sitting, Cuff Size: Standard)   Pulse 101   Temp 98 7 °F (37 1 °C)   Ht 5' 10" (1 778 m)   Wt (!) 141 kg (310 lb)   BMI 44 48 kg/m²     Constitutional:normal, well developed, well nourished, alert, in no distress and non-toxic and no overt pain behavior  and overweight  Eyes:anicteric  HEENT:grossly intact  Neck:supple, symmetric, trachea midline and no masses   Pulmonary:even and unlabored  Cardiovascular:No edema or pitting edema present  Skin:Normal without rashes or lesions and well hydrated  Psychiatric:Mood and affect appropriate  Neurologic:Cranial Nerves II-XII grossly intact  Musculoskeletal:Patient's gait is slightly antalgic, painful, but limping at times however, steady without the use of any assistive devices        Imaging  FL spine and pain procedure    (Results Pending)         Orders Placed This Encounter   Procedures    FL spine and pain procedure    Ambulatory Referral to Neurology    Ambulatory Referral to Rheumatology

## 2022-02-17 ENCOUNTER — TELEPHONE (OUTPATIENT)
Dept: PAIN MEDICINE | Facility: CLINIC | Age: 47
End: 2022-02-17

## 2022-02-17 NOTE — TELEPHONE ENCOUNTER
S/w Pt to let her know that [facility] filled out and faxed paperwork that Pt provided    [facility] also mailed the original to her address    Scanned a copy to her chart

## 2022-02-23 ENCOUNTER — HOSPITAL ENCOUNTER (OUTPATIENT)
Dept: RADIOLOGY | Facility: CLINIC | Age: 47
Discharge: HOME | End: 2022-02-23
Attending: ANESTHESIOLOGY | Admitting: ANESTHESIOLOGY
Payer: COMMERCIAL

## 2022-02-23 VITALS
HEART RATE: 90 BPM | DIASTOLIC BLOOD PRESSURE: 82 MMHG | TEMPERATURE: 97.9 F | RESPIRATION RATE: 18 BRPM | SYSTOLIC BLOOD PRESSURE: 127 MMHG | OXYGEN SATURATION: 97 %

## 2022-02-23 DIAGNOSIS — M51.27 HERNIATED NUCLEUS PULPOSUS, L5-S1: ICD-10-CM

## 2022-02-23 DIAGNOSIS — M54.50 CHRONIC RIGHT-SIDED LOW BACK PAIN WITHOUT SCIATICA: ICD-10-CM

## 2022-02-23 DIAGNOSIS — G89.29 CHRONIC RIGHT-SIDED LOW BACK PAIN WITHOUT SCIATICA: ICD-10-CM

## 2022-02-23 DIAGNOSIS — M48.062 SPINAL STENOSIS OF LUMBAR REGION WITH NEUROGENIC CLAUDICATION: ICD-10-CM

## 2022-02-23 PROCEDURE — 62323 NJX INTERLAMINAR LMBR/SAC: CPT | Performed by: ANESTHESIOLOGY

## 2022-02-23 RX ORDER — METHYLPREDNISOLONE ACETATE 80 MG/ML
80 INJECTION, SUSPENSION INTRA-ARTICULAR; INTRALESIONAL; INTRAMUSCULAR; PARENTERAL; SOFT TISSUE ONCE
Status: COMPLETED | OUTPATIENT
Start: 2022-02-23 | End: 2022-02-23

## 2022-02-23 RX ADMIN — METHYLPREDNISOLONE ACETATE 80 MG: 80 INJECTION, SUSPENSION INTRA-ARTICULAR; INTRALESIONAL; INTRAMUSCULAR; SOFT TISSUE at 11:04

## 2022-02-23 RX ADMIN — IOHEXOL 1 ML: 300 INJECTION, SOLUTION INTRAVENOUS at 11:03

## 2022-02-23 NOTE — DISCHARGE INSTR - LAB
Epidural Steroid Injection   WHAT YOU NEED TO KNOW:   An epidural steroid injection (MARCIE) is a procedure to inject steroid medicine into the epidural space  The epidural space is between your spinal cord and vertebrae  Steroids reduce inflammation and fluid buildup in your spine that may be causing pain  You may be given pain medicine along with the steroids  ACTIVITY  Do not drive or operate machinery today  No strenuous activity today - bending, lifting, etc   You may resume normal activites starting tomorrow - start slowly and as tolerated  You may shower today, but no tub baths or hot tubs  You may have numbness for several hours from the local anesthetic  Please use caution and common sense, especially with weight-bearing activities  CARE OF THE INJECTION SITE  If you have soreness or pain, apply ice to the area today (20 minutes on/20 minutes off)  Starting tomorrow, you may use warm, moist heat or ice if needed  You may have an increase or change in your discomfort for 36-48 hours after your treatment  Apply ice and continue with any pain medication you have been prescribed  Notify the [facility] if you have any of the following: redness, drainage, swelling, headache, stiff neck or fever above 100°F     SPECIAL INSTRUCTIONS  Our office will contact you in approximately 7 days for a progress report  MEDICATIONS  Continue to take all routine medications  Our office may have instructed you to hold some medications  As no general anesthesia was used in today's procedure, you should not experience any side effects related to anesthesia  If you have a problem specifically related to your procedure, please call our office at [phone]  Problems not related to your procedure should be directed to your primary care physician

## 2022-02-23 NOTE — H&P
History of Present Illness:  The patient is a 51 y o  female who presents with complaints of low back and leg pain for    Patient Active Problem List   Diagnosis    Neck pain, chronic    Chronic headache    Hypothyroidism    Hypertension    Episode of recurrent major depressive disorder ([address] )    Irritable bowel syndrome with diarrhea    Gastroesophageal reflux disease without esophagitis    Myofascial pain syndrome    Primary osteoarthritis of left ankle    Chronic right-sided low back pain without sciatica    Mild intermittent asthma without complication    Peroneal tendonitis, left    Chronic pain syndrome    Lumbar spondylosis    Herniated nucleus pulposus, L5-S1    Spinal stenosis of lumbar region with neurogenic claudication    Carpal tunnel syndrome, bilateral    Chronic pain of right knee    Bilateral leg weakness    Pain of right upper extremity       Past Medical History:   Diagnosis Date    Anxiety     Arthritis     Asthma     Chronic pain disorder     upper back    Depression     Disc disorder     Disease of thyroid gland     Fibromyalgia     Fibromyalgia, primary     GERD (gastroesophageal reflux disease)     Hypertension     Hypothyroidism     Migraine        Past Surgical History:   Procedure Laterality Date    BACK SURGERY      CERVICAL BIOPSY  W/ LOOP ELECTRODE EXCISION      CERVICAL FUSION       SECTION      CHOLECYSTECTOMY      FOOT SURGERY Bilateral     KNEE SURGERY      TUBAL LIGATION Bilateral          Current Outpatient Medications:     albuterol (VENTOLIN HFA) 90 mcg/act inhaler, Inhale 2 puffs every 6 (six) hours as needed for wheezing, Disp: 18 g, Rfl: 0    amLODIPine (NORVASC) 10 mg tablet, Take 1 tablet (10 mg total) by mouth daily, Disp: 90 tablet, Rfl: 1    betamethasone valerate (VALISONE) 0 1 % ointment, Apply 1 to 2 times a day only as needed to finger/hand rash , Disp: 30 g, Rfl: 0    cholecalciferol (VITAMIN D3) 1,000 units tablet, Take 2,000 Units by mouth daily, Disp: , Rfl:     ciclopirox (LOPROX) 0 77 % cream, APPLY 2 TIMES per day to rash on chest for 2-4 weeks  , Disp: 30 g, Rfl: 0    DULoxetine (CYMBALTA) 60 mg delayed release capsule, Take 2 PO HS , Disp: 180 capsule, Rfl: 0    famotidine (PEPCID) 20 mg tablet, Take 1 tablet (20 mg total) by mouth 2 (two) times a day, Disp: 180 tablet, Rfl: 1    HYDROcodone-acetaminophen (NORCO) 5-325 mg per tablet, Take 1 PO BID PRN for pain , Disp: 14 tablet, Rfl: 0    levothyroxine 200 mcg tablet, Take 1 tablet (200 mcg total) by mouth daily, Disp: 90 tablet, Rfl: 0    levothyroxine 25 mcg tablet, 25 mcg 1 tab PO q am with 200 mcg on Mon, Wed, Fri, Sun ONLY , con't 200 mcg all other days, Disp: 90 tablet, Rfl: 0    multivitamin (THERAGRAN) TABS, Take 1 tablet by mouth daily, Disp: , Rfl:     pregabalin (LYRICA) 200 MG capsule, Take 1 PO BID, Disp: 60 capsule, Rfl: 1    tiZANidine (ZANAFLEX) 2 mg tablet, Take 1 PO BID PRN for pain and spasms  , Disp: 60 tablet, Rfl: 1    Current Facility-Administered Medications:     iohexol (OMNIPAQUE) 300 mg/mL injection 50 mL, 50 mL, Epidural, Once, Edmond Morrow DO    methylPREDNISolone acetate (DEPO-MEDROL) injection 80 mg, 80 mg, Epidural, Once, [employer], DO    Allergies   Allergen Reactions    Acetaminophen-Codeine      Pt states she does not remember having a reaction to this medication    Hydrocodone Other (See Comments)    Latex Hives     Latex powder      Prednisolone Vomiting       Physical Exam:   General: Awake, Alert, Oriented x 3, Mood and affect appropriate  Respiratory: Respirations even and unlabored  Cardiovascular: Peripheral pulses intact; no edema  Musculoskeletal Exam:  Decreased range of motion lumbar spine    ASA Score: III         Assessment:  Lumbar disc herniation with lumbar radiculitis    Plan:  Lumbar epidural steroid injection directed towards the right

## 2022-03-02 ENCOUNTER — TELEPHONE (OUTPATIENT)
Dept: PAIN MEDICINE | Facility: CLINIC | Age: 47
End: 2022-03-02

## 2022-03-02 NOTE — TELEPHONE ENCOUNTER
1st attempt  Lm to cb with % improvement and pain level.       S/p L5-S1 LESI to the Right 2/23, F/u 4/13

## 2022-03-09 ENCOUNTER — CONSULT (OUTPATIENT)
Dept: ENDOCRINOLOGY | Facility: HOSPITAL | Age: 47
End: 2022-03-09
Payer: COMMERCIAL

## 2022-03-09 VITALS
DIASTOLIC BLOOD PRESSURE: 86 MMHG | WEIGHT: 293 LBS | HEART RATE: 90 BPM | SYSTOLIC BLOOD PRESSURE: 130 MMHG | HEIGHT: 70 IN | BODY MASS INDEX: 41.95 KG/M2

## 2022-03-09 DIAGNOSIS — E03.9 HYPOTHYROIDISM, UNSPECIFIED TYPE: ICD-10-CM

## 2022-03-09 DIAGNOSIS — Z83.3 FAMILY HISTORY OF DIABETES MELLITUS: Primary | ICD-10-CM

## 2022-03-09 DIAGNOSIS — R63.5 ABNORMAL WEIGHT GAIN: ICD-10-CM

## 2022-03-09 DIAGNOSIS — R35.89 POLYURIA: ICD-10-CM

## 2022-03-09 DIAGNOSIS — R53.82 CHRONIC FATIGUE: ICD-10-CM

## 2022-03-09 PROCEDURE — 99244 OFF/OP CNSLTJ NEW/EST MOD 40: CPT | Performed by: INTERNAL MEDICINE

## 2022-03-09 NOTE — PROGRESS NOTES
3/9/2022    Assessment/Plan      Diagnoses and all orders for this visit:    Family history of diabetes mellitus  -     HEMOGLOBIN A1C W/ EAG ESTIMATION Lab Collect  -     Comprehensive metabolic panel Lab Collect  -     T4, free Lab Collect  -     TSH, 3rd generation Lab Collect  -     Insulin, fasting- Lab Collect  -     T3, free  -     Anti-thyroglobulin antibody  -     Anti-microsomal antibody Lab Collect  -     Testosterone, free, total Lab Collect    Hypothyroidism, unspecified type  -     Ambulatory referral to Endocrinology  -     HEMOGLOBIN A1C W/ EAG ESTIMATION Lab Collect  -     Comprehensive metabolic panel Lab Collect  -     T4, free Lab Collect  -     TSH, 3rd generation Lab Collect  -     Insulin, fasting- Lab Collect  -     T3, free  -     Anti-thyroglobulin antibody  -     Anti-microsomal antibody Lab Collect  -     Testosterone, free, total Lab Collect    Polyuria  -     HEMOGLOBIN A1C W/ EAG ESTIMATION Lab Collect  -     Comprehensive metabolic panel Lab Collect  -     T4, free Lab Collect  -     TSH, 3rd generation Lab Collect  -     Insulin, fasting- Lab Collect  -     T3, free  -     Anti-thyroglobulin antibody  -     Anti-microsomal antibody Lab Collect  -     Testosterone, free, total Lab Collect    Chronic fatigue  -     HEMOGLOBIN A1C W/ EAG ESTIMATION Lab Collect  -     Comprehensive metabolic panel Lab Collect  -     T4, free Lab Collect  -     TSH, 3rd generation Lab Collect  -     Insulin, fasting- Lab Collect  -     T3, free  -     Anti-thyroglobulin antibody  -     Anti-microsomal antibody Lab Collect  -     Testosterone, free, total Lab Collect  -     Creatinine, urine, 24 hour- [employer];  Future  -     Cortisol, Free, Urine, 24 Hour; Future  -     Creatinine, urine, 24 hour- Lab Collect  -     Cortisol, Free, Urine, 24 Hour    Abnormal weight gain  -     HEMOGLOBIN A1C W/ EAG ESTIMATION Lab Collect  -     Comprehensive metabolic panel Lab Collect  -     T4, free Lab Collect  - TSH, 3rd generation Lab Collect  -     Insulin, fasting- Lab Collect  -     T3, free  -     Anti-thyroglobulin antibody  -     Anti-microsomal antibody Lab Collect  -     Testosterone, free, total Lab Collect  -     Creatinine, urine, 24 hour- Lab Collect; Future  -     Cortisol, Free, Urine, 24 Hour; Future  -     Creatinine, urine, 24 hour- Lab Collect  -     Cortisol, Free, Urine, 24 Hour    Other orders  -     Potassium 99 MG TABS; Take by mouth daily  -     Calcium Carbonate (CALCIUM 600 PO); Take by mouth daily        Assessment/Plan:  1  Hypothyroidism  I will do a full thyroid panel with thyroid antibodies  Depending on the results of the thyroid panel, we could consider changing her to brand name thyroid hormone or adding T3 supplementation with decreasing T4 to see if that improves her symptoms  I have also asked her to make sure that she moves both of her vitamins such as calcium and multivitamins that could interfere with levothyroxine absorption to the evening far away from when she takes her levothyroxine dosage  2  Family history of diabetes with history of polyuria  I have asked her to do some fasting blood work to rule out diabetes  3  Fatigue with abnormal weight gain  She has significant fatigue and it sounds like she may have sleep apnea based on symptomatology  I have asked her to continue to have sleep apnea evaluation to see if that is contributing to her tiredness  I will do a 24 hour urine for creatinine and urinary free cortisol to rule out hypercortisolism as contributing to her symptoms  She has to wait until after March 23rd to do this since she does had a cortisone injection in her knee on February 23rd  The meantime, I have recommended to make sure she continues to eat a healthy diet portion control and try to get any exercise into her regimen as possible up to 3 times a week and eventually up to 30 minutes at a time      She get fasting blood work done consisting of a hemoglobin A1c, CMP, TSH, free T4, free T3, fasting insulin level, free and total testosterone levels, and thyroid antibody panel  She will also get a 24 hour urine for urinary free cortisol and creatinine done after March 23rd  Follow-up will be determined based on the studies  CC:  Thyroid, weight, fatigue consult    History of Present Illness     HPI: Pamella Ortiz is a 51y o  year old female with history of hypothyroidism with continued recent weight gain and fatigue here for evaluation/consult  She reports that she was diagnosed at the age of 24 with hypothyroidism  She was placed on thyroid hormone for replacement purposes and uses generic levothyroxine  Last dosage change was about 1 year ago and she is currently taking levothyroxine 200 mcg daily with extra at levothyroxine 25 mcg 1 Monday, Wednesday, Friday, and Sunday  She does take her levothyroxine on an empty stomach but also does take multivitamins in the morning and calcium in the evening  She reports that she gained 30 lb within several weeks in the fall months  Her mother was a nurse and others were telling her that they thought it was her thyroid but blood work has all been normal   She tends to be somewhat who does gain weight 1 or 2 lb at a time and has always been heavy but has never gained weight this fast before  She is always exhausted and fatigued but also has a lot of pain issues  She tries to watch a lower portion diet but is unfortunately not able to exercise all that regularly due to her pain management and pain issues  Aside from the fatigue and weight gain, she is always somewhat on the warmer side with heat intolerance  She denies cold intolerance or palpitation  She reports that sleeping is not as good when her fiance snores but he has also said that she snores quite a bit but reportedly has never stopped breathing  She has occasional tremors    She has IBS with diarrhea which can have good times and bad times/events  She denies constipation or depression but is always somewhat anxious  She has dry skin and brittle nails along with generalized hair loss  She denies hirsutism, acne, or violaceous striae  She has no menses since she is on Depo-Provera which she has been on for almost 3 years  Her weight gain did not coincide with starting Depo-Provera  She has no compressive thyroid symptoms or difficulties with swallowing  She denies polyuria, polydipsia, or polyphagia  She has nocturia once a night over the last week and over the last month has had more polyuria with urinary urgency  She denies blurry vision  She has some numbness and tingling of the hands with carpal tunnel syndrome but none of the feet  Of note, she does have diabetes type 2 in half brother  Review of Systems   Constitutional: Positive for fatigue and unexpected weight change  Weight gain 30 lb in several weeks in the fall, typically gains about 1 or 2 lb so never of fast weight gain or in the past   Has always been heavy  Is always exhausted and fatigued  HENT: Positive for hearing loss  Negative for tinnitus and trouble swallowing  Asks people to repeat selves frequently  No XRT to the head or neck in the past    Eyes: Negative for visual disturbance  Wears glasses  No diplopia  Respiratory: Negative for chest tightness and shortness of breath  Cardiovascular: Negative for chest pain, palpitations and leg swelling  Bilateral leg edema 6 months ago, now not as much but can swell some days  Gastrointestinal: Positive for diarrhea  Negative for abdominal pain, constipation and nausea  Has IBS-D has diarrhea will be constant for a while and then not as bad  Endocrine: Positive for heat intolerance  Negative for cold intolerance, polydipsia, polyphagia and polyuria  Tends to be on warmer side  Within the last month, urinary urgency and almost incontinence   Nocturia once a night in the last week  Genitourinary:        Gets depo provera injections every 3 months with last one 2021  No menses on this treatment  Has been on for around 2-3 years  Musculoskeletal: Positive for arthralgias and back pain  Has pain in the cervical area, right knee pain and back pain  Had been getting cortisone injections regularly especially since pain radiates down the right leg  Last cortisol injection was 2022 which has helped her pain some  Skin: Positive for rash  Has dry skin  Pustular rash on hands  reddish rash on neck  Has brittle nails  Has hair loss generalized  No hirsutism  no acne  No violaceous striae  Neurological: Positive for tremors and numbness  Negative for dizziness, weakness, light-headedness and headaches  Occasional tremors  Will feel when moves too fast, room flips or spins  Has numbness and tingling of hands as has CTS  None in the feet  Psychiatric/Behavioral: Positive for sleep disturbance  Negative for dysphoric mood  The patient is nervous/anxious  Sleeping off as fiancee snores a lot does snore  No one ever said she stops breathing          Historical Information   Past Medical History:   Diagnosis Date    Anxiety     Arthritis     Asthma     Chronic pain disorder     upper back    Depression     Disc disorder     Fibromyalgia, primary     GERD (gastroesophageal reflux disease)     HPV (human papilloma virus) infection     cervical    Hypertension     Hypothyroidism     Migraine      Past Surgical History:   Procedure Laterality Date    CERVICAL BIOPSY  W/ LOOP ELECTRODE EXCISION      CERVICAL FUSION  2015     SECTION      CHOLECYSTECTOMY      lap    FOOT SURGERY Bilateral     multiple, posterior calcaneal bone spur    KNEE SURGERY Left     arthroscopy with debribement    TUBAL LIGATION Bilateral      Social History   Social History     Substance and Sexual Activity   Alcohol Use Not Currently     Social History     Substance and Sexual Activity   Drug Use No     Social History     Tobacco Use   Smoking Status Current Every Day Smoker    Packs/day: 1 00    Types: Cigarettes   Smokeless Tobacco Never Used   Tobacco Comment    actually about 3/4 PPD     Family History:   Family History   Adopted: Yes   Problem Relation Age of Onset    No Known Problems Mother     No Known Problems Father     Coronary artery disease Maternal Grandmother     Breast cancer Maternal Grandmother         age unknown    Coronary artery disease Maternal Aunt     No Known Problems Maternal Grandfather     No Known Problems Paternal Grandmother     No Known Problems Paternal Grandfather     No Known Problems Maternal Aunt     Heart murmur Maternal Aunt     Hypothyroidism Maternal Aunt     Hashimoto's thyroiditis Maternal Aunt     Diabetes unspecified Brother     No Known Problems Brother     No Known Problems Brother     ADD / ADHD Son        Meds/Allergies   Current Outpatient Medications   Medication Sig Dispense Refill    albuterol (VENTOLIN HFA) 90 mcg/act inhaler Inhale 2 puffs every 6 (six) hours as needed for wheezing 18 g 0    amLODIPine (NORVASC) 10 mg tablet Take 1 tablet (10 mg total) by mouth daily 90 tablet 1    betamethasone valerate (VALISONE) 0 1 % ointment Apply 1 to 2 times a day only as needed to finger/hand rash  30 g 0    Calcium Carbonate (CALCIUM 600 PO) Take by mouth daily      cholecalciferol (VITAMIN D3) 1,000 units tablet Take 2,000 Units by mouth daily      ciclopirox (LOPROX) 0 77 % cream APPLY 2 TIMES per day to rash on chest for 2-4 weeks  30 g 0    DULoxetine (CYMBALTA) 60 mg delayed release capsule Take 2 PO HS  180 capsule 0    famotidine (PEPCID) 20 mg tablet Take 1 tablet (20 mg total) by mouth 2 (two) times a day 180 tablet 1    HYDROcodone-acetaminophen (NORCO) 5-325 mg per tablet Take 1 PO BID PRN for pain   14 tablet 0    levothyroxine 200 mcg tablet Take 1 tablet (200 mcg total) by mouth daily 90 tablet 0    levothyroxine 25 mcg tablet 25 mcg 1 tab PO q am with 200 mcg on Mon, Wed, Fri, Sun ONLY , con't 200 mcg all other days 90 tablet 0    multivitamin (THERAGRAN) TABS Take 1 tablet by mouth daily      Potassium 99 MG TABS Take by mouth daily      pregabalin (LYRICA) 200 MG capsule Take 1 PO BID 60 capsule 1    tiZANidine (ZANAFLEX) 2 mg tablet Take 1 PO BID PRN for pain and spasms  60 tablet 1     No current facility-administered medications for this visit  Allergies   Allergen Reactions    Acetaminophen-Codeine      Pt states she does not remember having a reaction to this medication    Hydrocodone Other (See Comments)     GI issues    Latex Hives     Latex powder      Prednisolone Vomiting       Objective   Vitals: Blood pressure 130/86, pulse 90, height 5' 10" (1 778 m), weight (!) 144 kg (316 lb 9 6 oz), not currently breastfeeding  Invasive Devices  Report    None                 Physical Exam  Vitals reviewed  Constitutional:       Appearance: Normal appearance  She is well-developed  She is obese  HENT:      Head: Normocephalic and atraumatic  Comments: No moon faces  Eyes:      Extraocular Movements: Extraocular movements intact  Conjunctiva/sclera: Conjunctivae normal       Comments: No lid lag, stare, proptosis, or periorbital edema  Neck:      Thyroid: No thyromegaly  Vascular: No carotid bruit  Comments: No supraclavicular fat pads or buffalo hump  Thyroid normal in size  No palpable thyroid nodules  No bruits over the thyroid gland  Cardiovascular:      Rate and Rhythm: Normal rate and regular rhythm  Heart sounds: Normal heart sounds  No murmur heard  Pulmonary:      Effort: Pulmonary effort is normal       Breath sounds: Normal breath sounds  No wheezing  Abdominal:      General: Bowel sounds are normal       Palpations: Abdomen is soft  Tenderness: There is no abdominal tenderness  Musculoskeletal:         General: No deformity  Normal range of motion  Cervical back: Normal range of motion and neck supple  Right lower leg: No edema  Left lower leg: No edema  Comments: No tremor of the outstretched hands  No spinous process tenderness  No CVA tenderness  Lymphadenopathy:      Cervical: No cervical adenopathy  Skin:     General: Skin is warm and dry  Findings: No rash  Comments: No hirsutism on the chin  No violaceous striae  Neurological:      Mental Status: She is alert and oriented to person, place, and time  Deep Tendon Reflexes: Reflexes are normal and symmetric  Comments: Deep tendon reflexes normal          The history was obtained from the review of the chart and from the patient  Lab Results:    Blood work done on 10/13/2021 showed a TSH of 2 13 with a free T4 of 1 86  Total cholesterol 174, triglyceride 86, HDL 49,         CMP showed a glucose of 87 random/fasting, BUN/creatinine ratio 26, but was otherwise normal       CBC is normal     Lab Results   Component Value Date    CREATININE 0 72 10/13/2021    CREATININE 0 76 02/03/2021    CREATININE 0 76 06/22/2020    BUN 19 10/13/2021    K 4 3 10/13/2021     10/13/2021    CO2 22 10/13/2021     eGFR   Date Value Ref Range Status   02/03/2021 94 ml/min/1 73sq m Final       Lab Results   Component Value Date    HDL 49 10/13/2021    TRIG 86 10/13/2021    CHOLHDL 3 6 10/13/2021       Lab Results   Component Value Date    ALT 20 10/13/2021    AST 15 10/13/2021    ALKPHOS 62 02/03/2021       Lab Results   Component Value Date    TSH 2 130 10/13/2021    FREET4 1 86 (H) 10/13/2021             Future Appointments   Date Time Provider \[facility] [place] [facility]\""   3/15/2022 10:30 AM Opal Palacios MD [facility]TH[facility]   3/16/2022  2:45 PM Chioma Dumas PT [facility]   4/13/2022  7:15 AM KENNETH Whitlock SP [facility]   4/25/2022  1:30 PM MD Layla Burleson [facility]   8/1/2022 12:00 PM Jose Abebe MD Rheum [facility]   8/11/2022 12:30 PM Krys Aragon MD NEURO [facility]

## 2022-03-09 NOTE — PATIENT INSTRUCTIONS
For now, we'll do fasting blood work  Continue the same levothyroxine dosage but move the vitamins to the evening  (Calcium and multivitamin)  If the blood work is normal, we can try brand thyroid medicine or adding T3 and slightly lowering T4  We'll do 24 hour urine to test for high cortisol after march 23  Consider sleep apnea evaluation, may be contributing to tiredness  Make sure to eat healthy diet, portion controlled  Get any exercise in you can regularly, 3 times a week, up to 30 min at a time  Follow up to be determined

## 2022-03-15 ENCOUNTER — OFFICE VISIT (OUTPATIENT)
Dept: OBGYN CLINIC | Facility: CLINIC | Age: 47
End: 2022-03-15
Payer: COMMERCIAL

## 2022-03-15 VITALS
SYSTOLIC BLOOD PRESSURE: 138 MMHG | HEIGHT: 70 IN | BODY MASS INDEX: 41.95 KG/M2 | DIASTOLIC BLOOD PRESSURE: 86 MMHG | WEIGHT: 293 LBS

## 2022-03-15 DIAGNOSIS — E66.01 MORBID OBESITY WITH BODY MASS INDEX (BMI) OF 45.0 TO 49.9 IN ADULT (HCC): ICD-10-CM

## 2022-03-15 DIAGNOSIS — M17.11 PRIMARY OSTEOARTHRITIS OF RIGHT KNEE: Primary | ICD-10-CM

## 2022-03-15 PROCEDURE — 3008F BODY MASS INDEX DOCD: CPT | Performed by: ORTHOPAEDIC SURGERY

## 2022-03-15 PROCEDURE — 4004F PT TOBACCO SCREEN RCVD TLK: CPT | Performed by: ORTHOPAEDIC SURGERY

## 2022-03-15 PROCEDURE — 99214 OFFICE O/P EST MOD 30 MIN: CPT | Performed by: ORTHOPAEDIC SURGERY

## 2022-03-15 NOTE — PROGRESS NOTES
Assessment:     1  Primary osteoarthritis of right knee    2  Morbid obesity with body mass index (BMI) of 45 0 to 49 9 in [facility])        Plan:     Problem List Items Addressed This Visit        Musculoskeletal and Integument    Primary osteoarthritis of right knee - Primary       Other    Morbid obesity with body mass index (BMI) of 45 0 to 49 9 in [facility])    Relevant Orders    Ambulatory Referral to Weight Management        Findings consistent with right knee osteoarthritis  Imaging and prognosis reviewed  Patient is currently asymptomatic following CSI 4 weeks ago  She can move forward with physical therapy and learn what to do and transition to HEP  Avoid high impact activities, stationary bike, elliptical, pool for low impact exercise  OTC anti inflammatories as needed for pain, voltaren gel or aspercreme  Can repeat cortisone injection every 3-4 months if needed, also have viscosupplementation in future if cortisone stops being effective  Discussed weight loss with patient, referral placed to weight management  She has gained 45 lbs unexpectedly and PCP is doing work up to find out why  See patient as needed  All patient's questions were answered to her satisfaction  This note is created using dictation transcription  It may contain typographical errors, grammatical errors, improperly dictated words, background noise and other errors  Subjective:     Patient ID: Lenin Manzanares is a 51 y o  female  Chief Complaint:  51 yr old female in for evaluation of chronic right knee pain, referred by AKIRA Garcia  On and off knee pain for years w/o any injury or trauma  She was given cortisone injection 4 weeks ago which took a few days to work and since then patient has had little to no pain in her knee  She starts physical therapy tomorrow  She is able to work and do her daily activities pain free  She does note clicking, cracking, popping in anterior knee but no pain   She denies any instability or locking, walking unassisted  Denies any numbness or tingling in leg  Information on patient's intake form was reviewed      Allergy:  Allergies   Allergen Reactions    Acetaminophen-Codeine      Pt states she does not remember having a reaction to this medication    Hydrocodone Other (See Comments)     GI issues    Latex Hives     Latex powder      Prednisolone Vomiting     Medications:  all current active meds have been reviewed  Past Medical History:  Past Medical History:   Diagnosis Date    Anxiety     Arthritis     Asthma     Chronic pain disorder     upper back    Depression     Disc disorder     Fibromyalgia, primary     GERD (gastroesophageal reflux disease)     HPV (human papilloma virus) infection     cervical    Hypertension     Hypothyroidism     Migraine      Past Surgical History:  Past Surgical History:   Procedure Laterality Date    CERVICAL BIOPSY  W/ LOOP ELECTRODE EXCISION      CERVICAL FUSION  2015     SECTION      CHOLECYSTECTOMY      lap    FOOT SURGERY Bilateral     multiple, posterior calcaneal bone spur    KNEE SURGERY Left     arthroscopy with debribement    TUBAL LIGATION Bilateral      Family History:  Family History   Adopted: Yes   Problem Relation Age of Onset    No Known Problems Mother     No Known Problems Father     Coronary artery disease Maternal Grandmother     Breast cancer Maternal Grandmother         age unknown    Coronary artery disease Maternal Aunt     No Known Problems Maternal Grandfather     No Known Problems Paternal Grandmother     No Known Problems Paternal Grandfather     No Known Problems Maternal Aunt     Heart murmur Maternal Aunt     Hypothyroidism Maternal Aunt     Hashimoto's thyroiditis Maternal Aunt     Diabetes unspecified Brother     No Known Problems Brother     No Known Problems Brother     ADD / ADHD Son      Social History:  Social History     Substance and Sexual Activity   Alcohol Use Not Currently Social History     Substance and Sexual Activity   Drug Use No     Social History     Tobacco Use   Smoking Status Current Every Day Smoker    Packs/day: 1 00    Types: Cigarettes   Smokeless Tobacco Never Used   Tobacco Comment    actually about 3/4 PPD     Review of Systems   Constitutional: Negative for chills and fever  HENT: Negative for ear pain and sore throat  Eyes: Negative for pain and visual disturbance  Respiratory: Negative for cough and shortness of breath  Cardiovascular: Negative for chest pain and palpitations  Gastrointestinal: Negative for abdominal pain and vomiting  Genitourinary: Negative for dysuria and hematuria  Musculoskeletal: Positive for arthralgias (Right knee)  Negative for back pain, gait problem and joint swelling  Skin: Negative for color change and rash  Neurological: Negative for seizures and syncope  Psychiatric/Behavioral: Negative  All other systems reviewed and are negative  Objective:  BP Readings from Last 1 Encounters:   03/15/22 138/86      Wt Readings from Last 1 Encounters:   03/15/22 (!) 143 kg (315 lb)      BMI:   Estimated body mass index is 45 2 kg/m² as calculated from the following:    Height as of this encounter: 5' 10" (1 778 m)  Weight as of this encounter: 143 kg (315 lb)  BSA:   Estimated body surface area is 2 53 meters squared as calculated from the following:    Height as of this encounter: 5' 10" (1 778 m)  Weight as of this encounter: 143 kg (315 lb)  Physical Exam  Vitals and nursing note reviewed  Constitutional:       Appearance: Normal appearance  She is well-developed  HENT:      Head: Normocephalic and atraumatic  Right Ear: External ear normal       Left Ear: External ear normal    Eyes:      Extraocular Movements: Extraocular movements intact        Conjunctiva/sclera: Conjunctivae normal    Pulmonary:      Effort: Pulmonary effort is normal    Musculoskeletal:         General: Tenderness (right knee arthralgia ) present  Cervical back: Neck supple  Right knee: No effusion  Instability Tests: Medial Karen test negative and lateral Karen test negative  Skin:     General: Skin is warm and dry  Neurological:      Mental Status: She is alert and oriented to person, place, and time  Deep Tendon Reflexes: Reflexes are normal and symmetric  Psychiatric:         Mood and Affect: Mood normal          Behavior: Behavior normal        Right Knee Exam     Muscle Strength   The patient has normal right knee strength  Tenderness   The patient is experiencing no tenderness  Range of Motion   Extension: 0   Flexion: 120     Tests   Karen:  Medial - negative Lateral - negative  Varus: negative Valgus: negative  Patellar apprehension: negative    Other   Erythema: absent  Scars: absent  Sensation: normal  Pulse: present  Swelling: none  Effusion: no effusion present    Comments:   Well tracking patella with crepitation             I have personally reviewed pertinent films in PACS and my interpretation is xr right knee demonstrates tricompartental osteoarthritis moderate medial and mild to moderate patellofemoral with tricompartmental spurring and osteophytes patellofemoral       Scribe Attestation    I,:  Izzy Helm am acting as a scribe while in the presence of the attending physician :       I,:  Radha Gagnon MD personally performed the services described in this documentation    as scribed in my presence :

## 2022-03-15 NOTE — TELEPHONE ENCOUNTER
Patient states 90% of improvement & pain level is 3/10-pt states that shes feeling great-        thank you

## 2022-04-06 LAB
ALBUMIN SERPL-MCNC: 4.2 G/DL (ref 3.8–4.8)
ALBUMIN/GLOB SERPL: 1.8 {RATIO} (ref 1.2–2.2)
ALP SERPL-CCNC: 79 IU/L (ref 44–121)
ALT SERPL-CCNC: 21 IU/L (ref 0–32)
AST SERPL-CCNC: 13 IU/L (ref 0–40)
BILIRUB SERPL-MCNC: 0.3 MG/DL (ref 0–1.2)
BUN SERPL-MCNC: 25 MG/DL (ref 6–24)
BUN/CREAT SERPL: 34 (ref 9–23)
CALCIUM SERPL-MCNC: 9.8 MG/DL (ref 8.7–10.2)
CHLORIDE SERPL-SCNC: 105 MMOL/L (ref 96–106)
CO2 SERPL-SCNC: 23 MMOL/L (ref 20–29)
CREAT SERPL-MCNC: 0.74 MG/DL (ref 0.57–1)
EGFR: 100 ML/MIN/1.73
EST. AVERAGE GLUCOSE BLD GHB EST-MCNC: 120 MG/DL
GLOBULIN SER-MCNC: 2.4 G/DL (ref 1.5–4.5)
GLUCOSE SERPL-MCNC: 79 MG/DL (ref 65–99)
HBA1C MFR BLD: 5.8 % (ref 4.8–5.6)
POTASSIUM SERPL-SCNC: 4.4 MMOL/L (ref 3.5–5.2)
PROT SERPL-MCNC: 6.6 G/DL (ref 6–8.5)
SODIUM SERPL-SCNC: 142 MMOL/L (ref 134–144)
T3FREE SERPL-MCNC: 2.6 PG/ML (ref 2–4.4)
T4 FREE SERPL-MCNC: 1.62 NG/DL (ref 0.82–1.77)
TESTOST FREE SERPL-MCNC: 0.4 PG/ML (ref 0–4.2)
TESTOST SERPL-MCNC: <3 NG/DL (ref 4–50)
THYROGLOB AB SERPL-ACNC: <1 IU/ML (ref 0–0.9)
THYROPEROXIDASE AB SERPL-ACNC: 303 IU/ML (ref 0–34)
TSH SERPL DL<=0.005 MIU/L-ACNC: 4.66 UIU/ML (ref 0.45–4.5)

## 2022-04-11 DIAGNOSIS — E03.9 HYPOTHYROIDISM, UNSPECIFIED TYPE: ICD-10-CM

## 2022-04-11 RX ORDER — LEVOTHYROXINE SODIUM 0.2 MG/1
200 TABLET ORAL DAILY
Qty: 90 TABLET | Refills: 0 | Status: SHIPPED | OUTPATIENT
Start: 2022-04-11 | End: 2022-04-27

## 2022-04-13 ENCOUNTER — TELEMEDICINE (OUTPATIENT)
Dept: PAIN MEDICINE | Facility: CLINIC | Age: 47
End: 2022-04-13
Payer: COMMERCIAL

## 2022-04-13 DIAGNOSIS — R52 DIFFUSE PAIN: ICD-10-CM

## 2022-04-13 DIAGNOSIS — M47.816 LUMBAR SPONDYLOSIS: ICD-10-CM

## 2022-04-13 DIAGNOSIS — G89.4 CHRONIC PAIN SYNDROME: Primary | ICD-10-CM

## 2022-04-13 DIAGNOSIS — M54.50 CHRONIC RIGHT-SIDED LOW BACK PAIN WITHOUT SCIATICA: ICD-10-CM

## 2022-04-13 DIAGNOSIS — G89.29 CHRONIC NONINTRACTABLE HEADACHE, UNSPECIFIED HEADACHE TYPE: ICD-10-CM

## 2022-04-13 DIAGNOSIS — M54.6 ACUTE BILATERAL THORACIC BACK PAIN: ICD-10-CM

## 2022-04-13 DIAGNOSIS — G89.29 CHRONIC RIGHT-SIDED LOW BACK PAIN WITHOUT SCIATICA: ICD-10-CM

## 2022-04-13 DIAGNOSIS — M51.27 HERNIATED NUCLEUS PULPOSUS, L5-S1: ICD-10-CM

## 2022-04-13 DIAGNOSIS — G89.29 NECK PAIN, CHRONIC: ICD-10-CM

## 2022-04-13 DIAGNOSIS — M48.062 SPINAL STENOSIS OF LUMBAR REGION WITH NEUROGENIC CLAUDICATION: ICD-10-CM

## 2022-04-13 DIAGNOSIS — R51.9 CHRONIC NONINTRACTABLE HEADACHE, UNSPECIFIED HEADACHE TYPE: ICD-10-CM

## 2022-04-13 DIAGNOSIS — M54.2 NECK PAIN, CHRONIC: ICD-10-CM

## 2022-04-13 DIAGNOSIS — M79.18 MYOFASCIAL PAIN SYNDROME: ICD-10-CM

## 2022-04-13 LAB
CORTIS F 24H UR-MRATE: 17 UG/24 HR (ref 6–42)
CORTIS F UR-MCNC: 21 UG/L
CREAT 24H UR-MRATE: 1224 MG/24 HR (ref 800–1800)
CREAT UR-MCNC: 153 MG/DL

## 2022-04-13 PROCEDURE — 99214 OFFICE O/P EST MOD 30 MIN: CPT | Performed by: NURSE PRACTITIONER

## 2022-04-13 RX ORDER — TIZANIDINE 4 MG/1
TABLET ORAL
Qty: 60 TABLET | Refills: 2 | Status: SHIPPED | OUTPATIENT
Start: 2022-04-13 | End: 2022-07-05 | Stop reason: SDUPTHER

## 2022-04-13 RX ORDER — DULOXETIN HYDROCHLORIDE 60 MG/1
CAPSULE, DELAYED RELEASE ORAL
Qty: 180 CAPSULE | Refills: 0 | Status: SHIPPED | OUTPATIENT
Start: 2022-04-13 | End: 2022-07-05 | Stop reason: SDUPTHER

## 2022-04-13 RX ORDER — PREGABALIN 200 MG/1
CAPSULE ORAL
Qty: 60 CAPSULE | Refills: 2 | Status: SHIPPED | OUTPATIENT
Start: 2022-04-13 | End: 2022-04-19 | Stop reason: SDUPTHER

## 2022-04-13 NOTE — PATIENT INSTRUCTIONS
Assessment/Plan:  1  I discussed with the patient at this point time with regards to her low back and leg pain since she is noting significant and stable relief, for now, we will hold off any repeat injections or procedures and see how she does  The patient was agreeable and verbalized an understanding  2  I discussed with the patient with regards to the new onset and worsening thoracic pain, I feel would be reasonable to at least proceed with an x-ray of the thoracic spine to evaluate for any underlying etiology that could explain her pain  I advised the patient once we have the results of the x-rays, our office will give her a call to review the results and discuss the next step in treatment plan which may include a thoracic spine MRI, although I am highly suspicious the pain is most likely myofascial in nature  The patient was agreeable and verbalized an understanding  3  As I feel that her worsening thoracic pain is most likely and significantly myofascial in nature, I feel may be beneficial to increase the tizanidine to 4 mg b i d  to see if that would be more effective for her overall pain and spasms, especially in the thoracic area  In the meantime since she is noting at least moderate overall relief, without any significant side effects, I feel would be reasonable appropriate also continue the Cymbalta, Lyrica, and p r n  Norco as prescribed  However, please note that the patient did not need a refill the p r n  Norco today  The patient was agreeable and verbalized an understanding  4  The patient is to follow-up as scheduled for a virtual visit on July 5, 2022 at 7:30 a m  as scheduled  The patient was agreeable and verbalized an understanding  Opioid Safety   AMBULATORY CARE:   Opioid safety  includes the correct use, storage, and disposal of opioids  Examples of opioid medicines to treat pain include oxycodone, morphine, fentanyl, and codeine     Call your local emergency number (911 in the ), or have someone else call if:   · You have a seizure  · You cannot be woken  · You have trouble staying awake and your breathing is slow or shallow  · Your speech is slurred, or you are confused  · You are dizzy or stumble when you walk  Call your doctor, or have someone close to you call if:   · You are extremely drowsy, or you have trouble staying awake or speaking  · You have pale or clammy skin  · You have blue fingernails or lips  · Your heartbeat is slower than normal     · You cannot stop vomiting  · You have questions or concerns about your condition or care  Use opioids safely:   · Take prescribed opioids exactly as directed  Opioids come with directions based on the kind of opioid and how it is given  Do not take more than the recommended amount, or for longer than needed  · Do not give opioids to others or take opioids that belong to someone else  Misuse of opioids can lead to an addiction or overdose  · Do not mix opioids with other medicines or alcohol  The combination can cause an overdose, or lead to a coma  · Do not drive or operate heavy machinery after you take the opioid  Your provider or pharmacist can tell you how long to wait after a dose before you do these activities  · Talk to your healthcare provider if you have any side effects  He or she can help you prevent or relieve side effects  Side effects include nausea, sleepiness, itching, and trouble thinking clearly  Manage constipation:  Constipation is the most common side effect of opioid medicine  Constipation is when you have hard, dry bowel movements, or you go longer than usual between bowel movements  Tell your healthcare provider about all changes in your bowel movements while you are taking opioids  He or she may recommend laxative medicine to help you have a bowel movement  He or she may also change the kind of opioid you are taking, or change when you take it   The following are more ways you can prevent or relieve constipation:  · Drink liquids as directed  You may need to drink extra liquids to help soften and move your bowels  Ask how much liquid to drink each day and which liquids are best for you  · Eat high-fiber foods  This may help decrease constipation by adding bulk to your bowel movements  High-fiber foods include fruits, vegetables, whole-grain breads and cereals, and beans  Your healthcare provider or dietitian can help you create a high-fiber meal plan  Your provider may also recommend a fiber supplement if you cannot get enough fiber from food  · Exercise regularly  Regular physical activity can help stimulate your intestines  Walking is a good exercise to prevent or relieve constipation  Ask which exercises are best for you  · Schedule a time each day to have a bowel movement  This may help train your body to have regular bowel movements  Bend forward while you are on the toilet to help move the bowel movement out  Sit on the toilet for at least 10 minutes, even if you do not have a bowel movement  Store opioids safely:   · Store opioids where others cannot easily get them  Keep them in a locked cabinet or secure area  Do not  keep them in a purse or other bag you carry with you  A person may be looking for something else and find the opioids  · Make sure opioids are stored out of the reach of children  A child can easily overdose on opioids  Opioids may look like candy to a small child  The best way to dispose of opioids: The laws vary by country and area  In the [place], the best way is to return the opioids through a take-back program  This program is offered by the [employer]  The following are options for using the program:  · Take the opioids to a PAUL collection site  The site is often a law enforcement center  Call your local law enforcement center for scheduled take-back days in your area   You will be given information on where to go if the collection site is in a different location  · Take the opioids to an approved pharmacy or hospital   A pharmacy or hospital may be set up as a collection site  You will need to ask if it is a PAUL collection site if you were not directed there  A pharmacy or doctor's office may not be able to take back opioids unless it is a PAUL site  · Use a mail-back system  This means you are given containers to put the opioids into  You will then mail them in the containers  · Use a take-back drop box  This is a place to leave the opioids at any time  People and animals will not be able to get into the box  Your local law enforcement agency can tell you where to find a drop box in your area  Other safe ways to dispose of opioids: The medicine may come with disposal instructions  The instructions may vary depending on the brand of medicine you are using  Instructions may come in a Medication Guide, but not every medicine has one  You may instead get instructions from your pharmacy or doctor  Follow instructions carefully  The following are general guidelines to follow:  · Find out if you can flush the opioid  Some opioids can be flushed down the toilet or poured into the sink  You will need to contact authorities in your area to see if this is an option for you  The FDA also offers a list of medicines that are safe to flush down the toilet  You can check the list if you cannot get the information for your local area  · Ask your waste management company about rules for putting opioids in the trash  The company will be able to give you specific directions  Scratch out personal information on the original medicine label so it cannot be read  Then put it in the trash  Do not label the trash or put any information on it about the opioids  It should look like regular household trash so no one is tempted to look for the opioids   Keep the trash out of the reach of children and animals  Always make sure trash is secure  · Talk to officials if you live in a facility  If you live in a nursing home or assisted living center, talk to an official  The person will know the rules for your area  Other ways to manage pain:   · Ask your healthcare provider about non-opioid medicines to control pain  Nonprescription medicines include NSAIDs (such as ibuprofen) and acetaminophen  Prescription medicines include muscle relaxers, antidepressants, and steroids  · Pain may be managed without any medicines  Some ways to relieve pain include massage, aromatherapy, or meditation  Physical or occupational therapy may also help  For more information:   · Drug Enforcement Administration  [address] [facility] [place] [address]  Phone: 0- 106 - 597-5642  Web Address: [facility]/drug_disposal/    · [address]  and Drug Administration  [place] , [address] [facility]  Phone: 7- 922 - 848-0130  Web Address: http://[employer]/    Follow up with your doctor or pain specialist as directed: You may need to have your dose adjusted  Your doctor or pain specialist can also help you find ways to manage pain without opioids  Write down your questions so you remember to ask them during your visits  © Copyright [address] 2022 Information is for End User's use only and may not be sold, redistributed or otherwise used for commercial purposes  All illustrations and images included in CareNotes® are the copyrighted property of A D A M , Inc  or [facility] Panchito Hernandez   The above information is an  only  It is not intended as medical advice for individual conditions or treatments  Talk to your doctor, nurse or pharmacist before following any medical regimen to see if it is safe and effective for you

## 2022-04-13 NOTE — PROGRESS NOTES
Virtual Regular Visit    Verification of patient location:    Patient is located in the following state in which I hold an active license [place]      Assessment/Plan:  1  I discussed with the patient at this point time with regards to her low back and leg pain since she is noting significant and stable relief, for now, we will hold off any repeat injections or procedures and see how she does  The patient was agreeable and verbalized an understanding  2  I discussed with the patient with regards to the new onset and worsening thoracic pain, I feel would be reasonable to at least proceed with an x-ray of the thoracic spine to evaluate for any underlying etiology that could explain her pain  I advised the patient once we have the results of the x-rays, our office will give her a call to review the results and discuss the next step in treatment plan which may include a thoracic spine MRI, although I am highly suspicious the pain is most likely myofascial in nature  The patient was agreeable and verbalized an understanding  3  As I feel that her worsening thoracic pain is most likely and significantly myofascial in nature, I feel may be beneficial to increase the tizanidine to 4 mg b i d  to see if that would be more effective for her overall pain and spasms, especially in the thoracic area  In the meantime since she is noting at least moderate overall relief, without any significant side effects, I feel would be reasonable appropriate also continue the Cymbalta, Lyrica, and p r n  Norco as prescribed  However, please note that the patient did not need a refill the p r n  Norco today  The patient was agreeable and verbalized an understanding  4  The patient is to follow-up as scheduled for a virtual visit on July 5, 2022 at 7:30 a m  as scheduled  The patient was agreeable and verbalized an understanding       The risk of opioid medications, including dependence, addiction and tolerance were explained to the patient  The patient understands and agrees to use these medications only as prescribed  I have fully discussed the potential side effects of the medication with the patient, which include, but are not limited to, constipation, drowsiness, addiction, impaired judgment and risk of fatal overdose as not taken as prescribed  I have warned the patient that sharing medications is a felony  I warned against driving while taking sedating medications  At this point in time, the patient is showing no signs of addiction, abuse, diversion or suicidal ideation                Problem List Items Addressed This Visit        Musculoskeletal and Integument    Myofascial pain syndrome    Relevant Medications    DULoxetine (CYMBALTA) 60 mg delayed release capsule    tiZANidine (ZANAFLEX) 4 mg tablet    pregabalin (LYRICA) 200 MG capsule    Lumbar spondylosis    Relevant Medications    DULoxetine (CYMBALTA) 60 mg delayed release capsule    pregabalin (LYRICA) 200 MG capsule    Herniated nucleus pulposus, L5-S1    Relevant Medications    DULoxetine (CYMBALTA) 60 mg delayed release capsule    pregabalin (LYRICA) 200 MG capsule       Other    Neck pain, chronic    Relevant Medications    DULoxetine (CYMBALTA) 60 mg delayed release capsule    tiZANidine (ZANAFLEX) 4 mg tablet    pregabalin (LYRICA) 200 MG capsule    Chronic headache    Relevant Medications    DULoxetine (CYMBALTA) 60 mg delayed release capsule    tiZANidine (ZANAFLEX) 4 mg tablet    pregabalin (LYRICA) 200 MG capsule    Chronic right-sided low back pain without sciatica    Relevant Medications    DULoxetine (CYMBALTA) 60 mg delayed release capsule    tiZANidine (ZANAFLEX) 4 mg tablet    pregabalin (LYRICA) 200 MG capsule    Chronic pain syndrome - Primary    Relevant Medications    DULoxetine (CYMBALTA) 60 mg delayed release capsule    pregabalin (LYRICA) 200 MG capsule    Spinal stenosis of lumbar region with neurogenic claudication    Relevant Medications    DULoxetine (CYMBALTA) 60 mg delayed release capsule    pregabalin (LYRICA) 200 MG capsule      Other Visit Diagnoses     Diffuse pain        Relevant Medications    tiZANidine (ZANAFLEX) 4 mg tablet    Acute bilateral thoracic back pain        Relevant Orders    X-ray thoracic spine 3 views               Reason for visit is   Chief Complaint   Patient presents with    Virtual Regular Visit        Encounter provider KENNETH Wiley    Provider located at [address] [place] [address] E  [facility] [place] [id number]  [phone]      Recent Visits  No visits were found meeting these conditions  Showing recent visits within past 7 days and meeting all other requirements  Today's Visits  Date Type Provider Dept   04/13/22 Telemedicine K[facility]NET[facility]   Showing today's visits and meeting all other requirements  Future Appointments  No visits were found meeting these conditions  Showing future appointments within next 150 days and meeting all other requirements       The patient was identified by name and date of birth  Rayna Neal was informed that this is a telemedicine visit and that the visit is being conducted through [address] [place] [address] Now and patient was informed that this is a secure, HIPAA-compliant platform  She agrees to proceed     My office door was closed  No one else was in the room  She acknowledged consent and understanding of privacy and security of the video platform  The patient has agreed to participate and understands they can discontinue the visit at any time  Patient is aware this is a billable service  Subjective  Rayna Neal is a 51 y o  female who is currently being treated for her chronic pain syndrome secondary to herniated lumbar discs with spondylosis, stenosis and radiculopathy as well as chronic and diffuse myofascial pain  The patient preferred to proceed with a virtual visit today    The patient is status post an L5-S1 interlaminar epidural steroid injection directed towards the right on 2022 and reports that with regards to the right-sided low back and leg pain there is at least 90% improvement in her pain in that area to date  However, she reports that over the past few weeks without any significant trauma or injury she has noted worsening thoracic pain and spasms despite continuing with the tizanidine as prescribed patient is managing her pain symptoms currently with Cymbalta 120 mg at bedtime, Lyrica 200 mg b i d , tizanidine 2 mg b i d , and Norco 5/325, 1 p o  b i d  p r n  for severe pain only, dispense 14 pills  The patient reports that she has only been using the Norco when the pain is severe that she still has some left does not need a refill of it today  Overall, she does feel that her medication regimen is providing at least moderate relief of her chronic and overall pain symptoms although it does not seem to be significantly helping the new onset thoracic pain and spasms  She denies any side effects or issues with her current medication regimen  Presently she rates her pain as a 1/10 with regards to her normal pain symptoms and a 5 to 6/10 for the new onset thoracic pain symptoms        HPI     Past Medical History:   Diagnosis Date    Anxiety     Arthritis     Asthma     Chronic pain disorder     upper back    Depression     Disc disorder     Fibromyalgia, primary     GERD (gastroesophageal reflux disease)     HPV (human papilloma virus) infection     cervical    Hypertension     Hypothyroidism     Migraine        Past Surgical History:   Procedure Laterality Date    CERVICAL BIOPSY  W/ LOOP ELECTRODE EXCISION      CERVICAL FUSION  2015     SECTION      CHOLECYSTECTOMY      lap    FOOT SURGERY Bilateral     multiple, posterior calcaneal bone spur    KNEE SURGERY Left     arthroscopy with debribement    TUBAL LIGATION Bilateral        Current Outpatient Medications   Medication Sig Dispense Refill    albuterol (VENTOLIN HFA) 90 mcg/act inhaler Inhale 2 puffs every 6 (six) hours as needed for wheezing 18 g 0    amLODIPine (NORVASC) 10 mg tablet Take 1 tablet (10 mg total) by mouth daily 90 tablet 1    betamethasone valerate (VALISONE) 0 1 % ointment Apply 1 to 2 times a day only as needed to finger/hand rash  30 g 0    Calcium Carbonate (CALCIUM 600 PO) Take by mouth daily      cholecalciferol (VITAMIN D3) 1,000 units tablet Take 2,000 Units by mouth daily      ciclopirox (LOPROX) 0 77 % cream APPLY 2 TIMES per day to rash on chest for 2-4 weeks  30 g 0    DULoxetine (CYMBALTA) 60 mg delayed release capsule Take 2 PO HS  180 capsule 0    famotidine (PEPCID) 20 mg tablet Take 1 tablet (20 mg total) by mouth 2 (two) times a day 180 tablet 1    HYDROcodone-acetaminophen (NORCO) 5-325 mg per tablet Take 1 PO BID PRN for pain  14 tablet 0    levothyroxine 200 mcg tablet Take 1 tablet (200 mcg total) by mouth daily 90 tablet 0    levothyroxine 25 mcg tablet 25 mcg 1 tab PO q am with 200 mcg on Mon, Wed, Fri, Sun ONLY , con't 200 mcg all other days 90 tablet 0    multivitamin (THERAGRAN) TABS Take 1 tablet by mouth daily      Potassium 99 MG TABS Take by mouth daily      pregabalin (LYRICA) 200 MG capsule Take 1 PO BID 60 capsule 2    tiZANidine (ZANAFLEX) 4 mg tablet Take 1 PO BID PRN for pain and spasms  60 tablet 2     No current facility-administered medications for this visit  Allergies   Allergen Reactions    Acetaminophen-Codeine      Pt states she does not remember having a reaction to this medication    Hydrocodone Other (See Comments)     GI issues    Latex Hives     Latex powder      Prednisolone Vomiting       Review of Systems   Musculoskeletal: Positive for arthralgias, back pain, gait problem, myalgias, neck pain and neck stiffness  Neurological: Positive for weakness and numbness     All other systems reviewed and are negative  Video Exam    There were no vitals filed for this visit  Physical Exam  Constitutional:       Appearance: Normal appearance  She is obese  HENT:      Head: Normocephalic  Nose: Nose normal    Pulmonary:      Effort: Pulmonary effort is normal    Neurological:      General: No focal deficit present  Mental Status: She is alert and oriented to person, place, and time  Mental status is at baseline  Psychiatric:         Mood and Affect: Mood normal          Behavior: Behavior normal          Thought Content: Thought content normal          Judgment: Judgment normal           I spent 14 minutes directly with the patient during this visit    1717   Raf Mae verbally agrees to participate in [employer]  Pt is aware that Virtual Care Services could be limited without vital signs or the ability to perform a full hands-on physical Dixie Primes understands she or the provider may request at any time to terminate the video visit and request the patient to seek care or treatment in person

## 2022-04-18 ENCOUNTER — TELEPHONE (OUTPATIENT)
Dept: ENDOCRINOLOGY | Facility: HOSPITAL | Age: 47
End: 2022-04-18

## 2022-04-19 DIAGNOSIS — M51.27 HERNIATED NUCLEUS PULPOSUS, L5-S1: ICD-10-CM

## 2022-04-19 DIAGNOSIS — G89.29 CHRONIC RIGHT-SIDED LOW BACK PAIN WITHOUT SCIATICA: ICD-10-CM

## 2022-04-19 DIAGNOSIS — M79.18 MYOFASCIAL PAIN SYNDROME: ICD-10-CM

## 2022-04-19 DIAGNOSIS — M48.062 SPINAL STENOSIS OF LUMBAR REGION WITH NEUROGENIC CLAUDICATION: ICD-10-CM

## 2022-04-19 DIAGNOSIS — G89.4 CHRONIC PAIN SYNDROME: ICD-10-CM

## 2022-04-19 DIAGNOSIS — M54.50 CHRONIC RIGHT-SIDED LOW BACK PAIN WITHOUT SCIATICA: ICD-10-CM

## 2022-04-19 DIAGNOSIS — R51.9 CHRONIC NONINTRACTABLE HEADACHE, UNSPECIFIED HEADACHE TYPE: ICD-10-CM

## 2022-04-19 DIAGNOSIS — G89.29 NECK PAIN, CHRONIC: ICD-10-CM

## 2022-04-19 DIAGNOSIS — G89.29 CHRONIC NONINTRACTABLE HEADACHE, UNSPECIFIED HEADACHE TYPE: ICD-10-CM

## 2022-04-19 DIAGNOSIS — M47.816 LUMBAR SPONDYLOSIS: ICD-10-CM

## 2022-04-19 DIAGNOSIS — M54.2 NECK PAIN, CHRONIC: ICD-10-CM

## 2022-04-19 RX ORDER — PREGABALIN 150 MG/1
CAPSULE ORAL
Qty: 60 CAPSULE | Refills: 1 | Status: SHIPPED | OUTPATIENT
Start: 2022-04-19 | End: 2022-07-05 | Stop reason: SDUPTHER

## 2022-04-22 DIAGNOSIS — E03.9 HYPOTHYROIDISM, UNSPECIFIED TYPE: Primary | ICD-10-CM

## 2022-04-22 RX ORDER — LEVOTHYROXINE SODIUM 25 MCG
25 TABLET ORAL DAILY
Qty: 90 TABLET | Refills: 1 | Status: SHIPPED | OUTPATIENT
Start: 2022-04-22 | End: 2022-05-14 | Stop reason: SDUPTHER

## 2022-04-22 RX ORDER — LEVOTHYROXINE SODIUM 200 MCG
200 TABLET ORAL DAILY
Qty: 90 TABLET | Refills: 1 | Status: SHIPPED | OUTPATIENT
Start: 2022-04-22 | End: 2022-05-14 | Stop reason: SDUPTHER

## 2022-04-25 ENCOUNTER — APPOINTMENT (OUTPATIENT)
Dept: RADIOLOGY | Facility: CLINIC | Age: 47
End: 2022-04-25
Payer: COMMERCIAL

## 2022-04-25 ENCOUNTER — OFFICE VISIT (OUTPATIENT)
Dept: OBGYN CLINIC | Facility: CLINIC | Age: 47
End: 2022-04-25
Payer: COMMERCIAL

## 2022-04-25 VITALS
SYSTOLIC BLOOD PRESSURE: 132 MMHG | BODY MASS INDEX: 41.95 KG/M2 | WEIGHT: 293 LBS | DIASTOLIC BLOOD PRESSURE: 88 MMHG | HEIGHT: 70 IN

## 2022-04-25 DIAGNOSIS — G56.03 CARPAL TUNNEL SYNDROME, BILATERAL: Primary | ICD-10-CM

## 2022-04-25 DIAGNOSIS — M54.6 ACUTE BILATERAL THORACIC BACK PAIN: ICD-10-CM

## 2022-04-25 PROCEDURE — 20526 THER INJECTION CARP TUNNEL: CPT | Performed by: ORTHOPAEDIC SURGERY

## 2022-04-25 PROCEDURE — 99214 OFFICE O/P EST MOD 30 MIN: CPT | Performed by: ORTHOPAEDIC SURGERY

## 2022-04-25 PROCEDURE — 72072 X-RAY EXAM THORAC SPINE 3VWS: CPT

## 2022-04-25 RX ADMIN — LIDOCAINE HYDROCHLORIDE 1 ML: 10 INJECTION, SOLUTION INFILTRATION; PERINEURAL at 11:43

## 2022-04-25 RX ADMIN — BETAMETHASONE SODIUM PHOSPHATE AND BETAMETHASONE ACETATE 6 MG: 3; 3 INJECTION, SUSPENSION INTRA-ARTICULAR; INTRALESIONAL; INTRAMUSCULAR; SOFT TISSUE at 11:43

## 2022-04-25 NOTE — LETTER
April 26, 2022     Gloria Howard 21  [address]  [address] [place] [address] Drive [address]    Patient: Neha Garcia   YOB: 1975   Date of Visit: 4/25/2022       Dear Dr Marilou Smith:    Thank you for referring Neha Garcia to me for evaluation  Below are my notes for this consultation  If you have questions, please do not hesitate to call me  I look forward to following your patient along with you  Sincerely,        Ruth Ann Tran MD        CC: No Recipients  Ruth Ann Tran MD  4/26/2022  9:08 AM  Signed  ASSESSMENT/PLAN:    Assessment:   Carpal Tunnel Syndrome  bilateral    Plan:   steroid injections, activity modification and bracing    Follow Up:  6  week(s)    To Do Next Visit:  Repeat examination    General Discussions:     Carpal Tunnel Syndrome: The anatomy and physiology of carpal tunnel syndrome was discussed with the patient today  Increase pressure localized under the transverse carpal ligament can cause pain, numbness, tingling, or dysesthesias within the median nerve distribution as well as feelings of fatigue, clumsiness, or awkwardness  These symptoms typically occur at night and worse in the morning upon waking  Eventually, untreated carpal tunnel syndrome can result in weakness and permanent loss of muscle within the thenar compartment of the hand  Treatment options were discussed with the patient  Conservative treatment includes nocturnal resting splints to keep the nerve in a neutral position, ergonomic changes within the work or home environment, activity modification, and tendon gliding exercises  Steroid injections within the carpal canal can help a majority of patients, however this is often self-limited in a majority of patients  Surgical intervention to divide the transverse carpal ligament typically results in a long-lasting relief of the patient's complaints, with the recurrence rate of less than 1%  _____________________________________________________  CHIEF COMPLAINT:  Chief Complaint   Patient presents with    Left Wrist - Carpal Tunnel    Right Wrist - Carpal Tunnel         SUBJECTIVE:  Phoenix Noriega is a 51 y o  female who presents as a new patient for evaluation bilateral hand numbness and weakness  Patient does have a history of cervical degenerative disc disease and has been seen by [facility] for management of this condition  She has had EMG in the past which also suggest carpal tunnel syndrome and she has been referred to Dr Donaldo Reese for evaluation  Patient states that the hand numbness and tingling has been insidious in onset over several months to years  She notes that she is clumsy with fine motor activities  Denies radiation of the pain to the elbow no other complaints      Radiation:  All digits  Previous Treatments: activity modification and bracing with only partial relief  Handedness: right    PAST MEDICAL HISTORY:  Past Medical History:   Diagnosis Date    Anxiety     Arthritis     Asthma     Chronic pain disorder     upper back    Depression     Disc disorder     Fibromyalgia, primary     GERD (gastroesophageal reflux disease)     HPV (human papilloma virus) infection     cervical    Hypertension     Hypothyroidism     Migraine        PAST SURGICAL HISTORY:  Past Surgical History:   Procedure Laterality Date    CERVICAL BIOPSY  W/ LOOP ELECTRODE EXCISION      CERVICAL FUSION  2015     SECTION      CHOLECYSTECTOMY      lap    FOOT SURGERY Bilateral     multiple, posterior calcaneal bone spur    KNEE SURGERY Left     arthroscopy with debribement    TUBAL LIGATION Bilateral        FAMILY HISTORY:  Family History   Adopted: Yes   Problem Relation Age of Onset    No Known Problems Mother     No Known Problems Father     Coronary artery disease Maternal Grandmother     Breast cancer Maternal Grandmother         age unknown    Coronary artery disease Maternal Aunt     No Known Problems Maternal Grandfather     No Known Problems Paternal Grandmother     No Known Problems Paternal Grandfather     No Known Problems Maternal Aunt     Heart murmur Maternal Aunt     Hypothyroidism Maternal Aunt     Hashimoto's thyroiditis Maternal Aunt     Diabetes unspecified Brother     No Known Problems Brother     No Known Problems Brother     ADD / ADHD Son        SOCIAL HISTORY:  Social History     Tobacco Use    Smoking status: Current Every Day Smoker     Packs/day: 1 00     Types: Cigarettes    Smokeless tobacco: Never Used    Tobacco comment: actually about 3/4 PPD   Vaping Use    Vaping Use: Never used   Substance Use Topics    Alcohol use: Not Currently    Drug use: No       MEDICATIONS:    Current Outpatient Medications:     albuterol (VENTOLIN HFA) 90 mcg/act inhaler, Inhale 2 puffs every 6 (six) hours as needed for wheezing, Disp: 18 g, Rfl: 0    amLODIPine (NORVASC) 10 mg tablet, Take 1 tablet (10 mg total) by mouth daily, Disp: 30 tablet, Rfl: 0    betamethasone valerate (VALISONE) 0 1 % ointment, Apply 1 to 2 times a day only as needed to finger/hand rash , Disp: 30 g, Rfl: 0    Calcium Carbonate (CALCIUM 600 PO), Take by mouth daily, Disp: , Rfl:     cholecalciferol (VITAMIN D3) 1,000 units tablet, Take 2,000 Units by mouth daily, Disp: , Rfl:     ciclopirox (LOPROX) 0 77 % cream, APPLY 2 TIMES per day to rash on chest for 2-4 weeks  , Disp: 30 g, Rfl: 0    DULoxetine (CYMBALTA) 60 mg delayed release capsule, Take 2 PO HS , Disp: 180 capsule, Rfl: 0    famotidine (PEPCID) 20 mg tablet, Take 1 tablet (20 mg total) by mouth 2 (two) times a day, Disp: 180 tablet, Rfl: 0    HYDROcodone-acetaminophen (NORCO) 5-325 mg per tablet, Take 1 PO BID PRN for pain , Disp: 14 tablet, Rfl: 0    levothyroxine 25 mcg tablet, 25 mcg 1 tab PO q am with 200 mcg on Mon, Wed, Fri, Sun ONLY , con't 200 mcg all other days, Disp: 90 tablet, Rfl: 0   multivitamin (THERAGRAN) TABS, Take 1 tablet by mouth daily, Disp: , Rfl:     pregabalin (LYRICA) 150 mg capsule, Take 1 PO BID, Disp: 60 capsule, Rfl: 1    Synthroid 200 MCG tablet, Take 1 tablet (200 mcg total) by mouth daily, Disp: 90 tablet, Rfl: 1    Synthroid 25 MCG tablet, Take 1 tablet (25 mcg total) by mouth daily, Disp: 90 tablet, Rfl: 1    tiZANidine (ZANAFLEX) 4 mg tablet, Take 1 PO BID PRN for pain and spasms  , Disp: 60 tablet, Rfl: 2    levothyroxine 200 mcg tablet, Take 1 tablet (200 mcg total) by mouth daily, Disp: 90 tablet, Rfl: 0    Potassium 99 MG TABS, Take by mouth daily, Disp: , Rfl:     ALLERGIES:  Allergies   Allergen Reactions    Acetaminophen-Codeine      Pt states she does not remember having a reaction to this medication    Hydrocodone Other (See Comments)     GI issues    Latex Hives     Latex powder      Prednisolone Vomiting       REVIEW OF SYSTEMS:  Pertinent items are noted in HPI  A comprehensive review of systems was negative      LABS:  HgA1c:   Lab Results   Component Value Date    HGBA1C 5 8 (H) 04/05/2022     BMP:   Lab Results   Component Value Date    CALCIUM 9 1 02/03/2021    K 4 4 04/05/2022    CO2 23 04/05/2022     04/05/2022    BUN 25 (H) 04/05/2022    CREATININE 0 74 04/05/2022         _____________________________________________________  PHYSICAL EXAMINATION:  Vital signs: /88   Ht 5' 10" (1 778 m)   Wt (!) 146 kg (321 lb)   BMI 46 06 kg/m²   General: well developed and well nourished, alert, oriented times 3 and appears comfortable  Psychiatric: Normal  HEENT: Trachea Midline, No torticollis  Cardiovascular: No discernable arrhythmia  Pulmonary: No wheezing or stridor  Abdomen: No rebound or guarding  Extremities: No peripheral edema  Skin:  See below  Neurovascular:  See below    MUSCULOSKELETAL EXAMINATION:  Right upper extremity  · Skin intact  · No atrophy appreciated  · 4-/5 thumb opposition and intrinsics  · 5/5 motor all other muscle groups  · Positive Tinel's over wrist  · Negative Durkan compression  · Digits warm and well perfused    Left upper extremity  · Skin intact  · No atrophy appreciated  · 4/5 thumb opposition and intrinsics  · 5/5 motor all other muscle groups  · Positive Tinel's over wrist  · Negative Durkan compression  · Digits warm and well perfused  _____________________________________________________  STUDIES REVIEWED:  EMG:  Mild bilateral carpal tunnel syndrome from study in June of 2021      PROCEDURES PERFORMED:  Hand/upper extremity injection: R carpal tunnel  [place] Protocol:  Consent: Verbal consent obtained  Supporting Documentation  Indications: diagnostic and therapeutic   Procedure Details  Condition:carpal tunnel syndrome Site: R carpal tunnel   Needle size: 25 G  Ultrasound guidance: no  Approach: volar  Medications administered: 1 mL lidocaine 1 %; 6 mg betamethasone acetate-betamethasone sodium phosphate 6 (3-3) mg/mL    Patient tolerance: patient tolerated the procedure well with no immediate complications  Dressing:  Sterile dressing applied     Hand/upper extremity injection: L carpal tunnel  [place] Protocol:  Consent: Verbal consent obtained    Procedure Details  Condition:carpal tunnel syndrome Site: L carpal tunnel   Needle size: 25 G  Ultrasound guidance: no  Approach: volar  Medications administered: 1 mL lidocaine 1 %; 6 mg betamethasone acetate-betamethasone sodium phosphate 6 (3-3) mg/mL    Patient tolerance: patient tolerated the procedure well with no immediate complications  Dressing:  Sterile dressing applied

## 2022-04-25 NOTE — PROGRESS NOTES
ASSESSMENT/PLAN:    Assessment:   Carpal Tunnel Syndrome  bilateral    Plan:   steroid injections, activity modification and bracing    Follow Up:  6  week(s)    To Do Next Visit:  Repeat examination    General Discussions:     Carpal Tunnel Syndrome: The anatomy and physiology of carpal tunnel syndrome was discussed with the patient today  Increase pressure localized under the transverse carpal ligament can cause pain, numbness, tingling, or dysesthesias within the median nerve distribution as well as feelings of fatigue, clumsiness, or awkwardness  These symptoms typically occur at night and worse in the morning upon waking  Eventually, untreated carpal tunnel syndrome can result in weakness and permanent loss of muscle within the thenar compartment of the hand  Treatment options were discussed with the patient  Conservative treatment includes nocturnal resting splints to keep the nerve in a neutral position, ergonomic changes within the work or home environment, activity modification, and tendon gliding exercises  Steroid injections within the carpal canal can help a majority of patients, however this is often self-limited in a majority of patients  Surgical intervention to divide the transverse carpal ligament typically results in a long-lasting relief of the patient's complaints, with the recurrence rate of less than 1%        _____________________________________________________  CHIEF COMPLAINT:  Chief Complaint   Patient presents with    Left Wrist - Carpal Tunnel    Right Wrist - Carpal Tunnel         SUBJECTIVE:  Miles Kruse is a 51 y o  female who presents as a new patient for evaluation bilateral hand numbness and weakness  Patient does have a history of cervical degenerative disc disease and has been seen by [facility] for management of this condition  She has had EMG in the past which also suggest carpal tunnel syndrome and she has been referred to Dr Ramón Westfall for evaluation  Patient states that the hand numbness and tingling has been insidious in onset over several months to years  She notes that she is clumsy with fine motor activities  Denies radiation of the pain to the elbow no other complaints      Radiation:  All digits  Previous Treatments: activity modification and bracing with only partial relief  Handedness: right    PAST MEDICAL HISTORY:  Past Medical History:   Diagnosis Date    Anxiety     Arthritis     Asthma     Chronic pain disorder     upper back    Depression     Disc disorder     Fibromyalgia, primary     GERD (gastroesophageal reflux disease)     HPV (human papilloma virus) infection     cervical    Hypertension     Hypothyroidism     Migraine        PAST SURGICAL HISTORY:  Past Surgical History:   Procedure Laterality Date    CERVICAL BIOPSY  W/ LOOP ELECTRODE EXCISION      CERVICAL FUSION  2015     SECTION      CHOLECYSTECTOMY      lap    FOOT SURGERY Bilateral     multiple, posterior calcaneal bone spur    KNEE SURGERY Left     arthroscopy with debribement    TUBAL LIGATION Bilateral        FAMILY HISTORY:  Family History   Adopted: Yes   Problem Relation Age of Onset    No Known Problems Mother     No Known Problems Father     Coronary artery disease Maternal Grandmother     Breast cancer Maternal Grandmother         age unknown    Coronary artery disease Maternal Aunt     No Known Problems Maternal Grandfather     No Known Problems Paternal Grandmother     No Known Problems Paternal Grandfather     No Known Problems Maternal Aunt     Heart murmur Maternal Aunt     Hypothyroidism Maternal Aunt     Hashimoto's thyroiditis Maternal Aunt     Diabetes unspecified Brother     No Known Problems Brother     No Known Problems Brother     ADD / ADHD Son        SOCIAL HISTORY:  Social History     Tobacco Use    Smoking status: Current Every Day Smoker     Packs/day: 1 00     Types: Cigarettes    Smokeless tobacco: Never Used    Tobacco comment: actually about 3/4 PPD   Vaping Use    Vaping Use: Never used   Substance Use Topics    Alcohol use: Not Currently    Drug use: No       MEDICATIONS:    Current Outpatient Medications:     albuterol (VENTOLIN HFA) 90 mcg/act inhaler, Inhale 2 puffs every 6 (six) hours as needed for wheezing, Disp: 18 g, Rfl: 0    amLODIPine (NORVASC) 10 mg tablet, Take 1 tablet (10 mg total) by mouth daily, Disp: 30 tablet, Rfl: 0    betamethasone valerate (VALISONE) 0 1 % ointment, Apply 1 to 2 times a day only as needed to finger/hand rash , Disp: 30 g, Rfl: 0    Calcium Carbonate (CALCIUM 600 PO), Take by mouth daily, Disp: , Rfl:     cholecalciferol (VITAMIN D3) 1,000 units tablet, Take 2,000 Units by mouth daily, Disp: , Rfl:     ciclopirox (LOPROX) 0 77 % cream, APPLY 2 TIMES per day to rash on chest for 2-4 weeks  , Disp: 30 g, Rfl: 0    DULoxetine (CYMBALTA) 60 mg delayed release capsule, Take 2 PO HS , Disp: 180 capsule, Rfl: 0    famotidine (PEPCID) 20 mg tablet, Take 1 tablet (20 mg total) by mouth 2 (two) times a day, Disp: 180 tablet, Rfl: 0    HYDROcodone-acetaminophen (NORCO) 5-325 mg per tablet, Take 1 PO BID PRN for pain , Disp: 14 tablet, Rfl: 0    levothyroxine 25 mcg tablet, 25 mcg 1 tab PO q am with 200 mcg on Mon, Wed, Fri, Sun ONLY , con't 200 mcg all other days, Disp: 90 tablet, Rfl: 0    multivitamin (THERAGRAN) TABS, Take 1 tablet by mouth daily, Disp: , Rfl:     pregabalin (LYRICA) 150 mg capsule, Take 1 PO BID, Disp: 60 capsule, Rfl: 1    Synthroid 200 MCG tablet, Take 1 tablet (200 mcg total) by mouth daily, Disp: 90 tablet, Rfl: 1    Synthroid 25 MCG tablet, Take 1 tablet (25 mcg total) by mouth daily, Disp: 90 tablet, Rfl: 1    tiZANidine (ZANAFLEX) 4 mg tablet, Take 1 PO BID PRN for pain and spasms  , Disp: 60 tablet, Rfl: 2    levothyroxine 200 mcg tablet, Take 1 tablet (200 mcg total) by mouth daily, Disp: 90 tablet, Rfl: 0    Potassium 99 MG TABS, Take by mouth daily, Disp: , Rfl:     ALLERGIES:  Allergies   Allergen Reactions    Acetaminophen-Codeine      Pt states she does not remember having a reaction to this medication    Hydrocodone Other (See Comments)     GI issues    Latex Hives     Latex powder      Prednisolone Vomiting       REVIEW OF SYSTEMS:  Pertinent items are noted in HPI  A comprehensive review of systems was negative      LABS:  HgA1c:   Lab Results   Component Value Date    HGBA1C 5 8 (H) 04/05/2022     BMP:   Lab Results   Component Value Date    CALCIUM 9 1 02/03/2021    K 4 4 04/05/2022    CO2 23 04/05/2022     04/05/2022    BUN 25 (H) 04/05/2022    CREATININE 0 74 04/05/2022         _____________________________________________________  PHYSICAL EXAMINATION:  Vital signs: /88   Ht 5' 10" (1 778 m)   Wt (!) 146 kg (321 lb)   BMI 46 06 kg/m²   General: well developed and well nourished, alert, oriented times 3 and appears comfortable  Psychiatric: Normal  HEENT: Trachea Midline, No torticollis  Cardiovascular: No discernable arrhythmia  Pulmonary: No wheezing or stridor  Abdomen: No rebound or guarding  Extremities: No peripheral edema  Skin:  See below  Neurovascular:  See below    MUSCULOSKELETAL EXAMINATION:  Right upper extremity  · Skin intact  · No atrophy appreciated  · 4-/5 thumb opposition and intrinsics  · 5/5 motor all other muscle groups  · Positive Tinel's over wrist  · Negative Durkan compression  · Digits warm and well perfused    Left upper extremity  · Skin intact  · No atrophy appreciated  · 4/5 thumb opposition and intrinsics  · 5/5 motor all other muscle groups  · Positive Tinel's over wrist  · Negative Durkan compression  · Digits warm and well perfused  _____________________________________________________  STUDIES REVIEWED:  EMG:  Mild bilateral carpal tunnel syndrome from study in June of 2021      PROCEDURES PERFORMED:  Hand/upper extremity injection: R carpal tunnel  [place] Protocol:  Consent: Verbal consent obtained  Supporting Documentation  Indications: diagnostic and therapeutic   Procedure Details  Condition:carpal tunnel syndrome Site: R carpal tunnel   Needle size: 25 G  Ultrasound guidance: no  Approach: volar  Medications administered: 1 mL lidocaine 1 %; 6 mg betamethasone acetate-betamethasone sodium phosphate 6 (3-3) mg/mL    Patient tolerance: patient tolerated the procedure well with no immediate complications  Dressing:  Sterile dressing applied     Hand/upper extremity injection: L carpal tunnel  [place] Protocol:  Consent: Verbal consent obtained    Procedure Details  Condition:carpal tunnel syndrome Site: L carpal tunnel   Needle size: 25 G  Ultrasound guidance: no  Approach: volar  Medications administered: 1 mL lidocaine 1 %; 6 mg betamethasone acetate-betamethasone sodium phosphate 6 (3-3) mg/mL    Patient tolerance: patient tolerated the procedure well with no immediate complications  Dressing:  Sterile dressing applied

## 2022-04-26 RX ORDER — LIDOCAINE HYDROCHLORIDE 10 MG/ML
1 INJECTION, SOLUTION INFILTRATION; PERINEURAL
Status: COMPLETED | OUTPATIENT
Start: 2022-04-25 | End: 2022-04-25

## 2022-04-26 RX ORDER — BETAMETHASONE SODIUM PHOSPHATE AND BETAMETHASONE ACETATE 3; 3 MG/ML; MG/ML
6 INJECTION, SUSPENSION INTRA-ARTICULAR; INTRALESIONAL; INTRAMUSCULAR; SOFT TISSUE
Status: COMPLETED | OUTPATIENT
Start: 2022-04-25 | End: 2022-04-25

## 2022-04-27 ENCOUNTER — OFFICE VISIT (OUTPATIENT)
Dept: FAMILY MEDICINE CLINIC | Facility: HOSPITAL | Age: 47
End: 2022-04-27
Payer: COMMERCIAL

## 2022-04-27 VITALS
DIASTOLIC BLOOD PRESSURE: 82 MMHG | HEIGHT: 70 IN | WEIGHT: 293 LBS | SYSTOLIC BLOOD PRESSURE: 120 MMHG | BODY MASS INDEX: 41.95 KG/M2 | HEART RATE: 90 BPM | TEMPERATURE: 98.2 F

## 2022-04-27 DIAGNOSIS — I10 ESSENTIAL HYPERTENSION: ICD-10-CM

## 2022-04-27 DIAGNOSIS — G89.29 CHRONIC RIGHT-SIDED LOW BACK PAIN WITHOUT SCIATICA: ICD-10-CM

## 2022-04-27 DIAGNOSIS — E03.9 HYPOTHYROIDISM, UNSPECIFIED TYPE: ICD-10-CM

## 2022-04-27 DIAGNOSIS — Z23 ENCOUNTER FOR IMMUNIZATION: ICD-10-CM

## 2022-04-27 DIAGNOSIS — M54.50 CHRONIC RIGHT-SIDED LOW BACK PAIN WITHOUT SCIATICA: ICD-10-CM

## 2022-04-27 DIAGNOSIS — I10 PRIMARY HYPERTENSION: Primary | ICD-10-CM

## 2022-04-27 DIAGNOSIS — J45.20 MILD INTERMITTENT ASTHMA WITHOUT COMPLICATION: ICD-10-CM

## 2022-04-27 PROCEDURE — 3079F DIAST BP 80-89 MM HG: CPT | Performed by: FAMILY MEDICINE

## 2022-04-27 PROCEDURE — 3008F BODY MASS INDEX DOCD: CPT | Performed by: FAMILY MEDICINE

## 2022-04-27 PROCEDURE — 99214 OFFICE O/P EST MOD 30 MIN: CPT | Performed by: FAMILY MEDICINE

## 2022-04-27 PROCEDURE — 90715 TDAP VACCINE 7 YRS/> IM: CPT | Performed by: FAMILY MEDICINE

## 2022-04-27 PROCEDURE — 4004F PT TOBACCO SCREEN RCVD TLK: CPT | Performed by: FAMILY MEDICINE

## 2022-04-27 PROCEDURE — 90471 IMMUNIZATION ADMIN: CPT | Performed by: FAMILY MEDICINE

## 2022-04-27 PROCEDURE — 3725F SCREEN DEPRESSION PERFORMED: CPT | Performed by: FAMILY MEDICINE

## 2022-04-27 PROCEDURE — 3074F SYST BP LT 130 MM HG: CPT | Performed by: FAMILY MEDICINE

## 2022-04-27 RX ORDER — ALBUTEROL SULFATE 90 UG/1
2 AEROSOL, METERED RESPIRATORY (INHALATION) EVERY 6 HOURS PRN
Qty: 18 G | Refills: 0 | Status: SHIPPED | OUTPATIENT
Start: 2022-04-27

## 2022-04-27 RX ORDER — AMLODIPINE BESYLATE 10 MG/1
10 TABLET ORAL DAILY
Qty: 90 TABLET | Refills: 1 | Status: SHIPPED | OUTPATIENT
Start: 2022-04-27 | End: 2022-05-27

## 2022-04-27 NOTE — PROGRESS NOTES
Assessment/Plan:      Problem List Items Addressed This Visit        Endocrine    Hypothyroidism       Respiratory    Mild intermittent asthma without complication    Relevant Medications    albuterol (Ventolin HFA) 90 mcg/act inhaler       Cardiovascular and Mediastinum    Hypertension - Primary    Relevant Medications    amLODIPine (NORVASC) 10 mg tablet       Other    Chronic right-sided low back pain without sciatica      Other Visit Diagnoses     Encounter for immunization        Relevant Orders    TDAP VACCINE GREATER THAN OR EQUAL TO 8YO IM (Completed)    Essential hypertension        Relevant Medications    amLODIPine (NORVASC) 10 mg tablet           Plan/Discussion:  Htn  Stable  Continue with the same  Recent lipids reviewed  cmp reviewed  Chronic pain  Continue ongoing fu with Pain mgt  MDD  Stable  On cymbalta  Weight gain  Discussed lyrica and depo provera as potential causes in terms of medications that may contribute to this  Asthma, mild  Stable   Continue with prn albuterol  Subjective:   Chief Complaint   Patient presents with    Follow-up     Med check         Patient ID: Maureen Quinones is a 51 y o  female  Pt here for fu of chronic codntions  Overall she is doing well  Needing refill of albuterol and amlodipine  cotninues fu with pain mgt and Endo  No new concerns  The following portions of the patient's history were reviewed and updated as appropriate: allergies, current medications, past family history, past medical history, past social history, past surgical history and problem list     Review of Systems   Constitutional: Negative  Negative for activity change, appetite change, chills, diaphoresis, fatigue and fever  HENT: Negative for congestion, facial swelling and sore throat  Respiratory: Negative  Negative for apnea, cough, chest tightness and shortness of breath  Cardiovascular: Negative    Negative for chest pain and palpitations  Gastrointestinal: Negative  Negative for abdominal distention, abdominal pain, blood in stool, constipation, diarrhea and nausea  Genitourinary: Negative  Negative for difficulty urinating, dysuria, flank pain and frequency  Objective:  Vitals:    04/27/22 1330   BP: 120/82   Pulse: 90   Temp: 98 2 °F (36 8 °C)   Weight: (!) 147 kg (323 lb)   Height: 5' 10" (1 778 m)     BP Readings from Last 6 Encounters:   04/27/22 120/82   04/25/22 132/88   03/15/22 138/86   03/09/22 130/86   02/23/22 127/82   02/16/22 140/82      Wt Readings from Last 6 Encounters:   04/27/22 (!) 147 kg (323 lb)   04/25/22 (!) 146 kg (321 lb)   03/15/22 (!) 143 kg (315 lb)   03/09/22 (!) 144 kg (316 lb 9 6 oz)   02/16/22 (!) 141 kg (310 lb)   02/15/22 (!) 141 kg (310 lb)             Physical Exam  Vitals and nursing note reviewed  Constitutional:       Appearance: Normal appearance  She is well-developed  She is obese  She is not ill-appearing or diaphoretic  HENT:      Head: Normocephalic and atraumatic  Right Ear: Tympanic membrane, ear canal and external ear normal       Left Ear: Tympanic membrane, ear canal and external ear normal       Nose: Nose normal    Eyes:      Conjunctiva/sclera: Conjunctivae normal       Pupils: Pupils are equal, round, and reactive to light  Neck:      Thyroid: No thyromegaly  Trachea: No tracheal deviation  Cardiovascular:      Rate and Rhythm: Normal rate and regular rhythm  Heart sounds: Normal heart sounds  No murmur heard  Pulmonary:      Effort: Pulmonary effort is normal  No respiratory distress  Breath sounds: Normal breath sounds  No wheezing  Abdominal:      General: Bowel sounds are normal       Palpations: Abdomen is soft  Musculoskeletal:         General: Normal range of motion  Cervical back: Normal range of motion and neck supple  Skin:     General: Skin is warm and dry        Capillary Refill: Capillary refill takes less than 2 seconds  Neurological:      General: No focal deficit present  Mental Status: She is alert and oriented to person, place, and time     Psychiatric:         Mood and Affect: Mood normal          Behavior: Behavior normal

## 2022-05-14 DIAGNOSIS — E03.9 HYPOTHYROIDISM, UNSPECIFIED TYPE: ICD-10-CM

## 2022-05-16 RX ORDER — LEVOTHYROXINE SODIUM 25 MCG
25 TABLET ORAL DAILY
Qty: 90 TABLET | Refills: 1 | Status: SHIPPED | OUTPATIENT
Start: 2022-05-16 | End: 2022-06-30 | Stop reason: SDUPTHER

## 2022-05-16 RX ORDER — LEVOTHYROXINE SODIUM 200 MCG
200 TABLET ORAL DAILY
Qty: 90 TABLET | Refills: 1 | Status: SHIPPED | OUTPATIENT
Start: 2022-05-16 | End: 2022-06-30 | Stop reason: SDUPTHER

## 2022-05-27 ENCOUNTER — TELEPHONE (OUTPATIENT)
Dept: PAIN MEDICINE | Facility: CLINIC | Age: 47
End: 2022-05-27

## 2022-05-27 DIAGNOSIS — M47.814 THORACIC SPONDYLOSIS: ICD-10-CM

## 2022-05-27 DIAGNOSIS — M54.6 ACUTE BILATERAL THORACIC BACK PAIN: ICD-10-CM

## 2022-05-27 DIAGNOSIS — M79.18 MYOFASCIAL PAIN SYNDROME: Primary | ICD-10-CM

## 2022-05-27 NOTE — TELEPHONE ENCOUNTER
Can you please call the patient and let her know that her insurance company has denied the MRI of the thoracic spine because her physical therapy was not geared towards that area  At this point she would have to proceed with 6 weeks of physical therapy before we get the MRI approved  I did put in the order for the physical therapy and once she has completed the therapy we will then see if we can get the MRI approved

## 2022-05-27 NOTE — TELEPHONE ENCOUNTER
S/w pt, advised of above  Pt verbalized understanding and appreciation  Stated that she and her son have COVID, she cannot start PT right now but will fu w/ [facility] asap  Pt will cb sooner if questions / issues arise

## 2022-05-31 DIAGNOSIS — M47.814 THORACIC SPONDYLOSIS: ICD-10-CM

## 2022-05-31 DIAGNOSIS — G89.29 CHRONIC PAIN OF BOTH SHOULDERS: ICD-10-CM

## 2022-05-31 DIAGNOSIS — M25.511 CHRONIC PAIN OF BOTH SHOULDERS: ICD-10-CM

## 2022-05-31 DIAGNOSIS — M54.6 CHRONIC BILATERAL THORACIC BACK PAIN: Primary | ICD-10-CM

## 2022-05-31 DIAGNOSIS — G89.29 CHRONIC BILATERAL THORACIC BACK PAIN: Primary | ICD-10-CM

## 2022-05-31 DIAGNOSIS — M54.14 THORACIC RADICULOPATHY: ICD-10-CM

## 2022-05-31 DIAGNOSIS — M25.512 CHRONIC PAIN OF BOTH SHOULDERS: ICD-10-CM

## 2022-06-30 DIAGNOSIS — E03.9 HYPOTHYROIDISM, UNSPECIFIED TYPE: ICD-10-CM

## 2022-06-30 RX ORDER — LEVOTHYROXINE SODIUM 200 MCG
200 TABLET ORAL DAILY
Qty: 90 TABLET | Refills: 0 | Status: SHIPPED | OUTPATIENT
Start: 2022-06-30 | End: 2022-07-09 | Stop reason: SDUPTHER

## 2022-06-30 RX ORDER — LEVOTHYROXINE SODIUM 25 MCG
25 TABLET ORAL DAILY
Qty: 90 TABLET | Refills: 0 | Status: SHIPPED | OUTPATIENT
Start: 2022-06-30 | End: 2022-07-09 | Stop reason: SDUPTHER

## 2022-07-05 ENCOUNTER — TELEMEDICINE (OUTPATIENT)
Dept: PAIN MEDICINE | Facility: CLINIC | Age: 47
End: 2022-07-05
Payer: COMMERCIAL

## 2022-07-05 DIAGNOSIS — R52 DIFFUSE PAIN: ICD-10-CM

## 2022-07-05 DIAGNOSIS — M25.512 CHRONIC PAIN OF BOTH SHOULDERS: ICD-10-CM

## 2022-07-05 DIAGNOSIS — M79.18 MYOFASCIAL PAIN SYNDROME: ICD-10-CM

## 2022-07-05 DIAGNOSIS — G89.29 CHRONIC RIGHT-SIDED LOW BACK PAIN WITHOUT SCIATICA: ICD-10-CM

## 2022-07-05 DIAGNOSIS — M54.50 CHRONIC RIGHT-SIDED LOW BACK PAIN WITHOUT SCIATICA: ICD-10-CM

## 2022-07-05 DIAGNOSIS — M51.27 HERNIATED NUCLEUS PULPOSUS, L5-S1: ICD-10-CM

## 2022-07-05 DIAGNOSIS — R51.9 CHRONIC NONINTRACTABLE HEADACHE, UNSPECIFIED HEADACHE TYPE: ICD-10-CM

## 2022-07-05 DIAGNOSIS — G89.29 NECK PAIN, CHRONIC: ICD-10-CM

## 2022-07-05 DIAGNOSIS — G89.29 CHRONIC NONINTRACTABLE HEADACHE, UNSPECIFIED HEADACHE TYPE: ICD-10-CM

## 2022-07-05 DIAGNOSIS — M48.062 SPINAL STENOSIS OF LUMBAR REGION WITH NEUROGENIC CLAUDICATION: ICD-10-CM

## 2022-07-05 DIAGNOSIS — G89.4 CHRONIC PAIN SYNDROME: Primary | ICD-10-CM

## 2022-07-05 DIAGNOSIS — M47.816 LUMBAR SPONDYLOSIS: ICD-10-CM

## 2022-07-05 DIAGNOSIS — M25.511 CHRONIC PAIN OF BOTH SHOULDERS: ICD-10-CM

## 2022-07-05 DIAGNOSIS — G89.29 CHRONIC PAIN OF BOTH SHOULDERS: ICD-10-CM

## 2022-07-05 DIAGNOSIS — M54.2 NECK PAIN, CHRONIC: ICD-10-CM

## 2022-07-05 PROCEDURE — G2012 BRIEF CHECK IN BY MD/QHP: HCPCS | Performed by: NURSE PRACTITIONER

## 2022-07-05 RX ORDER — TIZANIDINE 4 MG/1
TABLET ORAL
Qty: 60 TABLET | Refills: 2 | Status: SHIPPED | OUTPATIENT
Start: 2022-07-05

## 2022-07-05 RX ORDER — HYDROCODONE BITARTRATE AND ACETAMINOPHEN 5; 325 MG/1; MG/1
TABLET ORAL
Qty: 14 TABLET | Refills: 0 | Status: SHIPPED | OUTPATIENT
Start: 2022-07-05

## 2022-07-05 RX ORDER — PREGABALIN 150 MG/1
CAPSULE ORAL
Qty: 60 CAPSULE | Refills: 2 | Status: SHIPPED | OUTPATIENT
Start: 2022-07-05 | End: 2022-08-25 | Stop reason: SDUPTHER

## 2022-07-05 RX ORDER — DULOXETIN HYDROCHLORIDE 60 MG/1
CAPSULE, DELAYED RELEASE ORAL
Qty: 180 CAPSULE | Refills: 0 | Status: SHIPPED | OUTPATIENT
Start: 2022-07-05

## 2022-07-05 NOTE — PROGRESS NOTES
Virtual Brief Visit    Patient is located in the following state in which I hold an active license [place]      Assessment/Plan:  1  I discussed with the patient that at this point in time I feel it is imperative for her to continue to follow up with regards to her thyroid issues and hopefully getting that to a stable and mandible point as well as to follow up with Rheumatology and Neurology for her consultations as scheduled next month  I also encouraged her to continue to follow up with Dr DEION ALARCON in Orthopedics regarding her carpal tunnel syndrome and treatment plan options  The patient was agreeable and verbalized an understanding  2  With regards to her medication regimen, I discussed with the patient that for now I feel it is in her best interest to just continue the Lyrica, Cymbalta, and tizanidine as prescribed and since she has definitely been using the p r n  Norco on a very sparing and as-needed basis and I feel it is appropriate, I will give her another small prescription for p r n  Norco today  I advised the patient that at her next office visit we will see how she is doing and discuss her treatment options as hopefully she will have had her consultations with Neurology and Rheumatology and I will give us a better understanding of her possible treatment options  The patient was agreeable and verbalized an understanding  3  The patient is to follow up as scheduled in 12 weeks on September 26, 2022 to arrive at 3:00 p m  for a 3:00 p m  office visit  The patient was agreeable and verbalized an understanding  I did review the patient's report on the [facility] web site and found it to be appropriate for what is being prescribed and I reviewed it for any inconsistencies or evidence of multiple prescribers/drug diversion  A copy of the patient's report can be found in their chart           The risk of opioid medications, including dependence, addiction and tolerance were explained to the patient  The patient understands and agrees to use these medications only as prescribed  I have fully discussed the potential side effects of the medication with the patient, which include, but are not limited to, constipation, drowsiness, addiction, impaired judgment and risk of fatal overdose as not taken as prescribed  I have warned the patient that sharing medications is a felony  I warned against driving while taking sedating medications  At this point in time, the patient is showing no signs of addiction, abuse, diversion or suicidal ideation                 Problem List Items Addressed This Visit        Musculoskeletal and Integument    Myofascial pain syndrome    Relevant Medications    DULoxetine (CYMBALTA) 60 mg delayed release capsule    pregabalin (LYRICA) 150 mg capsule    tiZANidine (ZANAFLEX) 4 mg tablet    Lumbar spondylosis    Relevant Medications    DULoxetine (CYMBALTA) 60 mg delayed release capsule    pregabalin (LYRICA) 150 mg capsule    Herniated nucleus pulposus, L5-S1    Relevant Medications    DULoxetine (CYMBALTA) 60 mg delayed release capsule    pregabalin (LYRICA) 150 mg capsule       Other    Neck pain, chronic    Relevant Medications    DULoxetine (CYMBALTA) 60 mg delayed release capsule    pregabalin (LYRICA) 150 mg capsule    tiZANidine (ZANAFLEX) 4 mg tablet    Chronic headache    Relevant Medications    DULoxetine (CYMBALTA) 60 mg delayed release capsule    HYDROcodone-acetaminophen (NORCO) 5-325 mg per tablet    pregabalin (LYRICA) 150 mg capsule    tiZANidine (ZANAFLEX) 4 mg tablet    Chronic right-sided low back pain without sciatica    Relevant Medications    DULoxetine (CYMBALTA) 60 mg delayed release capsule    pregabalin (LYRICA) 150 mg capsule    tiZANidine (ZANAFLEX) 4 mg tablet    Chronic pain syndrome - Primary    Relevant Medications    DULoxetine (CYMBALTA) 60 mg delayed release capsule    HYDROcodone-acetaminophen (NORCO) 5-325 mg per tablet pregabalin (LYRICA) 150 mg capsule    Spinal stenosis of lumbar region with neurogenic claudication    Relevant Medications    DULoxetine (CYMBALTA) 60 mg delayed release capsule    pregabalin (LYRICA) 150 mg capsule    Diffuse pain    Relevant Medications    DULoxetine (CYMBALTA) 60 mg delayed release capsule    pregabalin (LYRICA) 150 mg capsule    tiZANidine (ZANAFLEX) 4 mg tablet      Other Visit Diagnoses     Chronic pain of both shoulders        Relevant Orders    XR shoulder 2+ vw right    XR shoulder 2+ vw left          Recent Visits  No visits were found meeting these conditions  Showing recent visits within past 7 days and meeting all other requirements  Today's Visits  Date Type Provider Dept   07/05/22 Telemedicine KENNETH Gant  [facility]   Showing today's visits and meeting all other requirements  Future Appointments  No visits were found meeting these conditions  Showing future appointments within next 150 days and meeting all other requirements     It was my intent to perform this visit via video technology but the patient was not able to do a video connection so the visit was completed via audio telephone only  The patient is a pleasant 48-year-old female who is currently being treated for her chronic pain syndrome secondary to lumbar spondylosis with herniated discs stenosis and diffuse myofascial pain as well as worsening neck and bilateral shoulder pain  The patient needed to proceed with a virtual visit today  Since the patient's last office visit we did decrease her Lyrica down to 300 mg a day from 400 mg a day dosing because the weight gain and dry mouth side effects, which she feels has somewhat improved with the decrease but she also feels that her pain has worsened with the decrease  The patient also continues with Cymbalta 120 mg at bedtime and tizanidine 4 mg b i d  p r n  for pain which was increased from the 2 mg dosage at her last office visit    She also continues to very occasionally use p r n  Norco 5/325, 1 p o  b i d  p r n  for severe pain only reports that she does have a few pills left on hand from the very small prescription that was sent in January of this year  Since her last office visit she did proceed with the x-rays of the thoracic spine which showed moderate but stable degenerative changes without any evidence of fractures and has not yet started the physical therapy required in order to be able to proceed with the previously ordered thoracic MRI  The patient also reports that since her last office visit she is continued to have issues with her thyroid and they are working on trying to get that managed and stable and just feels that there could be a combination of things making her overall pain worse at times and she is under lot of stress right now as she is currently selling her house and moving into another house and that has been very difficult, especially since she lost 1 of her jobs  The patient is scheduled for her consultation with both neurology and rheumatology as we have previously discussed next month and is hopeful that the consultation with them will provide some more answers that will give us a better understanding of exactly what is causing her pain in any other possible treatment options  The patient has also followed up with Dr Nicholas Veronica regarding the carpal tunnel syndrome and arthritis pain and did have an injection which was somewhat helpful but has already worn off  Presently she rates her pain as a 7/10 and for now at the very least would like to continue her medications as prescribed      I spent 17 minutes directly with the patient during this visit

## 2022-07-08 ENCOUNTER — DOCUMENTATION (OUTPATIENT)
Dept: ENDOCRINOLOGY | Facility: HOSPITAL | Age: 47
End: 2022-07-08

## 2022-07-09 DIAGNOSIS — E03.9 HYPOTHYROIDISM, UNSPECIFIED TYPE: ICD-10-CM

## 2022-07-11 RX ORDER — LEVOTHYROXINE SODIUM 25 MCG
25 TABLET ORAL DAILY
Qty: 90 TABLET | Refills: 0 | Status: SHIPPED | OUTPATIENT
Start: 2022-07-11 | End: 2022-07-15 | Stop reason: ALTCHOICE

## 2022-07-11 RX ORDER — LEVOTHYROXINE SODIUM 200 MCG
200 TABLET ORAL DAILY
Qty: 90 TABLET | Refills: 0 | Status: SHIPPED | OUTPATIENT
Start: 2022-07-11 | End: 2022-07-15 | Stop reason: ALTCHOICE

## 2022-07-13 ENCOUNTER — TELEPHONE (OUTPATIENT)
Dept: ENDOCRINOLOGY | Facility: HOSPITAL | Age: 47
End: 2022-07-13

## 2022-07-15 DIAGNOSIS — E03.9 ACQUIRED HYPOTHYROIDISM: Primary | ICD-10-CM

## 2022-07-15 RX ORDER — LEVOTHYROXINE SODIUM 25 UG/1
TABLET ORAL
Qty: 90 TABLET | Refills: 3 | Status: SHIPPED | OUTPATIENT
Start: 2022-07-15 | End: 2022-08-06 | Stop reason: SDUPTHER

## 2022-07-15 RX ORDER — LEVOTHYROXINE SODIUM 200 UG/1
TABLET ORAL
Qty: 90 TABLET | Refills: 3 | Status: SHIPPED | OUTPATIENT
Start: 2022-07-15 | End: 2022-08-06 | Stop reason: SDUPTHER

## 2022-07-15 NOTE — TELEPHONE ENCOUNTER
Spoke to patient  She's okay with brand name Levoxyl  Please send in new prescriptions [place] the [facility] [place] [facility] [place] possible  She is out of the 25 mcg & she is leaving for [place]    Thanks

## 2022-08-01 ENCOUNTER — HOSPITAL ENCOUNTER (OUTPATIENT)
Dept: RADIOLOGY | Facility: HOSPITAL | Age: 47
Discharge: HOME | End: 2022-08-01
Payer: COMMERCIAL

## 2022-08-01 ENCOUNTER — APPOINTMENT (OUTPATIENT)
Dept: LAB | Facility: HOSPITAL | Age: 47
End: 2022-08-01
Attending: INTERNAL MEDICINE
Payer: COMMERCIAL

## 2022-08-01 ENCOUNTER — OFFICE VISIT (OUTPATIENT)
Dept: RHEUMATOLOGY | Facility: CLINIC | Age: 47
End: 2022-08-01
Payer: COMMERCIAL

## 2022-08-01 VITALS
DIASTOLIC BLOOD PRESSURE: 80 MMHG | HEIGHT: 70 IN | WEIGHT: 293 LBS | SYSTOLIC BLOOD PRESSURE: 120 MMHG | BODY MASS INDEX: 41.95 KG/M2

## 2022-08-01 DIAGNOSIS — M25.511 CHRONIC PAIN OF BOTH SHOULDERS: ICD-10-CM

## 2022-08-01 DIAGNOSIS — R29.898 BILATERAL LEG WEAKNESS: ICD-10-CM

## 2022-08-01 DIAGNOSIS — M79.18 MYOFASCIAL PAIN SYNDROME: ICD-10-CM

## 2022-08-01 DIAGNOSIS — M54.2 NECK PAIN, CHRONIC: ICD-10-CM

## 2022-08-01 DIAGNOSIS — M25.50 ARTHRALGIA, UNSPECIFIED JOINT: ICD-10-CM

## 2022-08-01 DIAGNOSIS — G89.4 CHRONIC PAIN SYNDROME: ICD-10-CM

## 2022-08-01 DIAGNOSIS — M54.50 CHRONIC RIGHT-SIDED LOW BACK PAIN WITHOUT SCIATICA: ICD-10-CM

## 2022-08-01 DIAGNOSIS — M51.27 HERNIATED NUCLEUS PULPOSUS, L5-S1: ICD-10-CM

## 2022-08-01 DIAGNOSIS — M47.816 LUMBAR SPONDYLOSIS: ICD-10-CM

## 2022-08-01 DIAGNOSIS — G89.29 CHRONIC NONINTRACTABLE HEADACHE, UNSPECIFIED HEADACHE TYPE: ICD-10-CM

## 2022-08-01 DIAGNOSIS — G89.29 CHRONIC RIGHT-SIDED LOW BACK PAIN WITHOUT SCIATICA: ICD-10-CM

## 2022-08-01 DIAGNOSIS — R52 DIFFUSE PAIN: ICD-10-CM

## 2022-08-01 DIAGNOSIS — G89.29 NECK PAIN, CHRONIC: ICD-10-CM

## 2022-08-01 DIAGNOSIS — G89.29 CHRONIC PAIN OF BOTH SHOULDERS: ICD-10-CM

## 2022-08-01 DIAGNOSIS — M48.062 SPINAL STENOSIS OF LUMBAR REGION WITH NEUROGENIC CLAUDICATION: ICD-10-CM

## 2022-08-01 DIAGNOSIS — R51.9 CHRONIC NONINTRACTABLE HEADACHE, UNSPECIFIED HEADACHE TYPE: ICD-10-CM

## 2022-08-01 DIAGNOSIS — M25.512 CHRONIC PAIN OF BOTH SHOULDERS: ICD-10-CM

## 2022-08-01 LAB
ALBUMIN SERPL BCP-MCNC: 3.4 G/DL (ref 3.5–5)
ALP SERPL-CCNC: 71 U/L (ref 46–116)
ALT SERPL W P-5'-P-CCNC: 27 U/L (ref 12–78)
ANION GAP SERPL CALCULATED.3IONS-SCNC: 5 MMOL/L (ref 4–13)
AST SERPL W P-5'-P-CCNC: 16 U/L (ref 5–45)
BASOPHILS # BLD AUTO: 0.06 THOUSANDS/ΜL (ref 0–0.1)
BASOPHILS NFR BLD AUTO: 1 % (ref 0–1)
BILIRUB SERPL-MCNC: 0.34 MG/DL (ref 0.2–1)
BUN SERPL-MCNC: 17 MG/DL (ref 5–25)
CALCIUM ALBUM COR SERPL-MCNC: 10 MG/DL (ref 8.3–10.1)
CALCIUM SERPL-MCNC: 9.5 MG/DL (ref 8.3–10.1)
CHLORIDE SERPL-SCNC: 108 MMOL/L (ref 96–108)
CK SERPL-CCNC: 99 U/L (ref 26–192)
CO2 SERPL-SCNC: 25 MMOL/L (ref 21–32)
CREAT SERPL-MCNC: 0.76 MG/DL (ref 0.6–1.3)
CRP SERPL QL: 13.4 MG/L
EOSINOPHIL # BLD AUTO: 0.45 THOUSAND/ΜL (ref 0–0.61)
EOSINOPHIL NFR BLD AUTO: 5 % (ref 0–6)
ERYTHROCYTE [DISTWIDTH] IN BLOOD BY AUTOMATED COUNT: 14.7 % (ref 11.6–15.1)
ERYTHROCYTE [SEDIMENTATION RATE] IN BLOOD: 31 MM/HOUR (ref 0–19)
GFR SERPL CREATININE-BSD FRML MDRD: 93 ML/MIN/1.73SQ M
GLUCOSE SERPL-MCNC: 91 MG/DL (ref 65–140)
HBV SURFACE AG SER QL: NORMAL
HCT VFR BLD AUTO: 43.4 % (ref 34.8–46.1)
HCV AB SER QL: NORMAL
HGB BLD-MCNC: 14 G/DL (ref 11.5–15.4)
IMM GRANULOCYTES # BLD AUTO: 0.05 THOUSAND/UL (ref 0–0.2)
IMM GRANULOCYTES NFR BLD AUTO: 1 % (ref 0–2)
LYMPHOCYTES # BLD AUTO: 2.23 THOUSANDS/ΜL (ref 0.6–4.47)
LYMPHOCYTES NFR BLD AUTO: 24 % (ref 14–44)
MCH RBC QN AUTO: 29.4 PG (ref 26.8–34.3)
MCHC RBC AUTO-ENTMCNC: 32.3 G/DL (ref 31.4–37.4)
MCV RBC AUTO: 91 FL (ref 82–98)
MONOCYTES # BLD AUTO: 0.41 THOUSAND/ΜL (ref 0.17–1.22)
MONOCYTES NFR BLD AUTO: 5 % (ref 4–12)
NEUTROPHILS # BLD AUTO: 5.96 THOUSANDS/ΜL (ref 1.85–7.62)
NEUTS SEG NFR BLD AUTO: 64 % (ref 43–75)
NRBC BLD AUTO-RTO: 0 /100 WBCS
PLATELET # BLD AUTO: 307 THOUSANDS/UL (ref 149–390)
PMV BLD AUTO: 10.7 FL (ref 8.9–12.7)
POTASSIUM SERPL-SCNC: 3.8 MMOL/L (ref 3.5–5.3)
PROT SERPL-MCNC: 7 G/DL (ref 6.4–8.4)
RBC # BLD AUTO: 4.76 MILLION/UL (ref 3.81–5.12)
SODIUM SERPL-SCNC: 138 MMOL/L (ref 135–147)
TSH SERPL DL<=0.05 MIU/L-ACNC: 0.55 UIU/ML (ref 0.45–4.5)
VIT B12 SERPL-MCNC: 508 PG/ML (ref 100–900)
WBC # BLD AUTO: 9.16 THOUSAND/UL (ref 4.31–10.16)

## 2022-08-01 PROCEDURE — 80053 COMPREHEN METABOLIC PANEL: CPT

## 2022-08-01 PROCEDURE — 86480 TB TEST CELL IMMUN MEASURE: CPT

## 2022-08-01 PROCEDURE — 86803 HEPATITIS C AB TEST: CPT

## 2022-08-01 PROCEDURE — 82607 VITAMIN B-12: CPT

## 2022-08-01 PROCEDURE — 86200 CCP ANTIBODY: CPT

## 2022-08-01 PROCEDURE — 85652 RBC SED RATE AUTOMATED: CPT

## 2022-08-01 PROCEDURE — 86430 RHEUMATOID FACTOR TEST QUAL: CPT

## 2022-08-01 PROCEDURE — 82306 VITAMIN D 25 HYDROXY: CPT

## 2022-08-01 PROCEDURE — 81374 HLA I TYPING 1 ANTIGEN LR: CPT

## 2022-08-01 PROCEDURE — 72202 X-RAY EXAM SI JOINTS 3/> VWS: CPT

## 2022-08-01 PROCEDURE — 73030 X-RAY EXAM OF SHOULDER: CPT

## 2022-08-01 PROCEDURE — 86038 ANTINUCLEAR ANTIBODIES: CPT

## 2022-08-01 PROCEDURE — 84443 ASSAY THYROID STIM HORMONE: CPT

## 2022-08-01 PROCEDURE — 87340 HEPATITIS B SURFACE AG IA: CPT

## 2022-08-01 PROCEDURE — 99244 OFF/OP CNSLTJ NEW/EST MOD 40: CPT | Performed by: INTERNAL MEDICINE

## 2022-08-01 PROCEDURE — 86140 C-REACTIVE PROTEIN: CPT

## 2022-08-01 PROCEDURE — 82550 ASSAY OF CK (CPK): CPT

## 2022-08-01 PROCEDURE — 36415 COLL VENOUS BLD VENIPUNCTURE: CPT

## 2022-08-01 PROCEDURE — 85025 COMPLETE CBC W/AUTO DIFF WBC: CPT

## 2022-08-01 RX ORDER — MELOXICAM 15 MG/1
15 TABLET ORAL DAILY
Qty: 30 TABLET | Refills: 2 | Status: SHIPPED | OUTPATIENT
Start: 2022-08-01 | End: 2022-08-21 | Stop reason: SDUPTHER

## 2022-08-01 NOTE — PROGRESS NOTES
Rheumatology Consult   Marni Cabrera 49 y o  female 1975    DATE: 8/1/2022    Reason for Consult: joint pain and stiffness    Assessment and Plan:  Polyarticular joint pain, swelling and stiffness with significant back pain and stiffness  Check labs and serologies  Xray SI joints ordered  Smoking cessation discussed  Anti-inflammatory diet/exercise  F/u with endocrinology as patient is still not euthyroid  Standing meloxicam x 30 day  F/u in 3 mos  or sooner if necessary  History of Present Illness:  Marni Cabrera is a 49 y o  female Here for initial visit   +joint pain, swelling and stiffness  No other complaints  She does take Aleve PRN with some improvement in symptoms  Review of Systems  Review of Systems   Constitutional: Positive for fatigue  Negative for fever and unexpected weight change  HENT: Negative for mouth sores  Respiratory: Negative for cough and shortness of breath  Cardiovascular: Negative for chest pain and leg swelling  Gastrointestinal: Negative for abdominal pain, constipation and diarrhea  Musculoskeletal: Positive for arthralgias and myalgias  Negative for back pain and joint swelling  Skin: Negative for color change and rash  Neurological: Negative for weakness  Hematological: Negative for adenopathy  Psychiatric/Behavioral: Negative for sleep disturbance         Allergies  Allergies   Allergen Reactions    Acetaminophen-Codeine      Pt states she does not remember having a reaction to this medication    Hydrocodone Other (See Comments)     GI issues    Latex Hives     Latex powder      Prednisolone Vomiting       Current Medications      Past Medical History  Past Medical History:   Diagnosis Date    Anxiety     Arthritis     Asthma     Chronic pain disorder     upper back    Depression     Disc disorder     Fibromyalgia, primary     GERD (gastroesophageal reflux disease)     HPV (human papilloma virus) infection     cervical    Hypertension     Hypothyroidism     Migraine        Past Surgical History  Past Surgical History:   Procedure Laterality Date    CERVICAL BIOPSY  W/ LOOP ELECTRODE EXCISION      CERVICAL FUSION  2015     SECTION      CHOLECYSTECTOMY      lap    FOOT SURGERY Bilateral     multiple, posterior calcaneal bone spur    KNEE SURGERY Left     arthroscopy with debribement    TUBAL LIGATION Bilateral        Family History  No known autoimmune or inflammatory diseases in the family  Family History   Adopted: Yes   Problem Relation Age of Onset    No Known Problems Mother     No Known Problems Father     Coronary artery disease Maternal Grandmother     Breast cancer Maternal Grandmother         age unknown    Coronary artery disease Maternal Aunt     No Known Problems Maternal Grandfather     No Known Problems Paternal Grandmother     No Known Problems Paternal Grandfather     No Known Problems Maternal Aunt     Heart murmur Maternal Aunt     Hypothyroidism Maternal Aunt     Hashimoto's thyroiditis Maternal Aunt     Diabetes unspecified Brother     No Known Problems Brother     No Known Problems Brother    [address] [facility] Alfie ADD / ADHD Son        Social History  Occupation: works as a  at [employer] History     Substance and Sexual Activity   Alcohol Use Not Currently     Social History     Substance and Sexual Activity   Drug Use No     Social History     Tobacco Use   Smoking Status Current Every Day Smoker    Packs/day: 1 00    Types: Cigarettes   Smokeless Tobacco Never Used   Tobacco Comment    actually about 3/4 PPD        Objective:  /80   Ht 5' 10" (1 778 m)   Wt (!) 146 kg (322 lb 12 8 oz)   BMI 46 32 kg/m²     Physical Exam  Musculoskeletal:      Right shoulder: Tenderness present  Decreased range of motion  Left shoulder: Tenderness present  Decreased range of motion  Right hand: Tenderness present  Left hand: Tenderness present        Right knee: Crepitus present  Left knee: Crepitus present  Right ankle:      Right Achilles Tendon: Tenderness present  Left ankle:      Left Achilles Tendon: Tenderness present  Lab Results: I have personally reviewed pertinent reports        CBC:   , Chemistry Profile:       Invalid input(s): ALBUMIN, Coagulation Studies:   , Cardiac Studies:   , Additional Labs:   , iSTAT CHEM 8:       Invalid input(s): POTASSIUMIS, ABG:   , Toxicology:   , Last A1C/Lipid Panel/Thyroid Panel:   Lab Results   Component Value Date    HGBA1C 5 8 (H) 04/05/2022    TRIG 86 10/13/2021    TRIG 143 06/22/2020    HDL 49 10/13/2021    HDL 46 06/22/2020    LDLCALC 109 (H) 10/13/2021    LDLCALC 121 (H) 06/22/2020    FREET4 1 62 04/05/2022     No results found for: CRP, SEDRATE, RIVAS, RF, HAV, HEPAIGM, HEPBIGM, HEPBCAB, HEPCAB, HBEAG        Invalid input(s): URIBILINOGEN         Imaging: I have personally reviewed pertinent films in PACS

## 2022-08-01 NOTE — PATIENT INSTRUCTIONS
Take the meloxicam every day with food or after a meal for the next month  Continue to see the thyroid doctor as low thyroid can affect your muscles and joints causing swelling and stiffness  Continue to exercise and eat a healthy anti-inflammatory diet  Please try to stop smoking as this is making your arthritis worse

## 2022-08-01 NOTE — LETTER
August 1, 2022     Gloria Garcia [address]  [address]  [address] [address] [address]    Patient: Jarek Pardo   YOB: 1975   Date of Visit: 8/1/2022       Dear Dr No Stains:    Thank you for referring Jarek Pardo to me for evaluation  Below are my notes for this consultation  If you have questions, please do not hesitate to call me  I look forward to following your patient along with you  Sincerely,        Rosina Wilder MD        CC: No Recipients  Rosina Wilder MD  8/1/2022  1:15 PM  Signed  Rheumatology Consult   Jarek Pardo 49 y o  female 1975    DATE: 8/1/2022    Reason for Consult: joint pain and stiffness    Assessment and Plan:  Polyarticular joint pain, swelling and stiffness with significant back pain and stiffness  Check labs and serologies  Xray SI joints ordered  Smoking cessation discussed  Anti-inflammatory diet/exercise  F/u with endocrinology as patient is still not euthyroid  Standing meloxicam x 30 day  F/u in 3 mos  or sooner if necessary  History of Present Illness:  Jarek Pardo is a 49 y o  female Here for initial visit   +joint pain, swelling and stiffness  No other complaints  She does take Aleve PRN with some improvement in symptoms  Review of Systems  Review of Systems   Constitutional: Positive for fatigue  Negative for fever and unexpected weight change  HENT: Negative for mouth sores  Respiratory: Negative for cough and shortness of breath  Cardiovascular: Negative for chest pain and leg swelling  Gastrointestinal: Negative for abdominal pain, constipation and diarrhea  Musculoskeletal: Positive for arthralgias and myalgias  Negative for back pain and joint swelling  Skin: Negative for color change and rash  Neurological: Negative for weakness  Hematological: Negative for adenopathy  Psychiatric/Behavioral: Negative for sleep disturbance         Allergies  Allergies   Allergen Reactions    Acetaminophen-Codeine      Pt states she does not remember having a reaction to this medication    Hydrocodone Other (See Comments)     GI issues    Latex Hives     Latex powder      Prednisolone Vomiting       Current Medications      Past Medical History  Past Medical History:   Diagnosis Date    Anxiety     Arthritis     Asthma     Chronic pain disorder     upper back    Depression     Disc disorder     Fibromyalgia, primary     GERD (gastroesophageal reflux disease)     HPV (human papilloma virus) infection     cervical    Hypertension     Hypothyroidism     Migraine        Past Surgical History  Past Surgical History:   Procedure Laterality Date    CERVICAL BIOPSY  W/ LOOP ELECTRODE EXCISION      CERVICAL FUSION  2015     SECTION      CHOLECYSTECTOMY      lap    FOOT SURGERY Bilateral     multiple, posterior calcaneal bone spur    KNEE SURGERY Left     arthroscopy with debribement    TUBAL LIGATION Bilateral        Family History  No known autoimmune or inflammatory diseases in the family     Family History   Adopted: Yes   Problem Relation Age of Onset    No Known Problems Mother     No Known Problems Father     Coronary artery disease Maternal Grandmother     Breast cancer Maternal Grandmother         age unknown    Coronary artery disease Maternal Aunt     No Known Problems Maternal Grandfather     No Known Problems Paternal Grandmother     No Known Problems Paternal Grandfather     No Known Problems Maternal Aunt     Heart murmur Maternal Aunt     Hypothyroidism Maternal Aunt     Hashimoto's thyroiditis Maternal Aunt     Diabetes unspecified Brother     No Known Problems Brother     No Known Problems Brother    [place] ADD / ADHD Son        Social History  Occupation: works as a  at [employer] History     Substance and Sexual Activity   Alcohol Use Not Currently     Social History     Substance and Sexual Activity   Drug Use No     Social History Tobacco Use   Smoking Status Current Every Day Smoker    Packs/day: 1 00    Types: Cigarettes   Smokeless Tobacco Never Used   Tobacco Comment    actually about 3/4 PPD        Objective:  /80   Ht 5' 10" (1 778 m)   Wt (!) 146 kg (322 lb 12 8 oz)   BMI 46 32 kg/m²     Physical Exam  Musculoskeletal:      Right shoulder: Tenderness present  Decreased range of motion  Left shoulder: Tenderness present  Decreased range of motion  Right hand: Tenderness present  Left hand: Tenderness present  Right knee: Crepitus present  Left knee: Crepitus present  Right ankle:      Right Achilles Tendon: Tenderness present  Left ankle:      Left Achilles Tendon: Tenderness present  Lab Results: I have personally reviewed pertinent reports        CBC:   , Chemistry Profile:       Invalid input(s): ALBUMIN, Coagulation Studies:   , Cardiac Studies:   , Additional Labs:   , iSTAT CHEM 8:       Invalid input(s): POTASSIUMIS, ABG:   , Toxicology:   , Last A1C/Lipid Panel/Thyroid Panel:   Lab Results   Component Value Date    HGBA1C 5 8 (H) 04/05/2022    TRIG 86 10/13/2021    TRIG 143 06/22/2020    HDL 49 10/13/2021    HDL 46 06/22/2020    LDLCALC 109 (H) 10/13/2021    LDLCALC 121 (H) 06/22/2020    FREET4 1 62 04/05/2022     No results found for: CRP, SEDRATE, RIVAS, RF, HAV, HEPAIGM, HEPBIGM, HEPBCAB, HEPCAB, HBEAG        Invalid input(s): URIBILINOGEN         Imaging: I have personally reviewed pertinent films in PACS

## 2022-08-02 LAB
25(OH)D3 SERPL-MCNC: 101.2 NG/ML (ref 30–100)
CCP AB SER IA-ACNC: 0.7
GAMMA INTERFERON BACKGROUND BLD IA-ACNC: 0.02 IU/ML
M TB IFN-G BLD-IMP: NEGATIVE
M TB IFN-G CD4+ BCKGRND COR BLD-ACNC: 0.01 IU/ML
M TB IFN-G CD4+ BCKGRND COR BLD-ACNC: 0.04 IU/ML
MITOGEN IGNF BCKGRD COR BLD-ACNC: 6.95 IU/ML
RHEUMATOID FACT SER QL LA: NEGATIVE
RYE IGE QN: NEGATIVE

## 2022-08-04 DIAGNOSIS — M79.18 MYOFASCIAL PAIN SYNDROME: ICD-10-CM

## 2022-08-04 DIAGNOSIS — M47.816 LUMBAR SPONDYLOSIS: ICD-10-CM

## 2022-08-04 DIAGNOSIS — M51.27 HERNIATED NUCLEUS PULPOSUS, L5-S1: ICD-10-CM

## 2022-08-04 DIAGNOSIS — R29.898 BILATERAL LEG WEAKNESS: Primary | ICD-10-CM

## 2022-08-04 DIAGNOSIS — M54.2 NECK PAIN, CHRONIC: ICD-10-CM

## 2022-08-04 DIAGNOSIS — R51.9 CHRONIC NONINTRACTABLE HEADACHE, UNSPECIFIED HEADACHE TYPE: ICD-10-CM

## 2022-08-04 DIAGNOSIS — M48.062 SPINAL STENOSIS OF LUMBAR REGION WITH NEUROGENIC CLAUDICATION: ICD-10-CM

## 2022-08-04 DIAGNOSIS — G89.29 NECK PAIN, CHRONIC: ICD-10-CM

## 2022-08-04 DIAGNOSIS — G89.29 CHRONIC RIGHT-SIDED LOW BACK PAIN WITHOUT SCIATICA: ICD-10-CM

## 2022-08-04 DIAGNOSIS — M54.50 CHRONIC RIGHT-SIDED LOW BACK PAIN WITHOUT SCIATICA: ICD-10-CM

## 2022-08-04 DIAGNOSIS — G89.29 CHRONIC NONINTRACTABLE HEADACHE, UNSPECIFIED HEADACHE TYPE: ICD-10-CM

## 2022-08-06 DIAGNOSIS — K21.9 GASTROESOPHAGEAL REFLUX DISEASE WITHOUT ESOPHAGITIS: ICD-10-CM

## 2022-08-06 DIAGNOSIS — E03.9 ACQUIRED HYPOTHYROIDISM: ICD-10-CM

## 2022-08-08 ENCOUNTER — EVALUATION (OUTPATIENT)
Dept: PHYSICAL THERAPY | Facility: CLINIC | Age: 47
End: 2022-08-08
Payer: COMMERCIAL

## 2022-08-08 DIAGNOSIS — M47.814 THORACIC SPONDYLOSIS: ICD-10-CM

## 2022-08-08 DIAGNOSIS — M54.14 THORACIC RADICULOPATHY: ICD-10-CM

## 2022-08-08 DIAGNOSIS — E03.9 ACQUIRED HYPOTHYROIDISM: Primary | ICD-10-CM

## 2022-08-08 DIAGNOSIS — M25.512 CHRONIC PAIN OF BOTH SHOULDERS: ICD-10-CM

## 2022-08-08 DIAGNOSIS — G89.29 CHRONIC PAIN OF BOTH SHOULDERS: ICD-10-CM

## 2022-08-08 DIAGNOSIS — M54.6 CHRONIC BILATERAL THORACIC BACK PAIN: Primary | ICD-10-CM

## 2022-08-08 DIAGNOSIS — G89.29 CHRONIC BILATERAL THORACIC BACK PAIN: Primary | ICD-10-CM

## 2022-08-08 DIAGNOSIS — M25.511 CHRONIC PAIN OF BOTH SHOULDERS: ICD-10-CM

## 2022-08-08 PROCEDURE — 97110 THERAPEUTIC EXERCISES: CPT | Performed by: PHYSICAL THERAPIST

## 2022-08-08 PROCEDURE — 97163 PT EVAL HIGH COMPLEX 45 MIN: CPT | Performed by: PHYSICAL THERAPIST

## 2022-08-08 RX ORDER — LEVOTHYROXINE SODIUM 25 UG/1
TABLET ORAL
Qty: 90 TABLET | Refills: 0 | Status: SHIPPED | OUTPATIENT
Start: 2022-08-08 | End: 2022-09-06 | Stop reason: SDUPTHER

## 2022-08-08 RX ORDER — LEVOTHYROXINE SODIUM 200 UG/1
TABLET ORAL
Qty: 90 TABLET | Refills: 0 | Status: SHIPPED | OUTPATIENT
Start: 2022-08-08 | End: 2022-09-06 | Stop reason: SDUPTHER

## 2022-08-08 RX ORDER — FAMOTIDINE 20 MG/1
20 TABLET, FILM COATED ORAL 2 TIMES DAILY
Qty: 180 TABLET | Refills: 0 | Status: SHIPPED | OUTPATIENT
Start: 2022-08-08

## 2022-08-08 NOTE — PROGRESS NOTES
PT Evaluation     Today's date: 2022  Patient name: Jarek Pardo  : 1975  MRN: [id number]  Referring provider: KENNETH Marroquin  Dx:   Encounter Diagnosis     ICD-10-CM    1  Chronic bilateral thoracic back pain  M54 6     G89 29    2  Chronic pain of both shoulders  M25 511     G89 29     M25 512                   Assessment  Assessment details: Jarek Pardo is a 49 y o  female who presents with increased mid back and shoulder pain consistent with referring diagnosis of Chronic bilateral thoracic back pain  (primary encounter diagnosis)  Chronic pain of both shoulders that is complex secondary to insidious and chronic onset, high fear avoidance and hypersensitive/central pain levels  Clinically demonstrates decreased C/S ROM, decreased B/L shoulder and periscapular strength, decreased postural proprioception leading to pain with ADLs and exercise  This suggests the need for skilled OPPT to address the above listed impairments, achieve established goals and return to PLOF pain-free  If you have any questions or concerns please contact me at [phone]  Thank you!   Impairments: abnormal coordination, abnormal gait, abnormal muscle firing, abnormal muscle tone, abnormal or restricted ROM, abnormal movement, activity intolerance, difficulty understanding, impaired physical strength, lacks appropriate home exercise program, pain with function and safety issue    Symptom irritability: highUnderstanding of Dx/Px/POC: good   Prognosis: good    Goals  Short Term Goals (4 weeks)  1 ) Establish independence with HEP  2 ) Decrease subjective pain levels from NPRS at least to 2-5/10 at rest and with activity  3 ) Improve C/S ROM at least 5-10 degrees into all planes to allow for improved ease of movement with less guarding    Long Term Goals (8 weeks)  1 ) Improve C/S ROM to WNL in all planes to restore normal movement with ADLs and function  2 ) Improve B/L shoulder flex and ABD strength to 5/5 in all planes in order to return to pain-free ADLs and function  3 ) Improve FOTO score at least to 75 points showing improved self reported disability   4 ) No longer HA sxs     Plan  Plan details: Initiate POC for B/L shoulder stability and posture proprioception; monitor sxs and progress as able   Patient would benefit from: PT eval and skilled physical therapy  Planned modality interventions: biofeedback, cryotherapy, electrical stimulation/Russian stimulation and TENS  Planned therapy interventions: abdominal trunk stabilization, activity modification, ADL retraining, ADL training, balance, behavior modification, body mechanics training, coordination, flexibility, functional ROM exercises, graded activity, graded exercise, graded motor, home exercise program, therapeutic training, therapeutic exercise, therapeutic activities, stretching, strengthening, sensory integrative techniques, self care, patient education, postural training, neuromuscular re-education, manual therapy, joint mobilization and IADL retraining  Frequency: 2x week  Duration in visits: 16  Duration in weeks: 8  Plan of Care beginning date: 8/8/2022  Plan of Care expiration date: 10/10/2022  Treatment plan discussed with: patient        Subjective Evaluation    History of Present Illness  Date of onset: 5/8/2022  Mechanism of injury: Abbie Payan is a 49 y o  female who presents with increased Thoracic pain and B/L shoulder discomfort aching starting ~ 3 months ago with [facility]- notes having more fatigue and discomfort with increased stress levels  Decided to seek out MD consult where X-rays were (-) for fx and script for OPPT provided  Reports continued night pain or changes in bowel or bladder function  Reports continued R sided NT signs or sxs at anterior or medial forearm  F/U with MD in 3 weeks for R shoulder injection and 9/15/22 for L shoulder and final consult on 9/26/22  Wishes to return to PLOF pain-free             Recurrent probem    Quality of life: good    Pain  Current pain ratin  At best pain ratin  At worst pain ratin  Location: Shoulders and upper back  Quality: tight, dull ache and sharp  Relieving factors: rest, relaxation, support, medications and change in position  Aggravating factors: standing  Progression: worsening    Social Support  Steps to enter house: yes  2  Stairs in house: yes   13  Lives in: multiple-level home  Lives with: significant other and young children    Employment status: working  Hand dominance: right  Exercise history: Work   Life stress: high stress       Diagnostic Tests  X-ray: abnormal  Treatments  Previous treatment: physical therapy and injection treatment  Current treatment: injection treatment and physical therapy  Patient Goals  Patient goals for therapy: decreased edema, decreased pain, improved balance, increased motion, increased strength, independence with ADLs/IADLs and return to sport/leisure activities  Patient goal: Get moving; feel better         Objective     Active Range of Motion   Cervical/Thoracic Spine       Cervical    Flexion: 40 degrees   Extension: 20 degrees      Left lateral flexion: 17 degrees      Right lateral flexion: 15 degrees      Left rotation: 47 degrees  Right rotation: 42 degrees         Left Shoulder   Flexion: 155 degrees   Extension: 50 degrees   Abduction: 150 degrees   External rotation 0°: 65 degrees   Internal rotation BTB: L2     Right Shoulder   Flexion: 155 degrees   Extension: 57 degrees   Abduction: 155 degrees   External rotation 0°: 70 degrees   Internal rotation BTB: L2     Strength/Myotome Testing     Left Shoulder     Planes of Motion   Flexion: 4-   Abduction: 5   External rotation at 0°: 4-   Internal rotation at 0°: 5     Right Shoulder     Planes of Motion   Flexion: 4-   Abduction: 5   External rotation at 0°: 4-   Internal rotation at 0°: 5     Tests   Cervical   Negative Sharp-Juan test      Left   Positive Spurling's Test A      Right   Positive Spurling's Test A  Left Shoulder   Positive Hawkin's and Speed's  Negative full can, horn blower, painful arc and Amber's  Right Shoulder   Positive empty can, full can, Hawkin's, Neer's, painful arc, Speed's and Amber's  Negative horn blower                Precautions: Chronic pain; high fear avoidance  EPOC : 10/10/22  HEP: Chin tuck supine, chin tuck rotation    Manuals 8/8            C/S PROM             Shoulder PROM                                       Neuro Re-Ed             Chin tuck 10x5            Chin tuck rotation 10x5"                                                                             Ther Ex             No money             H-HABD             SA wall slide                                                                              Ther Activity                                       Gait Training                                       Modalities

## 2022-08-08 NOTE — TELEPHONE ENCOUNTER
Left message for patient to call the office to schedule a fu appointment      (Last seen on 3/9/22 - DS)

## 2022-08-08 NOTE — TELEPHONE ENCOUNTER
Patient has an appointment scheduled with you on 9/6/22  Would you like her to get any lab work done?

## 2022-08-09 LAB — HLA-B27 QL NAA+PROBE: POSITIVE

## 2022-08-11 ENCOUNTER — CONSULT (OUTPATIENT)
Dept: NEUROLOGY | Facility: CLINIC | Age: 47
End: 2022-08-11
Payer: COMMERCIAL

## 2022-08-11 VITALS
WEIGHT: 293 LBS | SYSTOLIC BLOOD PRESSURE: 134 MMHG | TEMPERATURE: 97.6 F | DIASTOLIC BLOOD PRESSURE: 82 MMHG | BODY MASS INDEX: 46.75 KG/M2

## 2022-08-11 DIAGNOSIS — M54.2 NECK PAIN, CHRONIC: ICD-10-CM

## 2022-08-11 DIAGNOSIS — G89.4 CHRONIC PAIN SYNDROME: ICD-10-CM

## 2022-08-11 DIAGNOSIS — M47.816 LUMBAR SPONDYLOSIS: ICD-10-CM

## 2022-08-11 DIAGNOSIS — M48.062 SPINAL STENOSIS OF LUMBAR REGION WITH NEUROGENIC CLAUDICATION: ICD-10-CM

## 2022-08-11 DIAGNOSIS — G89.29 CHRONIC NONINTRACTABLE HEADACHE, UNSPECIFIED HEADACHE TYPE: ICD-10-CM

## 2022-08-11 DIAGNOSIS — M79.18 MYOFASCIAL PAIN SYNDROME: ICD-10-CM

## 2022-08-11 DIAGNOSIS — G89.29 CHRONIC RIGHT-SIDED LOW BACK PAIN WITHOUT SCIATICA: ICD-10-CM

## 2022-08-11 DIAGNOSIS — M51.27 HERNIATED NUCLEUS PULPOSUS, L5-S1: ICD-10-CM

## 2022-08-11 DIAGNOSIS — R51.9 CHRONIC NONINTRACTABLE HEADACHE, UNSPECIFIED HEADACHE TYPE: ICD-10-CM

## 2022-08-11 DIAGNOSIS — M54.50 CHRONIC RIGHT-SIDED LOW BACK PAIN WITHOUT SCIATICA: ICD-10-CM

## 2022-08-11 DIAGNOSIS — G89.29 NECK PAIN, CHRONIC: ICD-10-CM

## 2022-08-11 DIAGNOSIS — R29.898 BILATERAL LEG WEAKNESS: ICD-10-CM

## 2022-08-11 PROCEDURE — 99244 OFF/OP CNSLTJ NEW/EST MOD 40: CPT | Performed by: PSYCHIATRY & NEUROLOGY

## 2022-08-11 NOTE — PROGRESS NOTES
Patient ID: Aubrey Guevara is a 49 y o  female  Assessment/Plan:    Myofascial pain syndrome  Patient has had neck pain, lower back pain for number of years, continues to have pain and paresthesias in both upper extremities  Does report fatigability and weakness due to pain, but no objective muscle weakness was noted on her exam today  No ocular or bulbar symptoms today  Does have mild acral sensory loss as noted below on her exam       Explained to her symptoms are not consistent with a generalized neuromuscular disease, which she understood  Considering the inflammatory markers, and positive HLA B27, encouraged her to follow with rheumatology, for possible reactive arthritis  Was noted to have acral sensory loss as noted below, does have prediabetes, which could be contributing to that along with metabolic syndrome which he understands  Lifestyle modification was discussed today  We discussed pursuing electrodiagnostic testing of the lower extremities, but decided to defer it, as it would not change her clinical management which would be lifestyle modification and supportive care  I did order labs for Sjogren's, to complete her workup, she will continue  follow-up with Rheumatology  Thank you for allowing me to participate in her care  Diagnoses and all orders for this visit:    Neck pain, chronic  -     Ambulatory Referral to Neurology  -     Ambulatory Referral to Neurology    Myofascial pain syndrome  -     Ambulatory Referral to Neurology  -     Ambulatory Referral to Neurology  -     Sjogren's Antibodies;  Future  -     Sjogren's Antibodies    Chronic pain syndrome  -     Ambulatory Referral to Neurology    Chronic right-sided low back pain without sciatica  -     Ambulatory Referral to Neurology  -     Ambulatory Referral to Neurology    Chronic nonintractable headache, unspecified headache type  -     Ambulatory Referral to Neurology  -     Ambulatory Referral to Neurology    Lumbar spondylosis  -     Ambulatory Referral to Neurology  -     Ambulatory Referral to Neurology    Herniated nucleus pulposus, L5-S1  -     Ambulatory Referral to Neurology  -     Ambulatory Referral to Neurology    Spinal stenosis of lumbar region with neurogenic claudication  -     Ambulatory Referral to Neurology  -     Ambulatory Referral to Neurology    Bilateral leg weakness  -     Ambulatory Referral to Neurology  -     Ambulatory Referral to Neurology           Subjective:    HPI     I had the pleasure of seeing your patient in Neurology Clinic for neuromuscular consultation  As you know, patient is a 25-year-old woman who was referred for evaluation for neck pain, paresthesias in both upper extremities, and pain in her feet  Please allow me to summarize her history for the record  She was last seen by me in January 2019  As you know, patient has history of cervical spine surgery ([facility]) in 2014  She continues to have neck pain, pain in between the shoulder blades, pain and paresthesias in both upper extremities since then  Pain and paresthesias are worse in the right arm compared to the left  Denies any numbness in her feet, but does get paresthesias intermittently  No significant balance issues, but does stumble periodically  Denies any falls  No objective muscle weakness, but does feel easy fatigability and pain limits her activity  No muscle spasms/cramps, reports tightness in her muscles  Does have carpal tunnel syndrome in both hands, has had cortisone injections which were helpful for a short period of time  Besides this, has prediabetes, which is a new diagnosis for her, hypertension, hypothyroidism anxiety, lumbar spondylosis, diffuse Myofascial pain syndrome  Has gained more than 60 lbs in the last 6 months    Has been recently evaluated by Rheumatology, has elevated inflammatory markers with high sed rate and CRP, and positive HLA B27      Other labs including TSH, hepatitis-B, hepatitis-C, vitamin-D, B12, CK, rheumatoid factor, CCP antibodies, RIVAS have all been unremarkable  MRI L spine: 08/2021: Spondylotic changes of the lower lumbar spine including an L5-S1 central disc protrusion that abuts the right S1 nerve root within the subarticular recess concerning for possible impingement  Correlate clinically for radicular symptoms  There is also mild to moderate bilateral L5-S1 foraminal stenosis secondary to marginal osteophytes and facet hypertrophy  Correlate clinically for L5 radicular symptoms bilaterally  Ct abdomen/pelvis:  02/20: No acute pathology visualized on CT of the abdomen and pelvis with IV without oral contrast       The following portions of the patient's history were reviewed and updated as appropriate: allergies, current medications, past family history, past medical history, past social history, past surgical history and problem list          Objective:    Blood pressure 134/82, temperature 97 6 °F (36 4 °C), temperature source Temporal, weight (!) 148 kg (325 lb 12 8 oz), not currently breastfeeding  Physical Exam   General exam: Pt was awake, alert and oriented  HEENT: atraumatic, normocephalic  Normal oral mucosa, neck was supple, no lymphadenopathy  Normal peripheral pulses  Extremities did not show any edema or cyanosis  Neurological Exam  Neurologically, pt was awake and alert  Speech was normal, no dysarthria or aphasia  Cranial nerve exam showed normal extraocular movements, no nystagmus or diplopia  There was no ptosis at baseline or with sustained upward gaze  Strength of eye closure muscles was normal   Facial sensations were normal bilaterally  No facial weakness, able to blow out the cheeks and push the tongue in the cheeks well  No tongue atrophy or fasciculations  And normal   Motor exam revealed normal tone and muscle bulk   There was no atrophy, scapular winging, high arches, hammertoes, shortening of achilles tendons or any other features of neuromuscular disease  Muscle strength was normal in neck flexors and extensors, and all muscle groups in both upper and lower extremities  Does have myofascial tenderness  Reflexes were graded as 2+ throughout, including the ankles  Toes were downgoing  There was no exaggerated jaw jerk or lacey's sign  No ankle clonus  Sensory exam revealed mild length dependent, decreased sensation to pin and temp up to ankles to lower 1/3 of legs  Vibration was 10 sec at toes  Normal Proprioception, sensation was normal in the upper extremities  Gait was normal and casual         ROS:  I reviewed the below ROS and what is mentioned in HPI, the remainder of ROS was negative  Review of Systems   Constitutional: Negative  Negative for appetite change and fever  HENT: Negative  Negative for hearing loss, tinnitus, trouble swallowing and voice change  Eyes: Negative  Negative for photophobia and pain  Respiratory: Negative  Negative for shortness of breath  Cardiovascular: Negative  Negative for palpitations  Gastrointestinal: Negative  Negative for nausea and vomiting  Endocrine: Negative  Negative for cold intolerance  Genitourinary: Negative  Negative for dysuria, frequency and urgency  Musculoskeletal: Negative  Negative for myalgias and neck pain  Skin: Negative  Negative for rash  Neurological: Positive for numbness  Negative for dizziness, tremors, seizures, syncope, facial asymmetry, speech difficulty, weakness, light-headedness and headaches  Hematological: Negative  Does not bruise/bleed easily  Psychiatric/Behavioral: Negative  Negative for confusion, hallucinations and sleep disturbance

## 2022-08-12 NOTE — ASSESSMENT & PLAN NOTE
Patient has had neck pain, lower back pain for number of years, continues to have pain and paresthesias in both upper extremities  Does report fatigability and weakness due to pain, but no objective muscle weakness was noted on her exam today  No ocular or bulbar symptoms today  Does have mild acral sensory loss as noted below on her exam       Explained to her symptoms are not consistent with a generalized neuromuscular disease, which she understood  Considering the inflammatory markers, and positive HLA B27, encouraged her to follow with rheumatology, for possible reactive arthritis  Was noted to have acral sensory loss as noted below, does have prediabetes, which could be contributing to that along with metabolic syndrome which he understands  Lifestyle modification was discussed today  We discussed pursuing electrodiagnostic testing of the lower extremities, but decided to defer it, as it would not change her clinical management which would be lifestyle modification and supportive care  I did order labs for Sjogren's, to complete her workup, she will continue  follow-up with Rheumatology  Thank you for allowing me to participate in her care

## 2022-08-17 ENCOUNTER — APPOINTMENT (OUTPATIENT)
Dept: PHYSICAL THERAPY | Facility: CLINIC | Age: 47
End: 2022-08-17
Payer: COMMERCIAL

## 2022-08-19 ENCOUNTER — OFFICE VISIT (OUTPATIENT)
Dept: PHYSICAL THERAPY | Facility: CLINIC | Age: 47
End: 2022-08-19
Payer: COMMERCIAL

## 2022-08-19 DIAGNOSIS — M25.512 CHRONIC PAIN OF BOTH SHOULDERS: ICD-10-CM

## 2022-08-19 DIAGNOSIS — M25.511 CHRONIC PAIN OF BOTH SHOULDERS: ICD-10-CM

## 2022-08-19 DIAGNOSIS — M54.14 THORACIC RADICULOPATHY: ICD-10-CM

## 2022-08-19 DIAGNOSIS — G89.29 CHRONIC BILATERAL THORACIC BACK PAIN: Primary | ICD-10-CM

## 2022-08-19 DIAGNOSIS — M54.6 CHRONIC BILATERAL THORACIC BACK PAIN: Primary | ICD-10-CM

## 2022-08-19 DIAGNOSIS — G89.29 CHRONIC PAIN OF BOTH SHOULDERS: ICD-10-CM

## 2022-08-19 DIAGNOSIS — M47.814 THORACIC SPONDYLOSIS: ICD-10-CM

## 2022-08-19 PROCEDURE — 97110 THERAPEUTIC EXERCISES: CPT | Performed by: PHYSICAL THERAPIST

## 2022-08-19 PROCEDURE — 97112 NEUROMUSCULAR REEDUCATION: CPT | Performed by: PHYSICAL THERAPIST

## 2022-08-19 NOTE — PROGRESS NOTES
Daily Note     Today's date: 2022  Patient name: Nirali Joiner  : 1975  MRN: [id number]  Referring provider: KENNETH Wang  Dx:   Encounter Diagnosis     ICD-10-CM    1  Chronic bilateral thoracic back pain  M54 6     G89 29    2  Chronic pain of both shoulders  M25 511     G89 29     M25 512    3  Thoracic radiculopathy  M54 14    4  Thoracic spondylosis  M47 814                   Subjective: Notes having worse symptoms after last session  Objective: See treatment diary below      Assessment: Able to perform LPD and row without difficulty  Noting poor ability to tolerate any manual treatment to C/S  Performance of aerobic strengthening and UE strength will be beneficial   Exercise focus  Plan: Continue per plan of care        Precautions: Chronic pain; high fear avoidance  EPOC : 10/10/22  HEP: Chin tuck supine, chin tuck rotation    Manuals            C/S PROM             Shoulder PROM                                       Neuro Re-Ed             Chin tuck 10x5 10x5"           Chin tuck rotation 10x5" 10x5"                                                                            Ther Ex             No money  GTB 20x           H-HABD  GTB 20x           SA wall slide  2x10           LPD  35# 2x10           Machine row  35# 2x10           Single arm incline row  35# 2x10                                     Ther Activity             Bike  8 min                        Gait Training                                       Modalities

## 2022-08-21 DIAGNOSIS — G89.29 NECK PAIN, CHRONIC: ICD-10-CM

## 2022-08-21 DIAGNOSIS — M79.18 MYOFASCIAL PAIN SYNDROME: ICD-10-CM

## 2022-08-21 DIAGNOSIS — M54.2 NECK PAIN, CHRONIC: ICD-10-CM

## 2022-08-22 RX ORDER — MELOXICAM 15 MG/1
15 TABLET ORAL DAILY
Qty: 30 TABLET | Refills: 0 | Status: SHIPPED | OUTPATIENT
Start: 2022-08-22 | End: 2022-10-18

## 2022-08-25 ENCOUNTER — OFFICE VISIT (OUTPATIENT)
Dept: PAIN MEDICINE | Facility: CLINIC | Age: 47
End: 2022-08-25
Payer: COMMERCIAL

## 2022-08-25 ENCOUNTER — TELEPHONE (OUTPATIENT)
Dept: PAIN MEDICINE | Facility: CLINIC | Age: 47
End: 2022-08-25

## 2022-08-25 VITALS
SYSTOLIC BLOOD PRESSURE: 130 MMHG | TEMPERATURE: 98.3 F | HEIGHT: 70 IN | BODY MASS INDEX: 41.95 KG/M2 | WEIGHT: 293 LBS | DIASTOLIC BLOOD PRESSURE: 80 MMHG

## 2022-08-25 DIAGNOSIS — M47.816 LUMBAR SPONDYLOSIS: ICD-10-CM

## 2022-08-25 DIAGNOSIS — M54.50 CHRONIC RIGHT-SIDED LOW BACK PAIN WITHOUT SCIATICA: ICD-10-CM

## 2022-08-25 DIAGNOSIS — M51.27 HERNIATED NUCLEUS PULPOSUS, L5-S1: ICD-10-CM

## 2022-08-25 DIAGNOSIS — M48.062 SPINAL STENOSIS OF LUMBAR REGION WITH NEUROGENIC CLAUDICATION: ICD-10-CM

## 2022-08-25 DIAGNOSIS — G89.4 CHRONIC PAIN SYNDROME: Primary | ICD-10-CM

## 2022-08-25 DIAGNOSIS — M79.18 MYOFASCIAL PAIN SYNDROME: ICD-10-CM

## 2022-08-25 DIAGNOSIS — M46.1 SACROILIITIS (HCC): ICD-10-CM

## 2022-08-25 DIAGNOSIS — G89.29 CHRONIC RIGHT-SIDED LOW BACK PAIN WITHOUT SCIATICA: ICD-10-CM

## 2022-08-25 PROCEDURE — 99214 OFFICE O/P EST MOD 30 MIN: CPT | Performed by: NURSE PRACTITIONER

## 2022-08-25 PROCEDURE — 3075F SYST BP GE 130 - 139MM HG: CPT | Performed by: NURSE PRACTITIONER

## 2022-08-25 PROCEDURE — 3079F DIAST BP 80-89 MM HG: CPT | Performed by: NURSE PRACTITIONER

## 2022-08-25 RX ORDER — PREGABALIN 100 MG/1
CAPSULE ORAL
Qty: 60 CAPSULE | Refills: 2 | Status: SHIPPED | OUTPATIENT
Start: 2022-08-25

## 2022-08-25 NOTE — PROGRESS NOTES
Assessment:  1  Sacroiliitis ([address] )    2  Neck pain, chronic    3  Myofascial pain syndrome    4  Chronic pain syndrome    5  Chronic right-sided low back pain without sciatica    6  Chronic nonintractable headache, unspecified headache type    7  Lumbar spondylosis    8  Herniated nucleus pulposus, L5-S1    9  Spinal stenosis of lumbar region with neurogenic claudication    10  Diffuse pain        Plan:  The patient is a 49 y o  female last seen on 07/05/2022 who presents for a follow up office visit in regards to chronic pain secondary to low back pain, lumbar spondylosis, and lumbar intervertebral disc disorder with radiculopathy  Patient presents today with increased right-sided low back pain  The pain also is radiating to her right hip and groin  Upon examination her pain appears consistent with sacroiliitis  To help decrease inflammation and pain, a right sacroiliac joint injection can be performed  Complete risks and benefits including bleeding, infection, tissue reaction, nerve injury and allergic reaction were discussed  The approach was demonstrated using models and literature was provided  Verbal and written consent was obtained  Patient states that she continues to gain weight  This may be caused by the Lyrica or Cymbalta  I will wean the Lyrica to 100 mg twice a day to see if the weight gain decreases  A prescription with 2 refills was sent to the pharmacy    Complete risks and benefits including bleeding, infection, tissue reaction, nerve injury and allergic reaction were discussed  The approach was demonstrated using models and literature was provided  Verbal and written consent was obtained  The patient will follow-up in 4 weeks after in injection for medication prescription refill and reevaluation  The patient was advised to contact the office should their symptoms worsen in the interim  The patient was agreeable and verbalized an understanding  History of Present Illness:     The patient is a 49 y o  female last seen on 07/05/2022 who presents for a follow up office visit in regards to chronic pain secondary to low back pain, lumbar spondylosis, and lumbar intervertebral disc disorder with radiculopathy  Her last office vist was 7/5/22, in which she was continued on Lyrica 150 mg twice a day, Cymbalta 60 mg 2 tabs at bedtime, and Norco 5/325 mg 1 tablet twice a day as needed #14  Patient presents today with increased pain  The pain is located in the right shoulder as well as the right side of the low back  She is scheduled for a right shoulder injection on August 31, 2022 with our office  Her back pain is constant, and radiates into the right hip and groin  It occurs mostly in the morning and evening  She describes it as dull aching, sharp, cramping, and shooting  She is rating her pain 8/10 on the numeric rating scale  Since the last office visit the patient had seen Dr Jaden Romano, Rheumatology, who ordered x-rays of the sacroiliac joints which were unremarkable  He started her on meloxicam 15 mg 1 tablet daily in ordered multiple lab work  Lab work came back with positive HLA B27 and elevated c-reactive and sedimentation rate  She also saw Neurology, Matt Boland, on August 11, 2022, who did not feel her conditions were related to neuromuscular disease  She did order lab work for [facility] which is pending  She is not seen Dr Lonnie Bean, orthopedics, for carpal tunnel since last office visit    Current medications include Lyrica 150 mg 1 tablet twice a day  She was on higher doses but it was decreased due to weight gain  Cymbalta 60 mg 2 tablets at bedtime, and Norco 5/325 mg 1 tablet twice a day which she uses very sparingly, and tizanidine 4 mg 1 tablet twice a day  The medication provides 50% pain relief without side effects      I have personally reviewed and/or updated the patient's past medical history, past surgical history, family history, social history, current medications, allergies, and vital signs today  Review of Systems:    Review of Systems   Musculoskeletal: Positive for gait problem and joint swelling (joint stiffness)  Decreased ROM   Neurological: Positive for weakness           Past Medical History:   Diagnosis Date    Anxiety     Arthritis     Asthma     Chronic pain disorder     upper back    Depression     Disc disorder     Fibromyalgia, primary     GERD (gastroesophageal reflux disease)     HPV (human papilloma virus) infection     cervical    Hypertension     Hypothyroidism     Migraine        Past Surgical History:   Procedure Laterality Date    CERVICAL BIOPSY  W/ LOOP ELECTRODE EXCISION      CERVICAL FUSION  2015     SECTION      CHOLECYSTECTOMY      lap    FOOT SURGERY Bilateral     multiple, posterior calcaneal bone spur    KNEE SURGERY Left     arthroscopy with debribement    TUBAL LIGATION Bilateral        Family History   Adopted: Yes   Problem Relation Age of Onset    No Known Problems Mother     No Known Problems Father     Coronary artery disease Maternal Grandmother     Breast cancer Maternal Grandmother         age unknown    Coronary artery disease Maternal Aunt     No Known Problems Maternal Grandfather     No Known Problems Paternal Grandmother     No Known Problems Paternal Grandfather     No Known Problems Maternal Aunt     Heart murmur Maternal Aunt     Hypothyroidism Maternal Aunt     Hashimoto's thyroiditis Maternal Aunt     Diabetes unspecified Brother     No Known Problems Brother     No Known Problems Brother     ADD / ADHD Son        Social History     Occupational History    Occupation: [employer]     Comment: [employer] department   Tobacco Use    Smoking status: Current Every Day Smoker     Packs/day: 1 00     Types: Cigarettes    Smokeless tobacco: Never Used    Tobacco comment: actually about 3/4 PPD   Vaping Use    Vaping Use: Never used   Substance and Sexual Activity    Alcohol use: Not Currently    Drug use: No    Sexual activity: Not Currently         Current Outpatient Medications:     albuterol (Ventolin HFA) 90 mcg/act inhaler, Inhale 2 puffs every 6 (six) hours as needed for wheezing, Disp: 18 g, Rfl: 0    betamethasone valerate (VALISONE) 0 1 % ointment, Apply 1 to 2 times a day only as needed to finger/hand rash , Disp: 30 g, Rfl: 0    Calcium Carbonate (CALCIUM 600 PO), Take by mouth daily, Disp: , Rfl:     cholecalciferol (VITAMIN D3) 1,000 units tablet, Take 2,000 Units by mouth daily, Disp: , Rfl:     ciclopirox (LOPROX) 0 77 % cream, APPLY 2 TIMES per day to rash on chest for 2-4 weeks  , Disp: 30 g, Rfl: 0    DULoxetine (CYMBALTA) 60 mg delayed release capsule, Take 2 PO HS , Disp: 180 capsule, Rfl: 0    famotidine (PEPCID) 20 mg tablet, Take 1 tablet (20 mg total) by mouth 2 (two) times a day, Disp: 180 tablet, Rfl: 0    HYDROcodone-acetaminophen (NORCO) 5-325 mg per tablet, Take 1 PO BID PRN for pain , Disp: 14 tablet, Rfl: 0    Levoxyl 200 MCG tablet, Take 1 tablet daily in addition to 25 mcg tablet daily, Disp: 90 tablet, Rfl: 0    Levoxyl 25 MCG tablet, Take 1 tablet daily in addition to 200 mcg tablet daily, Disp: 90 tablet, Rfl: 0    meloxicam (Mobic) 15 mg tablet, Take 1 tablet (15 mg total) by mouth daily, Disp: 30 tablet, Rfl: 0    multivitamin (THERAGRAN) TABS, Take 1 tablet by mouth daily, Disp: , Rfl:     Potassium 99 MG TABS, Take by mouth daily, Disp: , Rfl:     pregabalin (LYRICA) 100 mg capsule, Take 1 PO BID, Disp: 60 capsule, Rfl: 2    tiZANidine (ZANAFLEX) 4 mg tablet, Take 1 PO BID PRN for pain and spasms  , Disp: 60 tablet, Rfl: 2    amLODIPine (NORVASC) 10 mg tablet, Take 1 tablet (10 mg total) by mouth daily, Disp: 90 tablet, Rfl: 1    Allergies   Allergen Reactions    Acetaminophen-Codeine      Pt states she does not remember having a reaction to this medication    Hydrocodone Other (See Comments)     GI issues  Latex Hives     Latex powder      Prednisolone Vomiting       Physical Exam:    /80 (BP Location: Left arm, Patient Position: Sitting, Cuff Size: Adult)   Temp 98 3 °F (36 8 °C)   Ht 5' 10" (1 778 m)   Wt (!) 147 kg (324 lb)   BMI 46 49 kg/m²     Constitutional:normal, well developed, well nourished, alert, in no distress and non-toxic and no overt pain behavior  Eyes:anicteric  HEENT:grossly intact  Neck:supple, symmetric, trachea midline and no masses   Pulmonary:even and unlabored  Cardiovascular:No edema or pitting edema present  Skin:Normal without rashes or lesions and well hydrated  Psychiatric:Mood and affect appropriate  Neurologic:Cranial Nerves II-XII grossly intact  Musculoskeletal:normal    Lumbar Spine Exam    Appearance:  Normal lordosis  Palpation/Tenderness:  right sacroiliac joint tenderness  Range of Motion:  Flexion:  Minimally limited  with pain  Extension:  Moderately limited  with pain  Special Tests:  Right Straight Leg Test:  negative  Right Dale's Maneuver:  positive  Right Gaenslen's Test:  positive    Imaging  RIGHT HIP     INDICATION:   M25 551: Pain in right hip      COMPARISON:  None     VIEWS:  XR HIP/PELV 2-3 VWS RIGHT W PELVIS IF PERFORMED   Images: 4     FINDINGS:     There is no acute fracture or dislocation      Mild bilateral degenerative changes in the hips      No lytic or blastic osseous lesion      Soft tissues are unremarkable      The visualized lumbar spine is unremarkable      IMPRESSION:     No acute osseous abnormality      Degenerative changes as described           MRI LUMBAR SPINE WITHOUT CONTRAST     INDICATION: M54 5: Low back pain  G89 29:  Other chronic pain  M54 16: Radiculopathy, lumbar region  M47 816: Spondylosis without myelopathy or radiculopathy, lumbar region      COMPARISON:  Plain film dated 5/13/2021     TECHNIQUE:  Sagittal T1, sagittal T2, sagittal inversion recovery, axial T1 and axial T2, coronal T2     IMAGE QUALITY: Diagnostic     FINDINGS:     VERTEBRAL BODIES:  There are 5 lumbar type vertebral bodies  Normal alignment of the lumbar spine  No spondylolysis or spondylolisthesis  No scoliosis  No compression fracture  Normal marrow signal is identified within the visualized bony   structures  No discrete marrow lesion      SACRUM:  Normal signal within the sacrum  No evidence of insufficiency or stress fracture      DISTAL CORD AND CONUS:  Normal size and signal within the distal cord and conus      PARASPINAL SOFT TISSUES:  Paraspinal soft tissues are unremarkable      LOWER THORACIC DISC SPACES:  Normal disc height and signal   No disc herniation, canal stenosis or foraminal narrowing      LUMBAR DISC SPACES:     L1-L2:  Normal      L2-L3:  Normal      L3-L4:  Minimal disc desiccation without loss of disc height  Minor bulge and facet hypertrophy without significant central canal or foraminal stenosis      L4-L5:  Disc desiccation with slight loss of disc height  Mild disc bulge with facet arthropathy resulting in mild central canal stenosis without foraminal encroachment      L5-S1:  Disc desiccation with moderate loss of disc height  Modic type II reactive endplate marrow changes are identified  There is grade 1 L5 on S1 retrolisthesis with exposure of the underlying disc noted  Diffuse disc bulge with superimposed small   central disc herniation, protrusion type  There is mild central canal encroachment  The disc protrusion abuts the right S1 nerve root concerning for possible impingement  Correlate clinically  Mild to moderate bilateral foraminal stenosis secondary   to marginal osteophytes and facet arthropathy      IMPRESSION:  Spondylotic changes of the lower lumbar spine including an L5-S1 central disc protrusion that abuts the right S1 nerve root within the subarticular recess concerning for possible impingement  Correlate clinically for radicular symptoms      There is also mild to moderate bilateral L5-S1 foraminal stenosis secondary to marginal osteophytes and facet hypertrophy    Correlate clinically for L5 radicular symptoms bilaterally      FL spine and pain procedure    (Results Pending)         Orders Placed This Encounter   Procedures    FL spine and pain procedure

## 2022-08-25 NOTE — TELEPHONE ENCOUNTER
S/w Pt to let her know that Dr Rosalino King is recommending the patient be seen today     Let the patient know that we have a cancellation at 1pm,if she was interested 8/25/22

## 2022-08-29 ENCOUNTER — OFFICE VISIT (OUTPATIENT)
Dept: PHYSICAL THERAPY | Facility: CLINIC | Age: 47
End: 2022-08-29
Payer: COMMERCIAL

## 2022-08-29 DIAGNOSIS — M54.6 CHRONIC BILATERAL THORACIC BACK PAIN: Primary | ICD-10-CM

## 2022-08-29 DIAGNOSIS — M47.814 THORACIC SPONDYLOSIS: ICD-10-CM

## 2022-08-29 DIAGNOSIS — M25.511 CHRONIC PAIN OF BOTH SHOULDERS: ICD-10-CM

## 2022-08-29 DIAGNOSIS — M54.14 THORACIC RADICULOPATHY: ICD-10-CM

## 2022-08-29 DIAGNOSIS — M25.512 CHRONIC PAIN OF BOTH SHOULDERS: ICD-10-CM

## 2022-08-29 DIAGNOSIS — G89.29 CHRONIC PAIN OF BOTH SHOULDERS: ICD-10-CM

## 2022-08-29 DIAGNOSIS — G89.29 CHRONIC BILATERAL THORACIC BACK PAIN: Primary | ICD-10-CM

## 2022-08-29 PROCEDURE — 97110 THERAPEUTIC EXERCISES: CPT | Performed by: PHYSICAL THERAPIST

## 2022-08-29 PROCEDURE — 97112 NEUROMUSCULAR REEDUCATION: CPT | Performed by: PHYSICAL THERAPIST

## 2022-08-29 NOTE — PROGRESS NOTES
Daily Note     Today's date: 2022  Patient name: Guillermo Stubbs  : 1975  MRN: [id number]  Referring provider: KENNETH Chpain  Dx:   Encounter Diagnosis     ICD-10-CM    1  Chronic bilateral thoracic back pain  M54 6     G89 29    2  Chronic pain of both shoulders  M25 511     G89 29     M25 512    3  Thoracic radiculopathy  M54 14    4  Thoracic spondylosis  M47 814                   Subjective: Notes feeling continued pain t/o UEs and LEs  Frustrated with lack of progress overall  [facility] and noted ankle pain/swelling  Objective: See treatment diary below      Assessment: Continues to have pain with performance of HR/TR and performance of no money, TBand rows,extension as well as lat pull down  Plan: Continue per plan of care        Precautions: Chronic pain; high fear avoidance  EPOC : 10/10/22  HEP: Chin tuck supine, chin tuck rotation    Manuals           C/S PROM             Shoulder PROM                                       Neuro Re-Ed             Chin tuck 10x5 10x5" 10x5"          Chin tuck rotation 10x5" 10x5" 10x5"                                                                           Ther Ex             No money  GTB 20x GTB 20x          H-HABD  GTB 20x GTB 20x          SA wall slide  2x10 2x10          LPD  35# 2x10 45# 2x10          Machine row  35# 2x10 45# 2x10          Single arm incline row  35# 2x10 45# 2x10                                    Ther Activity             Bike  8 min 5 min EFX and TM walk ea                       Gait Training                                       Modalities

## 2022-08-31 ENCOUNTER — HOSPITAL ENCOUNTER (OUTPATIENT)
Dept: RADIOLOGY | Facility: CLINIC | Age: 47
Discharge: HOME | End: 2022-08-31
Payer: COMMERCIAL

## 2022-08-31 VITALS
RESPIRATION RATE: 20 BRPM | HEART RATE: 96 BPM | DIASTOLIC BLOOD PRESSURE: 82 MMHG | TEMPERATURE: 97.7 F | OXYGEN SATURATION: 94 % | SYSTOLIC BLOOD PRESSURE: 156 MMHG

## 2022-08-31 DIAGNOSIS — M46.1 SACROILIITIS (HCC): ICD-10-CM

## 2022-08-31 PROCEDURE — 27096 INJECT SACROILIAC JOINT: CPT | Performed by: ANESTHESIOLOGY

## 2022-08-31 RX ORDER — METHYLPREDNISOLONE ACETATE 80 MG/ML
80 INJECTION, SUSPENSION INTRA-ARTICULAR; INTRALESIONAL; INTRAMUSCULAR; PARENTERAL; SOFT TISSUE ONCE
Status: COMPLETED | OUTPATIENT
Start: 2022-08-31 | End: 2022-08-31

## 2022-08-31 RX ADMIN — METHYLPREDNISOLONE ACETATE 80 MG: 80 INJECTION, SUSPENSION INTRA-ARTICULAR; INTRALESIONAL; INTRAMUSCULAR; SOFT TISSUE at 14:43

## 2022-08-31 RX ADMIN — IOHEXOL 0.5 ML: 300 INJECTION, SOLUTION INTRAVENOUS at 14:43

## 2022-08-31 RX ADMIN — LIDOCAINE HYDROCHLORIDE 1.5 ML: 20 INJECTION, SOLUTION EPIDURAL; INFILTRATION; INTRACAUDAL at 14:43

## 2022-08-31 NOTE — H&P
History of Present Illness: The patient is a 49 y o  female who presents with complaints of low back pain      Patient Active Problem List   Diagnosis    Neck pain, chronic    Chronic headache    Hypothyroidism    Hypertension    Episode of recurrent major depressive disorder ([address] )    Irritable bowel syndrome with diarrhea    Gastroesophageal reflux disease without esophagitis    Myofascial pain syndrome    Primary osteoarthritis of left ankle    Chronic right-sided low back pain without sciatica    Mild intermittent asthma without complication    Peroneal tendonitis, left    Chronic pain syndrome    Lumbar spondylosis    Herniated nucleus pulposus, L5-S1    Spinal stenosis of lumbar region with neurogenic claudication    Carpal tunnel syndrome, bilateral    Chronic pain of right knee    Bilateral leg weakness    Pain of right upper extremity    Polyuria    Family history of diabetes mellitus    Chronic fatigue    Abnormal weight gain    Primary osteoarthritis of right knee    Morbid obesity with body mass index (BMI) of 45 0 to 49 9 in adult (HCC)    Diffuse pain    Sacroiliitis (HCC)       Past Medical History:   Diagnosis Date    Anxiety     Arthritis     Asthma     Chronic pain disorder     upper back    Depression     Disc disorder     Fibromyalgia, primary     GERD (gastroesophageal reflux disease)     HPV (human papilloma virus) infection     cervical    Hypertension     Hypothyroidism     Migraine        Past Surgical History:   Procedure Laterality Date    CERVICAL BIOPSY  W/ LOOP ELECTRODE EXCISION      CERVICAL FUSION  2015     SECTION      CHOLECYSTECTOMY      lap    FOOT SURGERY Bilateral     multiple, posterior calcaneal bone spur    KNEE SURGERY Left     arthroscopy with debribement    TUBAL LIGATION Bilateral          Current Outpatient Medications:     albuterol (Ventolin HFA) 90 mcg/act inhaler, Inhale 2 puffs every 6 (six) hours as needed for wheezing, Disp: 18 g, Rfl: 0    amLODIPine (NORVASC) 10 mg tablet, Take 1 tablet (10 mg total) by mouth daily, Disp: 90 tablet, Rfl: 1    betamethasone valerate (VALISONE) 0 1 % ointment, Apply 1 to 2 times a day only as needed to finger/hand rash , Disp: 30 g, Rfl: 0    Calcium Carbonate (CALCIUM 600 PO), Take by mouth daily, Disp: , Rfl:     cholecalciferol (VITAMIN D3) 1,000 units tablet, Take 2,000 Units by mouth daily, Disp: , Rfl:     ciclopirox (LOPROX) 0 77 % cream, APPLY 2 TIMES per day to rash on chest for 2-4 weeks  , Disp: 30 g, Rfl: 0    DULoxetine (CYMBALTA) 60 mg delayed release capsule, Take 2 PO HS , Disp: 180 capsule, Rfl: 0    famotidine (PEPCID) 20 mg tablet, Take 1 tablet (20 mg total) by mouth 2 (two) times a day, Disp: 180 tablet, Rfl: 0    HYDROcodone-acetaminophen (NORCO) 5-325 mg per tablet, Take 1 PO BID PRN for pain , Disp: 14 tablet, Rfl: 0    Levoxyl 200 MCG tablet, Take 1 tablet daily in addition to 25 mcg tablet daily, Disp: 90 tablet, Rfl: 0    Levoxyl 25 MCG tablet, Take 1 tablet daily in addition to 200 mcg tablet daily, Disp: 90 tablet, Rfl: 0    meloxicam (Mobic) 15 mg tablet, Take 1 tablet (15 mg total) by mouth daily, Disp: 30 tablet, Rfl: 0    multivitamin (THERAGRAN) TABS, Take 1 tablet by mouth daily, Disp: , Rfl:     Potassium 99 MG TABS, Take by mouth daily, Disp: , Rfl:     pregabalin (LYRICA) 100 mg capsule, Take 1 PO BID, Disp: 60 capsule, Rfl: 2    tiZANidine (ZANAFLEX) 4 mg tablet, Take 1 PO BID PRN for pain and spasms  , Disp: 60 tablet, Rfl: 2    Current Facility-Administered Medications:     iohexol (OMNIPAQUE) 300 mg/mL injection 50 mL, 50 mL, Intra-articular, Once, Edmond Morrow DO    lidocaine (PF) (XYLOCAINE-MPF) 2 % injection 5 mL, 5 mL, Intra-articular, Once, Edmond Morrow DO    methylPREDNISolone acetate (DEPO-MEDROL) injection 80 mg, 80 mg, Intra-articular, Once, Edmond Morrow DO    Allergies   Allergen Reactions    Acetaminophen-Codeine      Pt states she does not remember having a reaction to this medication    Hydrocodone Other (See Comments)     GI issues    Latex Hives     Latex powder      Prednisolone Vomiting       Physical Exam:   General: Awake, Alert, Oriented x 3, Mood and affect appropriate  Respiratory: Respirations even and unlabored  Cardiovascular: Peripheral pulses intact; no edema  Musculoskeletal Exam:  Normal gait and station    ASA Score: III    Patient/Chart Verification  Patient ID Verified: Verbal  ID Band Applied: No  Consents Confirmed: Procedural, To be obtained in the Pre-Procedure area  Interval H&P(within 24 hr) Complete (required for Outpatients and Surgery Admit only): To be obtained in the Pre-Procedure area  Allergies Reviewed: Yes  Anticoag/NSAID held?: NA  Currently on antibiotics?: No  Pregnancy denied?: Yes    Assessment:   1   Sacroiliitis ([address] )        Plan: right sacroiliac joint injection

## 2022-08-31 NOTE — DISCHARGE INSTRUCTIONS
Steroid Joint Injection   WHAT YOU NEED TO KNOW:   A steroid joint injection is a procedure to inject steroid medicine into a joint  Steroid medicine decreases pain and inflammation  The injection may also contain an anesthetic (numbing medicine) to decrease pain  It may be done to treat conditions such as arthritis, gout, or carpal tunnel syndrome  The injections may be given in your knee, ankle, shoulder, elbow, wrist, ankle or sacroiliac joint  Do not apply heat to any area that is numb  If you have discomfort or soreness at the injection site, you may apply ice today, 20 minutes on and 20 minutes off  Tomorrow you may use ice or warm, moist heat  Do not apply ice or heat directly to the skin  You may have an increase or change in the discomfort for 36-48 hours after your treatment  Apply ice and continue with any pain medicine you have been prescribed  Do not do anything strenuous today  You may shower, but no tub baths or hot tubs today  You may resume your normal activities tomorrow, but do not overdo it  Resume normal activities slowly when you are feeling better  If you experience redness, drainage or swelling at the injection site, or if you develop a fever above 100 degrees, please call [facility] at [phone] or go to the Emergency Room  Continue to take all routine medicines prescribed by your primary care physician unless otherwise instructed by our staff  Most blood thinners should be started again according to your regularly scheduled dosing  If you have any questions, please give our office a call  As no general anesthesia was used in today's procedure, you should not experience any side effects related to anesthesia  If you are diabetic, the steroids used in today's injection may temporarily increase your blood sugar levels after the first few days after your injection   Please keep a close eye on your sugars and alert the doctor who manages your diabetes if your sugars are significantly high from your baseline or you are symptomatic  If you have a problem specifically related to your procedure, please call our office at [phone]  Problems not related to your procedure should be directed to your primary care physician

## 2022-09-04 LAB
T3FREE SERPL-MCNC: 2.4 PG/ML (ref 2–4.4)
T4 FREE SERPL-MCNC: 1.28 NG/DL (ref 0.82–1.77)
TSH SERPL DL<=0.005 MIU/L-ACNC: 1.13 UIU/ML (ref 0.45–4.5)

## 2022-09-06 ENCOUNTER — OFFICE VISIT (OUTPATIENT)
Dept: ENDOCRINOLOGY | Facility: HOSPITAL | Age: 47
End: 2022-09-06
Payer: COMMERCIAL

## 2022-09-06 VITALS
WEIGHT: 293 LBS | SYSTOLIC BLOOD PRESSURE: 136 MMHG | HEIGHT: 70 IN | DIASTOLIC BLOOD PRESSURE: 82 MMHG | BODY MASS INDEX: 41.95 KG/M2 | HEART RATE: 110 BPM

## 2022-09-06 DIAGNOSIS — R63.5 ABNORMAL WEIGHT GAIN: ICD-10-CM

## 2022-09-06 DIAGNOSIS — E06.3 HYPOTHYROIDISM DUE TO HASHIMOTO'S THYROIDITIS: Primary | ICD-10-CM

## 2022-09-06 DIAGNOSIS — E03.9 ACQUIRED HYPOTHYROIDISM: ICD-10-CM

## 2022-09-06 DIAGNOSIS — E03.8 HYPOTHYROIDISM DUE TO HASHIMOTO'S THYROIDITIS: Primary | ICD-10-CM

## 2022-09-06 PROCEDURE — 99214 OFFICE O/P EST MOD 30 MIN: CPT | Performed by: INTERNAL MEDICINE

## 2022-09-06 RX ORDER — LEVOTHYROXINE SODIUM 0.03 MG/1
TABLET ORAL
Qty: 90 TABLET | Refills: 3 | Status: SHIPPED | OUTPATIENT
Start: 2022-09-06

## 2022-09-06 RX ORDER — LEVOTHYROXINE SODIUM 0.2 MG/1
TABLET ORAL
Qty: 90 TABLET | Refills: 3 | Status: SHIPPED | OUTPATIENT
Start: 2022-09-06

## 2022-09-06 NOTE — PROGRESS NOTES
9/6/2022    Assessment/Plan      Diagnoses and all orders for this visit:    Hypothyroidism due to Hashimoto's thyroiditis  -     Insulin, fasting- Lab Collect  -     Glucose, fasting Lab Collect    Abnormal weight gain    Acquired hypothyroidism  -     levothyroxine (Levoxyl) 200 mcg tablet; Take 1 tablet daily in addition to 25 mcg tablet daily  -     levothyroxine (Levoxyl) 25 mcg tablet; Take 1 tablet daily in addition to 200 mcg tablet daily        Assessment/Plan:  1  Hypothyroidism due to Hashimoto's thyroiditis  Most recent thyroid function tests are normal   I reassured her this is not the cause of her weight gain  For now, she will continue the same levothyroxine 225 mcg daily  2  Abnormal weight gain  She had a 24 hour urine that was normal but unfortunately no fasting insulin level with glucose was performed so insulin resistance is unclear  I have asked her to get this fasting glucose and insulin level done at her earliest convenience  I have asked her to get her blood work done at her earliest convenience and then follow-up will be determined based on the blood work  CC:  Hypothyroid follow-up    History of Present Illness     HPI: Abbie Payan is a 49y o  year old female with history of hypothyroidism due to Hashimoto's thyroiditis for follow-up visit  She was diagnosed with hypothyroidism at the age of 24  She is on thyroid hormone for replacement purposes and uses generic levothyroxine  Thyroid antibodies were positive confirming Hashimoto's thyroiditis as the underlying cause of her hypothyroidism  She is on generic levothyroxine 225 mcg daily and was switched to this after her last visit  The insurance would not give brand  She continues to struggle with weight and has gained 6 lb more than March 2022  She unfortunately does not exercise but she also eats relatively healthy and skips breakfast and does not eat junk food    She denies heat or cold intolerance but can be hot and sweaty at times  She has diarrhea with her IBS  She is no constipation,  or depression  She will have her heart race at times which comes and goes and has occasional tremors  She has good and bad nights of sleep with her pain and has anxiety she has dry skin, hair loss, and brittle nails  She denies diplopia  She denies compressive thyroid symptoms or difficulties with swallowing  She had a lot of fatigue and abnormal weight gain for which 24 hour urine cortisol and insulin resistance was evaluated  Testosterone was noted to be low  24 hour urine cortisol was normal    Having issues with legs  Saw neurology and neuropathy from mid calf down with poor circulation  Some edema of the legs  Will get a rash on the lower leg medially  Review of Systems   Constitutional: Positive for fatigue and unexpected weight change  Weight 6 lb more than March 2022  Does not exercise  Does not eat junk  Has stopped soda 1 5 weeks ago  Does not eat breakfast  Only small lunch and dinner  No snacking  HENT: Negative for trouble swallowing  Eyes: Negative for visual disturbance  Wears glasses  No diplopia  Respiratory: Negative for chest tightness and shortness of breath  Cardiovascular: Positive for palpitations and leg swelling  Negative for chest pain  Will have heart race at times, comes and goes  Gastrointestinal: Positive for diarrhea  Negative for abdominal pain, constipation and nausea  Has IBS-D  Endocrine: Negative for cold intolerance and heat intolerance  Can be hot and sweaty  Skin: Negative for rash  Has dry skin  Has hair loss  Has brittle nails  Neurological: Positive for tremors and headaches  Negative for dizziness and light-headedness  Psychiatric/Behavioral: Positive for sleep disturbance  Negative for dysphoric mood  The patient is nervous/anxious  Has good and bad nights with pain          Historical Information   Past Medical History:   Diagnosis Date    Anxiety     Arthritis     Asthma     Chronic pain disorder     upper back    Depression     Disc disorder     Fibromyalgia, primary     GERD (gastroesophageal reflux disease)     HPV (human papilloma virus) infection     cervical    Hypertension     Hypothyroidism     Migraine      Past Surgical History:   Procedure Laterality Date    CERVICAL BIOPSY  W/ LOOP ELECTRODE EXCISION      CERVICAL FUSION  2015     SECTION      CHOLECYSTECTOMY      lap    FOOT SURGERY Bilateral     multiple, posterior calcaneal bone spur    KNEE SURGERY Left     arthroscopy with debribement    TUBAL LIGATION Bilateral      Social History   Social History     Substance and Sexual Activity   Alcohol Use Not Currently     Social History     Substance and Sexual Activity   Drug Use No     Social History     Tobacco Use   Smoking Status Current Every Day Smoker    Packs/day: 1 00    Types: Cigarettes   Smokeless Tobacco Never Used   Tobacco Comment    actually about 3/4 PPD     Family History:   Family History   Adopted: Yes   Problem Relation Age of Onset    No Known Problems Mother     No Known Problems Father     Coronary artery disease Maternal Grandmother     Breast cancer Maternal Grandmother         age unknown    Coronary artery disease Maternal Aunt     No Known Problems Maternal Grandfather     No Known Problems Paternal Grandmother     No Known Problems Paternal Grandfather     No Known Problems Maternal Aunt     Heart murmur Maternal Aunt     Hypothyroidism Maternal Aunt     Hashimoto's thyroiditis Maternal Aunt     Diabetes unspecified Brother     No Known Problems Brother     No Known Problems Brother     ADD / ADHD Son        Meds/Allergies   Current Outpatient Medications   Medication Sig Dispense Refill    albuterol (Ventolin HFA) 90 mcg/act inhaler Inhale 2 puffs every 6 (six) hours as needed for wheezing 18 g 0    amLODIPine (NORVASC) 10 mg tablet Take 1 tablet (10 mg total) by mouth daily 90 tablet 1    betamethasone valerate (VALISONE) 0 1 % ointment Apply 1 to 2 times a day only as needed to finger/hand rash  30 g 0    Calcium Carbonate (CALCIUM 600 PO) Take by mouth daily      cholecalciferol (VITAMIN D3) 1,000 units tablet Take 2,000 Units by mouth daily      ciclopirox (LOPROX) 0 77 % cream APPLY 2 TIMES per day to rash on chest for 2-4 weeks  30 g 0    DULoxetine (CYMBALTA) 60 mg delayed release capsule Take 2 PO HS  180 capsule 0    famotidine (PEPCID) 20 mg tablet Take 1 tablet (20 mg total) by mouth 2 (two) times a day 180 tablet 0    HYDROcodone-acetaminophen (NORCO) 5-325 mg per tablet Take 1 PO BID PRN for pain  14 tablet 0    levothyroxine (Levoxyl) 200 mcg tablet Take 1 tablet daily in addition to 25 mcg tablet daily 90 tablet 3    levothyroxine (Levoxyl) 25 mcg tablet Take 1 tablet daily in addition to 200 mcg tablet daily 90 tablet 3    meloxicam (Mobic) 15 mg tablet Take 1 tablet (15 mg total) by mouth daily 30 tablet 0    multivitamin (THERAGRAN) TABS Take 1 tablet by mouth daily      Potassium 99 MG TABS Take by mouth daily      pregabalin (LYRICA) 100 mg capsule Take 1 PO BID 60 capsule 2    tiZANidine (ZANAFLEX) 4 mg tablet Take 1 PO BID PRN for pain and spasms  60 tablet 2     No current facility-administered medications for this visit  Allergies   Allergen Reactions    Acetaminophen-Codeine      Pt states she does not remember having a reaction to this medication    Hydrocodone Other (See Comments)     GI issues    Latex Hives     Latex powder      Prednisolone Vomiting       Objective   Vitals: Blood pressure 136/82, pulse (!) 110, height 5' 10" (1 778 m), weight (!) 146 kg (322 lb), not currently breastfeeding  Invasive Devices  Report    None                 Physical Exam  Vitals reviewed  Constitutional:       Appearance: Normal appearance  She is well-developed  She is obese     HENT: Head: Normocephalic and atraumatic  Eyes:      Extraocular Movements: Extraocular movements intact  Conjunctiva/sclera: Conjunctivae normal       Comments: No lid lag, stare, proptosis, or periorbital edema  Neck:      Thyroid: No thyromegaly  Vascular: No carotid bruit  Comments: Thyroid normal in size  Cardiovascular:      Rate and Rhythm: Normal rate and regular rhythm  Heart sounds: Normal heart sounds  No murmur heard  Pulmonary:      Effort: Pulmonary effort is normal       Breath sounds: Normal breath sounds  No wheezing  Abdominal:      Palpations: Abdomen is soft  Musculoskeletal:         General: No deformity  Normal range of motion  Cervical back: Normal range of motion and neck supple  Right lower leg: No edema  Left lower leg: No edema  Comments: No tremor of the outstretched hands  Lymphadenopathy:      Cervical: No cervical adenopathy  Skin:     General: Skin is warm and dry  Findings: No rash  Neurological:      Mental Status: She is alert and oriented to person, place, and time  Deep Tendon Reflexes: Reflexes are normal and symmetric  Comments: Patellar deep tendon reflexes normal          The history was obtained from the review of the chart and from the patient  Lab Results:      Blood work done on 09/03/2022 showed a TSH of 1 13 with a free T4 of 1 28 and a free T3 of 2 4      Lab Results   Component Value Date    CREATININE 0 76 08/01/2022    CREATININE 0 74 04/05/2022    CREATININE 0 72 10/13/2021    BUN 17 08/01/2022    K 3 8 08/01/2022     08/01/2022    CO2 25 08/01/2022     eGFR   Date Value Ref Range Status   08/01/2022 93 ml/min/1 73sq m Final         Lab Results   Component Value Date    HDL 49 10/13/2021    TRIG 86 10/13/2021    CHOLHDL 3 6 10/13/2021       Lab Results   Component Value Date    ALT 27 08/01/2022    AST 16 08/01/2022    ALKPHOS 71 08/01/2022       Lab Results   Component Value Date    TSH 1 130 09/03/2022    FREET4 1 28 09/03/2022       Future Appointments   Date Time Provider [place]   9/7/2022  4:15 PM Keith Watts, PT [facility]   9/12/2022  4:45 PM Keith Watts, PT [facility]   9/15/2022  3:20 PM [facility] [address]   9/21/2022  4:15 PM Keith Watts, PT [facility]   9/26/2022  3:00 PM KENNETH Maldonado SP [facility]   10/26/2022  1:40 PM Max Ybarra MD [facility]   11/7/2022  4:00 PM Trang Cordero MD [facility]

## 2022-09-06 NOTE — PATIENT INSTRUCTIONS
The thyroid is now normal     This is not the cause of the weight gain  Continue the same levothyroxine 225 mcg daily for now  Let's do fasting blood work to check for insulin resistance  Follow up to be determined

## 2022-09-15 ENCOUNTER — HOSPITAL ENCOUNTER (OUTPATIENT)
Dept: RADIOLOGY | Facility: CLINIC | Age: 47
Discharge: HOME | End: 2022-09-15
Payer: COMMERCIAL

## 2022-09-15 VITALS
OXYGEN SATURATION: 95 % | RESPIRATION RATE: 20 BRPM | HEART RATE: 81 BPM | DIASTOLIC BLOOD PRESSURE: 86 MMHG | SYSTOLIC BLOOD PRESSURE: 142 MMHG | TEMPERATURE: 97.8 F

## 2022-09-15 DIAGNOSIS — M25.511 CHRONIC PAIN OF BOTH SHOULDERS: ICD-10-CM

## 2022-09-15 DIAGNOSIS — G89.29 CHRONIC PAIN OF BOTH SHOULDERS: ICD-10-CM

## 2022-09-15 DIAGNOSIS — M25.512 CHRONIC PAIN OF BOTH SHOULDERS: ICD-10-CM

## 2022-09-15 DIAGNOSIS — M19.011 OSTEOARTHRITIS OF BOTH SHOULDERS, UNSPECIFIED OSTEOARTHRITIS TYPE: ICD-10-CM

## 2022-09-15 DIAGNOSIS — M19.012 OSTEOARTHRITIS OF BOTH SHOULDERS, UNSPECIFIED OSTEOARTHRITIS TYPE: ICD-10-CM

## 2022-09-15 PROCEDURE — 20610 DRAIN/INJ JOINT/BURSA W/O US: CPT | Performed by: ANESTHESIOLOGY

## 2022-09-15 PROCEDURE — 77002 NEEDLE LOCALIZATION BY XRAY: CPT | Performed by: ANESTHESIOLOGY

## 2022-09-15 PROCEDURE — 77002 NEEDLE LOCALIZATION BY XRAY: CPT

## 2022-09-15 RX ORDER — METHYLPREDNISOLONE ACETATE 80 MG/ML
80 INJECTION, SUSPENSION INTRA-ARTICULAR; INTRALESIONAL; INTRAMUSCULAR; PARENTERAL; SOFT TISSUE ONCE
Status: COMPLETED | OUTPATIENT
Start: 2022-09-15 | End: 2022-09-15

## 2022-09-15 RX ADMIN — METHYLPREDNISOLONE ACETATE 80 MG: 80 INJECTION, SUSPENSION INTRA-ARTICULAR; INTRALESIONAL; INTRAMUSCULAR; SOFT TISSUE at 15:26

## 2022-09-15 RX ADMIN — IOHEXOL 1 ML: 300 INJECTION, SOLUTION INTRAVENOUS at 15:26

## 2022-09-15 RX ADMIN — LIDOCAINE HYDROCHLORIDE 2 ML: 20 INJECTION, SOLUTION EPIDURAL; INFILTRATION; INTRACAUDAL at 15:26

## 2022-09-15 NOTE — DISCHARGE INSTRUCTIONS
Steroid Joint Injection   WHAT YOU NEED TO KNOW:   A steroid joint injection is a procedure to inject steroid medicine into a joint  Steroid medicine decreases pain and inflammation  The injection may also contain an anesthetic (numbing medicine) to decrease pain  It may be done to treat conditions such as arthritis, gout, or carpal tunnel syndrome  The injections may be given in your knee, ankle, shoulder, elbow, wrist, ankle or sacroiliac joint  Do not apply heat to any area that is numb  If you have discomfort or soreness at the injection site, you may apply ice today, 20 minutes on and 20 minutes off  Tomorrow you may use ice or warm, moist heat  Do not apply ice or heat directly to the skin  You may have an increase or change in the discomfort for 36-48 hours after your treatment  Apply ice and continue with any pain medicine you have been prescribed  Do not do anything strenuous today  You may shower, but no tub baths or hot tubs today  You may resume your normal activities tomorrow, but do not overdo it  Resume normal activities slowly when you are feeling better  If you experience redness, drainage or swelling at the injection site, or if you develop a fever above 100 degrees, please call [facility] at [phone] or go to the Emergency Room  Continue to take all routine medicines prescribed by your primary care physician unless otherwise instructed by our staff  Most blood thinners should be started again according to your regularly scheduled dosing  If you have any questions, please give our office a call  As no general anesthesia was used in today's procedure, you should not experience any side effects related to anesthesia  If you are diabetic, the steroids used in today's injection may temporarily increase your blood sugar levels after the first few days after your injection   Please keep a close eye on your sugars and alert the doctor who manages your diabetes if your sugars are significantly high from your baseline or you are symptomatic  If you have a problem specifically related to your procedure, please call our office at [phone]  Problems not related to your procedure should be directed to your primary care physician

## 2022-09-15 NOTE — H&P
History of Present Illness: The patient is a 49 y o  female who presents with complaints of shoulder pain      Patient Active Problem List   Diagnosis    Neck pain, chronic    Chronic headache    Hypothyroidism due to Hashimoto's thyroiditis    Hypertension    Episode of recurrent major depressive disorder ([address] )    Irritable bowel syndrome with diarrhea    Gastroesophageal reflux disease without esophagitis    Myofascial pain syndrome    Primary osteoarthritis of left ankle    Chronic right-sided low back pain without sciatica    Mild intermittent asthma without complication    Peroneal tendonitis, left    Chronic pain syndrome    Lumbar spondylosis    Herniated nucleus pulposus, L5-S1    Spinal stenosis of lumbar region with neurogenic claudication    Carpal tunnel syndrome, bilateral    Chronic pain of right knee    Bilateral leg weakness    Pain of right upper extremity    Polyuria    Family history of diabetes mellitus    Chronic fatigue    Abnormal weight gain    Primary osteoarthritis of right knee    Morbid obesity with body mass index (BMI) of 45 0 to 49 9 in adult (HCC)    Diffuse pain    Sacroiliitis (HCC)    Osteoarthritis of both shoulders    Chronic pain of both shoulders       Past Medical History:   Diagnosis Date    Anxiety     Arthritis     Asthma     Chronic pain disorder     upper back    Depression     Disc disorder     Fibromyalgia, primary     GERD (gastroesophageal reflux disease)     HPV (human papilloma virus) infection     cervical    Hypertension     Hypothyroidism     Migraine        Past Surgical History:   Procedure Laterality Date    CERVICAL BIOPSY  W/ LOOP ELECTRODE EXCISION      CERVICAL FUSION  2015     SECTION      CHOLECYSTECTOMY      lap    FOOT SURGERY Bilateral     multiple, posterior calcaneal bone spur    KNEE SURGERY Left     arthroscopy with debribement    TUBAL LIGATION Bilateral          Current Outpatient Medications:     albuterol (Ventolin HFA) 90 mcg/act inhaler, Inhale 2 puffs every 6 (six) hours as needed for wheezing, Disp: 18 g, Rfl: 0    amLODIPine (NORVASC) 10 mg tablet, Take 1 tablet (10 mg total) by mouth daily, Disp: 90 tablet, Rfl: 1    betamethasone valerate (VALISONE) 0 1 % ointment, Apply 1 to 2 times a day only as needed to finger/hand rash , Disp: 30 g, Rfl: 0    Calcium Carbonate (CALCIUM 600 PO), Take by mouth daily, Disp: , Rfl:     cholecalciferol (VITAMIN D3) 1,000 units tablet, Take 2,000 Units by mouth daily, Disp: , Rfl:     ciclopirox (LOPROX) 0 77 % cream, APPLY 2 TIMES per day to rash on chest for 2-4 weeks  , Disp: 30 g, Rfl: 0    DULoxetine (CYMBALTA) 60 mg delayed release capsule, Take 2 PO HS , Disp: 180 capsule, Rfl: 0    famotidine (PEPCID) 20 mg tablet, Take 1 tablet (20 mg total) by mouth 2 (two) times a day, Disp: 180 tablet, Rfl: 0    HYDROcodone-acetaminophen (NORCO) 5-325 mg per tablet, Take 1 PO BID PRN for pain , Disp: 14 tablet, Rfl: 0    levothyroxine (Levoxyl) 200 mcg tablet, Take 1 tablet daily in addition to 25 mcg tablet daily, Disp: 90 tablet, Rfl: 3    levothyroxine (Levoxyl) 25 mcg tablet, Take 1 tablet daily in addition to 200 mcg tablet daily, Disp: 90 tablet, Rfl: 3    meloxicam (Mobic) 15 mg tablet, Take 1 tablet (15 mg total) by mouth daily, Disp: 30 tablet, Rfl: 0    multivitamin (THERAGRAN) TABS, Take 1 tablet by mouth daily, Disp: , Rfl:     Potassium 99 MG TABS, Take by mouth daily, Disp: , Rfl:     pregabalin (LYRICA) 100 mg capsule, Take 1 PO BID, Disp: 60 capsule, Rfl: 2    tiZANidine (ZANAFLEX) 4 mg tablet, Take 1 PO BID PRN for pain and spasms  , Disp: 60 tablet, Rfl: 2    Current Facility-Administered Medications:     iohexol (OMNIPAQUE) 300 mg/mL injection 50 mL, 50 mL, Intra-articular, Once, Edmond Morrow DO    lidocaine (PF) (XYLOCAINE-MPF) 2 % injection 5 mL, 5 mL, Intra-articular, Once, Edmond Morrow DO    methylPREDNISolone acetate (DEPO-MEDROL) injection 80 mg, 80 mg, Intra-articular, Once, Edmond Morrow, DO    Allergies   Allergen Reactions    Acetaminophen-Codeine      Pt states she does not remember having a reaction to this medication    Hydrocodone Other (See Comments)     GI issues    Latex Hives     Latex powder      Prednisolone Vomiting       Physical Exam:   General: Awake, Alert, Oriented x 3, Mood and affect appropriate  Respiratory: Respirations even and unlabored  Cardiovascular: Peripheral pulses intact; no edema  Musculoskeletal Exam:  Normal gait    ASA Score: III         Assessment:   1  Chronic pain of both shoulders    2   Osteoarthritis of both shoulders, unspecified osteoarthritis type        Plan:   Cortisone Shoulder injection

## 2022-09-16 LAB — GLUCOSE SERPL-MCNC: 87 MG/DL (ref 65–99)

## 2022-09-22 ENCOUNTER — TELEPHONE (OUTPATIENT)
Dept: PAIN MEDICINE | Facility: CLINIC | Age: 47
End: 2022-09-22

## 2022-09-22 NOTE — TELEPHONE ENCOUNTER
1st attempt  Unable to lm to cb with % improvement and pain level.     Right shoulder injection 9/15, 9/26 F/u

## 2022-09-26 ENCOUNTER — TELEPHONE (OUTPATIENT)
Dept: PAIN MEDICINE | Facility: CLINIC | Age: 47
End: 2022-09-26

## 2022-09-26 NOTE — TELEPHONE ENCOUNTER
Can we call her back and see if she at least wants to do a virtual? Spoke with Patient mother and explained the process.  Informed mother that Supervisor spoke with the MR department and they were going to send an updated copy since Dr. Morton did agree to the extended days.  Mother did verbalize understanding and in the future she will call our office if the patient is sent home by school or kept home.    Did give number to MR for mother to request records with the updated information that was going to patients fathers employer.  That is information that our office doesn't have.    Mother appreciated our assistance and nothing further was needed.  Call ended.    Will send to Dr. Morton as LATOYA

## 2022-09-26 NOTE — TELEPHONE ENCOUNTER
JOSHUAM to ask if she wanted to change her cancelled appt to a virtual per [facility] JONNIE

## 2022-10-08 ENCOUNTER — PATIENT MESSAGE (OUTPATIENT)
Dept: PAIN MEDICINE | Facility: CLINIC | Age: 47
End: 2022-10-08

## 2022-10-10 NOTE — TELEPHONE ENCOUNTER
From: Shikha Reyes  To: Sebastian Nicholson  Sent: 10/8/2022 12:12 PM EDT  Subject: Headaches     Hello  I know I missed my last appointment and I need to schedule another but l wanted to touch base with you about something  I have been having this headache that is only on one side, one spot for 10 days now  Now Karla Thi had times were I have had a headache for more than a few days but it was in various spots  It never stayed in one spot  I didn’t want to run to the hospital unless I was told to  Please let me know what you think I should do  Also no medication is helping it nor is ice  Ty    Sometimes it affects my vision but it’s not been constant  Twice I got lightheaded

## 2022-10-18 DIAGNOSIS — G89.29 NECK PAIN, CHRONIC: ICD-10-CM

## 2022-10-18 DIAGNOSIS — M54.2 NECK PAIN, CHRONIC: ICD-10-CM

## 2022-10-18 DIAGNOSIS — M79.18 MYOFASCIAL PAIN SYNDROME: ICD-10-CM

## 2022-10-18 RX ORDER — MELOXICAM 15 MG/1
15 TABLET ORAL DAILY
Qty: 30 TABLET | Refills: 2 | Status: SHIPPED | OUTPATIENT
Start: 2022-10-18

## 2022-10-26 ENCOUNTER — OFFICE VISIT (OUTPATIENT)
Dept: FAMILY MEDICINE CLINIC | Facility: HOSPITAL | Age: 47
End: 2022-10-26
Payer: COMMERCIAL

## 2022-10-26 VITALS
HEART RATE: 69 BPM | HEIGHT: 70 IN | DIASTOLIC BLOOD PRESSURE: 82 MMHG | BODY MASS INDEX: 41.95 KG/M2 | WEIGHT: 293 LBS | OXYGEN SATURATION: 98 % | TEMPERATURE: 98.5 F | SYSTOLIC BLOOD PRESSURE: 140 MMHG

## 2022-10-26 DIAGNOSIS — M79.18 MYOFASCIAL PAIN SYNDROME: ICD-10-CM

## 2022-10-26 DIAGNOSIS — M48.062 SPINAL STENOSIS OF LUMBAR REGION WITH NEUROGENIC CLAUDICATION: ICD-10-CM

## 2022-10-26 DIAGNOSIS — Z23 ENCOUNTER FOR IMMUNIZATION: ICD-10-CM

## 2022-10-26 DIAGNOSIS — I10 PRIMARY HYPERTENSION: Primary | ICD-10-CM

## 2022-10-26 DIAGNOSIS — Z12.31 ENCOUNTER FOR SCREENING MAMMOGRAM FOR MALIGNANT NEOPLASM OF BREAST: ICD-10-CM

## 2022-10-26 DIAGNOSIS — M79.89 LEG SWELLING: ICD-10-CM

## 2022-10-26 PROCEDURE — 90471 IMMUNIZATION ADMIN: CPT

## 2022-10-26 PROCEDURE — 90677 PCV20 VACCINE IM: CPT

## 2022-10-26 PROCEDURE — 99214 OFFICE O/P EST MOD 30 MIN: CPT | Performed by: FAMILY MEDICINE

## 2022-10-26 PROCEDURE — 90686 IIV4 VACC NO PRSV 0.5 ML IM: CPT

## 2022-10-26 PROCEDURE — 90472 IMMUNIZATION ADMIN EACH ADD: CPT

## 2022-10-26 RX ORDER — LISINOPRIL AND HYDROCHLOROTHIAZIDE 12.5; 1 MG/1; MG/1
1 TABLET ORAL DAILY
Qty: 90 TABLET | Refills: 0 | Status: SHIPPED | OUTPATIENT
Start: 2022-10-26

## 2022-10-26 NOTE — PROGRESS NOTES
Name: Mona Polanco      : 1975      MRN: [id number]  Encounter Provider: Rosio Cardenas MD  Encounter Date: 10/26/2022   Encounter department: [facility] [employer]      1  Primary hypertension  -     lisinopril-hydrochlorothiazide (PRINZIDE,ZESTORETIC) 10-12 5 MG per tablet; Take 1 tablet by mouth daily  -     Basic metabolic panel; Future  -     Basic metabolic panel    2  Encounter for screening mammogram for malignant neoplasm of breast  -     Mammo screening bilateral w 3d & cad; Future; Expected date: 10/26/2022    3  Spinal stenosis of lumbar region with neurogenic claudication    4  Myofascial pain syndrome    5  Leg swelling  -     lisinopril-hydrochlorothiazide (PRINZIDE,ZESTORETIC) 10-12 5 MG per tablet; Take 1 tablet by mouth daily  leg swelling  Suspect this is due to amlodipine  Will dc amlodipine  Replace with lisnopril/hctz  Monitor BP  Fu in 3 months  Weight gain  Working with endo  Suspect due to lyrica  Has been working with pain mgt to lower this and has lost a few pounds  Discussed options for weight loss to include referral [facility] as well as consdieration of weight loss medications  Subjective      Here for fu of chronic conditions  Today with concerns for bilateral leg edema  No shortness of breath, no orthopnea  Ongoing fu with Endo, pain mgt, and rheumatology  Working with pain mgt regarding lyrica  Has had some weight loss with this but already feeling an increase in pain    Review of Systems   Constitutional: Negative  Negative for activity change, appetite change, chills, diaphoresis, fatigue and fever  HENT: Negative for congestion, facial swelling and sore throat  Respiratory: Negative  Negative for apnea, cough, chest tightness and shortness of breath  Cardiovascular: Negative  Negative for chest pain and palpitations  Gastrointestinal: Negative  Negative for abdominal distention, abdominal pain, blood in stool, constipation, diarrhea and nausea  Genitourinary: Negative  Negative for difficulty urinating, dysuria, flank pain and frequency  Current Outpatient Medications on File Prior to Visit   Medication Sig   • albuterol (Ventolin HFA) 90 mcg/act inhaler Inhale 2 puffs every 6 (six) hours as needed for wheezing   • betamethasone valerate (VALISONE) 0 1 % ointment Apply 1 to 2 times a day only as needed to finger/hand rash  • Calcium Carbonate (CALCIUM 600 PO) Take by mouth daily   • cholecalciferol (VITAMIN D3) 1,000 units tablet Take 2,000 Units by mouth daily   • ciclopirox (LOPROX) 0 77 % cream APPLY 2 TIMES per day to rash on chest for 2-4 weeks  • DULoxetine (CYMBALTA) 60 mg delayed release capsule Take 2 PO HS  • famotidine (PEPCID) 20 mg tablet Take 1 tablet (20 mg total) by mouth 2 (two) times a day   • HYDROcodone-acetaminophen (NORCO) 5-325 mg per tablet Take 1 PO BID PRN for pain  • levothyroxine (Levoxyl) 200 mcg tablet Take 1 tablet daily in addition to 25 mcg tablet daily   • levothyroxine (Levoxyl) 25 mcg tablet Take 1 tablet daily in addition to 200 mcg tablet daily   • meloxicam (MOBIC) 15 mg tablet Take 1 tablet (15 mg total) by mouth daily   • multivitamin (THERAGRAN) TABS Take 1 tablet by mouth daily   • Potassium 99 MG TABS Take by mouth daily   • pregabalin (LYRICA) 100 mg capsule Take 1 PO BID   • tiZANidine (ZANAFLEX) 4 mg tablet Take 1 PO BID PRN for pain and spasms  • [DISCONTINUED] amLODIPine (NORVASC) 10 mg tablet Take 1 tablet (10 mg total) by mouth daily       Objective     /82   Pulse 69   Temp 98 5 °F (36 9 °C)   Ht 5' 10" (1 778 m)   Wt (!) 144 kg (318 lb)   SpO2 98%   BMI 45 63 kg/m²     Physical Exam  Vitals and nursing note reviewed  Constitutional:       Appearance: Normal appearance  She is well-developed  She is obese  HENT:      Head: Normocephalic and atraumatic        Right Ear: External ear normal       Left Ear: External ear normal       Nose: Nose normal    Eyes:      Conjunctiva/sclera: Conjunctivae normal       Pupils: Pupils are equal, round, and reactive to light  Neck:      Thyroid: No thyromegaly  Trachea: No tracheal deviation  Cardiovascular:      Rate and Rhythm: Normal rate and regular rhythm  Heart sounds: Normal heart sounds  No murmur heard  Pulmonary:      Effort: Pulmonary effort is normal  No respiratory distress  Breath sounds: Normal breath sounds  No wheezing  Abdominal:      General: Bowel sounds are normal       Palpations: Abdomen is soft  Musculoskeletal:         General: Normal range of motion  Cervical back: Normal range of motion and neck supple  Right lower leg: Edema present  Left lower leg: Edema present  Skin:     General: Skin is warm and dry  Neurological:      Mental Status: She is alert and oriented to person, place, and time         Alexsandra Carney MD

## 2022-11-06 ENCOUNTER — APPOINTMENT (OUTPATIENT)
Dept: RADIOLOGY | Facility: HOSPITAL | Age: 47
End: 2022-11-06

## 2022-11-06 ENCOUNTER — HOSPITAL ENCOUNTER (EMERGENCY)
Facility: HOSPITAL | Age: 47
Discharge: HOME | End: 2022-11-06
Attending: EMERGENCY MEDICINE

## 2022-11-06 VITALS
SYSTOLIC BLOOD PRESSURE: 135 MMHG | BODY MASS INDEX: 41.95 KG/M2 | HEART RATE: 71 BPM | DIASTOLIC BLOOD PRESSURE: 84 MMHG | WEIGHT: 293 LBS | RESPIRATION RATE: 20 BRPM | HEIGHT: 70 IN | OXYGEN SATURATION: 98 % | TEMPERATURE: 97.9 F

## 2022-11-06 DIAGNOSIS — R19.7 DIARRHEA: ICD-10-CM

## 2022-11-06 DIAGNOSIS — I10 CHRONIC HYPERTENSION: ICD-10-CM

## 2022-11-06 DIAGNOSIS — R11.0 NAUSEA: Primary | ICD-10-CM

## 2022-11-06 DIAGNOSIS — R07.89 ATYPICAL CHEST PAIN: ICD-10-CM

## 2022-11-06 LAB
2HR DELTA HS TROPONIN: 0 NG/L
ALBUMIN SERPL BCP-MCNC: 3.5 G/DL (ref 3.5–5)
ALP SERPL-CCNC: 74 U/L (ref 46–116)
ALT SERPL W P-5'-P-CCNC: 34 U/L (ref 12–78)
ANION GAP SERPL CALCULATED.3IONS-SCNC: 8 MMOL/L (ref 4–13)
AST SERPL W P-5'-P-CCNC: 14 U/L (ref 5–45)
BASOPHILS # BLD AUTO: 0.04 THOUSANDS/ÂΜL (ref 0–0.1)
BASOPHILS NFR BLD AUTO: 0 % (ref 0–1)
BILIRUB SERPL-MCNC: 0.2 MG/DL (ref 0.2–1)
BUN SERPL-MCNC: 24 MG/DL (ref 5–25)
CALCIUM SERPL-MCNC: 9.4 MG/DL (ref 8.3–10.1)
CARDIAC TROPONIN I PNL SERPL HS: 4 NG/L
CARDIAC TROPONIN I PNL SERPL HS: 4 NG/L
CHLORIDE SERPL-SCNC: 103 MMOL/L (ref 96–108)
CO2 SERPL-SCNC: 29 MMOL/L (ref 21–32)
CREAT SERPL-MCNC: 0.98 MG/DL (ref 0.6–1.3)
EOSINOPHIL # BLD AUTO: 0.17 THOUSAND/ÂΜL (ref 0–0.61)
EOSINOPHIL NFR BLD AUTO: 2 % (ref 0–6)
ERYTHROCYTE [DISTWIDTH] IN BLOOD BY AUTOMATED COUNT: 14.2 % (ref 11.6–15.1)
GFR SERPL CREATININE-BSD FRML MDRD: 68 ML/MIN/1.73SQ M
GLUCOSE SERPL-MCNC: 131 MG/DL (ref 65–140)
HCT VFR BLD AUTO: 42.2 % (ref 34.8–46.1)
HGB BLD-MCNC: 14.3 G/DL (ref 11.5–15.4)
IMM GRANULOCYTES # BLD AUTO: 0.04 THOUSAND/UL (ref 0–0.2)
IMM GRANULOCYTES NFR BLD AUTO: 0 % (ref 0–2)
LYMPHOCYTES # BLD AUTO: 3.43 THOUSANDS/ÂΜL (ref 0.6–4.47)
LYMPHOCYTES NFR BLD AUTO: 32 % (ref 14–44)
MCH RBC QN AUTO: 30.3 PG (ref 26.8–34.3)
MCHC RBC AUTO-ENTMCNC: 33.9 G/DL (ref 31.4–37.4)
MCV RBC AUTO: 89 FL (ref 82–98)
MONOCYTES # BLD AUTO: 0.5 THOUSAND/ÂΜL (ref 0.17–1.22)
MONOCYTES NFR BLD AUTO: 5 % (ref 4–12)
NEUTROPHILS # BLD AUTO: 6.59 THOUSANDS/ÂΜL (ref 1.85–7.62)
NEUTS SEG NFR BLD AUTO: 61 % (ref 43–75)
NRBC BLD AUTO-RTO: 0 /100 WBCS
PLATELET # BLD AUTO: 309 THOUSANDS/UL (ref 149–390)
PMV BLD AUTO: 10.1 FL (ref 8.9–12.7)
POTASSIUM SERPL-SCNC: 3.4 MMOL/L (ref 3.5–5.3)
PROT SERPL-MCNC: 7.4 G/DL (ref 6.4–8.4)
RBC # BLD AUTO: 4.72 MILLION/UL (ref 3.81–5.12)
SODIUM SERPL-SCNC: 140 MMOL/L (ref 135–147)
WBC # BLD AUTO: 10.77 THOUSAND/UL (ref 4.31–10.16)

## 2022-11-06 RX ORDER — ONDANSETRON 4 MG/1
4 TABLET, ORALLY DISINTEGRATING ORAL EVERY 6 HOURS PRN
Qty: 20 TABLET | Refills: 0 | Status: SHIPPED | OUTPATIENT
Start: 2022-11-06

## 2022-11-06 RX ORDER — METOCLOPRAMIDE HYDROCHLORIDE 5 MG/ML
10 INJECTION INTRAMUSCULAR; INTRAVENOUS ONCE
Status: COMPLETED | OUTPATIENT
Start: 2022-11-06 | End: 2022-11-06

## 2022-11-06 RX ORDER — PANTOPRAZOLE SODIUM 40 MG/10ML
40 INJECTION, POWDER, LYOPHILIZED, FOR SOLUTION INTRAVENOUS ONCE
Status: COMPLETED | OUTPATIENT
Start: 2022-11-06 | End: 2022-11-06

## 2022-11-06 RX ADMIN — PANTOPRAZOLE SODIUM 40 MG: 40 INJECTION, POWDER, FOR SOLUTION INTRAVENOUS at 21:20

## 2022-11-06 RX ADMIN — METOCLOPRAMIDE 10 MG: 5 INJECTION, SOLUTION INTRAMUSCULAR; INTRAVENOUS at 21:20

## 2022-11-06 RX ADMIN — SODIUM CHLORIDE 1000 ML: 0.9 INJECTION, SOLUTION INTRAVENOUS at 21:16

## 2022-11-07 ENCOUNTER — OFFICE VISIT (OUTPATIENT)
Dept: RHEUMATOLOGY | Facility: CLINIC | Age: 47
End: 2022-11-07

## 2022-11-07 ENCOUNTER — TELEPHONE (OUTPATIENT)
Dept: INTERNAL MEDICINE CLINIC | Facility: CLINIC | Age: 47
End: 2022-11-07

## 2022-11-07 VITALS
HEIGHT: 70 IN | SYSTOLIC BLOOD PRESSURE: 136 MMHG | DIASTOLIC BLOOD PRESSURE: 80 MMHG | BODY MASS INDEX: 41.95 KG/M2 | WEIGHT: 293 LBS

## 2022-11-07 DIAGNOSIS — G89.29 NECK PAIN, CHRONIC: ICD-10-CM

## 2022-11-07 DIAGNOSIS — M79.18 MYOFASCIAL PAIN SYNDROME: ICD-10-CM

## 2022-11-07 DIAGNOSIS — M47.819 SPONDYLO-ARTHROPATHY: Primary | ICD-10-CM

## 2022-11-07 DIAGNOSIS — Z51.81 MEDICATION MONITORING ENCOUNTER: ICD-10-CM

## 2022-11-07 DIAGNOSIS — M54.2 NECK PAIN, CHRONIC: ICD-10-CM

## 2022-11-07 LAB
ATRIAL RATE: 86 BPM
P AXIS: 64 DEGREES
PR INTERVAL: 168 MS
QRS AXIS: 77 DEGREES
QRSD INTERVAL: 90 MS
QT INTERVAL: 372 MS
QTC INTERVAL: 445 MS
T WAVE AXIS: 72 DEGREES
VENTRICULAR RATE: 86 BPM

## 2022-11-07 RX ORDER — MELOXICAM 15 MG/1
15 TABLET ORAL DAILY
Qty: 30 TABLET | Refills: 2 | Status: SHIPPED | OUTPATIENT
Start: 2022-11-07

## 2022-11-07 NOTE — ED PROVIDER NOTES
History  Chief Complaint   Patient presents with   • Chest Pain     Pt states that her BP meds got changed 10/26 she has been having issues  pt is vomiting, dizzy, blurry vision and chest pain  Pt states that this all started 2 days after med change  Pt states that the chest pain started today about 1 hour ago sitting on the couch     76-year-old female with history of anxiety, asthma, chronic pain disorder, depression, fibromyalgia migraines, hypertension obesity inflammatory bowel disease received influenza and pneumonia shots on 10/26/22  She started lisinopril for hypertension the 29th of October  From November 2nd until today she is having generalized abdominal discomfort with diarrhea, nausea, myalgias some dizziness  Since about 6:00 p m , after singing [employer] to her son, she developed chest pain  That has greatly improved currently  She is concerned that symptoms may be due to her recent vaccines or lisinopril  Prior to Admission Medications   Prescriptions Last Dose Informant Patient Reported? Taking? Calcium Carbonate (CALCIUM 600 PO)  Self Yes No   Sig: Take by mouth daily   DULoxetine (CYMBALTA) 60 mg delayed release capsule  Self No No   Sig: Take 2 PO HS  HYDROcodone-acetaminophen (NORCO) 5-325 mg per tablet  Self No No   Sig: Take 1 PO BID PRN for pain  Potassium 99 MG TABS  Self Yes No   Sig: Take by mouth daily   albuterol (Ventolin HFA) 90 mcg/act inhaler  Self No No   Sig: Inhale 2 puffs every 6 (six) hours as needed for wheezing   betamethasone valerate (VALISONE) 0 1 % ointment  Self No No   Sig: Apply 1 to 2 times a day only as needed to finger/hand rash  cholecalciferol (VITAMIN D3) 1,000 units tablet  Self Yes No   Sig: Take 2,000 Units by mouth daily   ciclopirox (LOPROX) 0 77 % cream  Self No No   Sig: APPLY 2 TIMES per day to rash on chest for 2-4 weeks     famotidine (PEPCID) 20 mg tablet  Self No No   Sig: Take 1 tablet (20 mg total) by mouth 2 (two) times a day   levothyroxine (Levoxyl) 200 mcg tablet   No No   Sig: Take 1 tablet daily in addition to 25 mcg tablet daily   levothyroxine (Levoxyl) 25 mcg tablet   No No   Sig: Take 1 tablet daily in addition to 200 mcg tablet daily   lisinopril-hydrochlorothiazide (PRINZIDE,ZESTORETIC) 10-12 5 MG per tablet   No No   Sig: Take 1 tablet by mouth daily   meloxicam (MOBIC) 15 mg tablet   No No   Sig: Take 1 tablet (15 mg total) by mouth daily   multivitamin (THERAGRAN) TABS  Self Yes No   Sig: Take 1 tablet by mouth daily   pregabalin (LYRICA) 100 mg capsule  Self No No   Sig: Take 1 PO BID   tiZANidine (ZANAFLEX) 4 mg tablet  Self No No   Sig: Take 1 PO BID PRN for pain and spasms        Facility-Administered Medications: None       Past Medical History:   Diagnosis Date   • Allergic    • Anxiety    • Arthritis    • Asthma    • Chronic pain disorder     upper back   • Depression    • Disc disorder    • Disease of thyroid gland    • Fibromyalgia, primary    • GERD (gastroesophageal reflux disease)    • Headache(784 0)    • HPV (human papilloma virus) infection     cervical   • Hypertension    • Hypothyroidism    • Inflammatory bowel disease    • Migraine    • Obesity        Past Surgical History:   Procedure Laterality Date   • CERVICAL BIOPSY  W/ LOOP ELECTRODE EXCISION     • CERVICAL FUSION  2015   •  SECTION     • CHOLECYSTECTOMY      lap   • FOOT SURGERY Bilateral     multiple, posterior calcaneal bone spur   • KNEE SURGERY Left     arthroscopy with debribement   • LYMPH NODE BIOPSY     • SPINE SURGERY     • TUBAL LIGATION Bilateral        Family History   Adopted: Yes   Problem Relation Age of Onset   • Thyroid disease Mother    • Arthritis Mother         Mom, dad, aunts, grandmother   • Autoimmune disease Mother    • No Known Problems Father    • Coronary artery disease Maternal Grandmother    • Breast cancer Maternal Grandmother         age unknown   • Heart disease Maternal Grandmother Mother, Mort Bullocks, Father   • Coronary artery disease Maternal Aunt    • Stroke Maternal Srinath Mackey, brothers   • No Known Problems Maternal Grandfather    • No Known Problems Paternal Grandmother    • No Known Problems Paternal Grandfather    • Suicide Attempts Maternal Aunt    • Heart murmur Maternal Aunt    • Hypothyroidism Maternal Aunt    • Hashimoto's thyroiditis Maternal Aunt    • Diabetes unspecified Brother    • Diabetes Brother    • No Known Problems Brother    • No Known Problems Brother    • ADD / ADHD Son      I have reviewed and agree with the history as documented  E-Cigarette/Vaping   • E-Cigarette Use Never User      E-Cigarette/Vaping Substances   • Nicotine No    • Flavoring No      Social History     Tobacco Use   • Smoking status: Current Every Day Smoker     Packs/day: 0 50     Years: 35 00     Pack years: 17 50     Types: Cigarettes   • Smokeless tobacco: Never Used   • Tobacco comment: actually about 3/4 PPD   Vaping Use   • Vaping Use: Never used   Substance Use Topics   • Alcohol use: Not Currently   • Drug use: No       Review of Systems   Eyes: Positive for visual disturbance  Respiratory: Positive for cough  Cardiovascular: Positive for chest pain (today, and chronically since cervical fusion)  Gastrointestinal: Positive for abdominal pain, diarrhea, nausea and vomiting  Negative for blood in stool  Genitourinary: Negative for dysuria  Musculoskeletal: Positive for arthralgias and myalgias  Neurological: Positive for dizziness and headaches  Psychiatric/Behavioral: The patient is nervous/anxious  All other systems reviewed and are negative  Physical Exam  Physical Exam  Vitals and nursing note reviewed  Constitutional:       General: She is not in acute distress  Appearance: She is well-developed  She is obese  She is not ill-appearing or diaphoretic  HENT:      Head: Normocephalic and atraumatic     Eyes:      Extraocular Movements: Extraocular movements intact  Conjunctiva/sclera: Conjunctivae normal       Pupils: Pupils are equal, round, and reactive to light  Neck:      Vascular: No JVD  Cardiovascular:      Rate and Rhythm: Normal rate and regular rhythm  Pulses:           Radial pulses are 2+ on the right side and 2+ on the left side  Heart sounds: Normal heart sounds  No murmur heard  Pulmonary:      Effort: Pulmonary effort is normal       Breath sounds: Normal breath sounds  Abdominal:      General: Bowel sounds are normal       Palpations: Abdomen is soft  Tenderness: There is no abdominal tenderness  There is no guarding or rebound  Musculoskeletal:         General: Normal range of motion  Cervical back: Normal range of motion and neck supple  Right lower leg: No tenderness  No edema  Left lower leg: No tenderness  No edema  Skin:     General: Skin is warm and dry  Capillary Refill: Capillary refill takes less than 2 seconds  Findings: No rash  Neurological:      General: No focal deficit present  Mental Status: She is alert and oriented to person, place, and time  Cranial Nerves: No cranial nerve deficit  Motor: No weakness  Coordination: Coordination normal       Deep Tendon Reflexes: Reflexes are normal and symmetric  Psychiatric:         Mood and Affect: Mood is anxious           Behavior: Behavior normal          Vital Signs  ED Triage Vitals   Temperature Pulse Respirations Blood Pressure SpO2   11/06/22 1846 11/06/22 1846 11/06/22 1846 11/06/22 1846 11/06/22 1846   97 9 °F (36 6 °C) 96 18 139/97 98 %      Temp Source Heart Rate Source Patient Position - Orthostatic VS BP Location FiO2 (%)   11/06/22 1846 11/06/22 2246 11/06/22 2246 11/06/22 2246 --   Temporal Monitor Lying Left arm       Pain Score       --                  Vitals:    11/06/22 1846 11/06/22 2045 11/06/22 2100 11/06/22 2246   BP: 139/97 148/92 148/87 135/84   Pulse: 96 78 74 71 Patient Position - Orthostatic VS:    Lying         Visual Acuity      ED Medications  Medications   sodium chloride 0 9 % bolus 1,000 mL (0 mL Intravenous Stopped 11/6/22 2246)   metoclopramide (REGLAN) injection 10 mg (10 mg Intravenous Given 11/6/22 2120)   pantoprazole (PROTONIX) injection 40 mg (40 mg Intravenous Given 11/6/22 2120)       Diagnostic Studies  Results Reviewed     Procedure Component Value Units Date/Time    HS Troponin I 2hr [[id number]]  (Normal) Collected: 11/06/22 2119    Lab Status: Final result Specimen: Blood from Arm, Right Updated: 11/06/22 2149     hs TnI 2hr 4 ng/L      Delta 2hr hsTnI 0 ng/L     HS Troponin 0hr (reflex protocol) [[id number]]  (Normal) Collected: 11/06/22 1854    Lab Status: Final result Specimen: Blood from Arm, Left Updated: 11/06/22 1929     hs TnI 0hr 4 ng/L     Comprehensive metabolic panel [[id number]]  (Abnormal) Collected: 11/06/22 1854    Lab Status: Final result Specimen: Blood from Arm, Left Updated: 11/06/22 1920     Sodium 140 mmol/L      Potassium 3 4 mmol/L      Chloride 103 mmol/L      CO2 29 mmol/L      ANION GAP 8 mmol/L      BUN 24 mg/dL      Creatinine 0 98 mg/dL      Glucose 131 mg/dL      Calcium 9 4 mg/dL      AST 14 U/L      ALT 34 U/L      Alkaline Phosphatase 74 U/L      Total Protein 7 4 g/dL      Albumin 3 5 g/dL      Total Bilirubin 0 20 mg/dL      eGFR 68 ml/min/1 73sq m     Narrative:      [place] guidelines for Chronic Kidney Disease (CKD):   •  Stage 1 with normal or high GFR (GFR > 90 mL/min/1 73 square meters)  •  Stage 2 Mild CKD (GFR = 60-89 mL/min/1 73 square meters)  •  Stage 3A Moderate CKD (GFR = 45-59 mL/min/1 73 square meters)  •  Stage 3B Moderate CKD (GFR = 30-44 mL/min/1 73 square meters)  •  Stage 4 Severe CKD (GFR = 15-29 mL/min/1 73 square meters)  •  Stage 5 End Stage CKD (GFR <15 mL/min/1 73 square meters)  Note: GFR calculation is accurate only with a steady state creatinine    CBC and differential [[id number]]  (Abnormal) Collected: 11/06/22 1854    Lab Status: Final result Specimen: Blood from Arm, Left Updated: 11/06/22 1859     WBC 10 77 Thousand/uL      RBC 4 72 Million/uL      Hemoglobin 14 3 g/dL      Hematocrit 42 2 %      MCV 89 fL      MCH 30 3 pg      MCHC 33 9 g/dL      RDW 14 2 %      MPV 10 1 fL      Platelets 046 Thousands/uL      nRBC 0 /100 WBCs      Neutrophils Relative 61 %      Immat GRANS % 0 %      Lymphocytes Relative 32 %      Monocytes Relative 5 %      Eosinophils Relative 2 %      Basophils Relative 0 %      Neutrophils Absolute 6 59 Thousands/µL      Immature Grans Absolute 0 04 Thousand/uL      Lymphocytes Absolute 3 43 Thousands/µL      Monocytes Absolute 0 50 Thousand/µL      Eosinophils Absolute 0 17 Thousand/µL      Basophils Absolute 0 04 Thousands/µL                  XR chest pa & lateral   ED Interpretation by Ralph Flynn DO (11/06 2050)   Unremarkable      Final Result by Darcy Carrillo MD (11/07 0740)      No acute cardiopulmonary disease  Findings are stable            Workstation performed: [id number]                    Procedures  ECG 12 Lead Documentation Only    Date/Time: 11/6/2022 9:03 PM  Performed by: Ralph Flynn DO  Authorized by: Ralph Flynn DO     ECG reviewed by me, the ED Provider: yes    Patient location:  ED  Previous ECG:     Previous ECG:  Unavailable    Comparison to cardiac monitor: Yes    Interpretation:     Interpretation: normal               ED Course  ED Course as of 11/07/22 1135   Sun Nov 06, 2022 2218 Patient states that she feels much better and is ready for discharge  Vital signs are stable               HEART Risk Score    Flowsheet Row Most Recent Value   Heart Score Risk Calculator    History 0 Filed at: 11/07/2022 1134   ECG 0 Filed at: 11/07/2022 1134   Age 1 Filed at: 11/07/2022 1134   Risk Factors 1 Filed at: 11/07/2022 1134   Troponin 0 Filed at: 11/07/2022 1134   HEART Score 2 Filed at: 11/07/2022 1134                        SBIRT 22yo+    Flowsheet Row Most Recent Value   SBIRT (23 yo +)    In order to provide better care to our patients, we are screening all of our patients for alcohol and drug use  Would it be okay to ask you these screening questions? Yes Filed at: 11/06/2022 2039   Initial Alcohol Screen: US AUDIT-C     1  How often do you have a drink containing alcohol? 0 Filed at: 11/06/2022 2039   2  How many drinks containing alcohol do you have on a typical day you are drinking? 0 Filed at: 11/06/2022 2039   3a  Male UNDER 65: How often do you have five or more drinks on one occasion? 0 Filed at: 11/06/2022 2039   3b  FEMALE Any Age, or MALE 65+: How often do you have 4 or more drinks on one occassion? 0 Filed at: 11/06/2022 2039   Audit-C Score 0 Filed at: 11/06/2022 2039   HERLINDA: How many times in the past year have you    Used an illegal drug or used a prescription medication for non-medical reasons? Once or Twice Filed at: 11/06/2022 2039   DAST-10: In the past 12 months       1  Have you used drugs other than those required for medical reasons? 0 Filed at: 11/06/2022 2039   2  Do you use more than one drug at a time? 0 Filed at: 11/06/2022 2039   3  Have you had medical problems as a result of your drug use (e g , memory loss, hepatitis, convulsions, bleeding, etc )? 0 Filed at: 11/06/2022 2039   4  Have you had "blackouts" or "flashbacks" as a result of drug use? YesNo 0 Filed at: 11/06/2022 2039   5  Do you ever feel bad or guilty about your drug use? 0 Filed at: 11/06/2022 2039   6  Does your spouse (or parent) ever complain about your involvement with drugs? 0 Filed at: 11/06/2022 2039   7  Have you neglected your family because of your use of drugs? 0 Filed at: 11/06/2022 2039   8  Have you engaged in illegal activities in order to obtain drugs? 0 Filed at: 11/06/2022 2039   9  Have you ever experienced withdrawal symptoms (felt sick) when you stopped taking drugs?  0 Filed at: 11/06/2022 2039   10  Are you always able to stop using drugs when you want to? 0 Filed at: 11/06/2022 2039   DAST-10 Score 0 Filed at: 11/06/2022 2039                    MDM  Number of Diagnoses or Management Options  Atypical chest pain: new and requires workup  Chronic hypertension: minor  Diarrhea: new and requires workup  Nausea: new and requires workup  Diagnosis management comments: 48 yo F with viral-like illness, anxiety, likely exacerbation of fibromyalgia  Also with abdominal discomfort, N/V/D and headache, Possible abdominal migraine? Atypical CP does not appear clinically to be ACS, PE, Dissection, etc  Will check labs including troponin, EKG, CXR  Greatly improved with IV fluids, metoclopramide  Delta troponin negative  Low HEART score  Stable for d/c  Amount and/or Complexity of Data Reviewed  Clinical lab tests: ordered and reviewed  Tests in the radiology section of CPT®: ordered and reviewed  Review and summarize past medical records: yes  Independent visualization of images, tracings, or specimens: yes        Disposition  Final diagnoses:   Nausea   Diarrhea   Chronic hypertension   Atypical chest pain     Time reflects when diagnosis was documented in both MDM as applicable and the Disposition within this note     Time User Action Codes Description Comment    11/6/2022 10:22 PM Maria E Naples Add [R11 0] Nausea     11/6/2022 10:22 PM Maria E Naples Add [R19 7] Diarrhea     11/6/2022 10:24 PM Maria E Naples Add [I10] Chronic hypertension     11/7/2022 11:33 AM Maria E Naples Add [R07 89] Atypical chest pain       ED Disposition     ED Disposition   Discharge    Condition   Stable    Date/Time   Sun Nov 6, 2022 10:21 PM    Kolton Erazo discharge to home/self care                 Follow-up Information     Follow up With Specialties Details Why Contact Info    Lily Nieves MD Family Medicine Call in 1 day  [place] [id number]  [address] [facility] [address]  [phone]            Discharge Medication List as of 11/6/2022 10:24 PM      START taking these medications    Details   ondansetron (Zofran ODT) 4 mg disintegrating tablet Take 1 tablet (4 mg total) by mouth every 6 (six) hours as needed for nausea or vomiting, Starting Sun 11/6/2022, Normal         CONTINUE these medications which have NOT CHANGED    Details   albuterol (Ventolin HFA) 90 mcg/act inhaler Inhale 2 puffs every 6 (six) hours as needed for wheezing, Starting Wed 4/27/2022, Normal      betamethasone valerate (VALISONE) 0 1 % ointment Apply 1 to 2 times a day only as needed to finger/hand rash , Normal      Calcium Carbonate (CALCIUM 600 PO) Take by mouth daily, Historical Med      cholecalciferol (VITAMIN D3) 1,000 units tablet Take 2,000 Units by mouth daily, Historical Med      ciclopirox (LOPROX) 0 77 % cream APPLY 2 TIMES per day to rash on chest for 2-4 weeks  , Normal      DULoxetine (CYMBALTA) 60 mg delayed release capsule Take 2 PO HS , Normal      famotidine (PEPCID) 20 mg tablet Take 1 tablet (20 mg total) by mouth 2 (two) times a day, Starting Mon 8/8/2022, Normal      HYDROcodone-acetaminophen (NORCO) 5-325 mg per tablet Take 1 PO BID PRN for pain , Normal      !! levothyroxine (Levoxyl) 200 mcg tablet Take 1 tablet daily in addition to 25 mcg tablet daily, Normal      !! levothyroxine (Levoxyl) 25 mcg tablet Take 1 tablet daily in addition to 200 mcg tablet daily, Normal      lisinopril-hydrochlorothiazide (PRINZIDE,ZESTORETIC) 10-12 5 MG per tablet Take 1 tablet by mouth daily, Starting Wed 10/26/2022, Normal      meloxicam (MOBIC) 15 mg tablet Take 1 tablet (15 mg total) by mouth daily, Starting Tue 10/18/2022, Normal      multivitamin (THERAGRAN) TABS Take 1 tablet by mouth daily, Historical Med      Potassium 99 MG TABS Take by mouth daily, Historical Med      pregabalin (LYRICA) 100 mg capsule Take 1 PO BID, Normal      tiZANidine (ZANAFLEX) 4 mg tablet Take 1 PO BID PRN for pain and spasms  , Normal       !! - Potential duplicate medications found  Please discuss with provider  No discharge procedures on file      PDMP Review       Value Time User    PDMP Reviewed  Yes 11/6/2022 10:22 PM Gerard Lopez DO          ED Provider  Electronically Signed by           Gerard Lopez DO  11/07/22 1139

## 2022-11-07 NOTE — DISCHARGE INSTRUCTIONS
Is unclear whether you have developed a reaction to your immunizations, reaction to your new medication, or simply viral gastroenteritis  Keep well hydrated with frequent small sips of fluid  Try Pedialyte to be sure you get enough electrolytes  Contact your PCP tomorrow regarding antihypertensive medication

## 2022-11-07 NOTE — PATIENT INSTRUCTIONS
Take the Enbrel once a week once we get insurance approval for it  Keep taking the meloxicam as needed for your joint pain  Continue eating healthy and exercising every day  Please make an appointment with your dermatologist and tell them you are on Enbrel for your arthritis

## 2022-11-07 NOTE — TELEPHONE ENCOUNTER
I would like to begin Enbrel for ankylosing spondylitis 50 mg every 7 days  She has failed oral NSAIDs and is intollerant to steroids  Her Hep serologies and Quantiferon are negative

## 2022-11-07 NOTE — PROGRESS NOTES
Assessment and Plan:   Spondyloarthropathy--continue NSAIDs PRN back pain  PT for LS spine  Topical analgesics   Begin Enbrel for ankylosing spondylitis  Risks/benefits of medication reviewed with patient and she understands  Derm eval for NMSC surveillance  F/u in 4-5 mos  or sooner if necessary  Rheumatic Disease Summary  As above    Here for f/u visit  Xrays T-spine and LS spine reviewed  All previous labs reviewed  Elevated esr/crp  HLA B27 +  Some, but not complete improvement with meloxicam          The following portions of the patient's history were reviewed and updated as appropriate: allergies, current medications, past family history, past medical history, past social history, past surgical history and problem list     Review of Systems:   Review of Systems   Constitutional: Positive for fatigue  HENT: Negative for mouth sores  Eyes: Negative for pain  Respiratory: Negative for shortness of breath  Cardiovascular: Negative for leg swelling  Musculoskeletal: Positive for arthralgias and back pain  Negative for joint swelling  Skin: Negative for rash  Neurological: Negative for weakness  Hematological: Negative for adenopathy  Psychiatric/Behavioral: Negative for sleep disturbance  Home Medications:    Current Outpatient Medications:   •  meloxicam (MOBIC) 15 mg tablet, Take 1 tablet (15 mg total) by mouth daily, Disp: 30 tablet, Rfl: 2  •  albuterol (Ventolin HFA) 90 mcg/act inhaler, Inhale 2 puffs every 6 (six) hours as needed for wheezing, Disp: 18 g, Rfl: 0  •  betamethasone valerate (VALISONE) 0 1 % ointment, Apply 1 to 2 times a day only as needed to finger/hand rash , Disp: 30 g, Rfl: 0  •  Calcium Carbonate (CALCIUM 600 PO), Take by mouth daily, Disp: , Rfl:   •  cholecalciferol (VITAMIN D3) 1,000 units tablet, Take 2,000 Units by mouth daily, Disp: , Rfl:   •  ciclopirox (LOPROX) 0 77 % cream, APPLY 2 TIMES per day to rash on chest for 2-4 weeks  , Disp: 30 g, Rfl: 0  •  DULoxetine (CYMBALTA) 60 mg delayed release capsule, Take 2 PO HS , Disp: 180 capsule, Rfl: 0  •  famotidine (PEPCID) 20 mg tablet, Take 1 tablet (20 mg total) by mouth 2 (two) times a day, Disp: 180 tablet, Rfl: 0  •  HYDROcodone-acetaminophen (NORCO) 5-325 mg per tablet, Take 1 PO BID PRN for pain , Disp: 14 tablet, Rfl: 0  •  levothyroxine (Levoxyl) 200 mcg tablet, Take 1 tablet daily in addition to 25 mcg tablet daily, Disp: 90 tablet, Rfl: 3  •  levothyroxine (Levoxyl) 25 mcg tablet, Take 1 tablet daily in addition to 200 mcg tablet daily, Disp: 90 tablet, Rfl: 3  •  lisinopril-hydrochlorothiazide (PRINZIDE,ZESTORETIC) 10-12 5 MG per tablet, Take 1 tablet by mouth daily, Disp: 90 tablet, Rfl: 0  •  losartan (COZAAR) 25 mg tablet, Take 1 tablet (25 mg total) by mouth daily, Disp: 30 tablet, Rfl: 0  •  multivitamin (THERAGRAN) TABS, Take 1 tablet by mouth daily, Disp: , Rfl:   •  ondansetron (Zofran ODT) 4 mg disintegrating tablet, Take 1 tablet (4 mg total) by mouth every 6 (six) hours as needed for nausea or vomiting, Disp: 20 tablet, Rfl: 0  •  Potassium 99 MG TABS, Take by mouth daily, Disp: , Rfl:   •  pregabalin (LYRICA) 100 mg capsule, Take 1 PO BID, Disp: 60 capsule, Rfl: 2  •  tiZANidine (ZANAFLEX) 4 mg tablet, Take 1 PO BID PRN for pain and spasms  , Disp: 60 tablet, Rfl: 2  No current facility-administered medications for this visit  Objective:    Vitals:    11/07/22 1427   BP: 136/80   Weight: (!) 137 kg (302 lb)   Height: 5' 10" (1 778 m)       Physical Exam  Constitutional:       General: She is not in acute distress  HENT:      Head: Normocephalic and atraumatic  Eyes:      Conjunctiva/sclera: Conjunctivae normal    Cardiovascular:      Rate and Rhythm: Normal rate and regular rhythm  Heart sounds: S1 normal and S2 normal      No friction rub  Pulmonary:      Effort: Pulmonary effort is normal  No respiratory distress  Breath sounds: Normal breath sounds   No wheezing, rhonchi or rales  Musculoskeletal:      Cervical back: Neck supple  Lumbar back: Bony tenderness present  Decreased range of motion  Skin:     Coloration: Skin is not pale  Neurological:      Mental Status: She is alert  Mental status is at baseline  Psychiatric:         Mood and Affect: Mood normal          Behavior: Behavior normal          Reviewed labs and imaging  Imaging:   XR chest pa & lateral    Result Date: 11/7/2022  Narrative: CHEST INDICATION:   CP  COMPARISON:  9/29/2019 EXAM PERFORMED/VIEWS:  XR CHEST PA & LATERAL Images: 3 FINDINGS: Cardiomediastinal silhouette appears unremarkable  The lungs are clear  No pneumothorax or pleural effusion  Osseous structures appear within normal limits for patient age  Lower cervical spinal fixation hardware again evident     Impression: No acute cardiopulmonary disease   Findings are stable Workstation performed: [id number]       Labs:   Admission on 11/06/2022, Discharged on 11/06/2022   Component Date Value Ref Range Status   • WBC 11/06/2022 10 77 (A) 4 31 - 10 16 Thousand/uL Final   • RBC 11/06/2022 4 72  3 81 - 5 12 Million/uL Final   • Hemoglobin 11/06/2022 14 3  11 5 - 15 4 g/dL Final   • Hematocrit 11/06/2022 42 2  34 8 - 46 1 % Final   • MCV 11/06/2022 89  82 - 98 fL Final   • MCH 11/06/2022 30 3  26 8 - 34 3 pg Final   • MCHC 11/06/2022 33 9  31 4 - 37 4 g/dL Final   • RDW 11/06/2022 14 2  11 6 - 15 1 % Final   • MPV 11/06/2022 10 1  8 9 - 12 7 fL Final   • Platelets 27/07/3929 309  149 - 390 Thousands/uL Final   • nRBC 11/06/2022 0  /100 WBCs Final   • Neutrophils Relative 11/06/2022 61  43 - 75 % Final   • Immat GRANS % 11/06/2022 0  0 - 2 % Final   • Lymphocytes Relative 11/06/2022 32  14 - 44 % Final   • Monocytes Relative 11/06/2022 5  4 - 12 % Final   • Eosinophils Relative 11/06/2022 2  0 - 6 % Final   • Basophils Relative 11/06/2022 0  0 - 1 % Final   • Neutrophils Absolute 11/06/2022 6 59  1 85 - 7 62 Thousands/µL Final   • Immature Grans Absolute 11/06/2022 0 04  0 00 - 0 20 Thousand/uL Final   • Lymphocytes Absolute 11/06/2022 3 43  0 60 - 4 47 Thousands/µL Final   • Monocytes Absolute 11/06/2022 0 50  0 17 - 1 22 Thousand/µL Final   • Eosinophils Absolute 11/06/2022 0 17  0 00 - 0 61 Thousand/µL Final   • Basophils Absolute 11/06/2022 0 04  0 00 - 0 10 Thousands/µL Final   • Sodium 11/06/2022 140  135 - 147 mmol/L Final   • Potassium 11/06/2022 3 4 (A) 3 5 - 5 3 mmol/L Final   • Chloride 11/06/2022 103  96 - 108 mmol/L Final   • CO2 11/06/2022 29  21 - 32 mmol/L Final   • ANION GAP 11/06/2022 8  4 - 13 mmol/L Final   • BUN 11/06/2022 24  5 - 25 mg/dL Final   • Creatinine 11/06/2022 0 98  0 60 - 1 30 mg/dL Final    Standardized to IDMS reference method   • Glucose 11/06/2022 131  65 - 140 mg/dL Final    If the patient is fasting, the ADA then defines impaired fasting glucose as > 100 mg/dL and diabetes as > or equal to 123 mg/dL  Specimen collection should occur prior to Sulfasalazine administration due to the potential for falsely depressed results  Specimen collection should occur prior to Sulfapyridine administration due to the potential for falsely elevated results  • Calcium 11/06/2022 9 4  8 3 - 10 1 mg/dL Final   • AST 11/06/2022 14  5 - 45 U/L Final    Specimen collection should occur prior to Sulfasalazine administration due to the potential for falsely depressed results  • ALT 11/06/2022 34  12 - 78 U/L Final    Specimen collection should occur prior to Sulfasalazine administration due to the potential for falsely depressed results  • Alkaline Phosphatase 11/06/2022 74  46 - 116 U/L Final   • Total Protein 11/06/2022 7 4  6 4 - 8 4 g/dL Final   • Albumin 11/06/2022 3 5  3 5 - 5 0 g/dL Final   • Total Bilirubin 11/06/2022 0 20  0 20 - 1 00 mg/dL Final    Use of this assay is not recommended for patients undergoing treatment with eltrombopag due to the potential for falsely elevated results     • eGFR 11/06/2022 68 ml/min/1 73sq m Final   • hs TnI 0hr 11/06/2022 4  "Refer to ACS Flowchart"- see link ng/L Final    Comment:                                              Initial (time 0) result  If >=50 ng/L, Myocardial injury suggested ;  Type of myocardial injury and treatment strategy  to be determined  If 5-49 ng/L, a delta result at 2 hours and or 4 hours will be needed to further evaluate  If <4 ng/L, and chest pain has been >3 hours since onset, patient may qualify for discharge based on the HEART score in the ED  If <5 ng/L and <3hours since onset of chest pain, a delta result at 2 hours will be needed to further evaluate  HS Troponin 99th Percentile URL of a Health Population=12 ng/L with a 95% Confidence Interval of 8-18 ng/L  Second Troponin (time 2 hours)  If calculated delta >= 20 ng/L,  Myocardial injury suggested ; Type of myocardial injury and treatment strategy to be determined  If 5-49 ng/L and the calculated delta is 5-19 ng/L, consult medical service for evaluation  Continue evaluation for ischemia on ecg and other possible etiology and repeat hs troponin at 4 hours  If delta                            is <5 ng/L at 2 hours, consider discharge based on risk stratification via the HEART score (if in ED), or MIRTHA risk score in IP/Observation  HS Troponin 99th Percentile URL of a Health Population=12 ng/L with a 95% Confidence Interval of 8-18 ng/L  • hs TnI 2hr 11/06/2022 4  "Refer to ACS Flowchart"- see link ng/L Final    Comment:                                              Initial (time 0) result  If >=50 ng/L, Myocardial injury suggested ;  Type of myocardial injury and treatment strategy  to be determined  If 5-49 ng/L, a delta result at 2 hours and or 4 hours will be needed to further evaluate  If <4 ng/L, and chest pain has been >3 hours since onset, patient may qualify for discharge based on the HEART score in the ED    If <5 ng/L and <3hours since onset of chest pain, a delta result at 2 hours will be needed to further evaluate  HS Troponin 99th Percentile URL of a Health Population=12 ng/L with a 95% Confidence Interval of 8-18 ng/L  Second Troponin (time 2 hours)  If calculated delta >= 20 ng/L,  Myocardial injury suggested ; Type of myocardial injury and treatment strategy to be determined  If 5-49 ng/L and the calculated delta is 5-19 ng/L, consult medical service for evaluation  Continue evaluation for ischemia on ecg and other possible etiology and repeat hs troponin at 4 hours  If delta                            is <5 ng/L at 2 hours, consider discharge based on risk stratification via the HEART score (if in ED), or MIRTHA risk score in IP/Observation  HS Troponin 99th Percentile URL of a Health Population=12 ng/L with a 95% Confidence Interval of 8-18 ng/L     • Delta 2hr hsTnI 11/06/2022 0  <20 ng/L Final   • Ventricular Rate 11/06/2022 86  BPM Final   • Atrial Rate 11/06/2022 86  BPM Final   • [place] Interval 11/06/2022 168  ms Final   • QRSD Interval 11/06/2022 90  ms Final   • QT Interval 11/06/2022 372  ms Final   • QTC Interval 11/06/2022 445  ms Final   • P Axis 11/06/2022 64  degrees Final   • QRS Axis 11/06/2022 77  degrees Final   • T Wave [place] 11/06/2022 72  degrees Final   Office Visit on 09/06/2022   Component Date Value Ref Range Status   • Glucose, Random 09/15/2022 87  65 - 99 mg/dL Final   Orders Only on 08/08/2022   Component Date Value Ref Range Status   • TSH 09/03/2022 1 130  0 450 - 4 500 uIU/mL Final   • Free t4 09/03/2022 1 28  0 82 - 1 77 ng/dL Final   • Free T3 09/03/2022 2 4  2 0 - 4 4 pg/mL Final   Appointment on 08/01/2022   Component Date Value Ref Range Status   • Rheumatoid Factor 08/01/2022 Negative  Negative Final   • QFT Nil 08/01/2022 0 02  0 - 8 0 IU/ml Final   • QFT TB1-NIL 08/01/2022 0 01  IU/ml Final   • QFT TB2-NIL 08/01/2022 0 04  IU/ml Final   • QFT Mitogen-NIL 08/01/2022 6 95  IU/ml Final   • QFT Final Interpretation 08/01/2022 Negative Negative Final    No Interferon-gamma response to M  tuberculosis antigens detected  Infection with M  tuberculosis is unlikely  A single negative result does not exclude infection with M  tuberculosis  In patients at high risk for M  tuberculosis infection, a second test should be considered in accordance with the 2017 ATS/IDSA/CDC Clinical Practice Guidelines for Diagnosis of Tuberculosis in Adults and Children  False negative results can be a result of incorrect blood sample collection or handling of the specimen affecting lymphocyte function  • Cyclic Citrullinated Peptide Ab  08/01/2022 0 7  See comment Final   • RIVAS 08/01/2022 Negative  Negative Final   • HLA B27 08/01/2022 Positive   Final    HLA-B*27 Positive  This patient is positive for HLA-B*27  This procedure rules  out the B*27:06 and 27:09 alleles, which the literature  suggests are not associated with spondyloarthropathies  B27 allele interpretation for all loci based on IMGT/HLA  database version 3 44  This test was developed and its performance characteristics  determined by [facility]   It has not been cleared or approved  by the Food and Drug Administration  [facility] CLIA ID Number [id number]  This test was performed using PCR (Polymerase Chain Reaction)/SSOP  (Sequence Specific Oligonucleotide Probes) technique  SBT (Sequence  Based Typing) and/or SSP (Sequence Specific Primers) may be used as  supplemental methods when necessary  Please contact [facility] Customer  Service at [phone] if you have any questions     Director of [facility] Laboratory   Dr Crispin Torres, PhD   • Sed Rate 08/01/2022 31 (A) 0 - 19 mm/hour Final   • Sodium 08/01/2022 138  135 - 147 mmol/L Final   • Potassium 08/01/2022 3 8  3 5 - 5 3 mmol/L Final   • Chloride 08/01/2022 108  96 - 108 mmol/L Final   • CO2 08/01/2022 25  21 - 32 mmol/L Final   • ANION GAP 08/01/2022 5  4 - 13 mmol/L Final   • BUN 08/01/2022 17  5 - 25 mg/dL Final   • Creatinine 08/01/2022 0 76  0 60 - 1 30 mg/dL Final    Standardized to IDMS reference method   • Glucose 08/01/2022 91  65 - 140 mg/dL Final    If the patient is fasting, the ADA then defines impaired fasting glucose as > 100 mg/dL and diabetes as > or equal to 123 mg/dL  Specimen collection should occur prior to Sulfasalazine administration due to the potential for falsely depressed results  Specimen collection should occur prior to Sulfapyridine administration due to the potential for falsely elevated results  • Calcium 08/01/2022 9 5  8 3 - 10 1 mg/dL Final   • Corrected Calcium 08/01/2022 10 0  8 3 - 10 1 mg/dL Final   • AST 08/01/2022 16  5 - 45 U/L Final    Specimen collection should occur prior to Sulfasalazine administration due to the potential for falsely depressed results  • ALT 08/01/2022 27  12 - 78 U/L Final    Specimen collection should occur prior to Sulfasalazine and/or Sulfapyridine administration due to the potential for falsely depressed results  • Alkaline Phosphatase 08/01/2022 71  46 - 116 U/L Final   • Total Protein 08/01/2022 7 0  6 4 - 8 4 g/dL Final   • Albumin 08/01/2022 3 4 (A) 3 5 - 5 0 g/dL Final   • Total Bilirubin 08/01/2022 0 34  0 20 - 1 00 mg/dL Final    Use of this assay is not recommended for patients undergoing treatment with eltrombopag due to the potential for falsely elevated results     • eGFR 08/01/2022 93  ml/min/1 73sq m Final   • Vitamin B-12 08/01/2022 508  100 - 900 pg/mL Final   • Vit D, 25-Hydroxy 08/01/2022 101 2 (A) 30 0 - 100 0 ng/mL Final   • Hepatitis C Ab 08/01/2022 Non-reactive  Non-reactive Final   • Hepatitis B Surface Ag 08/01/2022 Non-reactive  Non-reactive, NonReactive - Confirmed Final   • TSH 3RD GENERATON 08/01/2022 0 547  0 450 - 4 500 uIU/mL Final    The recommended reference ranges for TSH during pregnancy are as follows:   First trimester 0 1 to 2 5 uIU/mL   Second trimester  0 2 to 3 0 uIU/mL   Third trimester 0 3 to 3 0 uIU/m    Note: Normal ranges may not apply to patients who are transgender, non-binary, or whose legal sex, sex at birth, and gender identity differ  Adult TSH (3rd generation) reference range follows the recommended guidelines of the [employer] Thy[employer] Association, January, 2020     • WBC 08/01/2022 9 16  4 31 - 10 16 Thousand/uL Final   • RBC 08/01/2022 4 76  3 81 - 5 12 Million/uL Final   • Hemoglobin 08/01/2022 14 0  11 5 - 15 4 g/dL Final   • Hematocrit 08/01/2022 43 4  34 8 - 46 1 % Final   • MCV 08/01/2022 91  82 - 98 fL Final   • MCH 08/01/2022 29 4  26 8 - 34 3 pg Final   • MCHC 08/01/2022 32 3  31 4 - 37 4 g/dL Final   • RDW 08/01/2022 14 7  11 6 - 15 1 % Final   • MPV 08/01/2022 10 7  8 9 - 12 7 fL Final   • Platelets 17/95/3505 307  149 - 390 Thousands/uL Final   • nRBC 08/01/2022 0  /100 WBCs Final   • Neutrophils Relative 08/01/2022 64  43 - 75 % Final   • Immat GRANS % 08/01/2022 1  0 - 2 % Final   • Lymphocytes Relative 08/01/2022 24  14 - 44 % Final   • Monocytes Relative 08/01/2022 5  4 - 12 % Final   • Eosinophils Relative 08/01/2022 5  0 - 6 % Final   • Basophils Relative 08/01/2022 1  0 - 1 % Final   • Neutrophils Absolute 08/01/2022 5 96  1 85 - 7 62 Thousands/µL Final   • Immature Grans Absolute 08/01/2022 0 05  0 00 - 0 20 Thousand/uL Final   • Lymphocytes Absolute 08/01/2022 2 23  0 60 - 4 47 Thousands/µL Final   • Monocytes Absolute 08/01/2022 0 41  0 17 - 1 22 Thousand/µL Final   • Eosinophils Absolute 08/01/2022 0 45  0 00 - 0 61 Thousand/µL Final   • Basophils Absolute 08/01/2022 0 06  0 00 - 0 10 Thousands/µL Final   • Total CK 08/01/2022 99  26 - 192 U/L Final   • CRP 08/01/2022 13 4 (A) <3 0 mg/L Final   Consult on 03/09/2022   Component Date Value Ref Range Status   • Hemoglobin A1C 04/05/2022 5 8 (A) 4 8 - 5 6 % Final    Comment:          Prediabetes: 5 7 - 6 4           Diabetes: >6 4           Glycemic control for adults with diabetes: <7 0     • Estimated Average Glucose 04/05/2022 120  mg/dL Final   • Glucose, Random 04/05/2022 79  65 - 99 mg/dL Final   • BUN 04/05/2022 25 (A) 6 - 24 mg/dL Final   • Creatinine 04/05/2022 0 74  0 57 - 1 00 mg/dL Final   • eGFR 04/05/2022 100  >59 mL/min/1 73 Final   • SL AMB BUN/CREATININE RATIO 04/05/2022 34 (A) 9 - 23 Final   • Sodium 04/05/2022 142  134 - 144 mmol/L Final   • Potassium 04/05/2022 4 4  3 5 - 5 2 mmol/L Final   • Chloride 04/05/2022 105  96 - 106 mmol/L Final   • CO2 04/05/2022 23  20 - 29 mmol/L Final   • CALCIUM 04/05/2022 9 8  8 7 - 10 2 mg/dL Final   • Protein, Total 04/05/2022 6 6  6 0 - 8 5 g/dL Final   • Albumin 04/05/2022 4 2  3 8 - 4 8 g/dL Final   • Globulin, Total 04/05/2022 2 4  1 5 - 4 5 g/dL Final   • Albumin/Globulin Ratio 04/05/2022 1 8  1 2 - 2 2 Final   • TOTAL BILIRUBIN 04/05/2022 0 3  0 0 - 1 2 mg/dL Final   • Alk Phos Isoenzymes 04/05/2022 79  44 - 121 IU/L Final   • AST 04/05/2022 13  0 - 40 IU/L Final   • ALT 04/05/2022 21  0 - 32 IU/L Final   • Free t4 04/05/2022 1 62  0 82 - 1 77 ng/dL Final   • TSH 04/05/2022 4 660 (A) 0 450 - 4 500 uIU/mL Final   • Free T3 04/05/2022 2 6  2 0 - 4 4 pg/mL Final   • Thyroglobulin Ab 04/05/2022 <1 0  0 0 - 0 9 IU/mL Final    Thyroglobulin Antibody measured by [facility] Methodology   • THYROID MICROSOMAL ANTIBODY 04/05/2022 303 (A) 0 - 34 IU/mL Final   • TESTOSTERONE TOTAL 04/05/2022 <3 (A) 4 - 50 ng/dL Final   • Testosterone, Free 04/05/2022 0 4  0 0 - 4 2 pg/mL Final   • Creatinine, Urine 04/06/2022 153 0  Not Estab  mg/dL Final   • Creatinine, 24H Ur 04/06/2022 1,224  800 - 1,800 mg/24 hr Final   • Cortisol,F,ug/L,U 04/06/2022 21  Undefined ug/L Final   • Cortisol, 24H Ur 04/06/2022 17  6 - 42 ug/24 hr Final   Orders Only on 12/28/2021   Component Date Value Ref Range Status   • SARS-CoV-2 12/28/2021 Negative  Negative Final

## 2022-11-08 DIAGNOSIS — K21.9 GASTROESOPHAGEAL REFLUX DISEASE WITHOUT ESOPHAGITIS: ICD-10-CM

## 2022-11-08 RX ORDER — FAMOTIDINE 20 MG/1
20 TABLET, FILM COATED ORAL 2 TIMES DAILY
Qty: 180 TABLET | Refills: 0 | Status: SHIPPED | OUTPATIENT
Start: 2022-11-08

## 2022-11-09 DIAGNOSIS — I10 PRIMARY HYPERTENSION: ICD-10-CM

## 2022-11-09 RX ORDER — IRBESARTAN 75 MG/1
75 TABLET ORAL
Qty: 30 TABLET | Refills: 0 | Status: SHIPPED | OUTPATIENT
Start: 2022-11-09

## 2022-11-09 RX ORDER — LOSARTAN POTASSIUM 25 MG/1
25 TABLET ORAL DAILY
Qty: 30 TABLET | Refills: 0 | OUTPATIENT
Start: 2022-11-09

## 2022-11-10 ENCOUNTER — OFFICE VISIT (OUTPATIENT)
Dept: PAIN MEDICINE | Facility: CLINIC | Age: 47
End: 2022-11-10

## 2022-11-10 VITALS
HEART RATE: 80 BPM | BODY MASS INDEX: 41.95 KG/M2 | SYSTOLIC BLOOD PRESSURE: 132 MMHG | HEIGHT: 70 IN | TEMPERATURE: 98.1 F | DIASTOLIC BLOOD PRESSURE: 82 MMHG | WEIGHT: 293 LBS

## 2022-11-10 DIAGNOSIS — G89.29 CHRONIC NONINTRACTABLE HEADACHE, UNSPECIFIED HEADACHE TYPE: ICD-10-CM

## 2022-11-10 DIAGNOSIS — G89.29 CHRONIC RIGHT-SIDED LOW BACK PAIN WITHOUT SCIATICA: ICD-10-CM

## 2022-11-10 DIAGNOSIS — M54.2 NECK PAIN, CHRONIC: ICD-10-CM

## 2022-11-10 DIAGNOSIS — M51.27 HERNIATED NUCLEUS PULPOSUS, L5-S1: ICD-10-CM

## 2022-11-10 DIAGNOSIS — M48.062 SPINAL STENOSIS OF LUMBAR REGION WITH NEUROGENIC CLAUDICATION: ICD-10-CM

## 2022-11-10 DIAGNOSIS — R52 DIFFUSE PAIN: ICD-10-CM

## 2022-11-10 DIAGNOSIS — M79.18 MYOFASCIAL PAIN SYNDROME: ICD-10-CM

## 2022-11-10 DIAGNOSIS — M47.816 LUMBAR SPONDYLOSIS: ICD-10-CM

## 2022-11-10 DIAGNOSIS — G89.29 NECK PAIN, CHRONIC: ICD-10-CM

## 2022-11-10 DIAGNOSIS — G89.4 CHRONIC PAIN SYNDROME: Primary | ICD-10-CM

## 2022-11-10 DIAGNOSIS — R51.9 CHRONIC NONINTRACTABLE HEADACHE, UNSPECIFIED HEADACHE TYPE: ICD-10-CM

## 2022-11-10 DIAGNOSIS — M54.50 CHRONIC RIGHT-SIDED LOW BACK PAIN WITHOUT SCIATICA: ICD-10-CM

## 2022-11-10 RX ORDER — HYDROCODONE BITARTRATE AND ACETAMINOPHEN 5; 325 MG/1; MG/1
TABLET ORAL
Qty: 14 TABLET | Refills: 0 | Status: CANCELLED | OUTPATIENT
Start: 2022-11-10

## 2022-11-10 RX ORDER — DULOXETIN HYDROCHLORIDE 60 MG/1
CAPSULE, DELAYED RELEASE ORAL
Qty: 180 CAPSULE | Refills: 0 | Status: SHIPPED | OUTPATIENT
Start: 2022-11-10

## 2022-11-10 RX ORDER — TIZANIDINE 4 MG/1
TABLET ORAL
Qty: 60 TABLET | Refills: 2 | Status: SHIPPED | OUTPATIENT
Start: 2022-11-10

## 2022-11-10 RX ORDER — PREGABALIN 100 MG/1
CAPSULE ORAL
Qty: 60 CAPSULE | Refills: 2 | Status: SHIPPED | OUTPATIENT
Start: 2022-11-10

## 2022-11-10 NOTE — PROGRESS NOTES
Assessment:  1  Chronic pain syndrome    2  Diffuse pain    3  Neck pain, chronic    4  Chronic right-sided low back pain without sciatica    5  Myofascial pain syndrome    6  Chronic nonintractable headache, unspecified headache type    7  Lumbar spondylosis    8  Herniated nucleus pulposus, L5-S1    9  Spinal stenosis of lumbar region with neurogenic claudication        Plan:  While the patient was in the office today, I did have a thorough conversation with the patient regarding their chronic pain syndrome, symptoms, medication regimen, and treatment plan  I discussed with the patient that at this point in time since she has had 2 recent injections and there is moderate overall improvement in the areas where we performed the injections, for now, I feel it is in her best interest to hold off any repeat injections and see how she does over the next few months  The patient was agreeable and verbalized an understanding  I thoroughly encouraged the patient to follow up with rheumatology and give the Enbrel a chance as I am hoping this will provide relief of all of her pain symptoms or improvement to the point that it makes her pain more tolerable and manageable overall  For now, we decided to continue the Lyrica, Cymbalta, and tizanidine as prescribed for the next 3 months so we can give the Enbrel a chance and at her next office visit we will re-evaluate her medication regimen and pain symptoms and proceed from there as appropriate  The patient was agreeable and verbalized an understanding  The patient will follow-up in 12 weeks for medication prescription refill and reevaluation  The patient was advised to contact the office should their symptoms worsen in the interim  The patient was agreeable and verbalized an understanding  History of Present Illness:     The patient is a 49 y o  female last seen on 9/15/2022 who presents for a follow up office visit in regards to chronic pain syndrome, as the patient's pain has been ongoing for greater than a year, secondary to herniated lumbar discs with spondylosis and stenosis as well as chronic and diffuse cervical and myofascial pain with chronic headaches  The patient currently reports that since her last office visit as she is status post a right sacroiliac joint injection on August 31, 2022 and bilateral shoulder injections on September 5, 2022, both with Dr Cata Wolff there is at least moderate improvement in the right-sided low back pain and shoulder pain, however, she continues to have diffuse pain that seems to change or worsen depending on the day, her activity level, and/or her stress level  The patient reports that since her last office visit she has been having some issues with her blood pressure medications as her doctor had taken her off of the amlodipine as they felt that that could also be adding to some of her weight gain and started her on lisinopril with a diuretic, however, she was also recently in the ER for chest pain and shortness of breath  Since her last office visit the patient did decrease her Lyrica and is down to 100 mg b i d  and although her pain does seem to be worsening, her weight is actually increasing verses decreasing and for now she does not want to decrease the Lyrica any further because she definitely notices a difference with regards to her pain relief since decreasing the Lyrica  The patient has also recently followed up with her rheumatologist who is in the process of getting her approved to start Enbrel for the underlying ankylosing spondylitis  The patient presents today for regular postprocedure and medication follow-up visit  Current pain medications includes:  Lyrica 100 mg b i d , Cymbalta 120 mg at bedtime, and tizanidine 4 mg b i d  p r n  for pain as well as p r n  Norco that she uses on a very sparing as-needed basis and only when the pain is severe    The patient reports that this regimen is providing 40% pain relief  The patient is reporting no side effects from this pain medication regimen  I have personally reviewed and/or updated the patient's past medical history, past surgical history, family history, social history, current medications, allergies, and vital signs today  Review of Systems:    Review of Systems   Respiratory: Negative for shortness of breath  Cardiovascular: Negative for chest pain  Gastrointestinal: Positive for nausea  Negative for constipation, diarrhea and vomiting  Musculoskeletal: Positive for gait problem and joint swelling (joint stiffness)  Negative for arthralgias and myalgias  Skin: Negative for rash  Neurological: Negative for dizziness, seizures and weakness  All other systems reviewed and are negative          Past Medical History:   Diagnosis Date   • Allergic    • Anxiety    • Arthritis    • Asthma    • Chronic pain disorder     upper back   • Depression    • Disc disorder    • Disease of thyroid gland    • Fibromyalgia, primary    • GERD (gastroesophageal reflux disease)    • Headache(784 0)    • HPV (human papilloma virus) infection     cervical   • Hypertension    • Hypothyroidism    • Inflammatory bowel disease    • Migraine    • Obesity        Past Surgical History:   Procedure Laterality Date   • CERVICAL BIOPSY  W/ LOOP ELECTRODE EXCISION     • CERVICAL FUSION  2015   •  SECTION     • CHOLECYSTECTOMY      lap   • FOOT SURGERY Bilateral     multiple, posterior calcaneal bone spur   • KNEE SURGERY Left     arthroscopy with debribement   • LYMPH NODE BIOPSY     • SPINE SURGERY     • TUBAL LIGATION Bilateral        Family History   Adopted: Yes   Problem Relation Age of Onset   • Thyroid disease Mother    • Arthritis Mother         Mom, dad, aunts, grandmother   • Autoimmune disease Mother    • No Known Problems Father    • Coronary artery disease Maternal Grandmother    • Breast cancer Maternal Grandmother         age unknown   • Heart disease Maternal Grandmother         Mother, Kenny Pate, Father   • Coronary artery disease Maternal Aunt    • Stroke Maternal Vivi Brito, brothers   • No Known Problems Maternal Grandfather    • No Known Problems Paternal Grandmother    • No Known Problems Paternal Grandfather    • Suicide Attempts Maternal Aunt    • Heart murmur Maternal Aunt    • Hypothyroidism Maternal Aunt    • Hashimoto's thyroiditis Maternal Aunt    • Diabetes unspecified Brother    • Diabetes Brother    • No Known Problems Brother    • No Known Problems Brother    • ADD / ADHD Son        Social History     Occupational History   • Occupation: [employer]     Comment: [employer] department   Tobacco Use   • Smoking status: Current Every Day Smoker     Packs/day: 0 50     Years: 35 00     Pack years: 17 50     Types: Cigarettes   • Smokeless tobacco: Never Used   • Tobacco comment: actually about 3/4 PPD   Vaping Use   • Vaping Use: Never used   Substance and Sexual Activity   • Alcohol use: Not Currently   • Drug use: No   • Sexual activity: Not Currently     Partners: Male     Birth control/protection: Injection, Female Sterilization         Current Outpatient Medications:   •  albuterol (Ventolin HFA) 90 mcg/act inhaler, Inhale 2 puffs every 6 (six) hours as needed for wheezing, Disp: 18 g, Rfl: 0  •  betamethasone valerate (VALISONE) 0 1 % ointment, Apply 1 to 2 times a day only as needed to finger/hand rash , Disp: 30 g, Rfl: 0  •  Calcium Carbonate (CALCIUM 600 PO), Take by mouth daily, Disp: , Rfl:   •  cholecalciferol (VITAMIN D3) 1,000 units tablet, Take 2,000 Units by mouth daily, Disp: , Rfl:   •  ciclopirox (LOPROX) 0 77 % cream, APPLY 2 TIMES per day to rash on chest for 2-4 weeks  , Disp: 30 g, Rfl: 0  •  DULoxetine (CYMBALTA) 60 mg delayed release capsule, Take 2 PO HS , Disp: 180 capsule, Rfl: 0  •  famotidine (PEPCID) 20 mg tablet, Take 1 tablet (20 mg total) by mouth 2 (two) times a day, Disp: 180 tablet, Rfl: 0  • HYDROcodone-acetaminophen (NORCO) 5-325 mg per tablet, Take 1 PO BID PRN for pain , Disp: 14 tablet, Rfl: 0  •  irbesartan (AVAPRO) 75 mg tablet, Take 1 tablet (75 mg total) by mouth daily at bedtime, Disp: 30 tablet, Rfl: 0  •  levothyroxine (Levoxyl) 200 mcg tablet, Take 1 tablet daily in addition to 25 mcg tablet daily, Disp: 90 tablet, Rfl: 3  •  levothyroxine (Levoxyl) 25 mcg tablet, Take 1 tablet daily in addition to 200 mcg tablet daily, Disp: 90 tablet, Rfl: 3  •  meloxicam (MOBIC) 15 mg tablet, Take 1 tablet (15 mg total) by mouth daily, Disp: 30 tablet, Rfl: 2  •  multivitamin (THERAGRAN) TABS, Take 1 tablet by mouth daily, Disp: , Rfl:   •  ondansetron (Zofran ODT) 4 mg disintegrating tablet, Take 1 tablet (4 mg total) by mouth every 6 (six) hours as needed for nausea or vomiting, Disp: 20 tablet, Rfl: 0  •  Potassium 99 MG TABS, Take by mouth daily, Disp: , Rfl:   •  pregabalin (LYRICA) 100 mg capsule, Take 1 PO BID, Disp: 60 capsule, Rfl: 2  •  tiZANidine (ZANAFLEX) 4 mg tablet, Take 1 PO BID PRN for pain and spasms  , Disp: 60 tablet, Rfl: 2    Allergies   Allergen Reactions   • Acetaminophen-Codeine      Pt states she does not remember having a reaction to this medication   • Hydrocodone Other (See Comments)     GI issues   • Latex Hives     Latex powder     • Lisinopril Other (See Comments)     Itching, gi intollerance     • Prednisolone Vomiting       Physical Exam:    /82 (BP Location: Left arm, Patient Position: Sitting, Cuff Size: Standard)   Pulse 80   Temp 98 1 °F (36 7 °C)   Ht 5' 10" (1 778 m)   Wt (!) 142 kg (312 lb)   LMP 10/29/2022   BMI 44 77 kg/m²     Constitutional:normal, well developed, well nourished, alert, in no distress and non-toxic and no overt pain behavior   and obese  Eyes:anicteric  HEENT:grossly intact  Neck:supple, symmetric, trachea midline and no masses   Pulmonary:even and unlabored  Cardiovascular:No edema or pitting edema present  Skin:Normal without rashes or lesions and well hydrated  Psychiatric:Mood and affect appropriate  Neurologic:Cranial Nerves II-XII grossly intact  Musculoskeletal:The patient's gait is slightly antalgic, but steady without the use of any assistive devices  Imaging  No orders to display         No orders of the defined types were placed in this encounter

## 2022-11-14 ENCOUNTER — OFFICE VISIT (OUTPATIENT)
Dept: FAMILY MEDICINE CLINIC | Facility: HOSPITAL | Age: 47
End: 2022-11-14

## 2022-11-14 VITALS
BODY MASS INDEX: 41.95 KG/M2 | TEMPERATURE: 96.6 F | SYSTOLIC BLOOD PRESSURE: 138 MMHG | HEIGHT: 70 IN | DIASTOLIC BLOOD PRESSURE: 82 MMHG | HEART RATE: 93 BPM | WEIGHT: 293 LBS

## 2022-11-14 DIAGNOSIS — M47.819 SPONDYLO-ARTHROPATHY: ICD-10-CM

## 2022-11-14 DIAGNOSIS — I10 PRIMARY HYPERTENSION: Primary | ICD-10-CM

## 2022-11-14 DIAGNOSIS — E06.3 HYPOTHYROIDISM DUE TO HASHIMOTO'S THYROIDITIS: ICD-10-CM

## 2022-11-14 DIAGNOSIS — G47.10 HYPERSOMNOLENCE: ICD-10-CM

## 2022-11-14 DIAGNOSIS — E03.8 HYPOTHYROIDISM DUE TO HASHIMOTO'S THYROIDITIS: ICD-10-CM

## 2022-11-14 NOTE — PROGRESS NOTES
Name: Hannah Mcarthur      : 1975      MRN: [id number]  Encounter Provider: Marin Renee MD  Encounter Date: 2022   Encounter department: [facility]      1  Primary hypertension    2  Hypersomnolence  -     Home Study; Future    3  Hypothyroidism due to Hashimoto's thyroiditis    4  Spondylo-arthropathy     HTN  Currently on irbesartan 75 mg daily  Continue with salt restriction  Monitor  bp has improved  Hypersomnolence  Concern for NAYE  Sleep study to be done  Fu  In 3 months of if sleep study shows naye  Spondyloarthropathy  Continue fu with rheumatology  Subjective      Pt ehre for fu  Did not do well with lisinopril /hctz  Changed to irbesartan  Doing well  BP in the 130s today  Has been on this for 3 days  No lightheadedness, no dizziness  No chest pain, no sob  Review of Systems   Constitutional: Negative  Negative for activity change, appetite change, chills, diaphoresis, fatigue and fever  HENT: Negative for congestion, facial swelling and sore throat  Respiratory: Negative  Negative for apnea, cough, chest tightness and shortness of breath  Cardiovascular: Negative  Negative for chest pain and palpitations  Gastrointestinal: Negative  Negative for abdominal distention, abdominal pain, blood in stool, constipation, diarrhea and nausea  Genitourinary: Negative  Negative for difficulty urinating, dysuria, flank pain and frequency  Current Outpatient Medications on File Prior to Visit   Medication Sig   • albuterol (Ventolin HFA) 90 mcg/act inhaler Inhale 2 puffs every 6 (six) hours as needed for wheezing   • betamethasone valerate (VALISONE) 0 1 % ointment Apply 1 to 2 times a day only as needed to finger/hand rash     • Calcium Carbonate (CALCIUM 600 PO) Take by mouth daily   • cholecalciferol (VITAMIN D3) 1,000 units tablet Take 2,000 Units by mouth daily   • ciclopirox (LOPROX) 0 77 % cream APPLY 2 TIMES per day to rash on chest for 2-4 weeks  • DULoxetine (CYMBALTA) 60 mg delayed release capsule Take 2 PO HS    • Etanercept (ENBREL SC) Inject under the skin   • famotidine (PEPCID) 20 mg tablet Take 1 tablet (20 mg total) by mouth 2 (two) times a day   • HYDROcodone-acetaminophen (NORCO) 5-325 mg per tablet Take 1 PO BID PRN for pain  • irbesartan (AVAPRO) 75 mg tablet Take 1 tablet (75 mg total) by mouth daily at bedtime   • levothyroxine (Levoxyl) 200 mcg tablet Take 1 tablet daily in addition to 25 mcg tablet daily   • levothyroxine (Levoxyl) 25 mcg tablet Take 1 tablet daily in addition to 200 mcg tablet daily   • meloxicam (MOBIC) 15 mg tablet Take 1 tablet (15 mg total) by mouth daily   • multivitamin (THERAGRAN) TABS Take 1 tablet by mouth daily   • ondansetron (Zofran ODT) 4 mg disintegrating tablet Take 1 tablet (4 mg total) by mouth every 6 (six) hours as needed for nausea or vomiting   • Potassium 99 MG TABS Take by mouth daily   • pregabalin (LYRICA) 100 mg capsule Take 1 PO BID   • tiZANidine (ZANAFLEX) 4 mg tablet Take 1 PO BID PRN for pain and spasms  Objective     /82   Pulse 93   Temp (!) 96 6 °F (35 9 °C)   Ht 5' 10" (1 778 m)   Wt (!) 144 kg (316 lb 9 6 oz)   LMP 10/29/2022   BMI 45 43 kg/m²     Physical Exam  Vitals and nursing note reviewed  Constitutional:       Appearance: She is well-developed  HENT:      Head: Normocephalic and atraumatic  Right Ear: External ear normal       Left Ear: External ear normal       Nose: Nose normal       Mouth/Throat:      Comments: Crowded oropharynx  44 cm neck diameter  Eyes:      Conjunctiva/sclera: Conjunctivae normal       Pupils: Pupils are equal, round, and reactive to light  Neck:      Thyroid: No thyromegaly  Trachea: No tracheal deviation  Cardiovascular:      Rate and Rhythm: Normal rate and regular rhythm  Heart sounds: Normal heart sounds   No murmur heard   Pulmonary:      Effort: Pulmonary effort is normal  No respiratory distress  Breath sounds: Normal breath sounds  No wheezing  Abdominal:      General: Bowel sounds are normal       Palpations: Abdomen is soft  Musculoskeletal:         General: Normal range of motion  Cervical back: Normal range of motion and neck supple  Skin:     General: Skin is warm and dry  Capillary Refill: Capillary refill takes less than 2 seconds  Neurological:      General: No focal deficit present  Mental Status: She is oriented to person, place, and time     Psychiatric:         Mood and Affect: Mood normal          Behavior: Behavior normal         Jeromy Mccray MD

## 2022-11-14 NOTE — PROGRESS NOTES
11/14/22 0800   Stop Bang    Do you snore loudly (louder than talking or loud enough to be heard through closed doors)? 1- Yes   Do you often feel tired, fatigued, or sleepy during daytime? 1- Yes   Has anyone observed you stop breathing during your sleep? 0- No   Do you have or are you being treated for High Blood Pressure? 1- Yes   Body Mass Index greater than 35 kg/m2? 1- Yes   Age over 53yr old? 0- No   Neck circumference greater than 40cm? 1- Yes   Are you male?   0- No   Score 5

## 2022-11-17 ENCOUNTER — TELEPHONE (OUTPATIENT)
Dept: SLEEP CENTER | Facility: CLINIC | Age: 47
End: 2022-11-17

## 2022-11-17 NOTE — TELEPHONE ENCOUNTER
Patient's insurance does not cover home sleep studies  Please place an order for a diagnostic sleep study (SLE3)  82

## 2022-12-01 DIAGNOSIS — R51.9 CHRONIC NONINTRACTABLE HEADACHE, UNSPECIFIED HEADACHE TYPE: ICD-10-CM

## 2022-12-01 DIAGNOSIS — G47.10 HYPERSOMNOLENCE: Primary | ICD-10-CM

## 2022-12-01 DIAGNOSIS — G89.29 CHRONIC NONINTRACTABLE HEADACHE, UNSPECIFIED HEADACHE TYPE: ICD-10-CM

## 2022-12-01 DIAGNOSIS — R53.82 CHRONIC FATIGUE: ICD-10-CM

## 2022-12-06 DIAGNOSIS — M19.011 PRIMARY OSTEOARTHRITIS OF BOTH SHOULDERS: Primary | ICD-10-CM

## 2022-12-06 DIAGNOSIS — I10 PRIMARY HYPERTENSION: ICD-10-CM

## 2022-12-06 DIAGNOSIS — M19.012 PRIMARY OSTEOARTHRITIS OF BOTH SHOULDERS: Primary | ICD-10-CM

## 2022-12-06 DIAGNOSIS — M25.511 CHRONIC RIGHT SHOULDER PAIN: ICD-10-CM

## 2022-12-06 DIAGNOSIS — G89.29 CHRONIC RIGHT SHOULDER PAIN: ICD-10-CM

## 2022-12-06 RX ORDER — IRBESARTAN 75 MG/1
75 TABLET ORAL
Qty: 30 TABLET | Refills: 0 | Status: SHIPPED | OUTPATIENT
Start: 2022-12-06 | End: 2022-12-13 | Stop reason: SDUPTHER

## 2022-12-09 ENCOUNTER — TELEPHONE (OUTPATIENT)
Dept: OBGYN CLINIC | Facility: HOSPITAL | Age: 47
End: 2022-12-09

## 2022-12-09 ENCOUNTER — TELEPHONE (OUTPATIENT)
Dept: RHEUMATOLOGY | Facility: CLINIC | Age: 47
End: 2022-12-09

## 2022-12-09 NOTE — TELEPHONE ENCOUNTER
Caller: mita    Doctor: Bing Jaramillo    Reason for call: the prior auth for emrel short click 50 mg approved please send valid rx to [facility]   SKK:[phone]  Attn:[email or web address]    Call back#: n/a

## 2022-12-12 DIAGNOSIS — M47.819 SPONDYLO-ARTHROPATHY: Primary | ICD-10-CM

## 2022-12-12 NOTE — TELEPHONE ENCOUNTER
Caller: Nacho([facility])    Doctor: Dr Char Rojas    Reason for call: Needs a new script for enbrel    Call back#: [phone]

## 2022-12-13 DIAGNOSIS — I10 PRIMARY HYPERTENSION: ICD-10-CM

## 2022-12-13 RX ORDER — IRBESARTAN 75 MG/1
75 TABLET ORAL
Qty: 30 TABLET | Refills: 0 | Status: SHIPPED | OUTPATIENT
Start: 2022-12-13

## 2022-12-20 ENCOUNTER — TELEPHONE (OUTPATIENT)
Dept: PAIN MEDICINE | Facility: CLINIC | Age: 47
End: 2022-12-20

## 2022-12-20 NOTE — TELEPHONE ENCOUNTER
JOSHUAM to let Pt know that Gail Wilson wrote a work note per Pt request was returned to sender    Address on file is listed as [place] which only uses [address] not physical addresses for mail    Asked Pt to return call to update address

## 2022-12-23 ENCOUNTER — TELEPHONE (OUTPATIENT)
Dept: ENDOCRINOLOGY | Facility: HOSPITAL | Age: 47
End: 2022-12-23

## 2022-12-23 NOTE — TELEPHONE ENCOUNTER
Patient was last seen 9-6-22 and follow up was to be determined based on a fasting insulin level  There was a fasting glucose done on 9-15-22  Called patient to see if she had the fasting insulin done and she thinks she did  Is it possible that the lab ran the wrong test & we need to have it redone? Let patient know

## 2022-12-27 NOTE — TELEPHONE ENCOUNTER
Suspect that the lab did not do the results I would call the lab to make sure  If not, then we have the blood work rechecked but she is also due for follow-up  I would have her get blood work done consisting of a TSH, free T4, free T3, fasting insulin level and glucose

## 2022-12-27 NOTE — TELEPHONE ENCOUNTER
I called the lab and they stated we ordered a fasting glucose so the wrong test was done  I got her scheduled but she needs a later appointment with you so I have her in 4/27/23  Do you want her to get labs done now and then again in April?

## 2022-12-28 ENCOUNTER — HOSPITAL ENCOUNTER (OUTPATIENT)
Dept: RADIOLOGY | Facility: CLINIC | Age: 47
Discharge: HOME | End: 2022-12-28

## 2022-12-28 VITALS
TEMPERATURE: 97.5 F | HEART RATE: 87 BPM | OXYGEN SATURATION: 97 % | SYSTOLIC BLOOD PRESSURE: 119 MMHG | RESPIRATION RATE: 18 BRPM | DIASTOLIC BLOOD PRESSURE: 79 MMHG

## 2022-12-28 DIAGNOSIS — M19.011 PRIMARY OSTEOARTHRITIS OF BOTH SHOULDERS: ICD-10-CM

## 2022-12-28 DIAGNOSIS — G89.29 CHRONIC RIGHT SHOULDER PAIN: ICD-10-CM

## 2022-12-28 DIAGNOSIS — E03.8 HYPOTHYROIDISM DUE TO HASHIMOTO'S THYROIDITIS: Primary | ICD-10-CM

## 2022-12-28 DIAGNOSIS — M19.012 PRIMARY OSTEOARTHRITIS OF BOTH SHOULDERS: ICD-10-CM

## 2022-12-28 DIAGNOSIS — E06.3 HYPOTHYROIDISM DUE TO HASHIMOTO'S THYROIDITIS: Primary | ICD-10-CM

## 2022-12-28 DIAGNOSIS — M25.511 CHRONIC RIGHT SHOULDER PAIN: ICD-10-CM

## 2022-12-28 RX ORDER — METHYLPREDNISOLONE ACETATE 80 MG/ML
80 INJECTION, SUSPENSION INTRA-ARTICULAR; INTRALESIONAL; INTRAMUSCULAR; PARENTERAL; SOFT TISSUE ONCE
Status: COMPLETED | OUTPATIENT
Start: 2022-12-28 | End: 2022-12-28

## 2022-12-28 RX ADMIN — METHYLPREDNISOLONE ACETATE 80 MG: 80 INJECTION, SUSPENSION INTRA-ARTICULAR; INTRALESIONAL; INTRAMUSCULAR; SOFT TISSUE at 13:32

## 2022-12-28 RX ADMIN — IOHEXOL 1 ML: 300 INJECTION, SOLUTION INTRAVENOUS at 13:31

## 2022-12-28 RX ADMIN — LIDOCAINE HYDROCHLORIDE 2 ML: 20 INJECTION, SOLUTION EPIDURAL; INFILTRATION; INTRACAUDAL at 13:32

## 2022-12-28 NOTE — H&P
History of Present Illness: The patient is a 49 y o  female who presents with complaints of shoulder pain  Past Medical History:   Diagnosis Date   • Allergic    • Anxiety    • Arthritis    • Asthma    • Chronic pain disorder     upper back   • Depression    • Disc disorder    • Disease of thyroid gland    • Fibromyalgia, primary    • GERD (gastroesophageal reflux disease)    • Headache(784 0)    • HPV (human papilloma virus) infection     cervical   • Hypertension    • Hypothyroidism    • Inflammatory bowel disease    • Migraine    • Obesity        Past Surgical History:   Procedure Laterality Date   • CERVICAL BIOPSY  W/ LOOP ELECTRODE EXCISION     • CERVICAL FUSION  2015   •  SECTION     • CHOLECYSTECTOMY      lap   • FOOT SURGERY Bilateral     multiple, posterior calcaneal bone spur   • KNEE SURGERY Left     arthroscopy with debribement   • LYMPH NODE BIOPSY     • SPINE SURGERY     • TUBAL LIGATION Bilateral          Current Outpatient Medications:   •  albuterol (Ventolin HFA) 90 mcg/act inhaler, Inhale 2 puffs every 6 (six) hours as needed for wheezing, Disp: 18 g, Rfl: 0  •  betamethasone valerate (VALISONE) 0 1 % ointment, Apply 1 to 2 times a day only as needed to finger/hand rash , Disp: 30 g, Rfl: 0  •  Calcium Carbonate (CALCIUM 600 PO), Take by mouth daily, Disp: , Rfl:   •  cholecalciferol (VITAMIN D3) 1,000 units tablet, Take 2,000 Units by mouth daily, Disp: , Rfl:   •  ciclopirox (LOPROX) 0 77 % cream, APPLY 2 TIMES per day to rash on chest for 2-4 weeks  , Disp: 30 g, Rfl: 0  •  DULoxetine (CYMBALTA) 60 mg delayed release capsule, Take 2 PO HS , Disp: 180 capsule, Rfl: 0  •  etanercept (ENBREL SURECLICK) 50 MG/ML injection, Inject 1 mL (50 mg total) under the skin every 7 days, Disp: 4 mL, Rfl: 5  •  famotidine (PEPCID) 20 mg tablet, Take 1 tablet (20 mg total) by mouth 2 (two) times a day, Disp: 180 tablet, Rfl: 0  •  HYDROcodone-acetaminophen (NORCO) 5-325 mg per tablet, Take 1 PO BID PRN for pain , Disp: 14 tablet, Rfl: 0  •  irbesartan (AVAPRO) 75 mg tablet, Take 1 tablet (75 mg total) by mouth daily at bedtime, Disp: 30 tablet, Rfl: 0  •  levothyroxine (Levoxyl) 200 mcg tablet, Take 1 tablet daily in addition to 25 mcg tablet daily, Disp: 90 tablet, Rfl: 3  •  levothyroxine (Levoxyl) 25 mcg tablet, Take 1 tablet daily in addition to 200 mcg tablet daily, Disp: 90 tablet, Rfl: 3  •  meloxicam (MOBIC) 15 mg tablet, Take 1 tablet (15 mg total) by mouth daily, Disp: 30 tablet, Rfl: 2  •  multivitamin (THERAGRAN) TABS, Take 1 tablet by mouth daily, Disp: , Rfl:   •  ondansetron (Zofran ODT) 4 mg disintegrating tablet, Take 1 tablet (4 mg total) by mouth every 6 (six) hours as needed for nausea or vomiting, Disp: 20 tablet, Rfl: 0  •  Potassium 99 MG TABS, Take by mouth daily, Disp: , Rfl:   •  pregabalin (LYRICA) 100 mg capsule, Take 1 PO BID, Disp: 60 capsule, Rfl: 2  •  tiZANidine (ZANAFLEX) 4 mg tablet, Take 1 PO BID PRN for pain and spasms  , Disp: 60 tablet, Rfl: 2    Current Facility-Administered Medications:   •  iohexol (OMNIPAQUE) 300 mg/mL injection 50 mL, 50 mL, Intra-articular, Once, Edmond Morrow DO  •  lidocaine (PF) (XYLOCAINE-MPF) 2 % injection 5 mL, 5 mL, Intra-articular, Once, Edmond Morrow DO  •  methylPREDNISolone acetate (DEPO-MEDROL) injection 80 mg, 80 mg, Intra-articular, Once, Edmond Morrow,     Allergies   Allergen Reactions   • Acetaminophen-Codeine      Pt states she does not remember having a reaction to this medication   • Hydrocodone Other (See Comments)     GI issues   • Latex Hives     Latex powder     • Lisinopril Other (See Comments)     Itching, gi intollerance     • Prednisolone Vomiting       Physical Exam:   Vitals:    12/28/22 1320   BP: 131/80   Pulse: 88   Resp: 18   Temp: 97 5 °F (36 4 °C)   SpO2: 95%     General: Awake, Alert, Oriented x 3, Mood and affect appropriate  Respiratory: Respirations even and unlabored  Cardiovascular: Peripheral pulses intact; no edema  Musculoskeletal Exam: Decreased range of motion right shoulder    ASA Score: III    Patient/Chart Verification  Patient ID Verified: Verbal  ID Band Applied: No  Consents Confirmed: Procedural, To be obtained in the Pre-Procedure area  H&P( within 30 days) Verified: To be obtained in the Pre-Procedure area  Allergies Reviewed: Yes  Anticoag/NSAID held?: NA  Currently on antibiotics?: No  Pregnancy denied?: Yes    Assessment:   1  Primary osteoarthritis of both shoulders    2   Chronic right shoulder pain        Plan: Right Shoulder Injection

## 2022-12-28 NOTE — DISCHARGE INSTRUCTIONS
Steroid Joint Injection   WHAT YOU NEED TO KNOW:   A steroid joint injection is a procedure to inject steroid medicine into a joint  Steroid medicine decreases pain and inflammation  The injection may also contain an anesthetic (numbing medicine) to decrease pain  It may be done to treat conditions such as arthritis, gout, or carpal tunnel syndrome  The injections may be given in your knee, ankle, shoulder, elbow, wrist, ankle or sacroiliac joint  Do not apply heat to any area that is numb  If you have discomfort or soreness at the injection site, you may apply ice today, 20 minutes on and 20 minutes off  Tomorrow you may use ice or warm, moist heat  Do not apply ice or heat directly to the skin  You may have an increase or change in the discomfort for 36-48 hours after your treatment  Apply ice and continue with any pain medicine you have been prescribed  Do not do anything strenuous today  You may shower, but no tub baths or hot tubs today  You may resume your normal activities tomorrow, but do not “overdo it”  Resume normal activities slowly when you are feeling better  If you experience redness, drainage or swelling at the injection site, or if you develop a fever above 100 degrees, please call [facility] at [phone] or go to the Emergency Room  Continue to take all routine medicines prescribed by your primary care physician unless otherwise instructed by our staff  Most blood thinners should be started again according to your regularly scheduled dosing  If you have any questions, please give our office a call  As no general anesthesia was used in today's procedure, you should not experience any side effects related to anesthesia  If you are diabetic, the steroids used in today's injection may temporarily increase your blood sugar levels after the first few days after your injection   Please keep a close eye on your sugars and alert the doctor who manages your diabetes if your sugars are significantly high from your baseline or you are symptomatic  If you have a problem specifically related to your procedure, please call our office at [phone]  Problems not related to your procedure should be directed to your primary care physician

## 2022-12-30 ENCOUNTER — TELEPHONE (OUTPATIENT)
Dept: OBGYN CLINIC | Facility: CLINIC | Age: 47
End: 2022-12-30

## 2022-12-30 NOTE — TELEPHONE ENCOUNTER
Caller: Patient     Doctor: Nellie Vasquez // [facility]     Reason for call: Patient would like to schedule a f/u with Dr Mac for bilat carpal tunnel in [facility]     Call back#: [phone]

## 2023-01-04 ENCOUNTER — TELEPHONE (OUTPATIENT)
Dept: PAIN MEDICINE | Facility: CLINIC | Age: 48
End: 2023-01-04

## 2023-01-14 DIAGNOSIS — I10 PRIMARY HYPERTENSION: ICD-10-CM

## 2023-01-16 RX ORDER — IRBESARTAN 75 MG/1
75 TABLET ORAL
Qty: 30 TABLET | Refills: 0 | Status: SHIPPED | OUTPATIENT
Start: 2023-01-16 | End: 2023-01-26 | Stop reason: SDUPTHER

## 2023-01-24 DIAGNOSIS — M79.89 LEG SWELLING: ICD-10-CM

## 2023-01-24 DIAGNOSIS — I10 PRIMARY HYPERTENSION: ICD-10-CM

## 2023-01-24 RX ORDER — LISINOPRIL AND HYDROCHLOROTHIAZIDE 12.5; 1 MG/1; MG/1
1 TABLET ORAL DAILY
Qty: 90 TABLET | Refills: 0 | Status: SHIPPED | OUTPATIENT
Start: 2023-01-24 | End: 2023-01-26

## 2023-01-26 ENCOUNTER — OFFICE VISIT (OUTPATIENT)
Dept: FAMILY MEDICINE CLINIC | Facility: HOSPITAL | Age: 48
End: 2023-01-26

## 2023-01-26 VITALS
SYSTOLIC BLOOD PRESSURE: 138 MMHG | WEIGHT: 293 LBS | HEIGHT: 70 IN | DIASTOLIC BLOOD PRESSURE: 80 MMHG | BODY MASS INDEX: 41.95 KG/M2 | TEMPERATURE: 96.9 F | HEART RATE: 84 BPM

## 2023-01-26 DIAGNOSIS — I10 PRIMARY HYPERTENSION: ICD-10-CM

## 2023-01-26 DIAGNOSIS — M47.819 SPONDYLO-ARTHROPATHY: Primary | ICD-10-CM

## 2023-01-26 RX ORDER — IRBESARTAN 150 MG/1
150 TABLET ORAL
Qty: 90 TABLET | Refills: 1 | Status: SHIPPED | OUTPATIENT
Start: 2023-01-26

## 2023-01-26 NOTE — PROGRESS NOTES
Name: Latonya Chambers      : 1975      MRN: [id number]  Encounter Provider: Terrance Webster MD  Encounter Date: 2023   Encounter department: [facility]      1  Spondylo-arthropathy    2  Primary hypertension  -     irbesartan (AVAPRO) 150 mg tablet; Take 1 tablet (150 mg total) by mouth daily at bedtime  htn  Stable but not optimal    Increase avapro to 150 mg daily  Monitor BP and se/ar  New rx sent  Work on dietary control and physical activity  She will be scheduling for a sleep study  Spondyloarthropathy/AS  Doing well on Embrel  Hopeful to get off some of the pain medication  Discussed due to HTN and GERD/Gi sytmpoms I would try to get off nsaids first      Fu in 6 months  Sooner once sleep study results are avaialble  Subjective      Pt here for fu of HTN  Was started on lisinopril hctz  However did not feel well on this  Had nausea, dizziness  Switched to irbesartan 75 mg daily  She is feeling well on this  No sob, no chest pain, no headaches  No lightheadedness  AS  Ongoing fu with Rheumatology  Doing well with Embrel at this time  Was not able to schedule sleep study  Review of Systems   Constitutional: Negative  Negative for activity change, appetite change, chills, diaphoresis, fatigue and fever  HENT: Negative for congestion, facial swelling and sore throat  Respiratory: Negative  Negative for apnea, cough, chest tightness and shortness of breath  Cardiovascular: Negative  Negative for chest pain and palpitations  Gastrointestinal: Negative  Negative for abdominal distention, abdominal pain, blood in stool, constipation, diarrhea and nausea  Genitourinary: Negative  Negative for difficulty urinating, dysuria, flank pain and frequency         Current Outpatient Medications on File Prior to Visit   Medication Sig   • albuterol (Ventolin HFA) 90 mcg/act inhaler Inhale 2 puffs every 6 (six) hours as needed for wheezing   • betamethasone valerate (VALISONE) 0 1 % ointment Apply 1 to 2 times a day only as needed to finger/hand rash  • Calcium Carbonate (CALCIUM 600 PO) Take by mouth daily   • cholecalciferol (VITAMIN D3) 1,000 units tablet Take 2,000 Units by mouth daily   • ciclopirox (LOPROX) 0 77 % cream APPLY 2 TIMES per day to rash on chest for 2-4 weeks  • DULoxetine (CYMBALTA) 60 mg delayed release capsule Take 2 PO HS  • etanercept (ENBREL SURECLICK) 50 MG/ML injection Inject 1 mL (50 mg total) under the skin every 7 days   • famotidine (PEPCID) 20 mg tablet Take 1 tablet (20 mg total) by mouth 2 (two) times a day   • HYDROcodone-acetaminophen (NORCO) 5-325 mg per tablet Take 1 PO BID PRN for pain  • levothyroxine (Levoxyl) 200 mcg tablet Take 1 tablet daily in addition to 25 mcg tablet daily   • levothyroxine (Levoxyl) 25 mcg tablet Take 1 tablet daily in addition to 200 mcg tablet daily   • meloxicam (MOBIC) 15 mg tablet Take 1 tablet (15 mg total) by mouth daily   • multivitamin (THERAGRAN) TABS Take 1 tablet by mouth daily   • ondansetron (Zofran ODT) 4 mg disintegrating tablet Take 1 tablet (4 mg total) by mouth every 6 (six) hours as needed for nausea or vomiting   • Potassium 99 MG TABS Take by mouth daily   • pregabalin (LYRICA) 100 mg capsule Take 1 PO BID   • tiZANidine (ZANAFLEX) 4 mg tablet Take 1 PO BID PRN for pain and spasms  • [DISCONTINUED] irbesartan (AVAPRO) 75 mg tablet Take 1 tablet (75 mg total) by mouth daily at bedtime   • [DISCONTINUED] lisinopril-hydrochlorothiazide (PRINZIDE,ZESTORETIC) 10-12 5 MG per tablet Take 1 tablet by mouth daily (Patient not taking: Reported on 1/26/2023)       Objective     /80   Pulse 84   Temp (!) 96 9 °F (36 1 °C)   Ht 5' 10" (1 778 m)   Wt (!) 144 kg (316 lb 12 8 oz)   BMI 45 46 kg/m²     Physical Exam  Vitals and nursing note reviewed     Constitutional:       Appearance: She is well-developed  She is obese  HENT:      Head: Normocephalic and atraumatic  Right Ear: External ear normal       Left Ear: External ear normal       Nose: Nose normal    Eyes:      Conjunctiva/sclera: Conjunctivae normal       Pupils: Pupils are equal, round, and reactive to light  Neck:      Thyroid: No thyromegaly  Trachea: No tracheal deviation  Cardiovascular:      Rate and Rhythm: Normal rate and regular rhythm  Heart sounds: Normal heart sounds  No murmur heard  Pulmonary:      Effort: Pulmonary effort is normal  No respiratory distress  Breath sounds: Normal breath sounds  No wheezing  Abdominal:      General: Bowel sounds are normal       Palpations: Abdomen is soft  Musculoskeletal:         General: Normal range of motion  Cervical back: Normal range of motion and neck supple  Skin:     General: Skin is warm and dry  Neurological:      Mental Status: She is alert and oriented to person, place, and time         Анна Malloy MD

## 2023-01-27 DIAGNOSIS — K21.9 GASTROESOPHAGEAL REFLUX DISEASE WITHOUT ESOPHAGITIS: Primary | ICD-10-CM

## 2023-01-27 RX ORDER — METFORMIN HYDROCHLORIDE 500 MG/1
1000 TABLET, EXTENDED RELEASE ORAL
Qty: 180 TABLET | Refills: 1 | Status: SHIPPED | OUTPATIENT
Start: 2023-01-27 | End: 2023-01-27

## 2023-01-30 ENCOUNTER — TELEPHONE (OUTPATIENT)
Dept: FAMILY MEDICINE CLINIC | Facility: HOSPITAL | Age: 48
End: 2023-01-30

## 2023-01-30 NOTE — TELEPHONE ENCOUNTER
Pt states she picked up meds from [facility] on Friday and got a bottle of Metformin    She does not recall discussing this with the dr nor does it show on her med list   Pt can be reached at [phone]

## 2023-01-30 NOTE — TELEPHONE ENCOUNTER
Med was sent to pharmacy in error  Advised pt to contact pharmacy to see what she should do about getting rid of med

## 2023-02-02 ENCOUNTER — OFFICE VISIT (OUTPATIENT)
Dept: PAIN MEDICINE | Facility: CLINIC | Age: 48
End: 2023-02-02

## 2023-02-02 VITALS
SYSTOLIC BLOOD PRESSURE: 134 MMHG | WEIGHT: 293 LBS | HEART RATE: 83 BPM | BODY MASS INDEX: 41.95 KG/M2 | DIASTOLIC BLOOD PRESSURE: 80 MMHG | HEIGHT: 70 IN | TEMPERATURE: 97.7 F

## 2023-02-02 DIAGNOSIS — G89.29 NECK PAIN, CHRONIC: ICD-10-CM

## 2023-02-02 DIAGNOSIS — G89.29 CHRONIC NONINTRACTABLE HEADACHE, UNSPECIFIED HEADACHE TYPE: ICD-10-CM

## 2023-02-02 DIAGNOSIS — M51.27 HERNIATED NUCLEUS PULPOSUS, L5-S1: ICD-10-CM

## 2023-02-02 DIAGNOSIS — G89.4 CHRONIC PAIN SYNDROME: Primary | ICD-10-CM

## 2023-02-02 DIAGNOSIS — M79.18 MYOFASCIAL PAIN SYNDROME: ICD-10-CM

## 2023-02-02 DIAGNOSIS — M48.062 SPINAL STENOSIS OF LUMBAR REGION WITH NEUROGENIC CLAUDICATION: ICD-10-CM

## 2023-02-02 DIAGNOSIS — G89.29 CHRONIC RIGHT-SIDED LOW BACK PAIN WITHOUT SCIATICA: ICD-10-CM

## 2023-02-02 DIAGNOSIS — M54.2 NECK PAIN, CHRONIC: ICD-10-CM

## 2023-02-02 DIAGNOSIS — M47.816 LUMBAR SPONDYLOSIS: ICD-10-CM

## 2023-02-02 DIAGNOSIS — R52 DIFFUSE PAIN: ICD-10-CM

## 2023-02-02 DIAGNOSIS — M54.50 CHRONIC RIGHT-SIDED LOW BACK PAIN WITHOUT SCIATICA: ICD-10-CM

## 2023-02-02 DIAGNOSIS — R51.9 CHRONIC NONINTRACTABLE HEADACHE, UNSPECIFIED HEADACHE TYPE: ICD-10-CM

## 2023-02-02 RX ORDER — PREGABALIN 100 MG/1
CAPSULE ORAL
Qty: 60 CAPSULE | Refills: 2 | Status: SHIPPED | OUTPATIENT
Start: 2023-02-02

## 2023-02-02 RX ORDER — TIZANIDINE 4 MG/1
TABLET ORAL
Qty: 60 TABLET | Refills: 2 | Status: SHIPPED | OUTPATIENT
Start: 2023-02-02

## 2023-02-02 RX ORDER — DULOXETIN HYDROCHLORIDE 60 MG/1
CAPSULE, DELAYED RELEASE ORAL
Qty: 180 CAPSULE | Refills: 0 | Status: SHIPPED | OUTPATIENT
Start: 2023-02-02

## 2023-02-02 RX ORDER — HYDROCODONE BITARTRATE AND ACETAMINOPHEN 5; 325 MG/1; MG/1
TABLET ORAL
Qty: 14 TABLET | Refills: 0 | Status: CANCELLED | OUTPATIENT
Start: 2023-02-02

## 2023-02-02 NOTE — PROGRESS NOTES
Assessment:  1  Chronic pain syndrome    2  Diffuse pain    3  Neck pain, chronic    4  Chronic right-sided low back pain without sciatica    5  Chronic nonintractable headache, unspecified headache type    6  Myofascial pain syndrome    7  Lumbar spondylosis    8  Herniated nucleus pulposus, L5-S1    9  Spinal stenosis of lumbar region with neurogenic claudication        Plan:  While the patient was in the office today, I did have a thorough conversation with the patient regarding their chronic pain syndrome, symptoms, and medication regimen/treatment plan  I discussed with the patient that at this point in time we will hold off any repeat injections or procedures for now and see how she does over the next 3 months  I explained the patient that the hope is that giving the Enbrel more time overall all of her symptoms will start to level off, with the understanding that with weather changes, stress, tension, and any external factors that could increase her pain as her are going to be good days and bad days  The patient was agreeable and verbalized an understanding  With regards to her medication regimen, since she is noting moderate and stable relief of her chronic pain symptoms without any significant side effects or issues, she will continue the Cymbalta, Lyrica, and tizanidine as prescribed and can continue to use the as needed Norco on a very sparing as needed basis as we did not send any prescription today as she did not need it  The patient was agreeable and verbalized an understanding  [facility] Prescription Drug Monitoring Program report was reviewed and was appropriate     The patient will follow-up in 12 weeks for medication prescription refill and reevaluation  The patient was advised to contact the office should their symptoms worsen in the interim  The patient was agreeable and verbalized an understanding  History of Present Illness:     The patient is a 49 y o  female last seen on 12/28/2022 who presents for a follow up office visit in regards to chronic pain syndrome, as the patient's pain has been ongoing for greater than a year, secondary to herniated lumbar disc with spondylosis and stenosis as well as diffuse myofascial pain and chronic neck and shoulder pain  The patient currently reports that since her last office visit as she is status post her right shoulder injection on December 28, 2022 that there is significant improvement in her shoulder pain  She also reports that since her last office visit she had started on the weekly Enbrel injections and initially felt there is definite improvement in her pain symptoms, especially her low back and leg pain symptoms, however, over the past week or so, without any trauma or injury she has noted a return of some of her low back and leg pain symptoms but feels that maybe she needs to get the Enbrel more time and overall still feels that her pain is much more manageable than it was prior to coming to our office  The patient presents today for regular medication follow-up visit  Current pain medications includes: Cymbalta 120 mg at bedtime, Lyrica 100 mg twice daily, and tizanidine 4 mg twice daily for pain and spasms  The patient also still has a one-time small prescription for 14 pills of as needed Johana Rosalina reports that she has almost complete full amount of 14 pills that were filled in July 2022 as she only will use it if her pain is so severe that she cannot tolerate it  The patient reports that this regimen is providing 50-60% pain relief  The patient is reporting no side effects from this pain medication regimen  I have personally reviewed and/or updated the patient's past medical history, past surgical history, family history, social history, current medications, allergies, and vital signs today  Review of Systems:    Review of Systems   Respiratory: Negative for shortness of breath      Cardiovascular: Negative for chest pain  Gastrointestinal: Negative for constipation, diarrhea, nausea and vomiting  Musculoskeletal: Positive for gait problem  Negative for arthralgias, joint swelling (Joint stiffness) and myalgias  Skin: Negative for rash  Neurological: Negative for dizziness, seizures and weakness  All other systems reviewed and are negative          Past Medical History:   Diagnosis Date   • Allergic    • Anxiety    • Arthritis    • Asthma    • Chronic pain disorder     upper back   • Depression    • Disc disorder    • Disease of thyroid gland    • Fibromyalgia, primary    • GERD (gastroesophageal reflux disease)    • Headache(784 0)    • HPV (human papilloma virus) infection     cervical   • Hypertension    • Hypothyroidism    • Inflammatory bowel disease    • Migraine    • Obesity        Past Surgical History:   Procedure Laterality Date   • CERVICAL BIOPSY  W/ LOOP ELECTRODE EXCISION     • CERVICAL FUSION  2015   •  SECTION     • CHOLECYSTECTOMY      lap   • FOOT SURGERY Bilateral     multiple, posterior calcaneal bone spur   • KNEE SURGERY Left     arthroscopy with debribement   • LYMPH NODE BIOPSY     • SPINE SURGERY     • TUBAL LIGATION Bilateral        Family History   Adopted: Yes   Problem Relation Age of Onset   • Thyroid disease Mother    • Arthritis Mother         Mom, dad, aunts, grandmother   • Autoimmune disease Mother    • No Known Problems Father    • Coronary artery disease Maternal Grandmother    • Breast cancer Maternal Grandmother         age unknown   • Heart disease Maternal Grandmother         Mother, Earnest Kanner, Father   • Coronary artery disease Maternal Aunt    • Stroke Maternal Odalys Sprain, brothers   • No Known Problems Maternal Grandfather    • No Known Problems Paternal Grandmother    • No Known Problems Paternal Grandfather    • Suicide Attempts Maternal Aunt    • Heart murmur Maternal Aunt    • Hypothyroidism Maternal Aunt    • Hashimoto's thyroiditis Maternal Aunt    • Diabetes unspecified Brother    • Diabetes Brother    • No Known Problems Brother    • No Known Problems Brother    • ADD / ADHD Son        Social History     Occupational History   • Occupation: [employer]     Comment: [employer] department   Tobacco Use   • Smoking status: Every Day     Packs/day: 0 50     Years: 35 00     Pack years: 17 50     Types: Cigarettes   • Smokeless tobacco: Never   • Tobacco comments:     actually about 3/4 PPD   Vaping Use   • Vaping Use: Never used   Substance and Sexual Activity   • Alcohol use: Not Currently   • Drug use: No   • Sexual activity: Not Currently     Partners: Male     Birth control/protection: Injection, Female Sterilization         Current Outpatient Medications:   •  albuterol (Ventolin HFA) 90 mcg/act inhaler, Inhale 2 puffs every 6 (six) hours as needed for wheezing, Disp: 18 g, Rfl: 0  •  betamethasone valerate (VALISONE) 0 1 % ointment, Apply 1 to 2 times a day only as needed to finger/hand rash , Disp: 30 g, Rfl: 0  •  Calcium Carbonate (CALCIUM 600 PO), Take by mouth daily, Disp: , Rfl:   •  cholecalciferol (VITAMIN D3) 1,000 units tablet, Take 2,000 Units by mouth daily, Disp: , Rfl:   •  ciclopirox (LOPROX) 0 77 % cream, APPLY 2 TIMES per day to rash on chest for 2-4 weeks  , Disp: 30 g, Rfl: 0  •  DULoxetine (CYMBALTA) 60 mg delayed release capsule, Take 2 PO HS , Disp: 180 capsule, Rfl: 0  •  etanercept (ENBREL SURECLICK) 50 MG/ML injection, Inject 1 mL (50 mg total) under the skin every 7 days, Disp: 4 mL, Rfl: 5  •  famotidine (PEPCID) 20 mg tablet, Take 1 tablet (20 mg total) by mouth 2 (two) times a day, Disp: 180 tablet, Rfl: 0  •  HYDROcodone-acetaminophen (NORCO) 5-325 mg per tablet, Take 1 PO BID PRN for pain , Disp: 14 tablet, Rfl: 0  •  irbesartan (AVAPRO) 150 mg tablet, Take 1 tablet (150 mg total) by mouth daily at bedtime, Disp: 90 tablet, Rfl: 1  •  levothyroxine (Levoxyl) 200 mcg tablet, Take 1 tablet daily in addition to 25 mcg tablet daily, Disp: 90 tablet, Rfl: 3  •  levothyroxine (Levoxyl) 25 mcg tablet, Take 1 tablet daily in addition to 200 mcg tablet daily, Disp: 90 tablet, Rfl: 3  •  meloxicam (MOBIC) 15 mg tablet, Take 1 tablet (15 mg total) by mouth daily, Disp: 30 tablet, Rfl: 2  •  multivitamin (THERAGRAN) TABS, Take 1 tablet by mouth daily, Disp: , Rfl:   •  ondansetron (Zofran ODT) 4 mg disintegrating tablet, Take 1 tablet (4 mg total) by mouth every 6 (six) hours as needed for nausea or vomiting, Disp: 20 tablet, Rfl: 0  •  Potassium 99 MG TABS, Take by mouth daily, Disp: , Rfl:   •  pregabalin (LYRICA) 100 mg capsule, Take 1 PO BID, Disp: 60 capsule, Rfl: 2  •  tiZANidine (ZANAFLEX) 4 mg tablet, Take 1 PO BID PRN for pain and spasms  , Disp: 60 tablet, Rfl: 2    Allergies   Allergen Reactions   • Acetaminophen-Codeine      Pt states she does not remember having a reaction to this medication   • Hydrocodone Other (See Comments)     GI issues   • Latex Hives     Latex powder     • Lisinopril Other (See Comments)     Itching, gi intollerance     • Prednisolone Vomiting       Physical Exam:    /80 (BP Location: Left arm, Patient Position: Sitting, Cuff Size: Large)   Pulse 83   Temp 97 7 °F (36 5 °C)   Ht 5' 10" (1 778 m)   Wt (!) 142 kg (314 lb)   BMI 45 05 kg/m²     Constitutional:normal, well developed, well nourished, alert, in no distress and non-toxic and no overt pain behavior  and obese  Eyes:anicteric  HEENT:grossly intact  Neck:supple, symmetric, trachea midline and no masses   Pulmonary:even and unlabored  Cardiovascular:No edema or pitting edema present  Skin:Normal without rashes or lesions and well hydrated  Psychiatric:Mood and affect appropriate  Neurologic:Cranial Nerves II-XII grossly intact  Musculoskeletal:The patient's gait is slightly antalgic, but steady without the use of any assistive devices        Imaging  No orders to display         No orders of the defined types were placed in this encounter

## 2023-02-06 ENCOUNTER — TELEPHONE (OUTPATIENT)
Dept: SLEEP CENTER | Facility: CLINIC | Age: 48
End: 2023-02-06

## 2023-02-06 NOTE — TELEPHONE ENCOUNTER
----- Message from Gian Castillo MD sent at 2/5/2023  7:11 PM EST -----  Approved    ----- Message -----  From: Johnathan Jackson  Sent: 2/3/2023   8:35 AM EST  To: Sleep Medicine [facility] Provider    This Diagnostic sleep study needs approval      If approved please sign and return to clerical pool  If denied please include reasons why  Also provide alternative testing if warranted  Please sign and return to clerical pool  This XXXX sleep study needs approval      If approved please sign and return to clerical pool  If denied please include reasons why  Also provide alternative testing if warranted  Please sign and return to clerical pool

## 2023-02-15 ENCOUNTER — OFFICE VISIT (OUTPATIENT)
Dept: OBGYN CLINIC | Facility: CLINIC | Age: 48
End: 2023-02-15

## 2023-02-15 VITALS
SYSTOLIC BLOOD PRESSURE: 130 MMHG | WEIGHT: 293 LBS | DIASTOLIC BLOOD PRESSURE: 84 MMHG | HEIGHT: 70 IN | BODY MASS INDEX: 41.95 KG/M2

## 2023-02-15 DIAGNOSIS — E66.01 MORBID OBESITY WITH BODY MASS INDEX (BMI) OF 45.0 TO 49.9 IN ADULT (HCC): ICD-10-CM

## 2023-02-15 DIAGNOSIS — M17.11 PRIMARY OSTEOARTHRITIS OF RIGHT KNEE: Primary | ICD-10-CM

## 2023-02-15 RX ORDER — ROPIVACAINE HYDROCHLORIDE 5 MG/ML
10 INJECTION, SOLUTION EPIDURAL; INFILTRATION; PERINEURAL
Status: COMPLETED | OUTPATIENT
Start: 2023-02-15 | End: 2023-02-15

## 2023-02-15 RX ORDER — BETAMETHASONE SODIUM PHOSPHATE AND BETAMETHASONE ACETATE 3; 3 MG/ML; MG/ML
6 INJECTION, SUSPENSION INTRA-ARTICULAR; INTRALESIONAL; INTRAMUSCULAR; SOFT TISSUE
Status: COMPLETED | OUTPATIENT
Start: 2023-02-15 | End: 2023-02-15

## 2023-02-15 RX ADMIN — ROPIVACAINE HYDROCHLORIDE 10 ML: 5 INJECTION, SOLUTION EPIDURAL; INFILTRATION; PERINEURAL at 09:05

## 2023-02-15 RX ADMIN — BETAMETHASONE SODIUM PHOSPHATE AND BETAMETHASONE ACETATE 6 MG: 3; 3 INJECTION, SUSPENSION INTRA-ARTICULAR; INTRALESIONAL; INTRAMUSCULAR; SOFT TISSUE at 09:05

## 2023-02-15 NOTE — PROGRESS NOTES
Assessment:     1  Primary osteoarthritis of right knee    2  Morbid obesity with body mass index (BMI) of 45 0 to 49 9 in [facility])        Plan:     Problem List Items Addressed This Visit        Musculoskeletal and Integument    Primary osteoarthritis of right knee - Primary    Relevant Medications    betamethasone acetate-betamethasone sodium phosphate (CELESTONE) injection 6 mg (Completed)    ropivacaine (NAROPIN) 0 5 % injection 10 mL (Completed)    Other Relevant Orders    Large joint arthrocentesis: R knee (Completed)       Other    Morbid obesity with body mass index (BMI) of 45 0 to 49 9 in adult [facility])       Findings today are consistent with osteoarthritis right knee  Imaging and prognosis was reviewed with the patient today  Patient had good succuss with conservative treatments and would like to proceed with CSI to the right knee today  Cortisone steroid injection was administered today to the right knee  Patient should avoid strenuous activities for the next 1-2 days  Patient should avoid vaccines for the next 2 weeks if possible  Can apply cold compress for soreness  If patient feels relief with the cortisone injections, procedure can be repeated every 3-4 months  If patient sees minimal/no relief with cortisone injection, treatment with VISCO injections can be further discussed  Patient would benefit from low impact exercises in the form of stationary biking, swimming and flat surface walking  Continue working of weight loss strategies  Follow up as needed  All patient's questions were answered to her satisfaction  This note is created using dictation transcription  It may contain typographical errors, grammatical errors, improperly dictated words, background noise and other errors  Subjective:     Patient ID: Mary Carmen Castro is a 49 y o  female  Chief Complaint:  The patient presents with a chief complaint of right knee pain     The pain began several week(s) ago and is not associated with an acute injury  Patient notes that she had an injury 10+ years ago and underwent a debridement surgery- She was last seen in 2022 with conservative treatments and notes no pain until a couple weeks ago  The patient describes the pain as aching and throbbing  It is constant in timing, and localizes the pain to the medial joint line, lateral joint line  The pain is worse with walking, standing, ascending stairs, descending stairs, squatting and kneeling, squatting, twisting and relieved with rest, avoiding painful activities  She admits to mechanical symptoms such as locking and catching  She admits to instability of the knee  Patient was seen by Bebo Munoz PA-C and received a CSI on 2/15/2022 and was seen on 3/15/2022 and was referred to physical therapy which as has completed  She states that she has seen about a year of relief with previous conservative treatments        Allergy:  Allergies   Allergen Reactions   • Acetaminophen-Codeine      Pt states she does not remember having a reaction to this medication   • Hydrocodone Other (See Comments)     GI issues   • Latex Hives     Latex powder     • Lisinopril Other (See Comments)     Itching, gi intollerance     • Prednisolone Vomiting     Medications:  all current active meds have been reviewed  Past Medical History:  Past Medical History:   Diagnosis Date   • Allergic    • Anxiety    • Arthritis    • Asthma    • Chronic pain disorder     upper back   • Depression    • Disc disorder    • Disease of thyroid gland    • Fibromyalgia, primary    • GERD (gastroesophageal reflux disease)    • Headache(784 0)    • HPV (human papilloma virus) infection     cervical   • Hypertension    • Hypothyroidism    • Inflammatory bowel disease    • Migraine    • Obesity      Past Surgical History:  Past Surgical History:   Procedure Laterality Date   • CERVICAL BIOPSY  W/ LOOP ELECTRODE EXCISION     • CERVICAL FUSION  2015   •  SECTION     • CHOLECYSTECTOMY lap   • FOOT SURGERY Bilateral     multiple, posterior calcaneal bone spur   • KNEE SURGERY Left     arthroscopy with debribement   • LYMPH NODE BIOPSY     • SPINE SURGERY     • TUBAL LIGATION Bilateral 2012     Family History:  Family History   Adopted: Yes   Problem Relation Age of Onset   • Thyroid disease Mother    • Arthritis Mother         Mom, dad, aunts, grandmother   • Autoimmune disease Mother    • No Known Problems Father    • Coronary artery disease Maternal Grandmother    • Breast cancer Maternal Grandmother         age unknown   • Heart disease Maternal Grandmother         Mother, [address], Grandmother, Father   • Coronary artery disease Maternal Aunt    • Stroke Maternal Godfrey Howard, brothers   • No Known Problems Maternal Grandfather    • No Known Problems Paternal Grandmother    • No Known Problems Paternal Grandfather    • Suicide Attempts Maternal Aunt    • Heart murmur Maternal Aunt    • Hypothyroidism Maternal Aunt    • Hashimoto's thyroiditis Maternal Aunt    • Diabetes unspecified Brother    • Diabetes Brother    • No Known Problems Brother    • No Known Problems Brother    • ADD / ADHD Son      Social History:  Social History     Substance and Sexual Activity   Alcohol Use Not Currently     Social History     Substance and Sexual Activity   Drug Use No     Social History     Tobacco Use   Smoking Status Every Day   • Packs/day: 0 50   • Years: 35 00   • Pack years: 17 50   • Types: Cigarettes   Smokeless Tobacco Never   Tobacco Comments    actually about 3/4 PPD     Review of Systems   Constitutional: Negative for chills and fever  HENT: Negative for ear pain and sore throat  Eyes: Negative for pain and visual disturbance  Respiratory: Negative for cough and shortness of breath  Cardiovascular: Negative for chest pain and palpitations  Gastrointestinal: Negative for abdominal pain and vomiting  Genitourinary: Negative for dysuria and hematuria     Musculoskeletal: Positive for arthralgias (right knee)  Negative for back pain  Skin: Negative for color change and rash  Neurological: Negative for seizures and syncope  Psychiatric/Behavioral: Negative  All other systems reviewed and are negative  Objective:  BP Readings from Last 1 Encounters:   02/15/23 130/84      Wt Readings from Last 1 Encounters:   02/15/23 (!) 143 kg (315 lb)      BMI:   Estimated body mass index is 45 2 kg/m² as calculated from the following:    Height as of this encounter: 5' 10" (1 778 m)  Weight as of this encounter: 143 kg (315 lb)  BSA:   Estimated body surface area is 2 53 meters squared as calculated from the following:    Height as of this encounter: 5' 10" (1 778 m)  Weight as of this encounter: 143 kg (315 lb)  Physical Exam  Vitals and nursing note reviewed  Constitutional:       Appearance: Normal appearance  She is well-developed  She is obese  HENT:      Head: Normocephalic and atraumatic  Right Ear: External ear normal       Left Ear: External ear normal    Eyes:      Extraocular Movements: Extraocular movements intact  Conjunctiva/sclera: Conjunctivae normal    Pulmonary:      Effort: Pulmonary effort is normal    Musculoskeletal:      Cervical back: Neck supple  Right knee: No effusion  Instability Tests: Medial Karen test negative and lateral Karen test negative  Skin:     General: Skin is warm and dry  Neurological:      Mental Status: She is alert and oriented to person, place, and time  Deep Tendon Reflexes: Reflexes are normal and symmetric  Psychiatric:         Mood and Affect: Mood normal          Behavior: Behavior normal        Right Knee Exam     Tenderness   The patient is experiencing tenderness in the medial joint line      Range of Motion   Extension: 0   Flexion: 120     Tests   Karen:  Medial - negative Lateral - negative  Varus: negative Valgus: negative  Patellar apprehension: negative    Other   Erythema: absent  Scars: absent  Sensation: normal  Pulse: present  Swelling: none  Effusion: no effusion present    Comments:  Patellofemoral joint crepitation with knee motion          Large joint arthrocentesis: R knee  Universal Protocol:  Consent: Verbal consent obtained    Risks and benefits: risks, benefits and alternatives were discussed  Consent given by: patient  Patient understanding: patient states understanding of the procedure being performed  Site marked: the operative site was marked  Patient identity confirmed: verbally with patient    Supporting Documentation  Indications: pain   Procedure Details  Location: knee - R knee  Preparation: Patient was prepped and draped in the usual sterile fashion  Needle size: 22 G  Ultrasound guidance: no  Approach: anterolateral  Medications administered: 6 mg betamethasone acetate-betamethasone sodium phosphate 6 (3-3) mg/mL; 10 mL ropivacaine 0 5 %    Patient tolerance: patient tolerated the procedure well with no immediate complications  Dressing:  Sterile dressing applied        No new imaging was obtained today      Scribe Attestation    I,:  Debbie Montoya am acting as a scribe while in the presence of the attending physician :       I,:  Jose Grimm MD personally performed the services described in this documentation    as scribed in my presence :

## 2023-03-05 DIAGNOSIS — G89.29 NECK PAIN, CHRONIC: ICD-10-CM

## 2023-03-05 DIAGNOSIS — M54.2 NECK PAIN, CHRONIC: ICD-10-CM

## 2023-03-05 DIAGNOSIS — M79.18 MYOFASCIAL PAIN SYNDROME: ICD-10-CM

## 2023-03-07 RX ORDER — MELOXICAM 15 MG/1
15 TABLET ORAL DAILY
Qty: 30 TABLET | Refills: 0 | Status: SHIPPED | OUTPATIENT
Start: 2023-03-07

## 2023-03-08 DIAGNOSIS — K21.9 GASTROESOPHAGEAL REFLUX DISEASE WITHOUT ESOPHAGITIS: ICD-10-CM

## 2023-03-08 RX ORDER — FAMOTIDINE 20 MG/1
20 TABLET, FILM COATED ORAL 2 TIMES DAILY
Qty: 180 TABLET | Refills: 0 | Status: SHIPPED | OUTPATIENT
Start: 2023-03-08

## 2023-03-13 ENCOUNTER — OFFICE VISIT (OUTPATIENT)
Dept: OBGYN CLINIC | Facility: CLINIC | Age: 48
End: 2023-03-13

## 2023-03-13 VITALS
HEIGHT: 70 IN | DIASTOLIC BLOOD PRESSURE: 82 MMHG | BODY MASS INDEX: 41.95 KG/M2 | WEIGHT: 293 LBS | SYSTOLIC BLOOD PRESSURE: 126 MMHG

## 2023-03-13 DIAGNOSIS — G56.03 CARPAL TUNNEL SYNDROME, BILATERAL: Primary | ICD-10-CM

## 2023-03-13 NOTE — PROGRESS NOTES
ASSESSMENT/PLAN:    Assessment:   {Common Diagnosis:94208}    Plan:   {kmtreatments:75424}    Follow Up:  {follow up time choices:13010}    To Do Next Visit:  {To do next visit:33339::" "}    General Discussions:  {Nonoperative Discussions:42815::" "}    Operative Discussions:  {danalist:53409::" "}  ***    _____________________________________________________  CHIEF COMPLAINT:  Chief Complaint   Patient presents with   • Left Wrist - Follow-up, Carpal Tunnel     Carpal tunnel injection- 22   • Right Wrist - Follow-up, Carpal Tunnel     Carpal tunnel injection- 22         SUBJECTIVE:  Mary Carmen Castro is a 49 y o  female who presents with Pain  Moderate  Intermittant  Electric and Aching and Numbness to the bilateral {Surgical location:}  This started  {NUMBER 1-9:19184} {TIME FRAME:11678} ago as {Causes:14600}      Radiation: {radiation of pain:30933}  Previous Treatments: {kmtreatments:23984} {kmrelief:48298}  Associated symptoms: {Complaint:41131}  Handedness: {Operated side:81589}  Work status: ***    EMG completes 2021 mild left and moderate right- median   Right is worse than the left   9-10 months of relief      PAST MEDICAL HISTORY:  Past Medical History:   Diagnosis Date   • Allergic    • Anxiety    • Arthritis    • Asthma    • Chronic pain disorder     upper back   • Depression    • Disc disorder    • Disease of thyroid gland    • Fibromyalgia, primary    • GERD (gastroesophageal reflux disease)    • Headache(784 0)    • HPV (human papilloma virus) infection     cervical   • Hypertension    • Hypothyroidism    • Inflammatory bowel disease    • Migraine    • Obesity        PAST SURGICAL HISTORY:  Past Surgical History:   Procedure Laterality Date   • CERVICAL BIOPSY  W/ LOOP ELECTRODE EXCISION     • CERVICAL FUSION  2015   •  SECTION     • CHOLECYSTECTOMY      lap   • FOOT SURGERY Bilateral     multiple, posterior calcaneal bone spur   • KNEE SURGERY Left arthroscopy with debribement   • LYMPH NODE BIOPSY     • SPINE SURGERY     • TUBAL LIGATION Bilateral 2012       FAMILY HISTORY:  Family History   Adopted: Yes   Problem Relation Age of Onset   • Thyroid disease Mother    • Arthritis Mother         Mom, dad, aunts, grandmother   • Autoimmune disease Mother    • No Known Problems Father    • Coronary artery disease Maternal Grandmother    • Breast cancer Maternal Grandmother         age unknown   • Heart disease Maternal Grandmother         Mother, Kayden Macdonald, Father   • Coronary artery disease Maternal Aunt    • Stroke Maternal Rochester Stake, brothers   • No Known Problems Maternal Grandfather    • No Known Problems Paternal Grandmother    • No Known Problems Paternal Grandfather    • Suicide Attempts Maternal Aunt    • Heart murmur Maternal Aunt    • Hypothyroidism Maternal Aunt    • Hashimoto's thyroiditis Maternal Aunt    • Diabetes unspecified Brother    • Diabetes Brother    • No Known Problems Brother    • No Known Problems Brother    • ADD / ADHD Son        SOCIAL HISTORY:  Social History     Tobacco Use   • Smoking status: Every Day     Packs/day: 0 50     Years: 35 00     Pack years: 17 50     Types: Cigarettes   • Smokeless tobacco: Never   • Tobacco comments:     actually about 3/4 PPD   Vaping Use   • Vaping Use: Never used   Substance Use Topics   • Alcohol use: Not Currently   • Drug use: No       MEDICATIONS:    Current Outpatient Medications:   •  albuterol (Ventolin HFA) 90 mcg/act inhaler, Inhale 2 puffs every 6 (six) hours as needed for wheezing, Disp: 18 g, Rfl: 0  •  betamethasone valerate (VALISONE) 0 1 % ointment, Apply 1 to 2 times a day only as needed to finger/hand rash , Disp: 30 g, Rfl: 0  •  Calcium Carbonate (CALCIUM 600 PO), Take by mouth daily, Disp: , Rfl:   •  cholecalciferol (VITAMIN D3) 1,000 units tablet, Take 2,000 Units by mouth daily, Disp: , Rfl:   •  ciclopirox (LOPROX) 0 77 % cream, APPLY 2 TIMES per day to rash on chest for 2-4 weeks  , Disp: 30 g, Rfl: 0  •  DULoxetine (CYMBALTA) 60 mg delayed release capsule, Take 2 PO HS , Disp: 180 capsule, Rfl: 0  •  etanercept (ENBREL SURECLICK) 50 MG/ML injection, Inject 1 mL (50 mg total) under the skin every 7 days, Disp: 4 mL, Rfl: 5  •  famotidine (PEPCID) 20 mg tablet, Take 1 tablet (20 mg total) by mouth 2 (two) times a day, Disp: 180 tablet, Rfl: 0  •  HYDROcodone-acetaminophen (NORCO) 5-325 mg per tablet, Take 1 PO BID PRN for pain , Disp: 14 tablet, Rfl: 0  •  irbesartan (AVAPRO) 150 mg tablet, Take 1 tablet (150 mg total) by mouth daily at bedtime, Disp: 90 tablet, Rfl: 1  •  levothyroxine (Levoxyl) 200 mcg tablet, Take 1 tablet daily in addition to 25 mcg tablet daily, Disp: 90 tablet, Rfl: 3  •  levothyroxine (Levoxyl) 25 mcg tablet, Take 1 tablet daily in addition to 200 mcg tablet daily, Disp: 90 tablet, Rfl: 3  •  meloxicam (MOBIC) 15 mg tablet, Take 1 tablet (15 mg total) by mouth daily, Disp: 30 tablet, Rfl: 0  •  multivitamin (THERAGRAN) TABS, Take 1 tablet by mouth daily, Disp: , Rfl:   •  ondansetron (Zofran ODT) 4 mg disintegrating tablet, Take 1 tablet (4 mg total) by mouth every 6 (six) hours as needed for nausea or vomiting, Disp: 20 tablet, Rfl: 0  •  Potassium 99 MG TABS, Take by mouth daily, Disp: , Rfl:   •  pregabalin (LYRICA) 100 mg capsule, Take 1 PO BID, Disp: 60 capsule, Rfl: 2  •  tiZANidine (ZANAFLEX) 4 mg tablet, Take 1 PO BID PRN for pain and spasms  , Disp: 60 tablet, Rfl: 2    ALLERGIES:  Allergies   Allergen Reactions   • Acetaminophen-Codeine      Pt states she does not remember having a reaction to this medication   • Hydrocodone Other (See Comments)     GI issues   • Latex Hives     Latex powder     • Lisinopril Other (See Comments)     Itching, gi intollerance     • Prednisolone Vomiting       REVIEW OF SYSTEMS:  {KMREVIEWOFSYSTEMS:43100::"Pertinent items are noted in HPI ","A comprehensive review of systems was negative "}    LABS:  HgA1c: Lab Results   Component Value Date    HGBA1C 5 8 (H) 2022     BMP:   Lab Results   Component Value Date    CALCIUM 9 4 2022    K 3 4 (L) 2022    CO2 29 2022     2022    BUN 24 2022    CREATININE 0 98 2022         _____________________________________________________  PHYSICAL EXAMINATION:  Vital signs: There were no vitals taken for this visit    General: {1234:61119::"well developed and well nourished","alert","oriented times 3","appears comfortable"}  Psychiatric: {Psych:46504::"Normal"}  HEENT: Trachea Midline, No torticollis  Cardiovascular: No discernable arrhythmia  Pulmonary: No wheezing or stridor  Abdomen: No rebound or guarding  Extremities: No peripheral edema  Skin: {Skin exam:52219::"No masses, erythema, lacerations, fluctation, ulcerations"}  Neurovascular: {Nerve Exam:88512::"Sensation Intact to the Median, Ulnar, Radial Nerve","Motor Intact to the Median, Ulnar, Radial Nerve","Pulses Intact"}    MUSCULOSKELETAL EXAMINATION:  {Side Examined:51078}    _____________________________________________________  STUDIES REVIEWED:  {kmimagin::"No Studies to review"}      PROCEDURES PERFORMED:  Procedures  {Was Procdoc done:93181::"No Procedures performed today"}     Scribe Attestation    I,:   am acting as a scribe while in the presence of the attending physician :       I,:   personally performed the services described in this documentation    as scribed in my presence :

## 2023-03-13 NOTE — PROGRESS NOTES
ASSESSMENT/PLAN:    Assessment:   Carpal Tunnel Syndrome  bilateral    Plan:   Bilateral carpal tunnel cortisone steroid injections were administered today  Patient tolerated procedure well with no complications  Discussed that this will be her last set of injections  If symptoms return we will further discuss surgical intervention of carpal tunnel syndrome release  Follow Up:  PRN      General Discussions:     Carpal Tunnel Syndrome: The anatomy and physiology of carpal tunnel syndrome was discussed with the patient today  Increase pressure localized under the transverse carpal ligament can cause pain, numbness, tingling, or dysesthesias within the median nerve distribution as well as feelings of fatigue, clumsiness, or awkwardness  These symptoms typically occur at night and worse in the morning upon waking  Eventually, untreated carpal tunnel syndrome can result in weakness and permanent loss of muscle within the thenar compartment of the hand  Treatment options were discussed with the patient  Conservative treatment includes nocturnal resting splints to keep the nerve in a neutral position, ergonomic changes within the work or home environment, activity modification, and tendon gliding exercises  Steroid injections within the carpal canal can help a majority of patients, however this is often self-limited in a majority of patients  Surgical intervention to divide the transverse carpal ligament typically results in a long-lasting relief of the patient's complaints, with the recurrence rate of less than 1%  Operative Discussions:     Endoscopic Carpal Tunnel Release: The anatomy and physiology of carpal tunnel syndrome was discussed with the patient today  Increase pressure localized under the transverse carpal ligament can cause pain, numbness, tingling, or dysesthesias within the median nerve distribution as well as feelings of fatigue, clumsiness, or awkwardness    These symptoms typically occur at night and worse in the morning upon waking  Eventually, untreated carpal tunnel syndrome can result in weakness and permanent loss of muscle within the thenar compartment of the hand  Treatment options were discussed with the patient  Conservative treatment includes nocturnal resting splints to keep the nerve in a neutral position, ergonomic changes within the work or home environment, activity modification, and tendon gliding exercises  Steroid injections within the carpal canal can help a majority of patients, however this is often self-limited in a majority of patients  Surgical intervention to divide the transverse carpal ligament typically results in a long-lasting relief of the patient's complaints, with the recurrence rate of less than 1%  The patient has elected to undergo an endoscopic carpal tunnel release  The 2 incision technique was discussed with the patient, which results in approximately a two-week less recovery time, and less wound complications  In the postoperative period, light activities are allowed immediately, driving is allowed when narcotic medication has stopped, and the bandages may be removed and incision may get wet after 2 days  Heavy activities (lifting more than approximately 10 pounds) will be allowed after follow up appointment in 1-2 weeks  While the pain and discomfort in the hands generally improves rapidly, the numbness and tingling as well as the strength will slowly improve over weeks to months depending on the severity of the carpal tunnel syndrome  Pillar pain was discussed with the patient, which is typically a common but self-limiting condition  The risks of bleeding and infection from the surgery are less than 1%  Risk of recurrence is approximately 0 5%  The risks of nerve injury or nerve damage or damage to the blood vessels is approximately 1 in 1200  The patient has an understanding of the above mentioned discussion  The risks and benefits of the procedure were explained to the patient, which include, but are not limited to: Bleeding, infection, recurrence, pain, scar, damage to tendons, damage to nerves, and damage to blood vessels, failure to give desired results and complications related to anesthesia   These risks, along with alternative conservative treatment options, and postoperative protocols were voiced back and understood by the patient   All questions were answered to the patient's satisfaction   The patient agrees to comply with a standard postoperative protocol, and is willing to proceed   Education was provided via written and auditory forms   There were no barriers to learning  Written handouts regarding wound care, incision and scar care, and general preoperative information was provided to the patient   Prior to surgery, the patient may be requested to stop all anti-inflammatory medications   Prophylactic aspirin, Plavix, and Coumadin may be allowed to be continued   Medications including vitamin E , ginkgo, and fish oil are requested to be stopped approximately one week prior to surgery   Hypertensive medications and beta blockers, if taken, should be continued  _____________________________________________________  CHIEF COMPLAINT:  Chief Complaint   Patient presents with   • Left Wrist - Follow-up, Carpal Tunnel     Carpal tunnel injection- 4/25/22   • Right Wrist - Follow-up, Carpal Tunnel     Carpal tunnel injection- 4/25/22         SUBJECTIVE:  Jay Dawson is a 49 y o  female who presents for follow up regarding Carpal Tunnel Syndrome  bilateral  Chidi Jaramillo was last seen on 4/25/2022 and received bilateral CSI to the carpal tunnel  She states she received about 10 months of full relief  Symptoms have re-occurred about 3 weeks ago  She states that the right is worse then the left  Symptoms of burning, numbness and tingling occur at night through her hand and all of her fingers    She notes that she is clumsy with fine motor activities  Denies radiation of the pain to the elbow no other complaints  Patient had EMG on 2021 demonstrating mild median nerve entrapment in the left and moderate medial nerve entrapment in the right  Patient is interested in repeat CSIs today     Radiation: Yes to the  all digits  Associated symptoms: Numbness  Handedness: right    PAST MEDICAL HISTORY:  Past Medical History:   Diagnosis Date   • Allergic    • Anxiety    • Arthritis    • Asthma    • Chronic pain disorder     upper back   • Depression    • Disc disorder    • Disease of thyroid gland    • Fibromyalgia, primary    • GERD (gastroesophageal reflux disease)    • Headache(784 0)    • HPV (human papilloma virus) infection     cervical   • Hypertension    • Hypothyroidism    • Inflammatory bowel disease    • Migraine    • Obesity        PAST SURGICAL HISTORY:  Past Surgical History:   Procedure Laterality Date   • CERVICAL BIOPSY  W/ LOOP ELECTRODE EXCISION     • CERVICAL FUSION  2015   •  SECTION     • CHOLECYSTECTOMY      lap   • FOOT SURGERY Bilateral     multiple, posterior calcaneal bone spur   • KNEE SURGERY Left     arthroscopy with debribement   • LYMPH NODE BIOPSY     • SPINE SURGERY     • TUBAL LIGATION Bilateral        FAMILY HISTORY:  Family History   Adopted: Yes   Problem Relation Age of Onset   • Thyroid disease Mother    • Arthritis Mother         Mom, dad, aunts, grandmother   • Autoimmune disease Mother    • No Known Problems Father    • Coronary artery disease Maternal Grandmother    • Breast cancer Maternal Grandmother         age unknown   • Heart disease Maternal Grandmother         Mother, Vlad Nails, Father   • Coronary artery disease Maternal Aunt    • Stroke Maternal [place], brothers   • No Known Problems Maternal Grandfather    • No Known Problems Paternal Grandmother    • No Known Problems Paternal Grandfather    • Suicide Attempts Maternal Aunt    • Heart murmur Maternal Aunt    • Hypothyroidism Maternal Aunt    • Hashimoto's thyroiditis Maternal Aunt    • Diabetes unspecified Brother    • Diabetes Brother    • No Known Problems Brother    • No Known Problems Brother    • ADD / ADHD Son        SOCIAL HISTORY:  Social History     Tobacco Use   • Smoking status: Every Day     Packs/day: 0 50     Years: 35 00     Pack years: 17 50     Types: Cigarettes   • Smokeless tobacco: Never   • Tobacco comments:     actually about 3/4 PPD   Vaping Use   • Vaping Use: Never used   Substance Use Topics   • Alcohol use: Not Currently   • Drug use: No       MEDICATIONS:    Current Outpatient Medications:   •  albuterol (Ventolin HFA) 90 mcg/act inhaler, Inhale 2 puffs every 6 (six) hours as needed for wheezing, Disp: 18 g, Rfl: 0  •  betamethasone valerate (VALISONE) 0 1 % ointment, Apply 1 to 2 times a day only as needed to finger/hand rash , Disp: 30 g, Rfl: 0  •  Calcium Carbonate (CALCIUM 600 PO), Take by mouth daily, Disp: , Rfl:   •  cholecalciferol (VITAMIN D3) 1,000 units tablet, Take 2,000 Units by mouth daily, Disp: , Rfl:   •  ciclopirox (LOPROX) 0 77 % cream, APPLY 2 TIMES per day to rash on chest for 2-4 weeks  , Disp: 30 g, Rfl: 0  •  DULoxetine (CYMBALTA) 60 mg delayed release capsule, Take 2 PO HS , Disp: 180 capsule, Rfl: 0  •  etanercept (ENBREL SURECLICK) 50 MG/ML injection, Inject 1 mL (50 mg total) under the skin every 7 days, Disp: 4 mL, Rfl: 5  •  famotidine (PEPCID) 20 mg tablet, Take 1 tablet (20 mg total) by mouth 2 (two) times a day, Disp: 180 tablet, Rfl: 0  •  HYDROcodone-acetaminophen (NORCO) 5-325 mg per tablet, Take 1 PO BID PRN for pain , Disp: 14 tablet, Rfl: 0  •  irbesartan (AVAPRO) 150 mg tablet, Take 1 tablet (150 mg total) by mouth daily at bedtime, Disp: 90 tablet, Rfl: 1  •  levothyroxine (Levoxyl) 200 mcg tablet, Take 1 tablet daily in addition to 25 mcg tablet daily, Disp: 90 tablet, Rfl: 3  •  levothyroxine (Levoxyl) 25 mcg tablet, Take 1 tablet daily in addition to 200 mcg tablet daily, Disp: 90 tablet, Rfl: 3  •  meloxicam (MOBIC) 15 mg tablet, Take 1 tablet (15 mg total) by mouth daily, Disp: 30 tablet, Rfl: 0  •  multivitamin (THERAGRAN) TABS, Take 1 tablet by mouth daily, Disp: , Rfl:   •  ondansetron (Zofran ODT) 4 mg disintegrating tablet, Take 1 tablet (4 mg total) by mouth every 6 (six) hours as needed for nausea or vomiting, Disp: 20 tablet, Rfl: 0  •  Potassium 99 MG TABS, Take by mouth daily, Disp: , Rfl:   •  pregabalin (LYRICA) 100 mg capsule, Take 1 PO BID, Disp: 60 capsule, Rfl: 2  •  tiZANidine (ZANAFLEX) 4 mg tablet, Take 1 PO BID PRN for pain and spasms  , Disp: 60 tablet, Rfl: 2    ALLERGIES:  Allergies   Allergen Reactions   • Acetaminophen-Codeine      Pt states she does not remember having a reaction to this medication   • Hydrocodone Other (See Comments)     GI issues   • Latex Hives     Latex powder     • Lisinopril Other (See Comments)     Itching, gi intollerance     • Prednisolone Vomiting       REVIEW OF SYSTEMS:  Pertinent items are noted in HPI  A comprehensive review of systems was negative      LABS:  HgA1c:   Lab Results   Component Value Date    HGBA1C 5 8 (H) 04/05/2022     BMP:   Lab Results   Component Value Date    CALCIUM 9 4 11/06/2022    K 3 4 (L) 11/06/2022    CO2 29 11/06/2022     11/06/2022    BUN 24 11/06/2022    CREATININE 0 98 11/06/2022           _____________________________________________________  PHYSICAL EXAMINATION:  Vital signs: /82   Ht 5' 10" (1 778 m)   Wt (!) 147 kg (323 lb)   BMI 46 35 kg/m²   General: well developed and well nourished, alert, oriented times 3 and appears comfortable  Psychiatric: Normal  HEENT: Trachea Midline, No torticollis  Cardiovascular: No discernable arrhythmia  Pulmonary: No wheezing or stridor  Abdomen: No rebound or guarding  Extremities: No peripheral edema  Skin: No masses, erythema, lacerations, fluctation, ulcerations  Neurovascular: Sensation Intact to the Median, Ulnar, Radial Nerve, Motor Intact to the Median, Ulnar, Radial Nerve and Pulses Intact    MUSCULOSKELETAL EXAMINATION:  LEFT SIDE:  Carpal tunnel:  No atrophy thenar muscles, Negative Derkin's Compression, Postive Tinel's and 4/5 thumb opposition and intrinsics  RIGHT SIDE:  Carpal tunnel:  No atrophy thenar muscles, Negative Derkin's Compression, Postive Tinel's and -4/5 thumb opposition and intrinsics    _____________________________________________________  STUDIES REVIEWED:  No Studies to review      PROCEDURES PERFORMED:  Hand/upper extremity injection: bilateral carpal tunnel  [place] Protocol:  Consent: Verbal consent obtained    Risks and benefits: risks, benefits and alternatives were discussed  Consent given by: patient    Supporting Documentation  Indications: pain   Procedure Details  Condition:carpal tunnel syndrome Site: bilateral carpal tunnel   Needle size: 25 G  Ultrasound guidance: no  Patient tolerance: patient tolerated the procedure well with no immediate complications  Dressing:  Sterile dressing applied             Scribe Attestation    I,:  Haily Alvarado am acting as a scribe while in the presence of the attending physician :       I,:  Jonna Castellanos MD personally performed the services described in this documentation    as scribed in my presence :

## 2023-03-29 ENCOUNTER — OFFICE VISIT (OUTPATIENT)
Dept: OBGYN CLINIC | Facility: CLINIC | Age: 48
End: 2023-03-29

## 2023-03-29 VITALS
HEIGHT: 70 IN | BODY MASS INDEX: 41.95 KG/M2 | SYSTOLIC BLOOD PRESSURE: 130 MMHG | WEIGHT: 293 LBS | DIASTOLIC BLOOD PRESSURE: 82 MMHG

## 2023-03-29 DIAGNOSIS — Z01.419 ENCOUNTER FOR ANNUAL ROUTINE GYNECOLOGICAL EXAMINATION: Primary | ICD-10-CM

## 2023-03-29 NOTE — PROGRESS NOTES
Assessment/Plan:  Pap smear done as well as annual   Encourage self breast examination as well as calcium supplementation  Continue annual mammogram   Discussed treatment options for cycle control including progesterone agents  She is considering restarting Depo-Provera  She will notify me if she is interested  She is inquiring if she can get this through her primary care physician due to convenience  All questions answered  Return to office in 1 year or as needed  No problem-specific Assessment & Plan notes found for this encounter  Diagnoses and all orders for this visit:    Encounter for annual routine gynecological examination          Subjective:      Patient ID: Bindu Sadler is a 49 y o  female  HPI     This is a very pleasant 43-year-old female G3, P3 ( x1, age 7) presents for GYN exam   Her last GYN exam was over 3 years ago  She states her menstrual cycles are approximately every 4 weeks lasting 5 to 7 days described as heavy  She denies any breakthrough bleeding  She has been in a monogamous relationship with her partner for over 10 years  Method of contraception is tubal ligation  They have not been sexually active in a while  She states her Pap smears have been normal   She does believe she has had a LEEP procedure done in her early 25s with follow-up Pap smears normal     She does have a history of chronic pain and follows up with rheumatology  She also follows up with her family physician on a regular basis  She does recall being on Depo-Provera in her 25s followed by early 45s which resulted in amenorrhea  She is considering restarting it  We discussed the risks and benefits of Depo-Provera  She does take calcium on a daily basis      The following portions of the patient's history were reviewed and updated as appropriate: allergies, current medications, past family history, past medical history, past social history, past surgical history and problem "list     Review of Systems   Constitutional: Negative for fatigue, fever and unexpected weight change  Respiratory: Negative for cough, chest tightness, shortness of breath and wheezing  Cardiovascular: Negative  Negative for chest pain and palpitations  Gastrointestinal: Negative  Negative for abdominal distention, abdominal pain, blood in stool, constipation, diarrhea, nausea and vomiting  Genitourinary: Negative  Negative for difficulty urinating, dyspareunia, dysuria, flank pain, frequency, genital sores, hematuria, pelvic pain, urgency, vaginal bleeding, vaginal discharge and vaginal pain  Skin: Negative for rash  Objective:      /82   Ht 5' 10\" (1 778 m)   Wt (!) 144 kg (317 lb)   LMP 03/04/2023   BMI 45 48 kg/m²          Physical Exam  Constitutional:       Appearance: Normal appearance  She is well-developed  Cardiovascular:      Rate and Rhythm: Normal rate and regular rhythm  Pulmonary:      Effort: Pulmonary effort is normal       Breath sounds: Normal breath sounds  Chest:   Breasts:     Right: No inverted nipple, mass, nipple discharge, skin change or tenderness  Left: No inverted nipple, mass, nipple discharge, skin change or tenderness  Abdominal:      General: Bowel sounds are normal  There is no distension  Palpations: Abdomen is soft  Tenderness: There is no abdominal tenderness  There is no guarding or rebound  Genitourinary:     Labia:         Right: No rash, tenderness or lesion  Left: No rash, tenderness or lesion  Vagina: Normal  No signs of injury  No vaginal discharge or tenderness  Cervix: No cervical motion tenderness, discharge, friability, lesion, erythema or cervical bleeding  Uterus: Not enlarged and not tender  Adnexa:         Right: No mass, tenderness or fullness  Left: No mass, tenderness or fullness       Neurological:      Mental Status: She is alert and oriented to person, place, and " time    Psychiatric:         Behavior: Behavior normal

## 2023-04-03 ENCOUNTER — TELEPHONE (OUTPATIENT)
Dept: PAIN MEDICINE | Facility: CLINIC | Age: 48
End: 2023-04-03

## 2023-04-03 LAB
CYTOLOGIST CVX/VAG CYTO: NORMAL
DX ICD CODE: NORMAL
HPV I/H RISK 4 DNA CVX QL PROBE+SIG AMP: NEGATIVE
OTHER STN SPEC: NORMAL
PATH REPORT.FINAL DX SPEC: NORMAL
SL AMB NOTE:: NORMAL
SL AMB SPECIMEN ADEQUACY: NORMAL
SL AMB TEST METHODOLOGY: NORMAL

## 2023-04-12 DIAGNOSIS — M17.11 PRIMARY OSTEOARTHRITIS OF RIGHT KNEE: Primary | ICD-10-CM

## 2023-04-30 DIAGNOSIS — K21.9 GASTROESOPHAGEAL REFLUX DISEASE WITHOUT ESOPHAGITIS: ICD-10-CM

## 2023-04-30 DIAGNOSIS — G89.4 CHRONIC PAIN SYNDROME: ICD-10-CM

## 2023-04-30 NOTE — PROGRESS NOTES
Assessment and Plan:   Spondyloarthropathy--continue NSAIDs PRN back pain  PT for LS spine  Topical analgesics   Continue Enbrel for ankylosing spondylitis  Consider changing therapy if not improving clincally or if esr/crp continue to be elevated  Continue PT and f/u with pain mgmt  Risks/benefits of medication reviewed with patient and she understands  Derm eval for NMSC surveillance  F/u in 4-5 mos  or sooner if necessary  Rheumatic Disease Summary  As above    Here for f/u visit  Still with low back pain and stiffness  Will have blood work done ASAP for esr/crp  Has been taking Enbrel weekly for 4 months  The following portions of the patient's history were reviewed and updated as appropriate: allergies, current medications, past family history, past medical history, past social history, past surgical history and problem list     Review of Systems:   Review of Systems   Constitutional: Negative for fatigue  HENT: Negative for mouth sores  Eyes: Negative for pain  Respiratory: Negative for shortness of breath  Cardiovascular: Negative for leg swelling  Musculoskeletal: Positive for arthralgias and back pain  Negative for joint swelling  Skin: Negative for rash  Neurological: Negative for weakness  Hematological: Negative for adenopathy  Psychiatric/Behavioral: Negative for sleep disturbance  Home Medications:    Current Outpatient Medications:     albuterol (Ventolin HFA) 90 mcg/act inhaler, Inhale 2 puffs every 6 (six) hours as needed for wheezing, Disp: 18 g, Rfl: 0    betamethasone valerate (VALISONE) 0 1 % ointment, Apply 1 to 2 times a day only as needed to finger/hand rash , Disp: 30 g, Rfl: 0    Calcium Carbonate (CALCIUM 600 PO), Take by mouth daily, Disp: , Rfl:     cholecalciferol (VITAMIN D3) 1,000 units tablet, Take 2,000 Units by mouth daily, Disp: , Rfl:     ciclopirox (LOPROX) 0 77 % cream, APPLY 2 TIMES per day to rash on chest for 2-4 weeks  , Disp: 30 g, Rfl: 0    DULoxetine (CYMBALTA) 60 mg delayed release capsule, Take 2 PO HS , Disp: 180 capsule, Rfl: 0    etanercept (ENBREL SURECLICK) 50 MG/ML injection, Inject 1 mL (50 mg total) under the skin every 7 days, Disp: 4 mL, Rfl: 5    famotidine (PEPCID) 20 mg tablet, Take 1 tablet (20 mg total) by mouth 2 (two) times a day, Disp: 180 tablet, Rfl: 0    gabapentin (NEURONTIN) 400 mg capsule, Take 1 PO QD x 4 days, then 1 PO BID x 4 days, then 1 PO TID x 1 week, then 1 PO QID , Disp: 120 capsule, Rfl: 2    HYDROcodone-acetaminophen (NORCO) 5-325 mg per tablet, Take 1 PO BID PRN for pain , Disp: 14 tablet, Rfl: 0    irbesartan (AVAPRO) 150 mg tablet, Take 1 tablet (150 mg total) by mouth daily at bedtime, Disp: 90 tablet, Rfl: 1    levothyroxine (Levoxyl) 200 mcg tablet, Take 1 tablet daily in addition to 25 mcg tablet daily, Disp: 90 tablet, Rfl: 3    levothyroxine (Levoxyl) 25 mcg tablet, Take 1 tablet daily in addition to 200 mcg tablet daily, Disp: 90 tablet, Rfl: 3    multivitamin (THERAGRAN) TABS, Take 1 tablet by mouth daily, Disp: , Rfl:     ondansetron (Zofran ODT) 4 mg disintegrating tablet, Take 1 tablet (4 mg total) by mouth every 6 (six) hours as needed for nausea or vomiting, Disp: 20 tablet, Rfl: 0    Potassium 99 MG TABS, Take by mouth daily, Disp: , Rfl:     predniSONE 10 mg tablet, 5 pills x 2 days, 4 pills x 2 days, 3 pills x 2 days, 2 pills x 2 days, 1 pill x 2 days  Do not start before April 11, 2023 , Disp: 30 tablet, Rfl: 0    tiZANidine (ZANAFLEX) 4 mg tablet, Take 1 PO TID PRN for pain and spasms  , Disp: 90 tablet, Rfl: 2    Objective: There were no vitals filed for this visit  Physical Exam  Constitutional:       General: She is not in acute distress  HENT:      Head: Normocephalic and atraumatic  Eyes:      Conjunctiva/sclera: Conjunctivae normal    Cardiovascular:      Rate and Rhythm: Normal rate and regular rhythm        Heart sounds: S1 normal and S2 normal      No friction rub  Pulmonary:      Effort: Pulmonary effort is normal  No respiratory distress  Breath sounds: Normal breath sounds  No wheezing, rhonchi or rales  Musculoskeletal:      Cervical back: Neck supple  Skin:     Coloration: Skin is not pale  Neurological:      Mental Status: She is alert  Mental status is at baseline  Psychiatric:         Mood and Affect: Mood normal          Behavior: Behavior normal          Reviewed labs and imaging  Imaging:   CT lumbar spine without contrast    Result Date: 4/10/2023  Narrative: CT LUMBAR SPINE WITHOUT CONTRAST INDICATION:   Radicular low back pain  Back Pain (Reports mid back pain radiating down both legs to her toes, right leg hurts the most around her knee  Says the pain feels burning and throbbing  Reports pain has been increasing over the past several weeks, has taken hydrocodone if pain gets bad  She says this is the worst it has ever been COMPARISON: Sacroiliac joint radiographs August 1, 2022  MRI lumbar spine without contrast August 3, 2021  TECHNIQUE:  Contiguous axial images through the lumbar spine were obtained  Sagittal and coronal reconstructions were performed  Radiation dose length product (DLP) for this visit:  2212 45 mGy-cm   This examination, like all CT scans performed in the [facility], was performed utilizing techniques to minimize radiation dose exposure, including the use of iterative reconstruction and automated exposure control  IMAGE QUALITY:  Diagnostic  FINDINGS: ALIGNMENT:  There are 5 lumbar type vertebral bodies  Normal alignment of the lumbar spine  No spondylolysis or spondylolisthesis  VERTEBRAE:  No fracture  No lytic or blastic lesion  DEGENERATIVE CHANGES: Multilevel degenerative changes consist of osteophytes, vacuum disc phenomenon, disc height loss, and facet arthropathy  Moderate-to-severe disc height loss at L5-S1   Lower Thoracic spine:  Mild lower thoracic degenerative disc disease with mild annular bulging  L1-2:  No significant canal stenosis or foraminal narrowing, unchanged  L2-3:  No significant canal stenosis or foraminal narrowing, unchanged  L3-4:  Diffuse disc bulge  Facet arthropathy  No significant canal stenosis or foraminal narrowing, unchanged  L4-5:  Diffuse disc bulge, narrowed subarticular zones  Facet arthropathy  Mild canal stenosis, unchanged  Mild bilateral foraminal narrowing (right worse than left), unchanged  L5-S1:  Diffuse disc bulge, small central disc protrusion abutting right S1 descending nerve root, posterior marginal osteophytes extending into bilateral foraminal and bilateral extraforaminal zones  Facet arthropathy  Mild canal stenosis, unchanged  Mild-to-moderate bilateral foraminal narrowing, unchanged  PARASPINAL SOFT TISSUES:  Normal  OTHER: Mild scattered calcified atherosclerotic disease  Impression: No acute osseous abnormality of lumbar spine  Degenerative changes, as detailed above  Workstation performed: [id number]       Labs:   Office Visit on 03/29/2023   Component Date Value Ref Range Status    Diagnosis: 03/29/2023 Comment   Final    Comment: NEGATIVE FOR INTRAEPITHELIAL LESION OR MALIGNANCY  THE CYTOLOGY PROCESSING WAS PERFORMED AT THE [facility] LOCATED AT  [place] [address]   Specimen Adequacy 03/29/2023 Comment   Final    Comment: Satisfactory for evaluation  Endocervical and/or squamous metaplastic  cells (endocervical component) are present   Clinician Provided ICD10 03/29/2023 Comment   Final    [id number]    Performed by: 03/29/2023 Comment   Final    Rocael Dan, Cytotechnologist (ASCP)    [facility]   03/29/2023   Final    Note: 03/29/2023 Comment   Final    Comment: The Pap smear is a screening test designed to aid in the detection of  premalignant and malignant conditions of the uterine cervix    It is not a  diagnostic procedure and should not be used as the sole means of detecting  cervical cancer  Both false-positive and false-negative reports do occur   Test Methodology: 03/29/2023 Comment   Final    Comment: This liquid based ThinPrep(R) pap test was screened with the  use of an image guided system   HPV Aptima 03/29/2023 Negative  Negative Final    Comment: This nucleic acid amplification test detects fourteen high-risk  HPV types (16,18,31,33,35,39,45,51,52,56,58,59,66,68) without  differentiation       Admission on 11/06/2022, Discharged on 11/06/2022   Component Date Value Ref Range Status    WBC 11/06/2022 10 77 (H)  4 31 - 10 16 Thousand/uL Final    RBC 11/06/2022 4 72  3 81 - 5 12 Million/uL Final    Hemoglobin 11/06/2022 14 3  11 5 - 15 4 g/dL Final    Hematocrit 11/06/2022 42 2  34 8 - 46 1 % Final    MCV 11/06/2022 89  82 - 98 fL Final    MCH 11/06/2022 30 3  26 8 - 34 3 pg Final    MCHC 11/06/2022 33 9  31 4 - 37 4 g/dL Final    RDW 11/06/2022 14 2  11 6 - 15 1 % Final    MPV 11/06/2022 10 1  8 9 - 12 7 fL Final    Platelets 94/90/0699 309  149 - 390 Thousands/uL Final    nRBC 11/06/2022 0  /100 WBCs Final    Neutrophils Relative 11/06/2022 61  43 - 75 % Final    Immat GRANS % 11/06/2022 0  0 - 2 % Final    Lymphocytes Relative 11/06/2022 32  14 - 44 % Final    Monocytes Relative 11/06/2022 5  4 - 12 % Final    Eosinophils Relative 11/06/2022 2  0 - 6 % Final    Basophils Relative 11/06/2022 0  0 - 1 % Final    Neutrophils Absolute 11/06/2022 6 59  1 85 - 7 62 Thousands/µL Final    Immature Grans Absolute 11/06/2022 0 04  0 00 - 0 20 Thousand/uL Final    Lymphocytes Absolute 11/06/2022 3 43  0 60 - 4 47 Thousands/µL Final    Monocytes Absolute 11/06/2022 0 50  0 17 - 1 22 Thousand/µL Final    Eosinophils Absolute 11/06/2022 0 17  0 00 - 0 61 Thousand/µL Final    Basophils Absolute 11/06/2022 0 04  0 00 - 0 10 Thousands/µL Final    Sodium 11/06/2022 140  135 - 147 mmol/L Final    Potassium 11/06/2022 3 4 (L)  3 5 - "5 3 mmol/L Final    Chloride 11/06/2022 103  96 - 108 mmol/L Final    CO2 11/06/2022 29  21 - 32 mmol/L Final    ANION GAP 11/06/2022 8  4 - 13 mmol/L Final    BUN 11/06/2022 24  5 - 25 mg/dL Final    Creatinine 11/06/2022 0 98  0 60 - 1 30 mg/dL Final    Standardized to IDMS reference method    Glucose 11/06/2022 131  65 - 140 mg/dL Final    If the patient is fasting, the ADA then defines impaired fasting glucose as > 100 mg/dL and diabetes as > or equal to 123 mg/dL  Specimen collection should occur prior to Sulfasalazine administration due to the potential for falsely depressed results  Specimen collection should occur prior to Sulfapyridine administration due to the potential for falsely elevated results   Calcium 11/06/2022 9 4  8 3 - 10 1 mg/dL Final    AST 11/06/2022 14  5 - 45 U/L Final    Specimen collection should occur prior to Sulfasalazine administration due to the potential for falsely depressed results   ALT 11/06/2022 34  12 - 78 U/L Final    Specimen collection should occur prior to Sulfasalazine administration due to the potential for falsely depressed results   Alkaline Phosphatase 11/06/2022 74  46 - 116 U/L Final    Total Protein 11/06/2022 7 4  6 4 - 8 4 g/dL Final    Albumin 11/06/2022 3 5  3 5 - 5 0 g/dL Final    Total Bilirubin 11/06/2022 0 20  0 20 - 1 00 mg/dL Final    Use of this assay is not recommended for patients undergoing treatment with eltrombopag due to the potential for falsely elevated results   eGFR 11/06/2022 68  ml/min/1 73sq m Final    hs TnI 0hr 11/06/2022 4  \"Refer to ACS Flowchart\"- see link ng/L Final    Comment:                                              Initial (time 0) result  If >=50 ng/L, Myocardial injury suggested ;  Type of myocardial injury and treatment strategy  to be determined  If 5-49 ng/L, a delta result at 2 hours and or 4 hours will be needed to further evaluate    If <4 ng/L, and chest pain has been >3 hours since onset, " "patient may qualify for discharge based on the HEART score in the ED  If <5 ng/L and <3hours since onset of chest pain, a delta result at 2 hours will be needed to further evaluate  HS Troponin 99th Percentile URL of a Health Population=12 ng/L with a 95% Confidence Interval of 8-18 ng/L  Second Troponin (time 2 hours)  If calculated delta >= 20 ng/L,  Myocardial injury suggested ; Type of myocardial injury and treatment strategy to be determined  If 5-49 ng/L and the calculated delta is 5-19 ng/L, consult medical service for evaluation  Continue evaluation for ischemia on ecg and other possible etiology and repeat hs troponin at 4 hours  If delta                            is <5 ng/L at 2 hours, consider discharge based on risk stratification via the HEART score (if in ED), or MIRTHA risk score in IP/Observation  HS Troponin 99th Percentile URL of a Health Population=12 ng/L with a 95% Confidence Interval of 8-18 ng/L   hs TnI 2hr 11/06/2022 4  \"Refer to ACS Flowchart\"- see link ng/L Final    Comment:                                              Initial (time 0) result  If >=50 ng/L, Myocardial injury suggested ;  Type of myocardial injury and treatment strategy  to be determined  If 5-49 ng/L, a delta result at 2 hours and or 4 hours will be needed to further evaluate  If <4 ng/L, and chest pain has been >3 hours since onset, patient may qualify for discharge based on the HEART score in the ED  If <5 ng/L and <3hours since onset of chest pain, a delta result at 2 hours will be needed to further evaluate  HS Troponin 99th Percentile URL of a Health Population=12 ng/L with a 95% Confidence Interval of 8-18 ng/L  Second Troponin (time 2 hours)  If calculated delta >= 20 ng/L,  Myocardial injury suggested ; Type of myocardial injury and treatment strategy to be determined  If 5-49 ng/L and the calculated delta is 5-19 ng/L, consult medical service for evaluation    Continue evaluation for " ischemia on ecg and other possible etiology and repeat hs troponin at 4 hours  If delta                            is <5 ng/L at 2 hours, consider discharge based on risk stratification via the HEART score (if in ED), or MIRTHA risk score in IP/Observation  HS Troponin 99th Percentile URL of a Health Population=12 ng/L with a 95% Confidence Interval of 8-18 ng/L   Delta 2hr hsTnI 11/06/2022 0  <20 ng/L Final    Ventricular Rate 11/06/2022 86  BPM Final    Atrial Rate 11/06/2022 86  BPM Final    RI Interval 11/06/2022 168  ms Final    QRSD Interval 11/06/2022 90  ms Final    QT Interval 11/06/2022 372  ms Final    QTC Interval 11/06/2022 445  ms Final    P Axis 11/06/2022 64  degrees Final    QRS Axis 11/06/2022 77  degrees Final    T Wave [place] 11/06/2022 72  degrees Final   Office Visit on 09/06/2022   Component Date Value Ref Range Status    Glucose, Random 09/15/2022 87  65 - 99 mg/dL Final   Orders Only on 08/08/2022   Component Date Value Ref Range Status    TSH 09/03/2022 1 130  0 450 - 4 500 uIU/mL Final    Free t4 09/03/2022 1 28  0 82 - 1 77 ng/dL Final    Free T3 09/03/2022 2 4  2 0 - 4 4 pg/mL Final   Appointment on 08/01/2022   Component Date Value Ref Range Status    Rheumatoid Factor 08/01/2022 Negative  Negative Final    QFT Nil 08/01/2022 0 02  0 - 8 0 IU/ml Final    QFT TB1-NIL 08/01/2022 0 01  IU/ml Final    QFT TB2-NIL 08/01/2022 0 04  IU/ml Final    QFT Mitogen-NIL 08/01/2022 6 95  IU/ml Final    QFT Final Interpretation 08/01/2022 Negative  Negative Final    No Interferon-gamma response to M  tuberculosis antigens detected  Infection with M  tuberculosis is unlikely  A single negative result does not exclude infection with M  tuberculosis  In patients at high risk for M  tuberculosis infection, a second test should be considered in accordance with the 2017 ATS/IDSA/CDC Clinical Practice Guidelines for Diagnosis of Tuberculosis in Adults and Children   False negative results can be a result of incorrect blood sample collection or handling of the specimen affecting lymphocyte function   Cyclic Citrullinated Peptide Ab  08/01/2022 0 7  See comment Final    RIVAS 08/01/2022 Negative  Negative Final    HLA B27 08/01/2022 Positive   Final    HLA-B*27 Positive  This patient is positive for HLA-B*27  This procedure rules  out the B*27:06 and 27:09 alleles, which the literature  suggests are not associated with spondyloarthropathies  B27 allele interpretation for all loci based on IMGT/HLA  database version 3 44  This test was developed and its performance characteristics  determined by [facility]   It has not been cleared or approved  by the Food and Drug Administration  [facility] CLIA ID Number [id number]  This test was performed using PCR (Polymerase Chain Reaction)/SSOP  (Sequence Specific Oligonucleotide Probes) technique  SBT (Sequence  Based Typing) and/or SSP (Sequence Specific Primers) may be used as  supplemental methods when necessary  Please contact [facility] Customer  Service at [phone] if you have any questions  Director of [facility]   Dr Troy Cedillo, PhD    Sed Rate 08/01/2022 31 (H)  0 - 19 mm/hour Final    Sodium 08/01/2022 138  135 - 147 mmol/L Final    Potassium 08/01/2022 3 8  3 5 - 5 3 mmol/L Final    Chloride 08/01/2022 108  96 - 108 mmol/L Final    CO2 08/01/2022 25  21 - 32 mmol/L Final    ANION GAP 08/01/2022 5  4 - 13 mmol/L Final    BUN 08/01/2022 17  5 - 25 mg/dL Final    Creatinine 08/01/2022 0 76  0 60 - 1 30 mg/dL Final    Standardized to IDMS reference method    Glucose 08/01/2022 91  65 - 140 mg/dL Final    If the patient is fasting, the ADA then defines impaired fasting glucose as > 100 mg/dL and diabetes as > or equal to 123 mg/dL  Specimen collection should occur prior to Sulfasalazine administration due to the potential for falsely depressed results   Specimen collection should occur prior to Sulfapyridine administration due to the potential for falsely elevated results   Calcium 08/01/2022 9 5  8 3 - 10 1 mg/dL Final    Corrected Calcium 08/01/2022 10 0  8 3 - 10 1 mg/dL Final    AST 08/01/2022 16  5 - 45 U/L Final    Specimen collection should occur prior to Sulfasalazine administration due to the potential for falsely depressed results   ALT 08/01/2022 27  12 - 78 U/L Final    Specimen collection should occur prior to Sulfasalazine and/or Sulfapyridine administration due to the potential for falsely depressed results   Alkaline Phosphatase 08/01/2022 71  46 - 116 U/L Final    Total Protein 08/01/2022 7 0  6 4 - 8 4 g/dL Final    Albumin 08/01/2022 3 4 (L)  3 5 - 5 0 g/dL Final    Total Bilirubin 08/01/2022 0 34  0 20 - 1 00 mg/dL Final    Use of this assay is not recommended for patients undergoing treatment with eltrombopag due to the potential for falsely elevated results   eGFR 08/01/2022 93  ml/min/1 73sq m Final    Vitamin B-12 08/01/2022 508  100 - 900 pg/mL Final    Vit D, 25-Hydroxy 08/01/2022 101 2 (H)  30 0 - 100 0 ng/mL Final    Hepatitis C Ab 08/01/2022 Non-reactive  Non-reactive Final    Hepatitis B Surface Ag 08/01/2022 Non-reactive  Non-reactive, NonReactive - Confirmed Final    TSH 3RD GENERATON 08/01/2022 0 547  0 450 - 4 500 uIU/mL Final    The recommended reference ranges for TSH during pregnancy are as follows:   First trimester 0 1 to 2 5 uIU/mL   Second trimester  0 2 to 3 0 uIU/mL   Third trimester 0 3 to 3 0 uIU/m    Note: Normal ranges may not apply to patients who are transgender, non-binary, or whose legal sex, sex at birth, and gender identity differ  Adult TSH (3rd generation) reference range follows the recommended guidelines of the American Thyroid Association, January, 2020      WBC 08/01/2022 9 16  4 31 - 10 16 Thousand/uL Final    RBC 08/01/2022 4 76  3 81 - 5 12 Million/uL Final    Hemoglobin 08/01/2022 14 0  11 5 - 15 4 g/dL Final    Hematocrit 08/01/2022 43 4  34 8 - 46 1 % Final    MCV 08/01/2022 91  82 - 98 fL Final    MCH 08/01/2022 29 4  26 8 - 34 3 pg Final    MCHC 08/01/2022 32 3  31 4 - 37 4 g/dL Final    RDW 08/01/2022 14 7  11 6 - 15 1 % Final    MPV 08/01/2022 10 7  8 9 - 12 7 fL Final    Platelets 19/25/5719 307  149 - 390 Thousands/uL Final    nRBC 08/01/2022 0  /100 WBCs Final    Neutrophils Relative 08/01/2022 64  43 - 75 % Final    Immat GRANS % 08/01/2022 1  0 - 2 % Final    Lymphocytes Relative 08/01/2022 24  14 - 44 % Final    Monocytes Relative 08/01/2022 5  4 - 12 % Final    Eosinophils Relative 08/01/2022 5  0 - 6 % Final    Basophils Relative 08/01/2022 1  0 - 1 % Final    Neutrophils Absolute 08/01/2022 5 96  1 85 - 7 62 Thousands/µL Final    Immature Grans Absolute 08/01/2022 0 05  0 00 - 0 20 Thousand/uL Final    Lymphocytes Absolute 08/01/2022 2 23  0 60 - 4 47 Thousands/µL Final    Monocytes Absolute 08/01/2022 0 41  0 17 - 1 22 Thousand/µL Final    Eosinophils Absolute 08/01/2022 0 45  0 00 - 0 61 Thousand/µL Final    Basophils Absolute 08/01/2022 0 06  0 00 - 0 10 Thousands/µL Final    Total CK 08/01/2022 99  26 - 192 U/L Final    CRP 08/01/2022 13 4 (H)  <3 0 mg/L Final

## 2023-05-01 ENCOUNTER — OFFICE VISIT (OUTPATIENT)
Dept: RHEUMATOLOGY | Facility: CLINIC | Age: 48
End: 2023-05-01

## 2023-05-01 VITALS
SYSTOLIC BLOOD PRESSURE: 144 MMHG | DIASTOLIC BLOOD PRESSURE: 88 MMHG | BODY MASS INDEX: 41.95 KG/M2 | WEIGHT: 293 LBS | HEIGHT: 70 IN

## 2023-05-01 DIAGNOSIS — M54.50 CHRONIC LOW BACK PAIN, UNSPECIFIED BACK PAIN LATERALITY, UNSPECIFIED WHETHER SCIATICA PRESENT: ICD-10-CM

## 2023-05-01 DIAGNOSIS — G89.29 CHRONIC LOW BACK PAIN, UNSPECIFIED BACK PAIN LATERALITY, UNSPECIFIED WHETHER SCIATICA PRESENT: ICD-10-CM

## 2023-05-01 DIAGNOSIS — M47.819 SPONDYLO-ARTHROPATHY: Primary | ICD-10-CM

## 2023-05-01 DIAGNOSIS — M25.561 PAIN IN BOTH KNEES, UNSPECIFIED CHRONICITY: ICD-10-CM

## 2023-05-01 DIAGNOSIS — M25.562 PAIN IN BOTH KNEES, UNSPECIFIED CHRONICITY: ICD-10-CM

## 2023-05-01 RX ORDER — HYDROCODONE BITARTRATE AND ACETAMINOPHEN 5; 325 MG/1; MG/1
TABLET ORAL
Qty: 14 TABLET | Refills: 0 | Status: SHIPPED | OUTPATIENT
Start: 2023-05-01

## 2023-05-01 RX ORDER — HYALURONATE SODIUM 20 MG/2 ML
SYRINGE (ML) INTRAARTICULAR
COMMUNITY
Start: 2023-04-25

## 2023-05-01 RX ORDER — METFORMIN HYDROCHLORIDE 500 MG/1
TABLET, EXTENDED RELEASE ORAL
COMMUNITY
Start: 2023-04-22

## 2023-05-01 RX ORDER — FAMOTIDINE 20 MG/1
20 TABLET, FILM COATED ORAL 2 TIMES DAILY
Qty: 180 TABLET | Refills: 0 | Status: SHIPPED | OUTPATIENT
Start: 2023-05-01

## 2023-05-01 NOTE — TELEPHONE ENCOUNTER
I sent a script for #14 tabs  Any further refills I will discuss in person office visit as Price's last note states she was to be attempting to take less of the medication

## 2023-05-01 NOTE — PATIENT INSTRUCTIONS
Please have the blood work done ASAP (non-fasting)  Continue the Enbrel for now  We may consider changing your therapy if you are not improving on the Enbrel or your inflammation markers

## 2023-05-08 ENCOUNTER — TELEPHONE (OUTPATIENT)
Dept: OBGYN CLINIC | Facility: CLINIC | Age: 48
End: 2023-05-08

## 2023-05-08 NOTE — TELEPHONE ENCOUNTER
Please have the patient make a follow-up appointment to have consents obtained and to schedule surgery  Thank you

## 2023-05-10 DIAGNOSIS — M48.062 SPINAL STENOSIS OF LUMBAR REGION WITH NEUROGENIC CLAUDICATION: ICD-10-CM

## 2023-05-10 DIAGNOSIS — R52 DIFFUSE PAIN: ICD-10-CM

## 2023-05-10 DIAGNOSIS — M54.50 CHRONIC RIGHT-SIDED LOW BACK PAIN WITHOUT SCIATICA: ICD-10-CM

## 2023-05-10 DIAGNOSIS — M79.18 MYOFASCIAL PAIN SYNDROME: ICD-10-CM

## 2023-05-10 DIAGNOSIS — M47.816 LUMBAR SPONDYLOSIS: ICD-10-CM

## 2023-05-10 DIAGNOSIS — G89.29 CHRONIC RIGHT-SIDED LOW BACK PAIN WITHOUT SCIATICA: ICD-10-CM

## 2023-05-10 DIAGNOSIS — R51.9 CHRONIC NONINTRACTABLE HEADACHE, UNSPECIFIED HEADACHE TYPE: ICD-10-CM

## 2023-05-10 DIAGNOSIS — G89.29 NECK PAIN, CHRONIC: ICD-10-CM

## 2023-05-10 DIAGNOSIS — G89.29 CHRONIC NONINTRACTABLE HEADACHE, UNSPECIFIED HEADACHE TYPE: ICD-10-CM

## 2023-05-10 DIAGNOSIS — M54.2 NECK PAIN, CHRONIC: ICD-10-CM

## 2023-05-10 DIAGNOSIS — M51.27 HERNIATED NUCLEUS PULPOSUS, L5-S1: ICD-10-CM

## 2023-05-10 DIAGNOSIS — G89.4 CHRONIC PAIN SYNDROME: ICD-10-CM

## 2023-05-10 RX ORDER — GABAPENTIN 600 MG/1
600 TABLET ORAL 3 TIMES DAILY
Qty: 90 TABLET | Refills: 2 | Status: SHIPPED | OUTPATIENT
Start: 2023-05-10 | End: 2023-08-08

## 2023-05-16 ENCOUNTER — PROCEDURE VISIT (OUTPATIENT)
Dept: OBGYN CLINIC | Facility: CLINIC | Age: 48
End: 2023-05-16

## 2023-05-16 VITALS
DIASTOLIC BLOOD PRESSURE: 80 MMHG | WEIGHT: 293 LBS | BODY MASS INDEX: 41.95 KG/M2 | SYSTOLIC BLOOD PRESSURE: 118 MMHG | HEIGHT: 70 IN

## 2023-05-16 DIAGNOSIS — M17.11 PRIMARY OSTEOARTHRITIS OF RIGHT KNEE: Primary | ICD-10-CM

## 2023-05-16 RX ORDER — HYALURONATE SODIUM 10 MG/ML
20 SYRINGE (ML) INTRAARTICULAR
Status: COMPLETED | OUTPATIENT
Start: 2023-05-16 | End: 2023-05-16

## 2023-05-16 RX ADMIN — Medication 20 MG: at 09:24

## 2023-05-16 NOTE — ASSESSMENT & PLAN NOTE
Findings consistent with right knee osteoarthritis  Findings and treatment options were discussed with the patient  The first of 3 right knee Euflexxa injections were given today  She tolerated the procedure well  Advised to apply cold compress today  Follow-up in 1 week for the second injection  All questions were answered to patient's satisfaction

## 2023-05-16 NOTE — PROGRESS NOTES
Assessment:     1  Primary osteoarthritis of right knee        Plan:     Problem List Items Addressed This Visit        Musculoskeletal and Integument    Primary osteoarthritis of right knee - Primary     Findings consistent with right knee osteoarthritis  Findings and treatment options were discussed with the patient  The first of 3 right knee Euflexxa injections were given today  She tolerated the procedure well  Advised to apply cold compress today  Follow-up in 1 week for the second injection  All questions were answered to patient's satisfaction  Relevant Medications    Sodium Hyaluronate 20 mg (Completed)    Other Relevant Orders    Large joint arthrocentesis: R knee (Completed)         Subjective:     Patient ID: Kyra Alonzo is a 49 y o  female  Chief Complaint: This is a 60-year-old white female following up for right knee osteoarthritis  She is here to begin a series of Euflexxa injections  This is her first series of joint supplement injections  Last cortisone injection was given on February 15, 2023  She states she did not get any relief from that injection  Her pain is currently 6 out of 10  It is aching and throbbing in nature  The pain is localized around the patella  It is worse with stair climbing  She denies any new injury since last visit       Allergy:  Allergies   Allergen Reactions   • Acetaminophen-Codeine      Pt states she does not remember having a reaction to this medication   • Hydrocodone Other (See Comments)     GI issues   • Latex Hives     Latex powder     • Lisinopril Other (See Comments)     Itching, gi intollerance     • Prednisolone Vomiting     Medications:  all current active meds have been reviewed  Past Medical History:  Past Medical History:   Diagnosis Date   • Allergic    • Anxiety    • Arthritis    • Asthma    • Chronic pain disorder     upper back   • Depression    • Disc disorder    • Disease of thyroid gland    • Fibromyalgia, primary    • GERD (gastroesophageal reflux disease)    • Headache(784 0)    • HPV (human papilloma virus) infection     cervical   • Hypertension    • Hypothyroidism    • Inflammatory bowel disease    • Migraine    • Obesity    • Stomach disorder      Past Surgical History:  Past Surgical History:   Procedure Laterality Date   • CERVICAL BIOPSY  W/ LOOP ELECTRODE EXCISION     • CERVICAL FUSION  2015   •  SECTION     • CHOLECYSTECTOMY      lap   • FOOT SURGERY Bilateral     multiple, posterior calcaneal bone spur   • KNEE SURGERY Left     arthroscopy with debribement   • LYMPH NODE BIOPSY     • SPINE SURGERY     • TENDON REPAIR     • TUBAL LIGATION Bilateral      Family History:  Family History   Adopted: Yes   Problem Relation Age of Onset   • Thyroid disease Mother    • Arthritis Mother         Mom, dad, aunts, grandmother   • Autoimmune disease Mother    • No Known Problems Father    • Coronary artery disease Maternal Grandmother    • Breast cancer Maternal Grandmother         age unknown   • Heart disease Maternal Grandmother         Mother, Aunt, Grandmother, Father   • Coronary artery disease Maternal Aunt    • Stroke Maternal Espiridion Drape, brothers   • No Known Problems Maternal Grandfather    • No Known Problems Paternal Grandmother    • No Known Problems Paternal Grandfather    • Suicide Attempts Maternal Aunt    • Heart murmur Maternal Aunt    • Hypothyroidism Maternal Aunt    • Hashimoto's thyroiditis Maternal Aunt    • Diabetes unspecified Brother    • Diabetes Brother    • No Known Problems Brother    • No Known Problems Brother    • ADD / ADHD Son      Social History:  Social History     Substance and Sexual Activity   Alcohol Use Not Currently     Social History     Substance and Sexual Activity   Drug Use No     Social History     Tobacco Use   Smoking Status Every Day   • Packs/day: 0 50   • Years: 35 00   • Pack years: 17 50   • Types: Cigarettes   Smokeless Tobacco Never   Tobacco "Comments    actually about 3/4 PPD     Review of Systems   Constitutional: Negative for chills and fever  HENT: Negative for ear pain and sore throat  Eyes: Negative for pain and visual disturbance  Respiratory: Negative for cough and shortness of breath  Cardiovascular: Negative for chest pain and palpitations  Gastrointestinal: Negative for abdominal pain and vomiting  Genitourinary: Negative for dysuria and hematuria  Musculoskeletal: Positive for arthralgias (right knee) and gait problem (antalgic)  Negative for back pain  Skin: Negative for color change and rash  Neurological: Negative for seizures and syncope  Psychiatric/Behavioral: Negative  All other systems reviewed and are negative  Objective:  BP Readings from Last 1 Encounters:   05/16/23 118/80      Wt Readings from Last 1 Encounters:   05/16/23 (!) 142 kg (314 lb)      BMI:   Estimated body mass index is 45 05 kg/m² as calculated from the following:    Height as of this encounter: 5' 10\" (1 778 m)  Weight as of this encounter: 142 kg (314 lb)  BSA:   Estimated body surface area is 2 53 meters squared as calculated from the following:    Height as of this encounter: 5' 10\" (1 778 m)  Weight as of this encounter: 142 kg (314 lb)  Physical Exam  Vitals and nursing note reviewed  Constitutional:       Appearance: Normal appearance  She is well-developed  She is obese  HENT:      Head: Normocephalic and atraumatic  Right Ear: External ear normal       Left Ear: External ear normal    Eyes:      Extraocular Movements: Extraocular movements intact  Conjunctiva/sclera: Conjunctivae normal    Pulmonary:      Effort: Pulmonary effort is normal    Musculoskeletal:      Cervical back: Neck supple  Right knee: No effusion  Instability Tests: Medial Karen test negative and lateral Karen test negative  Skin:     General: Skin is warm and dry     Neurological:      Mental Status: She is alert and " oriented to person, place, and time  Deep Tendon Reflexes: Reflexes are normal and symmetric  Psychiatric:         Mood and Affect: Mood normal          Behavior: Behavior normal        Right Knee Exam     Tenderness   Right knee tenderness location: patellofemoral     Range of Motion   Extension: 0   Flexion: 120     Tests   Karen:  Medial - negative Lateral - negative  Varus: negative Valgus: negative  Patellar apprehension: negative    Other   Erythema: absent  Scars: absent  Sensation: normal  Pulse: present  Swelling: none  Effusion: no effusion present    Comments:  Patellofemoral joint crepitation with knee motion          Large joint arthrocentesis: R knee  Universal Protocol:  Consent: Verbal consent obtained    Risks and benefits: risks, benefits and alternatives were discussed  Consent given by: patient  Patient understanding: patient states understanding of the procedure being performed    Supporting Documentation  Indications: pain   Procedure Details  Location: knee - R knee  Preparation: Patient was prepped and draped in the usual sterile fashion  Needle size: 22 G  Approach: anterolateral  Medications administered: 20 mg Sodium Hyaluronate 20 MG/2ML    Patient tolerance: patient tolerated the procedure well with no immediate complications  Dressing:  Sterile dressing applied           No new imaging was obtained today

## 2023-05-25 ENCOUNTER — PROCEDURE VISIT (OUTPATIENT)
Dept: OBGYN CLINIC | Facility: CLINIC | Age: 48
End: 2023-05-25

## 2023-05-25 VITALS
DIASTOLIC BLOOD PRESSURE: 90 MMHG | BODY MASS INDEX: 41.95 KG/M2 | HEIGHT: 70 IN | WEIGHT: 293 LBS | SYSTOLIC BLOOD PRESSURE: 123 MMHG

## 2023-05-25 DIAGNOSIS — M17.11 PRIMARY OSTEOARTHRITIS OF RIGHT KNEE: Primary | ICD-10-CM

## 2023-05-25 RX ORDER — HYALURONATE SODIUM 10 MG/ML
20 SYRINGE (ML) INTRAARTICULAR
Status: COMPLETED | OUTPATIENT
Start: 2023-05-25 | End: 2023-05-25

## 2023-05-25 RX ADMIN — Medication 20 MG: at 11:30

## 2023-05-25 NOTE — ASSESSMENT & PLAN NOTE
Findings consistent with right knee osteoarthritis  Findings and treatment options were discussed with the patient  The 2nd of 3 right knee Euflexxa injections were given today  She tolerated the procedure well  Advised to apply cold compress today  Follow-up in 1 week for the third injection  All questions were answered to patient's satisfaction

## 2023-05-25 NOTE — PROGRESS NOTES
Assessment:     1  Primary osteoarthritis of right knee        Plan:     Problem List Items Addressed This Visit        Musculoskeletal and Integument    Primary osteoarthritis of right knee - Primary     Findings consistent with right knee osteoarthritis  Findings and treatment options were discussed with the patient  The 2nd of 3 right knee Euflexxa injections were given today  She tolerated the procedure well  Advised to apply cold compress today  Follow-up in 1 week for the third injection  All questions were answered to patient's satisfaction  Relevant Medications    Sodium Hyaluronate 20 mg (Completed)    Other Relevant Orders    Large joint arthrocentesis: R knee (Completed)         Subjective:     Patient ID: Vinh Smith is a 49 y o  female  Chief Complaint: This is a 54-year-old white female following up for right knee osteoarthritis  She is here for the second of 3 right knee Euflexxa injections  She did have aching pain for 1 to 2 days after the first injection       Allergy:  Allergies   Allergen Reactions   • Acetaminophen-Codeine      Pt states she does not remember having a reaction to this medication   • Hydrocodone Other (See Comments)     GI issues   • Latex Hives     Latex powder     • Lisinopril Other (See Comments)     Itching, gi intollerance     • Prednisolone Vomiting     Medications:  all current active meds have been reviewed  Past Medical History:  Past Medical History:   Diagnosis Date   • Allergic    • Anxiety    • Arthritis    • Asthma    • Chronic pain disorder     upper back   • Depression    • Disc disorder    • Disease of thyroid gland    • Fibromyalgia, primary    • GERD (gastroesophageal reflux disease)    • Headache(784 0)    • HPV (human papilloma virus) infection     cervical   • Hypertension    • Hypothyroidism    • Inflammatory bowel disease    • Migraine    • Obesity    • Stomach disorder      Past Surgical History:  Past Surgical History:   Procedure Laterality Date   • CERVICAL BIOPSY  W/ LOOP ELECTRODE EXCISION     • CERVICAL FUSION  2015   •  SECTION     • CHOLECYSTECTOMY      lap   • FOOT SURGERY Bilateral     multiple, posterior calcaneal bone spur   • KNEE SURGERY Left     arthroscopy with debribement   • LYMPH NODE BIOPSY     • SPINE SURGERY     • TENDON REPAIR     • TUBAL LIGATION Bilateral      Family History:  Family History   Adopted: Yes   Problem Relation Age of Onset   • Thyroid disease Mother    • Arthritis Mother         Mom, dad, aunts, grandmother   • Autoimmune disease Mother    • No Known Problems Father    • Coronary artery disease Maternal Grandmother    • Breast cancer Maternal Grandmother         age unknown   • Heart disease Maternal Grandmother         Mother, Aunt, Grandmother, Father   • Coronary artery disease Maternal Aunt    • Stroke Maternal Stephanie Dean, brothers   • No Known Problems Maternal Grandfather    • No Known Problems Paternal Grandmother    • No Known Problems Paternal Grandfather    • Suicide Attempts Maternal Aunt    • Heart murmur Maternal Aunt    • Hypothyroidism Maternal Aunt    • Hashimoto's thyroiditis Maternal Aunt    • Diabetes unspecified Brother    • Diabetes Brother    • No Known Problems Brother    • No Known Problems Brother    • ADD / ADHD Son      Social History:  Social History     Substance and Sexual Activity   Alcohol Use Not Currently     Social History     Substance and Sexual Activity   Drug Use No     Social History     Tobacco Use   Smoking Status Every Day   • Packs/day: 0 50   • Years: 35 00   • Total pack years: 17 50   • Types: Cigarettes   Smokeless Tobacco Never   Tobacco Comments    actually about 3/4 PPD     Review of Systems   Constitutional: Negative for chills and fever  HENT: Negative for ear pain and sore throat  Eyes: Negative for pain and visual disturbance  Respiratory: Negative for cough and shortness of breath      Cardiovascular: Negative for "chest pain and palpitations  Gastrointestinal: Negative for abdominal pain and vomiting  Genitourinary: Negative for dysuria and hematuria  Musculoskeletal: Positive for arthralgias (right knee) and gait problem (antalgic)  Negative for back pain  Skin: Negative for color change and rash  Neurological: Negative for seizures and syncope  Psychiatric/Behavioral: Negative  All other systems reviewed and are negative  Objective:  BP Readings from Last 1 Encounters:   05/25/23 123/90      Wt Readings from Last 1 Encounters:   05/25/23 (!) 142 kg (314 lb)      BMI:   Estimated body mass index is 45 05 kg/m² as calculated from the following:    Height as of this encounter: 5' 10\" (1 778 m)  Weight as of this encounter: 142 kg (314 lb)  BSA:   Estimated body surface area is 2 53 meters squared as calculated from the following:    Height as of this encounter: 5' 10\" (1 778 m)  Weight as of this encounter: 142 kg (314 lb)  Physical Exam  Vitals and nursing note reviewed  Constitutional:       Appearance: Normal appearance  She is well-developed  She is obese  HENT:      Head: Normocephalic and atraumatic  Right Ear: External ear normal       Left Ear: External ear normal    Eyes:      Extraocular Movements: Extraocular movements intact  Conjunctiva/sclera: Conjunctivae normal    Pulmonary:      Effort: Pulmonary effort is normal    Musculoskeletal:      Cervical back: Neck supple  Right knee: No effusion  Instability Tests: Medial Karen test negative and lateral Karen test negative  Skin:     General: Skin is warm and dry  Neurological:      Mental Status: She is alert and oriented to person, place, and time  Deep Tendon Reflexes: Reflexes are normal and symmetric     Psychiatric:         Mood and Affect: Mood normal          Behavior: Behavior normal        Right Knee Exam     Tenderness   Right knee tenderness location: patellofemoral     Range of Motion " Extension: 0   Flexion: 120     Tests   Karen:  Medial - negative Lateral - negative  Varus: negative Valgus: negative  Patellar apprehension: negative    Other   Erythema: absent  Scars: absent  Sensation: normal  Pulse: present  Swelling: none  Effusion: no effusion present    Comments:  Patellofemoral joint crepitation with knee motion          Large joint arthrocentesis: R knee  Universal Protocol:  Consent: Verbal consent obtained    Risks and benefits: risks, benefits and alternatives were discussed  Consent given by: patient  Patient understanding: patient states understanding of the procedure being performed    Supporting Documentation  Indications: pain   Procedure Details  Location: knee - R knee  Preparation: Patient was prepped and draped in the usual sterile fashion  Needle size: 22 G  Approach: anterolateral  Medications administered: 20 mg Sodium Hyaluronate 20 MG/2ML    Patient tolerance: patient tolerated the procedure well with no immediate complications  Dressing:  Sterile dressing applied           No new imaging was obtained today

## 2023-06-01 ENCOUNTER — PROCEDURE VISIT (OUTPATIENT)
Dept: OBGYN CLINIC | Facility: CLINIC | Age: 48
End: 2023-06-01

## 2023-06-01 VITALS
BODY MASS INDEX: 41.95 KG/M2 | DIASTOLIC BLOOD PRESSURE: 82 MMHG | WEIGHT: 293 LBS | SYSTOLIC BLOOD PRESSURE: 120 MMHG | HEIGHT: 70 IN

## 2023-06-01 DIAGNOSIS — M17.11 PRIMARY OSTEOARTHRITIS OF RIGHT KNEE: Primary | ICD-10-CM

## 2023-06-01 RX ORDER — HYALURONATE SODIUM 10 MG/ML
20 SYRINGE (ML) INTRAARTICULAR
Status: COMPLETED | OUTPATIENT
Start: 2023-06-01 | End: 2023-06-01

## 2023-06-01 RX ADMIN — Medication 20 MG: at 16:15

## 2023-06-01 NOTE — ASSESSMENT & PLAN NOTE
Findings consistent with right knee osteoarthritis  Findings and treatment options were discussed with the patient  The 3rd of 3 right knee Euflexxa injections were given today  She tolerated the procedure well  Advised to apply cold compress today  Follow-up as needed if symptoms return  Advised patient the soonest she can have another series is in 6 months  All questions were answered to patient's satisfaction

## 2023-06-01 NOTE — PROGRESS NOTES
Assessment:     1  Primary osteoarthritis of right knee        Plan:     Problem List Items Addressed This Visit        Musculoskeletal and Integument    Primary osteoarthritis of right knee - Primary     Findings consistent with right knee osteoarthritis  Findings and treatment options were discussed with the patient  The 3rd of 3 right knee Euflexxa injections were given today  She tolerated the procedure well  Advised to apply cold compress today  Follow-up as needed if symptoms return  Advised patient the soonest she can have another series is in 6 months  All questions were answered to patient's satisfaction  Relevant Medications    Sodium Hyaluronate 20 mg (Completed) (Start on 6/1/2023  4:15 PM)    Other Relevant Orders    Large joint arthrocentesis: R knee (Completed)         Subjective:     Patient ID: Vinh Smith is a 49 y o  female  Chief Complaint: This is a 76-year-old white female following up for right knee osteoarthritis  She is here for the 3rd of 3 right knee Euflexxa injections  No issues after the second injection  She is feeling some relief so far       Allergy:  Allergies   Allergen Reactions   • Acetaminophen-Codeine      Pt states she does not remember having a reaction to this medication   • Hydrocodone Other (See Comments)     GI issues   • Latex Hives     Latex powder     • Lisinopril Other (See Comments)     Itching, gi intollerance     • Prednisolone Vomiting     Medications:  all current active meds have been reviewed  Past Medical History:  Past Medical History:   Diagnosis Date   • Allergic    • Anxiety    • Arthritis    • Asthma    • Chronic pain disorder     upper back   • Depression    • Disc disorder    • Disease of thyroid gland    • Fibromyalgia, primary    • GERD (gastroesophageal reflux disease)    • Headache(784 0)    • HPV (human papilloma virus) infection     cervical   • Hypertension    • Hypothyroidism    • Inflammatory bowel disease    • Migraine • Obesity    • Stomach disorder      Past Surgical History:  Past Surgical History:   Procedure Laterality Date   • CERVICAL BIOPSY  W/ LOOP ELECTRODE EXCISION     • CERVICAL FUSION  2015   •  SECTION     • CHOLECYSTECTOMY      lap   • FOOT SURGERY Bilateral     multiple, posterior calcaneal bone spur   • KNEE SURGERY Left     arthroscopy with debribement   • LYMPH NODE BIOPSY     • SPINE SURGERY     • TENDON REPAIR     • TUBAL LIGATION Bilateral      Family History:  Family History   Adopted: Yes   Problem Relation Age of Onset   • Thyroid disease Mother    • Arthritis Mother         Mom, dad, aunts, grandmother   • Autoimmune disease Mother    • No Known Problems Father    • Coronary artery disease Maternal Grandmother    • Breast cancer Maternal Grandmother         age unknown   • Heart disease Maternal Grandmother         Mother, Aunt, Grandmother, Father   • Coronary artery disease Maternal Aunt    • Stroke Maternal Jannet Peña, brothers   • No Known Problems Maternal Grandfather    • No Known Problems Paternal Grandmother    • No Known Problems Paternal Grandfather    • Suicide Attempts Maternal Aunt    • Heart murmur Maternal Aunt    • Hypothyroidism Maternal Aunt    • Hashimoto's thyroiditis Maternal Aunt    • Diabetes unspecified Brother    • Diabetes Brother    • No Known Problems Brother    • No Known Problems Brother    • ADD / ADHD Son      Social History:  Social History     Substance and Sexual Activity   Alcohol Use Not Currently     Social History     Substance and Sexual Activity   Drug Use No     Social History     Tobacco Use   Smoking Status Every Day   • Packs/day: 0 50   • Years: 35 00   • Total pack years: 17 50   • Types: Cigarettes   Smokeless Tobacco Never   Tobacco Comments    actually about 3/4 PPD     Review of Systems   Constitutional: Negative for chills and fever  HENT: Negative for ear pain and sore throat      Eyes: Negative for pain and visual "disturbance  Respiratory: Negative for cough and shortness of breath  Cardiovascular: Negative for chest pain and palpitations  Gastrointestinal: Negative for abdominal pain and vomiting  Genitourinary: Negative for dysuria and hematuria  Musculoskeletal: Positive for arthralgias (right knee) and gait problem (antalgic)  Negative for back pain  Skin: Negative for color change and rash  Neurological: Negative for seizures and syncope  Psychiatric/Behavioral: Negative  All other systems reviewed and are negative  Objective:  BP Readings from Last 1 Encounters:   06/01/23 120/82      Wt Readings from Last 1 Encounters:   06/01/23 (!) 142 kg (314 lb)      BMI:   Estimated body mass index is 45 05 kg/m² as calculated from the following:    Height as of this encounter: 5' 10\" (1 778 m)  Weight as of this encounter: 142 kg (314 lb)  BSA:   Estimated body surface area is 2 53 meters squared as calculated from the following:    Height as of this encounter: 5' 10\" (1 778 m)  Weight as of this encounter: 142 kg (314 lb)  Physical Exam  Vitals and nursing note reviewed  Constitutional:       Appearance: Normal appearance  She is well-developed  She is obese  HENT:      Head: Normocephalic and atraumatic  Right Ear: External ear normal       Left Ear: External ear normal    Eyes:      Extraocular Movements: Extraocular movements intact  Conjunctiva/sclera: Conjunctivae normal    Pulmonary:      Effort: Pulmonary effort is normal    Musculoskeletal:      Cervical back: Neck supple  Right knee: No effusion  Instability Tests: Medial Karen test negative and lateral Karen test negative  Skin:     General: Skin is warm and dry  Neurological:      Mental Status: She is alert and oriented to person, place, and time  Deep Tendon Reflexes: Reflexes are normal and symmetric     Psychiatric:         Mood and Affect: Mood normal          Behavior: Behavior normal  " Right Knee Exam     Tenderness   Right knee tenderness location: patellofemoral     Range of Motion   Extension: 0   Flexion: 120     Tests   Karen:  Medial - negative Lateral - negative  Varus: negative Valgus: negative  Patellar apprehension: negative    Other   Erythema: absent  Scars: absent  Sensation: normal  Pulse: present  Swelling: none  Effusion: no effusion present    Comments:  Patellofemoral joint crepitation with knee motion          Large joint arthrocentesis: R knee  Universal Protocol:  Consent: Verbal consent obtained    Risks and benefits: risks, benefits and alternatives were discussed  Consent given by: patient  Patient understanding: patient states understanding of the procedure being performed    Supporting Documentation  Indications: pain   Procedure Details  Location: knee - R knee  Preparation: Patient was prepped and draped in the usual sterile fashion  Needle size: 22 G  Approach: anterolateral  Medications administered: 20 mg Sodium Hyaluronate 20 MG/2ML    Patient tolerance: patient tolerated the procedure well with no immediate complications  Dressing:  Sterile dressing applied           No new imaging was obtained today

## 2023-06-19 ENCOUNTER — OFFICE VISIT (OUTPATIENT)
Dept: OBGYN CLINIC | Facility: CLINIC | Age: 48
End: 2023-06-19
Payer: COMMERCIAL

## 2023-06-19 VITALS
HEIGHT: 70 IN | DIASTOLIC BLOOD PRESSURE: 84 MMHG | WEIGHT: 293 LBS | BODY MASS INDEX: 41.95 KG/M2 | SYSTOLIC BLOOD PRESSURE: 128 MMHG

## 2023-06-19 DIAGNOSIS — G56.03 CARPAL TUNNEL SYNDROME, BILATERAL: Primary | ICD-10-CM

## 2023-06-19 PROCEDURE — 99214 OFFICE O/P EST MOD 30 MIN: CPT | Performed by: ORTHOPAEDIC SURGERY

## 2023-06-19 RX ORDER — LIDOCAINE HYDROCHLORIDE AND EPINEPHRINE 10; 10 MG/ML; UG/ML
20 INJECTION, SOLUTION INFILTRATION; PERINEURAL ONCE
OUTPATIENT
Start: 2023-06-19 | End: 2023-06-19

## 2023-06-19 NOTE — PROGRESS NOTES
ASSESSMENT/PLAN:    Assessment:   Bilateral carpal tunnel syndrome  Left thumb retinacular cyst    Plan:   Conservative and operative treatments were discussed with the patient at length  She would like to proceed with surgical intervention  She would like to proceed with staged surgeries with the right first followed by the left  She will be scheduled for right endoscopic carpal tunnel release under local and then left endoscopic carpal tunnel release and left thumb retinacular cyst excision under local in staged form  Risk and benefits to surgery were discussed with the patient at length  Patient understands the risk and wishes to proceed  Detail consent was obtained  She will follow-up after surgery for suture removal    Follow Up: After surgery    To Do Next Visit:  Suture removal      Operative Discussions:  Endoscopic Carpal Tunnel Release: The anatomy and physiology of carpal tunnel syndrome was discussed with the patient today  Increase pressure localized under the transverse carpal ligament can cause pain, numbness, tingling, or dysesthesias within the median nerve distribution as well as feelings of fatigue, clumsiness, or awkwardness  These symptoms typically occur at night and worse in the morning upon waking  Eventually, untreated carpal tunnel syndrome can result in weakness and permanent loss of muscle within the thenar compartment of the hand  Treatment options were discussed with the patient  Conservative treatment includes nocturnal resting splints to keep the nerve in a neutral position, ergonomic changes within the work or home environment, activity modification, and tendon gliding exercises  Steroid injections within the carpal canal can help a majority of patients, however this is often self-limited in a majority of patients    Surgical intervention to divide the transverse carpal ligament typically results in a long-lasting relief of the patient's complaints, with the recurrence rate of less than 1%  The patient has elected to undergo an endoscopic carpal tunnel release  The 2 incision technique was discussed with the patient, which results in approximately a two-week less recovery time, and less wound complications  In the postoperative period, light activities are allowed immediately, driving is allowed when narcotic medication has stopped, and the bandages may be removed and incision may get wet after 2 days  Heavy activities (lifting more than approximately 10 pounds) will be allowed after follow up appointment in 1-2 weeks  While the pain and discomfort in the hands generally improves rapidly, the numbness and tingling as well as the strength will slowly improve over weeks to months depending on the severity of the carpal tunnel syndrome  Pillar pain was discussed with the patient, which is typically a common but self-limiting condition  The risks of bleeding and infection from the surgery are less than 1%  Risk of recurrence is approximately 0 5%  The risks of nerve injury or nerve damage or damage to the blood vessels is approximately 1 in 1200  The patient has an understanding of the above mentioned discussion  The risks and benefits of the procedure were explained to the patient, which include, but are not limited to: Bleeding, infection, recurrence, pain, scar, damage to tendons, damage to nerves, and damage to blood vessels, failure to give desired results and complications related to anesthesia   These risks, along with alternative conservative treatment options, and postoperative protocols were voiced back and understood by the patient   All questions were answered to the patient's satisfaction   The patient agrees to comply with a standard postoperative protocol, and is willing to proceed   Education was provided via written and auditory forms   There were no barriers to learning   Written handouts regarding wound care, incision and scar care, and general preoperative information was provided to the patient   Prior to surgery, the patient may be requested to stop all anti-inflammatory medications   Prophylactic aspirin, Plavix, and Coumadin may be allowed to be continued   Medications including vitamin E , ginkgo, and fish oil are requested to be stopped approximately one week prior to surgery   Hypertensive medications and beta blockers, if taken, should be continued  _____________________________________________________  CHIEF COMPLAINT:  Chief Complaint   Patient presents with   • Left Wrist - Carpal Tunnel, Follow-up     Carpal Tunnel- 2nd injection 3/13/23   • Right Wrist - Carpal Tunnel, Follow-up     Carpal Tunnel- 2nd injection 3/13/23         SUBJECTIVE:  Jonathan Diaz is a 51 y o  female who presents for follow-up regarding bilateral carpal tunnel syndrome  She has had injections which have helped with the numbness and tingling but she would like to proceed with surgical intervention at this time  She states that she does wake up from sleep with numbness and tingling in her hands  She has tried braces without relief of her symptoms      PAST MEDICAL HISTORY:  Past Medical History:   Diagnosis Date   • Allergic    • Anxiety    • Arthritis    • Asthma    • Chronic pain disorder     upper back   • Depression    • Disc disorder    • Disease of thyroid gland    • Fibromyalgia, primary    • GERD (gastroesophageal reflux disease)    • Headache(784 0)    • HPV (human papilloma virus) infection     cervical   • Hypertension    • Hypothyroidism    • Inflammatory bowel disease    • Migraine    • Obesity    • Stomach disorder        PAST SURGICAL HISTORY:  Past Surgical History:   Procedure Laterality Date   • CERVICAL BIOPSY  W/ LOOP ELECTRODE EXCISION     • CERVICAL FUSION  2015   •  SECTION     • CHOLECYSTECTOMY      lap   • FOOT SURGERY Bilateral     multiple, posterior calcaneal bone spur   • KNEE SURGERY Left     arthroscopy with debribement   • LYMPH NODE BIOPSY     • SPINE SURGERY     • TENDON REPAIR     • TUBAL LIGATION Bilateral 2012       FAMILY HISTORY:  Family History   Adopted: Yes   Problem Relation Age of Onset   • Thyroid disease Mother    • Arthritis Mother         Mom, dad, aunts, grandmother   • Autoimmune disease Mother    • No Known Problems Father    • Coronary artery disease Maternal Grandmother    • Breast cancer Maternal Grandmother         age unknown   • Heart disease Maternal Grandmother         Mother, Agnieszka Newberry, Father   • Coronary artery disease Maternal Aunt    • Stroke Maternal Tamara Neris, brothers   • No Known Problems Maternal Grandfather    • No Known Problems Paternal Grandmother    • No Known Problems Paternal Grandfather    • Suicide Attempts Maternal Aunt    • Heart murmur Maternal Aunt    • Hypothyroidism Maternal Aunt    • Hashimoto's thyroiditis Maternal Aunt    • Diabetes unspecified Brother    • Diabetes Brother    • No Known Problems Brother    • No Known Problems Brother    • ADD / ADHD Son        SOCIAL HISTORY:  Social History     Tobacco Use   • Smoking status: Every Day     Packs/day: 0 50     Years: 35 00     Total pack years: 17 50     Types: Cigarettes   • Smokeless tobacco: Never   • Tobacco comments:     actually about 3/4 PPD   Vaping Use   • Vaping Use: Never used   Substance Use Topics   • Alcohol use: Not Currently   • Drug use: No       MEDICATIONS:    Current Outpatient Medications:   •  albuterol (Ventolin HFA) 90 mcg/act inhaler, Inhale 2 puffs every 6 (six) hours as needed for wheezing, Disp: 18 g, Rfl: 0  •  betamethasone valerate (VALISONE) 0 1 % ointment, Apply 1 to 2 times a day only as needed to finger/hand rash , Disp: 30 g, Rfl: 0  •  Calcium Carbonate (CALCIUM 600 PO), Take by mouth daily, Disp: , Rfl:   •  cholecalciferol (VITAMIN D3) 1,000 units tablet, Take 2,000 Units by mouth daily, Disp: , Rfl:   •  ciclopirox (LOPROX) 0 77 % cream, APPLY 2 TIMES per day to rash on chest for 2-4 weeks  , Disp: 30 g, Rfl: 0  •  DULoxetine (CYMBALTA) 60 mg delayed release capsule, Take 2 PO HS , Disp: 180 capsule, Rfl: 0  •  etanercept (ENBREL SURECLICK) 50 MG/ML injection, Inject 1 mL (50 mg total) under the skin every 7 days, Disp: 4 mL, Rfl: 5  •  famotidine (PEPCID) 20 mg tablet, Take 1 tablet (20 mg total) by mouth 2 (two) times a day, Disp: 180 tablet, Rfl: 0  •  gabapentin (Neurontin) 600 MG tablet, Take 1 tablet (600 mg total) by mouth 3 (three) times a day, Disp: 90 tablet, Rfl: 2  •  HYDROcodone-acetaminophen (NORCO) 5-325 mg per tablet, Take 1 PO BID PRN for pain for ongoing therapy, Disp: 14 tablet, Rfl: 0  •  irbesartan (AVAPRO) 150 mg tablet, Take 1 tablet (150 mg total) by mouth daily at bedtime, Disp: 90 tablet, Rfl: 1  •  levothyroxine (Levoxyl) 200 mcg tablet, Take 1 tablet daily in addition to 25 mcg tablet daily, Disp: 90 tablet, Rfl: 3  •  levothyroxine (Levoxyl) 25 mcg tablet, Take 1 tablet daily in addition to 200 mcg tablet daily, Disp: 90 tablet, Rfl: 3  •  multivitamin (THERAGRAN) TABS, Take 1 tablet by mouth daily, Disp: , Rfl:   •  ondansetron (Zofran ODT) 4 mg disintegrating tablet, Take 1 tablet (4 mg total) by mouth every 6 (six) hours as needed for nausea or vomiting, Disp: 20 tablet, Rfl: 0  •  Potassium 99 MG TABS, Take by mouth daily, Disp: , Rfl:   •  tiZANidine (ZANAFLEX) 4 mg tablet, Take 1 PO TID PRN for pain and spasms  , Disp: 90 tablet, Rfl: 2  •  Euflexxa 20 MG/2ML SOSY, , Disp: , Rfl:   •  metFORMIN (GLUCOPHAGE-XR) 500 mg 24 hr tablet, TAKE TWO TABLETS BY MOUTH DAILY WITH BREAKFAST, Disp: , Rfl:   •  predniSONE 10 mg tablet, 5 pills x 2 days, 4 pills x 2 days, 3 pills x 2 days, 2 pills x 2 days, 1 pill x 2 days   Do not start before April 11, 2023 , Disp: 30 tablet, Rfl: 0    ALLERGIES:  Allergies   Allergen Reactions   • Acetaminophen-Codeine      Pt states she does not remember having a reaction to this medication   • Hydrocodone Other (See Comments)     GI "issues   • Latex Hives     Latex powder     • Lisinopril Other (See Comments)     Itching, gi intollerance     • Prednisolone Vomiting       REVIEW OF SYSTEMS:  Pertinent items are noted in HPI  A comprehensive review of systems was negative  LABS:  HgA1c:   Lab Results   Component Value Date    HGBA1C 5 8 (H) 04/05/2022     BMP:   Lab Results   Component Value Date    CALCIUM 9 4 11/06/2022    K 3 4 (L) 11/06/2022    CO2 29 11/06/2022     11/06/2022    BUN 24 11/06/2022    CREATININE 0 98 11/06/2022       _____________________________________________________  PHYSICAL EXAMINATION:  Vital signs: /84   Ht 5' 10\" (1 778 m)   Wt (!) 143 kg (314 lb 9 6 oz)   BMI 45 14 kg/m²   General: well developed and well nourished, alert, oriented times 3 and appears comfortable  Psychiatric: Normal  HEENT: Trachea Midline, No torticollis  Cardiovascular: No discernable arrhythmia  Pulmonary: No wheezing or stridor  Abdomen: No rebound or guarding  Extremities: No peripheral edema  Skin: No masses, erythema, lacerations, fluctation, ulcerations  Neurovascular: Sensation Intact to the Median, Ulnar, Radial Nerve, Motor Intact to the Median, Ulnar, Radial Nerve and Pulses Intact    MUSCULOSKELETAL EXAMINATION:  Bilateral carpal Tunnel Exam:    Negative thenar atrophy  Negative phalen's test  Positive carpal tunnel compression  Positive tinels over median nerve at the wrist   Opposition strength 5/5  Abduction strength 5/5        Left thumb demonstrates a palpable cyst appreciated along the tendon sheath which is painful, no clicking or locking appreciated      _____________________________________________________  STUDIES REVIEWED:  No Studies to review      PROCEDURES PERFORMED:  Procedures  No Procedures performed today    Scribe Attestation    I,:  May An PA-C am acting as a scribe while in the presence of the attending physician :       I,:  Adrienne Peraza MD personally performed the services " described in this documentation    as scribed in my presence :

## 2023-06-20 ENCOUNTER — PREP FOR PROCEDURE (OUTPATIENT)
Dept: OBGYN CLINIC | Facility: CLINIC | Age: 48
End: 2023-06-20

## 2023-06-20 ENCOUNTER — OFFICE VISIT (OUTPATIENT)
Dept: PAIN MEDICINE | Facility: CLINIC | Age: 48
End: 2023-06-20
Payer: COMMERCIAL

## 2023-06-20 VITALS
TEMPERATURE: 98.2 F | HEART RATE: 68 BPM | WEIGHT: 293 LBS | BODY MASS INDEX: 41.95 KG/M2 | HEIGHT: 70 IN | SYSTOLIC BLOOD PRESSURE: 126 MMHG | DIASTOLIC BLOOD PRESSURE: 82 MMHG

## 2023-06-20 DIAGNOSIS — M47.816 LUMBAR SPONDYLOSIS: ICD-10-CM

## 2023-06-20 DIAGNOSIS — G89.4 CHRONIC PAIN SYNDROME: Primary | ICD-10-CM

## 2023-06-20 DIAGNOSIS — M25.512 CHRONIC PAIN OF BOTH SHOULDERS: ICD-10-CM

## 2023-06-20 DIAGNOSIS — G89.29 CHRONIC PAIN OF BOTH SHOULDERS: ICD-10-CM

## 2023-06-20 DIAGNOSIS — M54.2 NECK PAIN, CHRONIC: ICD-10-CM

## 2023-06-20 DIAGNOSIS — M79.18 MYOFASCIAL PAIN SYNDROME: ICD-10-CM

## 2023-06-20 DIAGNOSIS — M51.27 HERNIATED NUCLEUS PULPOSUS, L5-S1: ICD-10-CM

## 2023-06-20 DIAGNOSIS — G89.29 NECK PAIN, CHRONIC: ICD-10-CM

## 2023-06-20 DIAGNOSIS — R29.898 BILATERAL LEG WEAKNESS: ICD-10-CM

## 2023-06-20 DIAGNOSIS — M48.061 LUMBAR FORAMINAL STENOSIS: ICD-10-CM

## 2023-06-20 DIAGNOSIS — M54.16 LUMBAR RADICULOPATHY: ICD-10-CM

## 2023-06-20 DIAGNOSIS — M25.511 CHRONIC PAIN OF BOTH SHOULDERS: ICD-10-CM

## 2023-06-20 PROCEDURE — 99214 OFFICE O/P EST MOD 30 MIN: CPT | Performed by: PHYSICIAN ASSISTANT

## 2023-06-20 RX ORDER — BACLOFEN 10 MG/1
10 TABLET ORAL 3 TIMES DAILY
Qty: 90 TABLET | Refills: 2 | Status: SHIPPED | OUTPATIENT
Start: 2023-06-20

## 2023-06-20 RX ORDER — DULOXETIN HYDROCHLORIDE 60 MG/1
CAPSULE, DELAYED RELEASE ORAL
Qty: 180 CAPSULE | Refills: 0 | Status: SHIPPED | OUTPATIENT
Start: 2023-06-20

## 2023-06-20 NOTE — PROGRESS NOTES
Assessment:  1  Chronic pain syndrome    2  Lumbar radiculopathy    3  Lumbar foraminal stenosis    4  Herniated nucleus pulposus, L5-S1    5  Lumbar spondylosis    6  Bilateral leg weakness    7  Chronic pain of both shoulders    8  Myofascial pain syndrome    9  Neck pain, chronic        Plan:  While the patient was in the office today, I did have a thorough conversation regarding their chronic pain syndrome, medication management, and treatment plan options  After discussing options, I have recommended that we obtain an EMG of the lower extremities bilaterally given the persistent neuropathic complaints that she is describing in her feet  In order to address the radicular component of her pain pattern I have recommended that we proceed with a diagnostic and hopefully therapeutic bilateral S1 transforaminal epidural steroid injection  She can continue the gabapentin 600 mg twice daily as she is tolerating it and reports about 60% relief without side effects  I recommended that we discontinue the tizanidine and trial baclofen 10 mg 3 times daily as needed tightness and spasm  I advised the patient that they should not drive or operate machinery while on this medication until they see how it affects them, as it could cause lethargy and mental cloudiness  I advised the patient to call our office if they experience any side effects or issues with the medication changes  The patient verbalized an understanding  I have provided a refill on the Cymbalta 60 mg 2 tablets at bedtime  Otherwise I will have the patient follow-up in 3 months or sooner if needed if the pain changes or worsens  Complete risks and benefits including bleeding, infection, tissue reaction, nerve injury and allergic reaction were discussed  The approach was demonstrated using models and literature was provided  Verbal and written consent was obtained      The patient will follow-up in 12 weeks for medication prescription refill and reevaluation  The patient was advised to contact the office should their symptoms worsen in the interim  The patient was agreeable and verbalized an understanding  History of Present Illness: The patient is a 51 y o  female last seen on 4/13/2023 who presents for a follow up office visit in regards to chronic pain  The patient currently reports increasing low back and bilateral lower extremity pain  She also history of chronic bilateral shoulder pain  She is rating her pain a 7 out of 10 on the pain scale describes it as a constant dull, aching, throbbing, cramping and pressure-like pain that radiates down the posterior and lateral aspects of bilateral lower extremities into her feet  She describes some issues with gait dysfunction as her legs are feeling more weak as time goes on  She is noticing more relief with the gabapentin as opposed to the pregabalin  Current pain medications includes: Gabapentin 600 mg 3 times daily, Cymbalta 120 mg nightly, tizanidine 4 mg 3 times daily as needed  The patient reports that this regimen is providing 60% pain relief  The patient is reporting no side effects from this pain medication regimen  I have personally reviewed and/or updated the patient's past medical history, past surgical history, family history, social history, current medications, allergies, and vital signs today  Review of Systems:    Review of Systems   Respiratory: Negative for shortness of breath  Cardiovascular: Negative for chest pain  Gastrointestinal: Negative for constipation, diarrhea, nausea and vomiting  Musculoskeletal: Positive for gait problem and joint swelling (Joint stiffness)  Negative for arthralgias and myalgias  Skin: Negative for rash  Neurological: Negative for dizziness, seizures and weakness  All other systems reviewed and are negative          Past Medical History:   Diagnosis Date   • Allergic    • Anxiety    • Arthritis    • Asthma    • Chronic pain disorder     upper back   • Depression    • Disc disorder    • Disease of thyroid gland    • Fibromyalgia, primary    • GERD (gastroesophageal reflux disease)    • Headache(784 0)    • HPV (human papilloma virus) infection     cervical   • Hypertension    • Hypothyroidism    • Inflammatory bowel disease    • Migraine    • Obesity    • Stomach disorder        Past Surgical History:   Procedure Laterality Date   • CERVICAL BIOPSY  W/ LOOP ELECTRODE EXCISION     • CERVICAL FUSION  2015   •  SECTION     • CHOLECYSTECTOMY      lap   • FOOT SURGERY Bilateral     multiple, posterior calcaneal bone spur   • KNEE SURGERY Left     arthroscopy with debribement   • LYMPH NODE BIOPSY     • SPINE SURGERY     • TENDON REPAIR     • TUBAL LIGATION Bilateral        Family History   Adopted: Yes   Problem Relation Age of Onset   • Thyroid disease Mother    • Arthritis Mother         Mom, dad, aunts, grandmother   • Autoimmune disease Mother    • No Known Problems Father    • Coronary artery disease Maternal Grandmother    • Breast cancer Maternal Grandmother         age unknown   • Heart disease Maternal Grandmother         Mother, Stephanie Dominguez, Father   • Coronary artery disease Maternal Aunt    • Stroke Maternal Michelet Skinner, brothers   • No Known Problems Maternal Grandfather    • No Known Problems Paternal Grandmother    • No Known Problems Paternal Grandfather    • Suicide Attempts Maternal Aunt    • Heart murmur Maternal Aunt    • Hypothyroidism Maternal Aunt    • Hashimoto's thyroiditis Maternal Aunt    • Diabetes unspecified Brother    • Diabetes Brother    • No Known Problems Brother    • No Known Problems Brother    • ADD / ADHD Son        Social History     Occupational History   • Occupation: [employer]     Comment: [employer] department   Tobacco Use   • Smoking status: Every Day     Packs/day: 0 50     Years: 35 00     Total pack years: 17 50     Types: Cigarettes   • Smokeless tobacco: Never • Tobacco comments:     actually about 3/4 PPD   Vaping Use   • Vaping Use: Never used   Substance and Sexual Activity   • Alcohol use: Not Currently   • Drug use: No   • Sexual activity: Not Currently     Partners: Male     Birth control/protection: Female Sterilization, None         Current Outpatient Medications:   •  albuterol (Ventolin HFA) 90 mcg/act inhaler, Inhale 2 puffs every 6 (six) hours as needed for wheezing, Disp: 18 g, Rfl: 0  •  baclofen 10 mg tablet, Take 1 tablet (10 mg total) by mouth 3 (three) times a day, Disp: 90 tablet, Rfl: 2  •  betamethasone valerate (VALISONE) 0 1 % ointment, Apply 1 to 2 times a day only as needed to finger/hand rash , Disp: 30 g, Rfl: 0  •  Calcium Carbonate (CALCIUM 600 PO), Take by mouth daily, Disp: , Rfl:   •  cholecalciferol (VITAMIN D3) 1,000 units tablet, Take 2,000 Units by mouth daily, Disp: , Rfl:   •  ciclopirox (LOPROX) 0 77 % cream, APPLY 2 TIMES per day to rash on chest for 2-4 weeks  , Disp: 30 g, Rfl: 0  •  DULoxetine (CYMBALTA) 60 mg delayed release capsule, Take 2 PO HS , Disp: 180 capsule, Rfl: 0  •  etanercept (ENBREL SURECLICK) 50 MG/ML injection, Inject 1 mL (50 mg total) under the skin every 7 days, Disp: 4 mL, Rfl: 5  •  Euflexxa 20 MG/2ML SOSY, , Disp: , Rfl:   •  famotidine (PEPCID) 20 mg tablet, Take 1 tablet (20 mg total) by mouth 2 (two) times a day, Disp: 180 tablet, Rfl: 0  •  gabapentin (Neurontin) 600 MG tablet, Take 1 tablet (600 mg total) by mouth 3 (three) times a day, Disp: 90 tablet, Rfl: 2  •  HYDROcodone-acetaminophen (NORCO) 5-325 mg per tablet, Take 1 PO BID PRN for pain for ongoing therapy, Disp: 14 tablet, Rfl: 0  •  irbesartan (AVAPRO) 150 mg tablet, Take 1 tablet (150 mg total) by mouth daily at bedtime, Disp: 90 tablet, Rfl: 1  •  levothyroxine (Levoxyl) 200 mcg tablet, Take 1 tablet daily in addition to 25 mcg tablet daily, Disp: 90 tablet, Rfl: 3  •  levothyroxine (Levoxyl) 25 mcg tablet, Take 1 tablet daily in addition "to 200 mcg tablet daily, Disp: 90 tablet, Rfl: 3  •  multivitamin (THERAGRAN) TABS, Take 1 tablet by mouth daily, Disp: , Rfl:   •  ondansetron (Zofran ODT) 4 mg disintegrating tablet, Take 1 tablet (4 mg total) by mouth every 6 (six) hours as needed for nausea or vomiting, Disp: 20 tablet, Rfl: 0  •  Potassium 99 MG TABS, Take by mouth daily, Disp: , Rfl:     Allergies   Allergen Reactions   • Acetaminophen-Codeine      Pt states she does not remember having a reaction to this medication   • Hydrocodone Other (See Comments)     GI issues   • Latex Hives     Latex powder     • Lisinopril Other (See Comments)     Itching, gi intollerance     • Prednisolone Vomiting       Physical Exam:    /82 (BP Location: Left arm, Patient Position: Sitting, Cuff Size: Standard)   Pulse 68   Temp 98 2 °F (36 8 °C)   Ht 5' 10\" (1 778 m)   Wt (!) 142 kg (314 lb) Comment: per pt  BMI 45 05 kg/m²     Constitutional:normal, well developed, well nourished, alert, in no distress and non-toxic and no overt pain behavior  and obese  Eyes:anicteric  HEENT:grossly intact  Neck:supple, symmetric, trachea midline and no masses   Pulmonary:even and unlabored  Cardiovascular:No edema or pitting edema present  Skin:Normal without rashes or lesions and well hydrated  Psychiatric:Mood and affect appropriate  Neurologic:Cranial Nerves II-XII grossly intact  Musculoskeletal: Antalgic gait, positive straight leg raise test bilaterally  Imaging  CT LUMBAR SPINE WITHOUT CONTRAST     INDICATION:   Radicular low back pain  Back Pain (Reports mid back pain radiating down both legs to her toes, right leg hurts the most around her knee  Says the pain feels burning and throbbing  Reports pain has been increasing over the past several   weeks, has taken hydrocodone if pain gets bad  She says this is the worst it has ever been     COMPARISON: Sacroiliac joint radiographs August 1, 2022    MRI lumbar spine without contrast August 3, " 2021      TECHNIQUE:  Contiguous axial images through the lumbar spine were obtained  Sagittal and coronal reconstructions were performed        Radiation dose length product (DLP) for this visit:  2212 45 mGy-cm   This examination, like all CT scans performed in the [facility], was performed utilizing techniques to minimize radiation dose exposure, including the use of   iterative reconstruction and automated exposure control        IMAGE QUALITY:  Diagnostic      FINDINGS:     ALIGNMENT:  There are 5 lumbar type vertebral bodies  Normal alignment of the lumbar spine  No spondylolysis or spondylolisthesis      VERTEBRAE:  No fracture  No lytic or blastic lesion      DEGENERATIVE CHANGES: Multilevel degenerative changes consist of osteophytes, vacuum disc phenomenon, disc height loss, and facet arthropathy  Moderate-to-severe disc height loss at L5-S1      Lower Thoracic spine:  Mild lower thoracic degenerative disc disease with mild annular bulging      L1-2:  No significant canal stenosis or foraminal narrowing, unchanged      L2-3:  No significant canal stenosis or foraminal narrowing, unchanged      L3-4:  Diffuse disc bulge  Facet arthropathy  No significant canal stenosis or foraminal narrowing, unchanged      L4-5:  Diffuse disc bulge, narrowed subarticular zones  Facet arthropathy  Mild canal stenosis, unchanged  Mild bilateral foraminal narrowing (right worse than left), unchanged      L5-S1:  Diffuse disc bulge, small central disc protrusion abutting right S1 descending nerve root, posterior marginal osteophytes extending into bilateral foraminal and bilateral extraforaminal zones  Facet arthropathy  Mild canal stenosis, unchanged      Mild-to-moderate bilateral foraminal narrowing, unchanged      PARASPINAL SOFT TISSUES:  Normal      OTHER: Mild scattered calcified atherosclerotic disease      IMPRESSION:     No acute osseous abnormality of lumbar spine      Degenerative changes, as detailed above         FL spine and pain procedure    (Results Pending)         Orders Placed This Encounter   Procedures   • FL spine and pain procedure   • EMG 2 limb lower extremity

## 2023-06-20 NOTE — PATIENT INSTRUCTIONS
Epidural Steroid Injection   WHAT YOU NEED TO KNOW:   What do I need to know about an epidural steroid injection (MARCIE)? An MARCIE is a procedure to inject steroid medicine into the epidural space  The epidural space is between your spinal cord and vertebrae  Steroids reduce inflammation and fluid buildup in your spine that may be causing pain  You may be given pain medicine along with the steroids  How do I prepare for an MARCIE? Your provider will talk to you about how to prepare for your procedure  He or she will tell you what medicines to take or not take on the day of your procedure  You may need to stop taking blood thinners or other medicines several days before your procedure  You may need to adjust any diabetes medicine you take on the day of your procedure  Steroid medicine can increase your blood sugar level  Arrange for someone to drive you home when you are discharged  What will happen during an MARCIE? You will be given medicine to numb the procedure area  You will be awake for the procedure, but you will not feel pain  You may also be given medicine to help you relax  Contrast liquid will be used to help your provider see the area better  Tell the provider if you have ever had an allergic reaction to contrast liquid  Your provider may place the needle into your neck area, middle of your back, or tailbone area  He or she may inject the medicine next to the nerves that are causing your pain  He or she may instead inject the medicine into a larger area of the epidural space  This helps the medicine spread to more nerves  Your provider will use a fluoroscope to help guide the needle to the right place  A fluoroscope is a type of x-ray  After the procedure, a bandage will be placed over the injection site to prevent infection  What will happen after an MARCIE? You will have a bandage over the injection site to prevent infection   Your provider will tell you when you can bathe and any activity guidelines  You will be able to go home  What are the risks of an MARCIE? You may have temporary or permanent nerve damage or paralysis  You may have bleeding or develop a serious infection, such as meningitis (swelling of the brain coverings)  An abscess may also develop  An abscess is a pus-filled area under the skin  You may need surgery to fix the abscess  You may have a seizure, anxiety, or trouble sleeping  If you are a man, you may have temporary erectile dysfunction (not able to have an erection)  CARE AGREEMENT:   You have the right to help plan your care  Learn about your health condition and how it may be treated  Discuss treatment options with your healthcare providers to decide what care you want to receive  You always have the right to refuse treatment  The above information is an  only  It is not intended as medical advice for individual conditions or treatments  Talk to your doctor, nurse or pharmacist before following any medical regimen to see if it is safe and effective for you  © Copyright [employer] 2022 Information is for End User's use only and may not be sold, redistributed or otherwise used for commercial purposes

## 2023-06-30 ENCOUNTER — HOSPITAL ENCOUNTER (OUTPATIENT)
Dept: RADIOLOGY | Facility: CLINIC | Age: 48
Discharge: HOME | End: 2023-06-30
Payer: COMMERCIAL

## 2023-06-30 VITALS
OXYGEN SATURATION: 92 % | HEART RATE: 84 BPM | TEMPERATURE: 97.2 F | DIASTOLIC BLOOD PRESSURE: 81 MMHG | RESPIRATION RATE: 18 BRPM | SYSTOLIC BLOOD PRESSURE: 134 MMHG

## 2023-06-30 DIAGNOSIS — M54.16 LUMBAR RADICULOPATHY: ICD-10-CM

## 2023-06-30 PROCEDURE — 64483 NJX AA&/STRD TFRM EPI L/S 1: CPT | Performed by: ANESTHESIOLOGY

## 2023-06-30 RX ORDER — METHYLPREDNISOLONE ACETATE 80 MG/ML
160 INJECTION, SUSPENSION INTRA-ARTICULAR; INTRALESIONAL; INTRAMUSCULAR; PARENTERAL; SOFT TISSUE ONCE
Status: COMPLETED | OUTPATIENT
Start: 2023-06-30 | End: 2023-06-30

## 2023-06-30 RX ADMIN — IOHEXOL 2 ML: 300 INJECTION, SOLUTION INTRAVENOUS at 09:56

## 2023-06-30 RX ADMIN — METHYLPREDNISOLONE ACETATE 160 MG: 80 INJECTION, SUSPENSION INTRA-ARTICULAR; INTRALESIONAL; INTRAMUSCULAR; SOFT TISSUE at 09:56

## 2023-06-30 NOTE — DISCHARGE INSTRUCTIONS
Epidural Steroid Injection   WHAT YOU NEED TO KNOW:   An epidural steroid injection (MARCIE) is a procedure to inject steroid medicine into the epidural space  The epidural space is between your spinal cord and vertebrae  Steroids reduce inflammation and fluid buildup in your spine that may be causing pain  You may be given pain medicine along with the steroids  ACTIVITY  Do not drive or operate machinery today  No strenuous activity today - bending, lifting, etc   You may resume normal activites starting tomorrow - start slowly and as tolerated  You may shower today, but no tub baths or hot tubs  You may have numbness for several hours from the local anesthetic  Please use caution and common sense, especially with weight-bearing activities  CARE OF THE INJECTION SITE  If you have soreness or pain, apply ice to the area today (20 minutes on/20 minutes off)  Starting tomorrow, you may use warm, moist heat or ice if needed  You may have an increase or change in your discomfort for 36-48 hours after your treatment  Apply ice and continue with any pain medication you have been prescribed  Notify the [facility] if you have any of the following: redness, drainage, swelling, headache, stiff neck or fever above 100°F     SPECIAL INSTRUCTIONS  Our office will contact you in approximately 7 days for a progress report  MEDICATIONS  Continue to take all routine medications  Our office may have instructed you to hold some medications  As no general anesthesia was used in today's procedure, you should not experience any side effects related to anesthesia  If you are diabetic, the steroids used in today's injection may temporarily increase your blood sugar levels after the first few days after your injection  Please keep a close eye on your sugars and alert the doctor who manages your diabetes if your sugars are significantly high from your baseline or you are symptomatic       If you have a problem specifically related to your procedure, please call our office at [phone]  Problems not related to your procedure should be directed to your primary care physician

## 2023-06-30 NOTE — H&P
History of Present Illness: The patient is a 51 y o  female who presents with complaints of low back pain  Past Medical History:   Diagnosis Date   • Allergic    • Anxiety    • Arthritis    • Asthma    • Chronic pain disorder     upper back   • Depression    • Disc disorder    • Disease of thyroid gland    • Fibromyalgia, primary    • GERD (gastroesophageal reflux disease)    • Headache(784 0)    • HPV (human papilloma virus) infection     cervical   • Hypertension    • Hypothyroidism    • Inflammatory bowel disease    • Migraine    • Obesity    • Stomach disorder        Past Surgical History:   Procedure Laterality Date   • CERVICAL BIOPSY  W/ LOOP ELECTRODE EXCISION     • CERVICAL FUSION  2015   •  SECTION     • CHOLECYSTECTOMY      lap   • FOOT SURGERY Bilateral     multiple, posterior calcaneal bone spur   • KNEE SURGERY Left     arthroscopy with debribement   • LYMPH NODE BIOPSY     • SPINE SURGERY     • TENDON REPAIR     • TUBAL LIGATION Bilateral          Current Outpatient Medications:   •  albuterol (Ventolin HFA) 90 mcg/act inhaler, Inhale 2 puffs every 6 (six) hours as needed for wheezing, Disp: 18 g, Rfl: 0  •  baclofen 10 mg tablet, Take 1 tablet (10 mg total) by mouth 3 (three) times a day, Disp: 90 tablet, Rfl: 2  •  betamethasone valerate (VALISONE) 0 1 % ointment, Apply 1 to 2 times a day only as needed to finger/hand rash , Disp: 30 g, Rfl: 0  •  Calcium Carbonate (CALCIUM 600 PO), Take by mouth daily, Disp: , Rfl:   •  cholecalciferol (VITAMIN D3) 1,000 units tablet, Take 2,000 Units by mouth daily, Disp: , Rfl:   •  ciclopirox (LOPROX) 0 77 % cream, APPLY 2 TIMES per day to rash on chest for 2-4 weeks  , Disp: 30 g, Rfl: 0  •  DULoxetine (CYMBALTA) 60 mg delayed release capsule, Take 2 PO HS , Disp: 180 capsule, Rfl: 0  •  etanercept (ENBREL SURECLICK) 50 MG/ML injection, Inject 1 mL (50 mg total) under the skin every 7 days, Disp: 4 mL, Rfl: 5  •  Euflexxa 20 MG/2ML SOSY, , Disp: , Rfl:   •  famotidine (PEPCID) 20 mg tablet, Take 1 tablet (20 mg total) by mouth 2 (two) times a day, Disp: 180 tablet, Rfl: 0  •  gabapentin (Neurontin) 600 MG tablet, Take 1 tablet (600 mg total) by mouth 3 (three) times a day, Disp: 90 tablet, Rfl: 2  •  HYDROcodone-acetaminophen (NORCO) 5-325 mg per tablet, Take 1 PO BID PRN for pain for ongoing therapy, Disp: 14 tablet, Rfl: 0  •  irbesartan (AVAPRO) 150 mg tablet, Take 1 tablet (150 mg total) by mouth daily at bedtime, Disp: 90 tablet, Rfl: 1  •  levothyroxine (Levoxyl) 200 mcg tablet, Take 1 tablet daily in addition to 25 mcg tablet daily, Disp: 90 tablet, Rfl: 3  •  levothyroxine (Levoxyl) 25 mcg tablet, Take 1 tablet daily in addition to 200 mcg tablet daily, Disp: 90 tablet, Rfl: 3  •  multivitamin (THERAGRAN) TABS, Take 1 tablet by mouth daily, Disp: , Rfl:   •  ondansetron (Zofran ODT) 4 mg disintegrating tablet, Take 1 tablet (4 mg total) by mouth every 6 (six) hours as needed for nausea or vomiting, Disp: 20 tablet, Rfl: 0  •  Potassium 99 MG TABS, Take by mouth daily, Disp: , Rfl:     Current Facility-Administered Medications:   •  iohexol (OMNIPAQUE) 300 mg/mL injection 2 mL, 2 mL, Epidural, Once, Edmond Morrow DO  •  methylPREDNISolone acetate (DEPO-MEDROL) injection 160 mg, 160 mg, Epidural, Once, Edmond Morrow DO    Allergies   Allergen Reactions   • Acetaminophen-Codeine      Pt states she does not remember having a reaction to this medication   • Hydrocodone Other (See Comments)     GI issues   • Latex Hives     Latex powder     • Lisinopril Other (See Comments)     Itching, gi intollerance     • Prednisolone Vomiting       Physical Exam:   Vitals:    06/30/23 0936   BP: 115/79   Pulse: 87   Resp: 18   Temp: (!) 97 2 °F (36 2 °C)   SpO2: 97%     General: Awake, Alert, Oriented x 3, Mood and affect appropriate  Respiratory: Respirations even and unlabored  Cardiovascular: Peripheral pulses intact; no edema  Musculoskeletal Exam: Pain with lumbar extension    ASA Score: III    Patient/Chart Verification  Patient ID Verified: Verbal  ID Band Applied: No  Consents Confirmed: Procedural, To be obtained in the Pre-Procedure area  Interval H&P(within 24 hr) Complete (required for Outpatients and Surgery Admit only): To be obtained in the Pre-Procedure area  Allergies Reviewed: Yes  Anticoag/NSAID held?: NA  Currently on antibiotics?: No  Pregnancy denied?: Yes    Assessment:   1   Lumbar radiculopathy        Plan: b/l S1 TFESI

## 2023-07-07 ENCOUNTER — TELEPHONE (OUTPATIENT)
Dept: PAIN MEDICINE | Facility: CLINIC | Age: 48
End: 2023-07-07

## 2023-07-12 DIAGNOSIS — E03.9 ACQUIRED HYPOTHYROIDISM: ICD-10-CM

## 2023-07-12 RX ORDER — LEVOTHYROXINE SODIUM 0.2 MG/1
TABLET ORAL
Qty: 90 TABLET | Refills: 3 | OUTPATIENT
Start: 2023-07-12

## 2023-07-12 NOTE — TELEPHONE ENCOUNTER
Left message for patient to call the office to schedule an appointment.  She was supposed to have lab work done and and an appointment in April

## 2023-08-08 DIAGNOSIS — M48.062 SPINAL STENOSIS OF LUMBAR REGION WITH NEUROGENIC CLAUDICATION: ICD-10-CM

## 2023-08-08 DIAGNOSIS — M79.18 MYOFASCIAL PAIN SYNDROME: ICD-10-CM

## 2023-08-08 RX ORDER — GABAPENTIN 600 MG/1
600 TABLET ORAL 3 TIMES DAILY
Qty: 90 TABLET | Refills: 2 | Status: SHIPPED | OUTPATIENT
Start: 2023-08-08

## 2023-08-18 ENCOUNTER — HOSPITAL ENCOUNTER (OUTPATIENT)
Dept: SLEEP CENTER | Facility: HOSPITAL | Age: 48
Discharge: HOME | End: 2023-08-18
Payer: COMMERCIAL

## 2023-08-18 DIAGNOSIS — R53.82 CHRONIC FATIGUE: ICD-10-CM

## 2023-08-18 DIAGNOSIS — R51.9 CHRONIC NONINTRACTABLE HEADACHE, UNSPECIFIED HEADACHE TYPE: ICD-10-CM

## 2023-08-18 DIAGNOSIS — G47.10 HYPERSOMNOLENCE: ICD-10-CM

## 2023-08-18 DIAGNOSIS — G89.29 CHRONIC NONINTRACTABLE HEADACHE, UNSPECIFIED HEADACHE TYPE: ICD-10-CM

## 2023-08-18 PROCEDURE — 95810 POLYSOM 6/> YRS 4/> PARAM: CPT | Performed by: INTERNAL MEDICINE

## 2023-08-18 PROCEDURE — 95810 POLYSOM 6/> YRS 4/> PARAM: CPT

## 2023-08-19 NOTE — PROGRESS NOTES
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  Caffeine use:YES- coffee  6 to 18 ounces and ice tea  12 ounces    Alcohol:Dayshift workers after 5PM: Alcohol use:NO    Typical day for patient:YES       Study Documentation    Sleep Study Indications: BMI > 30, unrefreshing sleep, and neck circumference > 17 inches    Sleep Study: Diagnostic   Snore:Severe  Supplemental O2: no    O2 flow rate (L/min) range NA  O2 flow rate (L/min) final NA  Minimum SaO2 75%  Baseline SaO2 94%        EKG abnormalities: no     EEG abnormalities: no    Sleep Study Recorded < 2 hours: N/A    Sleep Study Recorded > 2 hours but incomplete study: N/A    Sleep Study Recorded 6 hours but no sleep obtained: NO    Patient classification: employed       Post-Sleep Study    Medication used at bedtime or during sleep study:YES other prescription medications    Patient reports time it took to fall asleep:20 to 30 minutes    Patient reports waking up during study:Denied    Patient reports sleeping 6 to 8 hours without dreaming. Patient reports sleep during study:better than usual    Patient rated sleepiness: Somewhat sleepy or tired    PAP treatment:no.

## 2023-08-21 DIAGNOSIS — M54.16 LUMBAR RADICULOPATHY: ICD-10-CM

## 2023-08-21 DIAGNOSIS — M79.18 MYOFASCIAL PAIN SYNDROME: ICD-10-CM

## 2023-08-21 RX ORDER — BACLOFEN 10 MG/1
10 TABLET ORAL 3 TIMES DAILY
Qty: 90 TABLET | Refills: 0 | OUTPATIENT
Start: 2023-08-21

## 2023-08-22 ENCOUNTER — HOSPITAL ENCOUNTER (OUTPATIENT)
Facility: HOSPITAL | Age: 48
Setting detail: OUTPATIENT SURGERY
Discharge: HOME | End: 2023-08-22
Attending: ORTHOPAEDIC SURGERY | Admitting: ORTHOPAEDIC SURGERY
Payer: COMMERCIAL

## 2023-08-22 VITALS
WEIGHT: 293 LBS | TEMPERATURE: 97.9 F | BODY MASS INDEX: 41.95 KG/M2 | DIASTOLIC BLOOD PRESSURE: 94 MMHG | OXYGEN SATURATION: 97 % | HEART RATE: 78 BPM | RESPIRATION RATE: 18 BRPM | SYSTOLIC BLOOD PRESSURE: 153 MMHG | HEIGHT: 70 IN

## 2023-08-22 DIAGNOSIS — G56.03 CARPAL TUNNEL SYNDROME, BILATERAL: Primary | ICD-10-CM

## 2023-08-22 PROCEDURE — 99024 POSTOP FOLLOW-UP VISIT: CPT | Performed by: PHYSICIAN ASSISTANT

## 2023-08-22 PROCEDURE — 29848 WRIST ENDOSCOPY/SURGERY: CPT | Performed by: ORTHOPAEDIC SURGERY

## 2023-08-22 RX ORDER — LIDOCAINE HYDROCHLORIDE AND EPINEPHRINE 10; 10 MG/ML; UG/ML
20 INJECTION, SOLUTION INFILTRATION; PERINEURAL ONCE
Status: DISCONTINUED | OUTPATIENT
Start: 2023-08-22 | End: 2023-08-22 | Stop reason: HOSPADM

## 2023-08-22 RX ORDER — HYDROCODONE BITARTRATE AND ACETAMINOPHEN 5; 325 MG/1; MG/1
1 TABLET ORAL EVERY 6 HOURS PRN
Qty: 5 TABLET | Refills: 0 | Status: SHIPPED | OUTPATIENT
Start: 2023-08-22 | End: 2023-08-27

## 2023-08-22 RX ORDER — MAGNESIUM HYDROXIDE 1200 MG/15ML
LIQUID ORAL AS NEEDED
Status: DISCONTINUED | OUTPATIENT
Start: 2023-08-22 | End: 2023-08-22 | Stop reason: HOSPADM

## 2023-08-22 RX ORDER — SENNOSIDES 8.6 MG
650 CAPSULE ORAL EVERY 8 HOURS PRN
Qty: 30 TABLET | Refills: 0 | Status: SHIPPED | OUTPATIENT
Start: 2023-08-22

## 2023-08-22 RX ORDER — NAPROXEN SODIUM 220 MG
220 TABLET ORAL 2 TIMES DAILY WITH MEALS
Qty: 20 TABLET | Refills: 0 | Status: SHIPPED | OUTPATIENT
Start: 2023-08-22

## 2023-08-22 RX ADMIN — SODIUM BICARBONATE: 84 INJECTION, SOLUTION INTRAVENOUS at 10:27

## 2023-08-22 NOTE — DISCHARGE INSTR - AVS FIRST PAGE
Post Operative Instructions    You have had surgery on your arm today, please read and follow the information below:  Elevate your hand above your elbow during the next 24-48 hours to help with swelling. Place your hand and arm over your head with motion at your shoulder three times a day. Do not apply any cream/ointment/oil to your incisions including antibiotics. Do not soak your hands in standing water (dishwater, tubs, Jacuzzi's, pools, etc.) until given permission (typically 2-3 weeks after injury)    Call the office if you notice any:  Increased numbness or tingling of your hand or fingers that is not relieved with elevation. Increasing pain that is not controlled with medication. Difficulty chewing, breathing, swallowing. Chest pains or shortness of breath. Fever over 101.4 degrees. Bandage: Remove bandage after 5 days. Motion: Move fingers into a fist 5 times a day, DO NOT move any splinted fingers. Weight bearing status: Avoid heavy lifting (>5 pounds) with the extremity that was operated on until follow up appointment. Normal activities of daily living are OK. Ice: Ice for 10 minutes every hour as needed for swelling x 24 hours. Sling: No sling necessary. Medications:   Naproxen 220 mg two times a day   Tylenol Extended Release 650 mg every 8 hours  Norco/Hydrocodone one tab every 6 hours AS NEEDED for pain     Follow-up Appointment: 7-10 days. Please call the office if you have any questions or concerns regarding your post-operative care.

## 2023-08-22 NOTE — OP NOTE
OPERATIVE REPORT  PATIENT NAME: Nicol Sanabria  :  1975  MRN: [id number]  Pt Location: [facility]    SURGERY DATE: 23    Surgeon(s) and Role:     * Giovanna Aguero MD - Primary     * Александр Siddiqui MD - Assisting    Pre-Op Diagnosis:  Carpal tunnel syndrome, Right    Post-Op Diagnosis:  .Carpal tunnel syndrome, Right    Procedure(s) (LRB):  Right endoscopic carpal tunnel release (Right)    Specimen(s):  No specimens collected during this procedure. Estimated Blood Loss:   Minimal      Anesthesia Type:   Local    Operative Indications: The patient has a history of Carpal Tunnel Syndrome  right that was recalcitrant to conservative management. The decision was made to bring the patient to the operating room for Endoscopic Carpal Tunnel Release  right. Risks of the procedure were explained which include, but are not limited to bleeding; infection; damage to nerves, arteries,veins, tendons; scar; pain; need for reoperation; failure to give desired result; and risks of anaesthesia. All questions were answered to satisfaction and they were willing to proceed. Operative Findings:  Right carpal tunnel syndrome    Complications:   None    Procedure and Technique:  After the patient, site, and procedure were identified, the patient was brought into the operating room in a supine position. Local anaesthesia was adminstered in the preoperative holding area. A tourniquet was not used. The  right upper extremity was then prepped and drapped in a normal, sterile, orthopedic fashion. After reconfirmation of the patient, site, and surgical procedure, which was agreed upon by the entire surgical team, attention was turned to the right wrist.  The sites of the proximal and distal incisions were marked.   The nancy of the proximal incision was placed horizontally at the midline of the wrist.  The distal incision nancy was longitudinal extending distally from the point of intersection of the line between the long finger and ring finger and the line along the distal border of the fully abducted thumb. The proximal incision was performed. Subcutaneous tissues were dissected. Then the transverse volar antebrachial fascia was perforated with a scalpel. The edges of the skin incision where retracted and the forearm fascia was incised for approximately 1.5 cm proximally with care taken to identify and protect the median nerve. Retractors were used to inspect the transverse carpal ligament distally. A curved Francois dissector was used to glide under the transverse carpal ligament and superficial to the median nerve with confirmation via the washboard feeling. Then the curved Francois was pushed into the palm toward the distal incision site. When the location of the distal skin nancy was adequate, the distal incision was made. Then with retraction of the skin, further dissection and perforation of the palmar fascia was performed with the use of tenotomy scissors. The curved Francois was guided from proximal to distal out the distal incisions without any twisting to allow for dilation of the tract. The curved Francois was removed, and the cannula for the camera was inserted along the same tract, making sure to keep the alignment post on the cannula perpendicular to the plane of the hand without twisting. Then while keeping the wrist in extension, and holding the cannula of the camera in place, the wrist was placed on the hyperextension board. The scope was inserted distally, and a cotton-tip applicator was used proximally to clean the tract as well as the scope. A curved cutting knife was introduced from proximal to distal while keeping visualization with the use of the camera. Without twisting of the canula, the knife was used to cut the transverse carpal ligament completely, making sure there were no remnant fibers. Then after this was accomplished, the hand was removed from the extension block.   Three maneuvers were used to confirm the full release of the transverse carpal ligament. First, the ease of twisting the trocar of the camera confirmed the release of the ligament. Second, the curved Francois was introduced to make sure there were no remnant fibers that could be felt palmarly. Third, the scope was introduced again to visualize that the whole ligament was released proximally to distally. Additional confirmation of full release included retraction and inspection in the distal and proximal incisions to make sure there were no remnant fibers distally or proximally respectively. At the completion of the procedure, hemostasis was obtained with cautery and direct pressure. The wounds were copiously irrigated with sterile solution. The wounds were closed with Prolene. Sterile dressings were applied, including Xeroform, gauze, tweeners, webril, ACE. Please note, all sponge, needle, and instrument counts were correct prior to closure. Loupe magnification was utilized. The patient tolerated the procedure well. I was present for all critical portions of the procedure.     Patient Disposition:  PACU  and hemodynamically stable    SIGNATURE: Sarah Perales MD  DATE: 08/22/23  TIME: 12:35 PM

## 2023-08-22 NOTE — H&P
H&P Exam - Orthopedics   Roger Cabrera 51 y.o. female MRN: [id number]  Unit/Bed#: OR POOL    Assessment/Plan   Assessment:  Bilateral carpal tunnel syndrome  Left thumb retinacular cyst    Plan:  Right ECTR under local anesthesia 2023  Left ECTR AND Left thumb retinacular cyst excision under local anesthesia 2023    History of Present Illness   HPI:  Roger Cabrera is a 51 y.o. female who presents with numbness and tingling in hands bilaterally. She has tried bracing and injections, but continues to have persistent symptoms. She also has a bass in the left thumb that causes her pain.      Historical Information  Review Of Systems:   · Skin: Normal  · Neuro: See HPI  · Musculoskeletal: See HPI  · 14 point review of systems negative except as stated above     Past Medical History:   Past Medical History:   Diagnosis Date   • Allergic    • Anxiety    • Arthritis    • Asthma    • Chronic pain disorder     upper back   • Depression    • Disc disorder    • Disease of thyroid gland    • Fibromyalgia, primary    • GERD (gastroesophageal reflux disease)    • Headache(784.0)    • HPV (human papilloma virus) infection     cervical   • Hypertension    • Hypothyroidism    • Inflammatory bowel disease    • Migraine    • Obesity    • Stomach disorder        Past Surgical History:   Past Surgical History:   Procedure Laterality Date   • CERVICAL BIOPSY  W/ LOOP ELECTRODE EXCISION     • CERVICAL FUSION  2015   •  SECTION     • CHOLECYSTECTOMY      lap   • FOOT SURGERY Bilateral     multiple, posterior calcaneal bone spur   • KNEE SURGERY Left     arthroscopy with debribement   • LYMPH NODE BIOPSY     • SPINE SURGERY     • TENDON REPAIR     • TUBAL LIGATION Bilateral        Family History:  Family history reviewed and non-contributory  Family History   Adopted: Yes   Problem Relation Age of Onset   • Thyroid disease Mother    • Arthritis Mother         Mom, dad, aunts, grandmother   • Autoimmune disease Mother    • No Known Problems Father    • Coronary artery disease Maternal Grandmother    • Breast cancer Maternal Grandmother         age unknown   • Heart disease Maternal Grandmother         Mother, Bora Davidson, Father   • Coronary artery disease Maternal Aunt    • Stroke Maternal Julissa Ratliff, brothers   • No Known Problems Maternal Grandfather    • No Known Problems Paternal Grandmother    • No Known Problems Paternal Grandfather    • Suicide Attempts Maternal Aunt    • Heart murmur Maternal Aunt    • Hypothyroidism Maternal Aunt    • Hashimoto's thyroiditis Maternal Aunt    • Diabetes unspecified Brother    • Diabetes Brother    • No Known Problems Brother    • No Known Problems Brother    • ADD / ADHD Son        Social History:  Social History     Socioeconomic History   • Marital status:      Spouse name: Not on file   • Number of children: Not on file   • Years of education: Not on file   • Highest education level: Not on file   Occupational History   • Occupation: [employer]     Comment: [employer]   Tobacco Use   • Smoking status: Every Day     Packs/day: 0.50     Years: 35.00     Total pack years: 17.50     Types: Cigarettes   • Smokeless tobacco: Never   • Tobacco comments:     actually about 3/4 PPD   Vaping Use   • Vaping Use: Never used   Substance and Sexual Activity   • Alcohol use: Not Currently   • Drug use: No   • Sexual activity: Not Currently     Partners: Male     Birth control/protection: Female Sterilization, None   Other Topics Concern   • Not on file   Social History Narrative   • Not on file     Social Determinants of Health     Financial Resource Strain: Not on file   Food Insecurity: Not on file   Transportation Needs: Not on file   Physical Activity: Not on file   Stress: Not on file   Social Connections: Not on file   Intimate Partner Violence: Not on file   Housing Stability: Not on file       Allergies:    Allergies   Allergen Reactions   • Acetaminophen-Codeine      Pt states she does not remember having a reaction to this medication   • Hydrocodone Other (See Comments)     GI issues   • Latex Hives     Latex powder     • Lisinopril Other (See Comments)     Itching, gi intollerance     • Prednisolone Vomiting           Labs:  0   Lab Value Date/Time    HCT 42.2 11/06/2022 1854    HCT 43.4 08/01/2022 1300    HCT 40.8 10/13/2021 1158    HCT 42.2 02/03/2021 1108    HGB 14.3 11/06/2022 1854    HGB 14.0 08/01/2022 1300    HGB 13.6 10/13/2021 1158    HGB 13.9 02/03/2021 1108    WBC 10.77 (H) 11/06/2022 1854    WBC 9.16 08/01/2022 1300    WBC 8.5 10/13/2021 1158    WBC 7.65 02/03/2021 1108    ESR 31 (H) 08/01/2022 1300    CRP 13.4 (H) 08/01/2022 1300       Meds:  No current facility-administered medications for this encounter. Blood Culture:   No results found for: "BLOODCX"    Wound Culture:   No results found for: "WOUNDCULT"    Ins and Outs:  No intake/output data recorded. Physical Exam  There were no vitals taken for this visit. There were no vitals taken for this visit. Gen: No acute distress, resting comfortably in bed  HEENT: Eyes clear, moist mucus membranes, hearing intact  Respiratory: No audible wheezing or stridor  Cardiovascular: Well Perfused peripherally, 2+ distal pulse  Abdomen: nondistended, no peritoneal signs  Ortho Exam: positive tinels carpal tunnel bilaterally. Left thumb with painful cyst appreciated along the tendon sheath  Neuro Exam: diminished sensation median nerve distribution.      Lab Results: Reviewed  Imaging: Reviewed

## 2023-08-23 DIAGNOSIS — G47.33 OSA (OBSTRUCTIVE SLEEP APNEA): Primary | ICD-10-CM

## 2023-08-25 ENCOUNTER — TELEPHONE (OUTPATIENT)
Age: 48
End: 2023-08-25

## 2023-08-25 NOTE — TELEPHONE ENCOUNTER
Caller: patient    Doctor: Ameena Negrete    Reason for call: pt called stating she would like to cancel her surgery with Dr Ameena Negrete on 9/7    Call back#: [phone]

## 2023-08-25 NOTE — TELEPHONE ENCOUNTER
Caller: patient    Doctor: Baron Boeck    Reason for call: pt is asking for a work note for missing work from 8/22 to today 8/25. Sx was 8/22/23.  Upload to My Chart trevor    Call back#: [phone]

## 2023-09-05 ENCOUNTER — TELEPHONE (OUTPATIENT)
Age: 48
End: 2023-09-05

## 2023-09-05 NOTE — TELEPHONE ENCOUNTER
Hello,  Please advise if the following patient can be forced onto the schedule:    Patient: patient    : 73    MRN: [id number]    Call back #: [phone]    Insurance: Reji Andersen first    Reason for appointment: patient had surgery on - was suppose to have a second surgery on  and have the sutures removed that day-but that sx was canceled. So patient needs a post op suture removal appt    Requested doctor/location: [facility]-patient is asking for after 5 if possible      Thank you.

## 2023-09-08 NOTE — TELEPHONE ENCOUNTER
No availble appts open at [address], [place] ([place]), [facility] (booked). There is an appt available in [place] tomorrow w/Sukhjinder Palm PA-C at [phone] (checked w/Ericka Bach since GLADYS Palm is off today). CLM on pt's VM to return call to see if she would be willing to go there to have sutures removed from R CTR on 8/22/23. Otherwise, pt will need [facility] appt for Monday in [place]. Thank you.

## 2023-09-08 NOTE — TELEPHONE ENCOUNTER
Caller: Patient    Doctor: Shawnee Zimmerman     Reason for call: Patient is following up on when she can come in to have stiches removed?      Call back#: [phone]

## 2023-09-08 NOTE — TELEPHONE ENCOUNTER
Can we please have the patient see [facility] for suture removal today or can she be placed onto the schedule for Monday in [place]. Thank you.

## 2023-09-09 ENCOUNTER — OFFICE VISIT (OUTPATIENT)
Dept: URGENT CARE | Facility: CLINIC | Age: 48
End: 2023-09-09
Payer: COMMERCIAL

## 2023-09-09 VITALS
TEMPERATURE: 98.4 F | HEART RATE: 86 BPM | DIASTOLIC BLOOD PRESSURE: 77 MMHG | SYSTOLIC BLOOD PRESSURE: 128 MMHG | RESPIRATION RATE: 18 BRPM | OXYGEN SATURATION: 97 %

## 2023-09-09 DIAGNOSIS — Z48.02 ENCOUNTER FOR REMOVAL OF SUTURES: Primary | ICD-10-CM

## 2023-09-09 PROCEDURE — G0382 LEV 3 HOSP TYPE B ED VISIT: HCPCS

## 2023-09-09 NOTE — PROGRESS NOTES
[facility]        NAME: Rao Townsend is a 51 y.o. female  : 1975    MRN: [id number]  DATE: 2023  TIME: 2:25 PM    Assessment and Plan   Encounter for removal of sutures [Z48.02]  1. Encounter for removal of sutures  Suture removal            Patient Instructions     One suture removed today. You are to follow-up in the office to have the rest of your sutures removed. Go to the ED for any severely worsening symptoms. Chief Complaint     Chief Complaint   Patient presents with   • Suture / Staple Removal     Pt reports she had carpal tunnel surgery of right hand on 23. Pt reports sutures were due to be removed on 23 and has not been able to get an appt with specialist's office. Pt is due for f/u at 23. History of Present Illness       This is a 4year-old female who presents with request for suture removal s/p right carpal tunnel release surgery on . Patient states her sutures were due to come out on  however she has been unable to get an appointment with ortho for this. Some of the sutures are now irritating and causing her discomfort. She denies fevers/chills and other signs of infection including redness, swelling, warmth, and pus drainage. Review of Systems   Review of Systems   Constitutional: Negative for chills and fever. Respiratory: Negative for chest tightness and shortness of breath. Cardiovascular: Negative for chest pain and palpitations. Musculoskeletal: Positive for arthralgias (intermittent R hand pain s/p carpal tunnel release). Skin:        Healing surgical incisions to R hand and wrist   Neurological: Negative for dizziness, light-headedness and headaches.        Current Medications       Current Outpatient Medications:   •  baclofen 10 mg tablet, Take 1 tablet (10 mg total) by mouth 3 (three) times a day, Disp: 90 tablet, Rfl: 2  •  betamethasone valerate (VALISONE) 0.1 % ointment, Apply 1 to 2 times a day only as needed to finger/hand rash., Disp: 30 g, Rfl: 0  •  Calcium Carbonate (CALCIUM 600 PO), Take by mouth daily, Disp: , Rfl:   •  cholecalciferol (VITAMIN D3) 1,000 units tablet, Take 2,000 Units by mouth daily, Disp: , Rfl:   •  ciclopirox (LOPROX) 0.77 % cream, APPLY 2 TIMES per day to rash on chest for 2-4 weeks. , Disp: 30 g, Rfl: 0  •  DULoxetine (CYMBALTA) 60 mg delayed release capsule, Take 2 PO HS., Disp: 180 capsule, Rfl: 0  •  etanercept (ENBREL SURECLICK) 50 MG/ML injection, Inject 1 mL (50 mg total) under the skin every 7 days, Disp: 4 mL, Rfl: 5  •  Euflexxa 20 MG/2ML SOSY, , Disp: , Rfl:   •  famotidine (PEPCID) 20 mg tablet, Take 1 tablet (20 mg total) by mouth 2 (two) times a day, Disp: 180 tablet, Rfl: 0  •  gabapentin (NEURONTIN) 600 MG tablet, Take 1 tablet (600 mg total) by mouth 3 (three) times a day, Disp: 90 tablet, Rfl: 2  •  HYDROcodone-acetaminophen (NORCO) 5-325 mg per tablet, Take 1 PO BID PRN for pain for ongoing therapy, Disp: 14 tablet, Rfl: 0  •  irbesartan (AVAPRO) 150 mg tablet, Take 1 tablet (150 mg total) by mouth daily at bedtime, Disp: 90 tablet, Rfl: 1  •  levothyroxine (Levoxyl) 200 mcg tablet, Take 1 tablet daily in addition to 25 mcg tablet daily, Disp: 90 tablet, Rfl: 3  •  levothyroxine (Levoxyl) 25 mcg tablet, Take 1 tablet daily in addition to 200 mcg tablet daily, Disp: 90 tablet, Rfl: 3  •  multivitamin (THERAGRAN) TABS, Take 1 tablet by mouth daily, Disp: , Rfl:   •  naproxen sodium (ALEVE) 220 MG tablet, Take 1 tablet (220 mg total) by mouth 2 (two) times a day with meals, Disp: 20 tablet, Rfl: 0  •  ondansetron (Zofran ODT) 4 mg disintegrating tablet, Take 1 tablet (4 mg total) by mouth every 6 (six) hours as needed for nausea or vomiting, Disp: 20 tablet, Rfl: 0  •  Potassium 99 MG TABS, Take by mouth daily, Disp: , Rfl:   •  acetaminophen (TYLENOL) 650 mg CR tablet, Take 1 tablet (650 mg total) by mouth every 8 (eight) hours as needed for mild pain, Disp: 30 tablet, Rfl: 0  •  albuterol (Ventolin HFA) 90 mcg/act inhaler, Inhale 2 puffs every 6 (six) hours as needed for wheezing, Disp: 18 g, Rfl: 0    Current Allergies     Allergies as of 2023 - Reviewed 2023   Allergen Reaction Noted   • Acetaminophen-codeine  10/23/2017   • Hydrocodone Other (See Comments) 2016   • Latex Hives 2021   • Lisinopril Other (See Comments) 11/10/2022   • Prednisolone Vomiting 10/23/2017            The following portions of the patient's history were reviewed and updated as appropriate: allergies, current medications, past family history, past medical history, past social history, past surgical history and problem list.     Past Medical History:   Diagnosis Date   • Allergic    • Anxiety    • Arthritis    • Asthma    • Chronic pain disorder     upper back   • Depression    • Disc disorder    • Disease of thyroid gland    • Fibromyalgia, primary    • GERD (gastroesophageal reflux disease)    • Headache(784.0)    • HPV (human papilloma virus) infection     cervical   • Hypertension    • Hypothyroidism    • Inflammatory bowel disease    • Migraine    • Obesity    • Stomach disorder        Past Surgical History:   Procedure Laterality Date   • CERVICAL BIOPSY  W/ LOOP ELECTRODE EXCISION     • CERVICAL FUSION  2015   •  SECTION     • CHOLECYSTECTOMY      lap   • FOOT SURGERY Bilateral     multiple, posterior calcaneal bone spur   • KNEE SURGERY Left     arthroscopy with debribement   • LYMPH NODE BIOPSY     • NH NDSC WRST SURG W/RLS TRANSVRS CARPL LIGM Right 2023    Procedure: Right endoscopic carpal tunnel release;  Surgeon: Giovanna Aguero MD;  Location: [facility];  Service: Orthopedics   • SPINE SURGERY     • TENDON REPAIR     • TUBAL LIGATION Bilateral        Family History   Adopted: Yes   Problem Relation Age of Onset   • Thyroid disease Mother    • Arthritis Mother         Mom, dad, aunts, grandmother   • Autoimmune disease Mother    • No Known Problems Father    • Coronary artery disease Maternal Grandmother    • Breast cancer Maternal Grandmother         age unknown   • Heart disease Maternal Grandmother         Mother, Chuckie Herrera, Father   • Coronary artery disease Maternal Aunt    • Stroke Maternal Corrinne Hoe, brothers   • No Known Problems Maternal Grandfather    • No Known Problems Paternal Grandmother    • No Known Problems Paternal Grandfather    • Suicide Attempts Maternal Aunt    • Heart murmur Maternal Aunt    • Hypothyroidism Maternal Aunt    • Hashimoto's thyroiditis Maternal Aunt    • Diabetes unspecified Brother    • Diabetes Brother    • No Known Problems Brother    • No Known Problems Brother    • ADD / ADHD Son          Medications have been verified. Objective   /77   Pulse 86   Temp 98.4 °F (36.9 °C)   Resp 18   SpO2 97%        Physical Exam     Physical Exam  Vitals and nursing note reviewed. Constitutional:       General: She is not in acute distress. Appearance: She is obese. HENT:      Head: Normocephalic. Mouth/Throat:      Mouth: Mucous membranes are moist.   Cardiovascular:      Rate and Rhythm: Normal rate. Pulses: Normal pulses. Pulmonary:      Effort: Pulmonary effort is normal.   Musculoskeletal:         General: Normal range of motion. Cervical back: Normal range of motion and neck supple. Skin:     General: Skin is warm and dry. Capillary Refill: Capillary refill takes less than 2 seconds. Comments: Healing surgical incisions to palmar aspect of R hand and wrist with sutures intact. No erythema, warmth, or pus drainage. Cap refill <2 sec. +2 radial pulse. Neurological:      Mental Status: She is alert and oriented to person, place, and time. Suture removal    Date/Time: 9/9/2023 1:30 PM    Performed by: KENNETH Estevez  Authorized by: KENNETH Estevez  Universal Protocol:  Consent: Verbal consent obtained.   Risks and benefits: risks, benefits and alternatives were discussed  Consent given by: patient  Patient understanding: patient states understanding of the procedure being performed  Patient identity confirmed: verbally with patient        Patient location:  Clinic  Location:     Laterality:  Right    Location:  Upper extremity    Upper extremity location:  Wrist    Wrist location:  R wrist  Procedure details: Tools used:  Suture removal kit    Number of sutures removed:  1  Post-procedure details:     Post-removal:  No dressing applied    Patient tolerance of procedure: Tolerated well, no immediate complications  Comments:      One suture that was particularly irritating and uncomfortable for the patient was removed.  She was advised to follow-up as directed by ortho for the remainder of her suture removal.

## 2023-09-09 NOTE — PATIENT INSTRUCTIONS
One suture removed today. You are to follow-up in the office to have the rest of your sutures removed. Go to the ED for any severely worsening symptoms.

## 2023-09-11 ENCOUNTER — OFFICE VISIT (OUTPATIENT)
Dept: OBGYN CLINIC | Facility: CLINIC | Age: 48
End: 2023-09-11

## 2023-09-11 VITALS — BODY MASS INDEX: 41.95 KG/M2 | HEIGHT: 70 IN | WEIGHT: 293 LBS

## 2023-09-11 DIAGNOSIS — G56.03 CARPAL TUNNEL SYNDROME, BILATERAL: Primary | ICD-10-CM

## 2023-09-11 PROCEDURE — 99024 POSTOP FOLLOW-UP VISIT: CPT | Performed by: ORTHOPAEDIC SURGERY

## 2023-09-11 NOTE — PROGRESS NOTES
Assessment:   S/P Right endoscopic carpal tunnel release - Right on 8/22/2023    Plan:   Patient was advised to allow the incision to finish healing on the palm of her hand  She is advised use heat massage as demonstrated in the office  She was advised that she can and palmar pain for 3 to 4 months after surgery which is normal  She will follow-up with us as needed  She will give us a call when she would like to schedule left endoscopic carpal tunnel release    Follow Up:  PRN    To Do Next Visit:  Re-evaluation      CHIEF COMPLAINT:  Chief Complaint   Patient presents with   • Right Wrist - Post-op, Suture / Staple Removal     ECTR- 8/22/23         SUBJECTIVE:  Sunny Bray is a 51 y.o. female who presents for follow up after Right endoscopic carpal tunnel release - Right on 8/22/2023. Today patient has she has improvement in her numbness and tingling symptoms.        PHYSICAL EXAMINATION:  Vital signs: Ht 5' 10" (1.778 m)   Wt (!) 142 kg (313 lb)   BMI 44.91 kg/m²   General: well developed and well nourished, alert, oriented times 3 and appears comfortable  Psychiatric: Normal    MUSCULOSKELETAL EXAMINATION:  Incision: Clean, dry, intact  Range of Motion: As expected  Neurovascular status: Neuro intact, good cap refill  Activity Restrictions: No restrictions  Done today: Sutures out      STUDIES REVIEWED:  No Studies to review      PROCEDURES PERFORMED:  Procedures  No Procedures performed today

## 2023-09-11 NOTE — TELEPHONE ENCOUNTER
Hello,  Please advise if the following patient can be forced onto the schedule:    Patient: Glenn Chin    : 1975    MRN: [id number]    Call back #: [phone]    Insurance: [address]    Reason for appointment: Needs surtures removed from R CTR on 23. Requested doctor/location: Estefania/Wayne Weed-Please see msg from [facility] to place onto schedule for Monday in [place]. (I did try for [facility] on Friday/Sat. Pt did not return call. ). Thanks. Thank you.

## 2023-09-14 ENCOUNTER — PATIENT MESSAGE (OUTPATIENT)
Dept: FAMILY MEDICINE CLINIC | Facility: HOSPITAL | Age: 48
End: 2023-09-14

## 2023-09-14 DIAGNOSIS — L30.9 DERMATITIS: ICD-10-CM

## 2023-09-14 DIAGNOSIS — E03.9 ACQUIRED HYPOTHYROIDISM: ICD-10-CM

## 2023-09-14 DIAGNOSIS — K21.9 GASTROESOPHAGEAL REFLUX DISEASE WITHOUT ESOPHAGITIS: ICD-10-CM

## 2023-09-14 DIAGNOSIS — M79.18 MYOFASCIAL PAIN SYNDROME: ICD-10-CM

## 2023-09-14 DIAGNOSIS — M54.16 LUMBAR RADICULOPATHY: ICD-10-CM

## 2023-09-14 DIAGNOSIS — M48.062 SPINAL STENOSIS OF LUMBAR REGION WITH NEUROGENIC CLAUDICATION: ICD-10-CM

## 2023-09-14 DIAGNOSIS — L30.9 HAND DERMATITIS: ICD-10-CM

## 2023-09-15 RX ORDER — LEVOTHYROXINE SODIUM 0.03 MG/1
TABLET ORAL
Qty: 90 TABLET | Refills: 3 | Status: SHIPPED | OUTPATIENT
Start: 2023-09-15

## 2023-09-15 RX ORDER — BACLOFEN 10 MG/1
10 TABLET ORAL 3 TIMES DAILY
Qty: 90 TABLET | Refills: 2 | Status: SHIPPED | OUTPATIENT
Start: 2023-09-15

## 2023-09-15 RX ORDER — LEVOTHYROXINE SODIUM 0.2 MG/1
TABLET ORAL
Qty: 90 TABLET | Refills: 3 | Status: SHIPPED | OUTPATIENT
Start: 2023-09-15

## 2023-09-15 RX ORDER — FAMOTIDINE 20 MG/1
20 TABLET, FILM COATED ORAL 2 TIMES DAILY
Qty: 180 TABLET | Refills: 0 | Status: SHIPPED | OUTPATIENT
Start: 2023-09-15

## 2023-09-15 NOTE — TELEPHONE ENCOUNTER
Sang Galdamez   Are you able to refuse this medication? I sent her a my chart message letting her know a follow up is needed.  I am unable to do it

## 2023-09-15 NOTE — TELEPHONE ENCOUNTER
Please cancel the attached refill request. Confirmed 8/8/23 rx with 2 refills at [facility]. The pt will be notified when the rx is ready for pu. Forwarding to [facility] clerical pool.  Please fu w/ pt re: 12 w fu ov cancelled on 9/12/2023

## 2023-09-18 RX ORDER — GABAPENTIN 600 MG/1
600 TABLET ORAL 3 TIMES DAILY
Qty: 90 TABLET | Refills: 0 | OUTPATIENT
Start: 2023-09-18

## 2023-10-10 DIAGNOSIS — M47.819 SPONDYLO-ARTHROPATHY: ICD-10-CM

## 2023-10-10 NOTE — TELEPHONE ENCOUNTER
Reason for call:   [x] Refill   [] Prior Auth  [] Other:     Office:   [] PCP/Provider -   [x] Speciality/Provider -     Medication: enbrel  Dose/Frequency:    Quantity:    Pharmacy:[facility]

## 2023-10-16 ENCOUNTER — PATIENT MESSAGE (OUTPATIENT)
Dept: PAIN MEDICINE | Facility: CLINIC | Age: 48
End: 2023-10-16

## 2023-10-17 DIAGNOSIS — M54.16 LUMBAR RADICULOPATHY: Primary | ICD-10-CM

## 2023-10-18 ENCOUNTER — TELEPHONE (OUTPATIENT)
Age: 48
End: 2023-10-18

## 2023-10-18 NOTE — TELEPHONE ENCOUNTER
Caller: Priscilla Lorenzo    Doctor: Dr Morrow    Reason for call: Patient calling to schedule procedure please advise     Call back#: [phone]

## 2023-10-18 NOTE — TELEPHONE ENCOUNTER
Patient scheduled     PRE OP INSTRUCTIONS:           Hold medication  x _ full days prior, last dose on _           Patient advised to hold medication from Date till their appointment at that time instructions to restart will be given.          Patient stated verbal understanding. Aware that nursing may/will call to review hold dates as well.      -If you are on prescription blood thinners, you may have to hold the medication for several days before the procedure.    Please call the office to discuss medication holds at [phone].    -Do not eat or drink ONE HOUR prior to your procedure. If you are diabetic, may follow regular breakfast/lunch schedule and take usual    diabetic medications.  -Lumbar( low back) procedure, please wear comfortable slacks/pants.  -Cervical (neck) procedure, please wear a shirt/blouse that is easy to remove.  -A  is required to take you home form your procedure.  -Continue all to take prescribed medication the day of your procedure, including blood pressure medications.  -If you are prescribe antibiotics, have an active infection or have an open wound, please contact the office at [phone].  -Please refrain from any vaccinations two weeks before and two weeks after injection.  -Insurance authorization received in not a guarantee of payment per your insurance company's authorization disclaimer and it is    your responsibility to verify your benefits.          -If you have any questions about the instructions, please call me at [phone]          -PATIENT INSTRUCTED TO STOP ALL NSAID'S 4 DAYS PRIOR TO PROCEDURE            EXCEPT FOR IBUPROFEN IT CAN BE TAKEN UP TO 24 HOURS PRIOR TO PROCEDURE.            [facility] PRE OP INSTRUCTIONS:             Please do not eat or drink 1 hour prior to the procedure.          If you are not feeling well, have any kind of infection or are placed on antibiotics for any reason, please call the office,           we will have to reschedule your  procedure.           Patient on ABX procedure canceled till off ABX and S/S free for 48 hours          You will need a , 18 years or older.             Reviewed instructions: , NPO 1 hour prior, loose-fitting/comfortable clothes, if ill/fever/infx/abx to call and reschedule.            Also pain level at leat 5/10 if it is not please call and talk to nurse [phone].           Please continue to take any long acting pain medication as prescribed.          Refrain from PRN, as-needed pain meds 6h prior and 6-8 hours after anything you would buy over the counter at a store.          It is recommended that you try to continue with your normal activities of daily living to see if the medial branch block is helping.           You will be sent home with a pain diary that you will need to return to us either by dropping it off, mailing it in, faxing it or you can           upload it to your my chart for the doctor to review to allow us to assess the next steps in your treatment.      Patient called back and scheduled for Procedure    All pre procedure instructions were given to patient   Nothing to eat or drink for 1 hour prior  Loose fitting clothing   PATIENT INSTRUCTED TO STOP ALL NSAID'S 4 DAYS PRIOR TO PROCEDURE EXCEPT FOR IBUPROFEN IT CAN BE TAKEN UP TO 24 HOURS PRIOR TO PROCEDURE.   DENIES ANTICOAG'S OR ASA   Denies Antibx   Needs    Patient instructed to contact our office if becomes sick or gets put on any ANTIBIOTIC or has any active infections   Refrain from any vaccines 2 weeks before & 2 weeks after  Insurance auth received but is not a guarantee of payment per your insurance company's authorization disclaimer and it is your responsibility to verify your benefits   COVID -19 screening complete

## 2023-10-20 DIAGNOSIS — I10 PRIMARY HYPERTENSION: ICD-10-CM

## 2023-10-20 RX ORDER — IRBESARTAN 150 MG/1
150 TABLET ORAL
Qty: 90 TABLET | Refills: 0 | Status: SHIPPED | OUTPATIENT
Start: 2023-10-20

## 2023-10-22 LAB
GLUCOSE SERPL-MCNC: 106 MG/DL (ref 70–99)
INSULIN SERPL-ACNC: 23.4 UIU/ML (ref 2.6–24.9)
T3FREE SERPL-MCNC: 3.2 PG/ML (ref 2–4.4)
T4 FREE SERPL-MCNC: 1.49 NG/DL (ref 0.82–1.77)
TSH SERPL DL<=0.005 MIU/L-ACNC: 0.41 UIU/ML (ref 0.45–4.5)

## 2023-10-23 ENCOUNTER — OFFICE VISIT (OUTPATIENT)
Dept: FAMILY MEDICINE CLINIC | Facility: HOSPITAL | Age: 48
End: 2023-10-23
Payer: COMMERCIAL

## 2023-10-23 ENCOUNTER — OFFICE VISIT (OUTPATIENT)
Dept: PAIN MEDICINE | Facility: CLINIC | Age: 48
End: 2023-10-23
Payer: COMMERCIAL

## 2023-10-23 ENCOUNTER — OFFICE VISIT (OUTPATIENT)
Dept: ENDOCRINOLOGY | Facility: HOSPITAL | Age: 48
End: 2023-10-23
Payer: COMMERCIAL

## 2023-10-23 VITALS
HEIGHT: 70 IN | DIASTOLIC BLOOD PRESSURE: 82 MMHG | BODY MASS INDEX: 41.95 KG/M2 | TEMPERATURE: 98.1 F | SYSTOLIC BLOOD PRESSURE: 118 MMHG | WEIGHT: 293 LBS | HEART RATE: 101 BPM

## 2023-10-23 VITALS
BODY MASS INDEX: 41.95 KG/M2 | HEIGHT: 70 IN | DIASTOLIC BLOOD PRESSURE: 84 MMHG | HEART RATE: 90 BPM | OXYGEN SATURATION: 97 % | SYSTOLIC BLOOD PRESSURE: 128 MMHG | WEIGHT: 293 LBS

## 2023-10-23 VITALS
HEART RATE: 92 BPM | BODY MASS INDEX: 41.95 KG/M2 | WEIGHT: 293 LBS | DIASTOLIC BLOOD PRESSURE: 82 MMHG | HEIGHT: 70 IN | SYSTOLIC BLOOD PRESSURE: 122 MMHG | TEMPERATURE: 97.9 F

## 2023-10-23 DIAGNOSIS — E03.8 HYPOTHYROIDISM DUE TO HASHIMOTO'S THYROIDITIS: Primary | ICD-10-CM

## 2023-10-23 DIAGNOSIS — I10 PRIMARY HYPERTENSION: Primary | ICD-10-CM

## 2023-10-23 DIAGNOSIS — L30.9 HAND DERMATITIS: ICD-10-CM

## 2023-10-23 DIAGNOSIS — M48.062 SPINAL STENOSIS OF LUMBAR REGION WITH NEUROGENIC CLAUDICATION: Primary | ICD-10-CM

## 2023-10-23 DIAGNOSIS — M54.2 NECK PAIN, CHRONIC: ICD-10-CM

## 2023-10-23 DIAGNOSIS — Z23 ENCOUNTER FOR IMMUNIZATION: ICD-10-CM

## 2023-10-23 DIAGNOSIS — L20.9 ATOPIC DERMATITIS, UNSPECIFIED TYPE: ICD-10-CM

## 2023-10-23 DIAGNOSIS — R73.01 ELEVATED FASTING GLUCOSE: ICD-10-CM

## 2023-10-23 DIAGNOSIS — K21.9 GASTROESOPHAGEAL REFLUX DISEASE WITHOUT ESOPHAGITIS: ICD-10-CM

## 2023-10-23 DIAGNOSIS — M79.18 MYOFASCIAL PAIN SYNDROME: ICD-10-CM

## 2023-10-23 DIAGNOSIS — M51.27 HERNIATED NUCLEUS PULPOSUS, L5-S1: ICD-10-CM

## 2023-10-23 DIAGNOSIS — G89.29 NECK PAIN, CHRONIC: ICD-10-CM

## 2023-10-23 DIAGNOSIS — R73.03 PREDIABETES: ICD-10-CM

## 2023-10-23 DIAGNOSIS — E66.01 MORBID OBESITY WITH BODY MASS INDEX (BMI) OF 45.0 TO 49.9 IN ADULT (HCC): ICD-10-CM

## 2023-10-23 DIAGNOSIS — E06.3 HYPOTHYROIDISM DUE TO HASHIMOTO'S THYROIDITIS: Primary | ICD-10-CM

## 2023-10-23 DIAGNOSIS — G89.4 CHRONIC PAIN SYNDROME: ICD-10-CM

## 2023-10-23 DIAGNOSIS — Z98.1 HISTORY OF FUSION OF CERVICAL SPINE: ICD-10-CM

## 2023-10-23 DIAGNOSIS — M54.16 LUMBAR RADICULOPATHY: ICD-10-CM

## 2023-10-23 DIAGNOSIS — M54.12 CERVICAL RADICULOPATHY: ICD-10-CM

## 2023-10-23 DIAGNOSIS — G47.33 OSA (OBSTRUCTIVE SLEEP APNEA): ICD-10-CM

## 2023-10-23 DIAGNOSIS — M47.816 LUMBAR SPONDYLOSIS: ICD-10-CM

## 2023-10-23 PROCEDURE — 90686 IIV4 VACC NO PRSV 0.5 ML IM: CPT | Performed by: FAMILY MEDICINE

## 2023-10-23 PROCEDURE — 99214 OFFICE O/P EST MOD 30 MIN: CPT | Performed by: PHYSICIAN ASSISTANT

## 2023-10-23 PROCEDURE — 90471 IMMUNIZATION ADMIN: CPT | Performed by: FAMILY MEDICINE

## 2023-10-23 PROCEDURE — 99214 OFFICE O/P EST MOD 30 MIN: CPT | Performed by: FAMILY MEDICINE

## 2023-10-23 PROCEDURE — 99213 OFFICE O/P EST LOW 20 MIN: CPT | Performed by: PHYSICIAN ASSISTANT

## 2023-10-23 RX ORDER — IRBESARTAN 75 MG/1
75 TABLET ORAL
Qty: 90 TABLET | Refills: 1 | Status: SHIPPED | OUTPATIENT
Start: 2023-10-23 | End: 2024-04-20

## 2023-10-23 RX ORDER — GABAPENTIN 600 MG/1
600 TABLET ORAL 3 TIMES DAILY
Qty: 90 TABLET | Refills: 2 | Status: SHIPPED | OUTPATIENT
Start: 2023-10-23

## 2023-10-23 RX ORDER — FAMOTIDINE 20 MG/1
20 TABLET, FILM COATED ORAL 2 TIMES DAILY
Qty: 180 TABLET | Refills: 0 | Status: SHIPPED | OUTPATIENT
Start: 2023-10-23

## 2023-10-23 RX ORDER — DULOXETIN HYDROCHLORIDE 60 MG/1
CAPSULE, DELAYED RELEASE ORAL
Qty: 180 CAPSULE | Refills: 0 | Status: SHIPPED | OUTPATIENT
Start: 2023-10-23

## 2023-10-23 NOTE — PROGRESS NOTES
Name: Rao Townsend      : 1975      MRN: [id number]  Encounter Provider: Sandra Stewart MD  Encounter Date: 10/23/2023   Encounter department: [address]     1. Primary hypertension  -     irbesartan (AVAPRO) 75 mg tablet; Take 1 tablet (75 mg total) by mouth daily at bedtime  -     CBC; Future  -     Comprehensive metabolic panel; Future  -     CBC  -     Comprehensive metabolic panel    2. Encounter for immunization  -     influenza vaccine, quadrivalent, 0.5 mL, preservative-free, for adult and pediatric patients 6 mos+ (AFLURIA, FLUARIX, FLULAVAL, FLUZONE)    3. Atopic dermatitis, unspecified type    4. Hand dermatitis  -     betamethasone valerate (VALISONE) 0.1 % ointment; Apply 1 to 2 times a day only as needed to finger/hand rash. 5. NAYE (obstructive sleep apnea)  -     Ambulatory Referral to Sleep Medicine; Future    6. Gastroesophageal reflux disease without esophagitis  -     famotidine (PEPCID) 20 mg tablet; Take 1 tablet (20 mg total) by mouth 2 (two) times a day    7. Morbid obesity with body mass index (BMI) of 45.0 to 49.9 in adult ([address])    8. Prediabetes  -     Hemoglobin A1C; Future  -     Lipid Panel with Direct LDL reflex; Future  -     Hemoglobin A1C  -     Lipid Panel with Direct LDL reflex      BMI Counseling: Body mass index is 46.26 kg/m². The BMI is above normal. Nutrition recommendations include decreasing portion sizes. Exercise recommendations include moderate physical activity 150 minutes/week. Rationale for BMI follow-up plan is due to patient being overweight or obese. Tobacco Cessation Counseling: Tobacco cessation counseling was provided. The patient is sincerely urged to quit consumption of tobacco. She is not ready to quit tobacco. Medication options discussed. Discussed naye and sleep study in detail. Due to severity referral to sleep medicine.      Discussed difficulty to lose weight with untreated radha. Consider phentermine due to insurance coverage of other options. Discussed get radha under control/treatment first.   Work on dietary control and exercises. Sponyloarthropathy. Fu w rhematology. Ongoing fuw with pain mgt. Mdd. On cymbalta. May be helpig with chronic pain as well. Discussed depressio difficult to treat with untreated radha as well. Robel Flores. Stable on h2 blocker. Htn. Stable. Continue with the same. Subjective      Priscilla is here for fu of chornic conditions. Had sleep study done and here for review. Declined previous recommendation to see sleep medicine. Htn. Stable. On irbesartan 75 mg daily. Robel Flores. Needing refill on pepcid. Continue fu with pain medicine and rheumatology. Review of Systems   Constitutional: Negative. Negative for activity change, appetite change, chills, diaphoresis, fatigue and fever. HENT:  Negative for congestion, facial swelling and sore throat. Respiratory: Negative. Negative for apnea, cough, chest tightness and shortness of breath. Cardiovascular: Negative. Negative for chest pain and palpitations. Gastrointestinal: Negative. Negative for abdominal distention, abdominal pain, blood in stool, constipation, diarrhea and nausea. Genitourinary: Negative. Negative for difficulty urinating, dysuria, flank pain and frequency.        Current Outpatient Medications on File Prior to Visit   Medication Sig   • acetaminophen (TYLENOL) 650 mg CR tablet Take 1 tablet (650 mg total) by mouth every 8 (eight) hours as needed for mild pain   • albuterol (Ventolin HFA) 90 mcg/act inhaler Inhale 2 puffs every 6 (six) hours as needed for wheezing   • baclofen 10 mg tablet Take 1 tablet (10 mg total) by mouth 3 (three) times a day   • Calcium Carbonate (CALCIUM 600 PO) Take by mouth daily   • cholecalciferol (VITAMIN D3) 1,000 units tablet Take 2,000 Units by mouth daily   • ciclopirox (LOPROX) 0.77 % cream APPLY 2 TIMES per day to rash on chest for 2-4 weeks. • DULoxetine (CYMBALTA) 60 mg delayed release capsule Take 2 PO HS. • etanercept (ENBREL SURECLICK) 50 MG/ML injection Inject 1 mL (50 mg total) under the skin every 7 days   • Euflexxa 20 MG/2ML SOSY    • gabapentin (NEURONTIN) 600 MG tablet Take 1 tablet (600 mg total) by mouth 3 (three) times a day   • HYDROcodone-acetaminophen (NORCO) 5-325 mg per tablet Take 1 PO BID PRN for pain for ongoing therapy   • levothyroxine (Levoxyl) 200 mcg tablet Take 1 tablet daily in addition to 25 mcg tablet daily   • levothyroxine (Levoxyl) 25 mcg tablet Take 1 tablet daily in addition to 200 mcg tablet daily   • multivitamin (THERAGRAN) TABS Take 1 tablet by mouth daily   • naproxen sodium (ALEVE) 220 MG tablet Take 1 tablet (220 mg total) by mouth 2 (two) times a day with meals   • ondansetron (Zofran ODT) 4 mg disintegrating tablet Take 1 tablet (4 mg total) by mouth every 6 (six) hours as needed for nausea or vomiting   • Potassium 99 MG TABS Take by mouth daily   • [DISCONTINUED] betamethasone valerate (VALISONE) 0.1 % ointment Apply 1 to 2 times a day only as needed to finger/hand rash. • [DISCONTINUED] famotidine (PEPCID) 20 mg tablet Take 1 tablet (20 mg total) by mouth 2 (two) times a day   • [DISCONTINUED] irbesartan (AVAPRO) 150 mg tablet Take 1 tablet (150 mg total) by mouth daily at bedtime       Objective     /82   Pulse 101   Temp 98.1 °F (36.7 °C)   Ht 5' 10" (1.778 m)   Wt (!) 146 kg (322 lb 6.4 oz)   BMI 46.26 kg/m²     Physical Exam  Vitals and nursing note reviewed. Constitutional:       Appearance: Normal appearance. She is well-developed. She is obese. HENT:      Head: Normocephalic and atraumatic. Right Ear: External ear normal.      Left Ear: External ear normal.      Nose: Nose normal.   Eyes:      Conjunctiva/sclera: Conjunctivae normal.      Pupils: Pupils are equal, round, and reactive to light. Neck:      Thyroid: No thyromegaly. Trachea: No tracheal deviation. Cardiovascular:      Rate and Rhythm: Normal rate and regular rhythm. Heart sounds: Normal heart sounds. No murmur heard. Pulmonary:      Effort: Pulmonary effort is normal. No respiratory distress. Breath sounds: Normal breath sounds. No wheezing. Abdominal:      General: Bowel sounds are normal.      Palpations: Abdomen is soft. Musculoskeletal:         General: Normal range of motion. Cervical back: Normal range of motion and neck supple. Skin:     General: Skin is warm and dry. Capillary Refill: Capillary refill takes less than 2 seconds. Neurological:      General: No focal deficit present. Mental Status: She is alert and oriented to person, place, and time.    Psychiatric:         Mood and Affect: Mood normal.         Behavior: Behavior normal.       Allison Broussard MD

## 2023-10-23 NOTE — PATIENT INSTRUCTIONS
Levothyroxine 225 mcg daily. Call with any concerns or questions. Follow up in 1 year with lab work prior to visit.

## 2023-10-23 NOTE — PROGRESS NOTES
Miles Cantu 51 y.o. female MRN: [id number]    Encounter: [id number]      Assessment/Plan     Assessment: This is a 51y.o.-year-old female with hypothyroidism due to Hashimoto's thyroiditis. Plan:  Hypothyroidism: Most recent thyroid lab work came back with a normal free T3 and free T4 with the slightly low TSH. At this time is clinically euthyroid so will not make any adjustments to her medication. She will continue levothyroxine 225 mcg daily she is taking 200 mcg tablet and 25 mcg tablet. Contact the office if there is any change in symptoms. Follow-up in 1 year with lab work completed prior to visit. CC: Hypothyroidism follow-up    History of Present Illness     HPI:  Miles Cantu is a 51year old female with hypothyroidism due to Hashimoto's thyroiditis for follow-up visit. She was diagnosed with hypothyroidism at the age of 24. She is on thyroid hormone for replacement purposes and uses generic levothyroxine. Thyroid antibodies were positive confirming Hashimoto's thyroiditis as the underlying cause of her hypothyroidism. He is currently on levothyroxine 225 mcg daily. She continues to struggle with weight but weight is stable since last year. She unfortunately does not exercise but she also eats relatively healthy. She denies heat or cold intolerance but can be hot and sweaty at times. She has diarrhea with her IBS. She denies palpitations constipation, tremors, or depression. Sleep can vary from night to night. Has dry skin and brittle nails, but denies any excessive hair loss. She denies diplopia. She denies compressive thyroid symptoms or difficulties with swallowing. From an endocrine standpoint she believes she is doing well today. She had a lot of fatigue and abnormal weight gain for which 24 hour urine cortisol and insulin resistance was evaluated. Testosterone was noted to be low.   24 hour urine cortisol was normal.    Review of Systems   Constitutional: Positive for fatigue. Negative for activity change, appetite change, diaphoresis and unexpected weight change. HENT:  Negative for sore throat, trouble swallowing and voice change. Eyes:  Negative for visual disturbance. Respiratory:  Negative for chest tightness and shortness of breath. Cardiovascular:  Negative for chest pain, palpitations and leg swelling. Gastrointestinal:  Positive for diarrhea. Negative for abdominal pain and constipation. Endocrine: Negative for cold intolerance, heat intolerance, polydipsia, polyphagia and polyuria. Musculoskeletal:  Positive for arthralgias. Skin:  Negative for rash. Dry skin and brittle nails   Neurological:  Negative for dizziness, tremors, light-headedness, numbness and headaches. Hematological:  Negative for adenopathy. Psychiatric/Behavioral:  Negative for dysphoric mood and sleep disturbance. The patient is not nervous/anxious.         Historical Information   Past Medical History:   Diagnosis Date   • Allergic    • Anxiety    • Arthritis    • Asthma    • Chronic pain disorder     upper back   • Depression    • Disc disorder    • Disease of thyroid gland    • Fibromyalgia, primary    • GERD (gastroesophageal reflux disease)    • Headache(784.0)    • HPV (human papilloma virus) infection     cervical   • Hypertension    • Hypothyroidism    • Inflammatory bowel disease    • Migraine    • Obesity    • Stomach disorder      Past Surgical History:   Procedure Laterality Date   • CERVICAL BIOPSY  W/ LOOP ELECTRODE EXCISION     • CERVICAL FUSION  2015   •  SECTION     • CHOLECYSTECTOMY      lap   • FOOT SURGERY Bilateral     multiple, posterior calcaneal bone spur   • KNEE SURGERY Left     arthroscopy with debribement   • LYMPH NODE BIOPSY     • [place] NDSC WRST SURG W/RLS TRANSVRS CARPL LIGM Right 2023    Procedure: Right endoscopic carpal tunnel release;  Surgeon: Reyna Garcia MD;  Location: [facility];  Service: Orthopedics • SPINE SURGERY     • TENDON REPAIR     • TUBAL LIGATION Bilateral 2012     Social History   Social History     Substance and Sexual Activity   Alcohol Use Not Currently     Social History     Substance and Sexual Activity   Drug Use No     Social History     Tobacco Use   Smoking Status Every Day   • Packs/day: 0.50   • Years: 35.00   • Total pack years: 17.50   • Types: Cigarettes   Smokeless Tobacco Never   Tobacco Comments    actually about 3/4 PPD     Family History:   Family History   Adopted: Yes   Problem Relation Age of Onset   • Thyroid disease Mother    • Arthritis Mother         Mom, dad, aunts, grandmother   • Autoimmune disease Mother    • No Known Problems Father    • Coronary artery disease Maternal Grandmother    • Breast cancer Maternal Grandmother         age unknown   • Heart disease Maternal Grandmother         Mother, Aunt, Grandmother, Father   • Coronary artery disease Maternal Aunt    • Stroke Maternal Hailey Baars, brothers   • No Known Problems Maternal Grandfather    • No Known Problems Paternal Grandmother    • No Known Problems Paternal Grandfather    • Suicide Attempts Maternal Aunt    • Heart murmur Maternal Aunt    • Hypothyroidism Maternal Aunt    • Hashimoto's thyroiditis Maternal Aunt    • Diabetes unspecified Brother    • Diabetes Brother    • No Known Problems Brother    • No Known Problems Brother    • ADD / ADHD Son        Meds/Allergies   Current Outpatient Medications   Medication Sig Dispense Refill   • acetaminophen (TYLENOL) 650 mg CR tablet Take 1 tablet (650 mg total) by mouth every 8 (eight) hours as needed for mild pain 30 tablet 0   • albuterol (Ventolin HFA) 90 mcg/act inhaler Inhale 2 puffs every 6 (six) hours as needed for wheezing 18 g 0   • baclofen 10 mg tablet Take 1 tablet (10 mg total) by mouth 3 (three) times a day 90 tablet 2   • betamethasone valerate (VALISONE) 0.1 % ointment Apply 1 to 2 times a day only as needed to finger/hand rash.  30 g 0   • Calcium Carbonate (CALCIUM 600 PO) Take by mouth daily     • cholecalciferol (VITAMIN D3) 1,000 units tablet Take 2,000 Units by mouth daily     • ciclopirox (LOPROX) 0.77 % cream APPLY 2 TIMES per day to rash on chest for 2-4 weeks. 30 g 0   • DULoxetine (CYMBALTA) 60 mg delayed release capsule Take 2 PO HS. 180 capsule 0   • etanercept (ENBREL SURECLICK) 50 MG/ML injection Inject 1 mL (50 mg total) under the skin every 7 days 4 mL 5   • Euflexxa 20 MG/2ML SOSY      • famotidine (PEPCID) 20 mg tablet Take 1 tablet (20 mg total) by mouth 2 (two) times a day 180 tablet 0   • gabapentin (NEURONTIN) 600 MG tablet Take 1 tablet (600 mg total) by mouth 3 (three) times a day 90 tablet 2   • HYDROcodone-acetaminophen (NORCO) 5-325 mg per tablet Take 1 PO BID PRN for pain for ongoing therapy 14 tablet 0   • irbesartan (AVAPRO) 150 mg tablet Take 1 tablet (150 mg total) by mouth daily at bedtime 90 tablet 0   • levothyroxine (Levoxyl) 200 mcg tablet Take 1 tablet daily in addition to 25 mcg tablet daily 90 tablet 3   • levothyroxine (Levoxyl) 25 mcg tablet Take 1 tablet daily in addition to 200 mcg tablet daily 90 tablet 3   • multivitamin (THERAGRAN) TABS Take 1 tablet by mouth daily     • naproxen sodium (ALEVE) 220 MG tablet Take 1 tablet (220 mg total) by mouth 2 (two) times a day with meals 20 tablet 0   • ondansetron (Zofran ODT) 4 mg disintegrating tablet Take 1 tablet (4 mg total) by mouth every 6 (six) hours as needed for nausea or vomiting 20 tablet 0   • Potassium 99 MG TABS Take by mouth daily       No current facility-administered medications for this visit.      Allergies   Allergen Reactions   • Acetaminophen-Codeine      Pt states she does not remember having a reaction to this medication   • Hydrocodone Other (See Comments)     GI issues   • Latex Hives     Latex powder     • Lisinopril Other (See Comments)     Itching, gi intollerance     • Prednisolone Vomiting       Objective   Vitals: Blood pressure 128/84, pulse 90, height 5' 10" (1.778 m), weight (!) 146 kg (321 lb), SpO2 97 %, not currently breastfeeding. Physical Exam  Vitals and nursing note reviewed. Constitutional:       General: She is not in acute distress. Appearance: Normal appearance. She is not diaphoretic. HENT:      Head: Normocephalic and atraumatic. Eyes:      General: No scleral icterus. Extraocular Movements: Extraocular movements intact. Conjunctiva/sclera: Conjunctivae normal.      Pupils: Pupils are equal, round, and reactive to light. Neck:      Thyroid: No thyroid mass, thyromegaly or thyroid tenderness. Cardiovascular:      Rate and Rhythm: Normal rate and regular rhythm. Heart sounds: No murmur heard. Pulmonary:      Effort: Pulmonary effort is normal. No respiratory distress. Breath sounds: Normal breath sounds. No wheezing. Musculoskeletal:         General: No swelling. Cervical back: Normal range of motion. Lymphadenopathy:      Cervical: No cervical adenopathy. Neurological:      Mental Status: She is alert and oriented to person, place, and time. Mental status is at baseline. Sensory: No sensory deficit. Motor: No tremor. Gait: Gait normal.      Deep Tendon Reflexes:      Reflex Scores:       Patellar reflexes are 2+ on the right side and 2+ on the left side. Psychiatric:         Mood and Affect: Mood normal.         Behavior: Behavior normal.         Thought Content: Thought content normal.         The history was obtained from the review of the chart, patient. Lab Results:   Lab Results   Component Value Date/Time    Free t4 1.49 10/21/2023 08:16 AM           Portions of the record may have been created with voice recognition software. Occasional wrong word or "sound a like" substitutions may have occurred due to the inherent limitations of voice recognition software. Read the chart carefully and recognize, using context, where substitutions have occurred.

## 2023-10-23 NOTE — PROGRESS NOTES
Assessment:  1. Spinal stenosis of lumbar region with neurogenic claudication    2. Lumbar spondylosis    3. Chronic pain syndrome    4. Herniated nucleus pulposus, L5-S1    5. Lumbar radiculopathy    6. Myofascial pain syndrome    7. Neck pain, chronic    8. Cervical radiculopathy    9. History of fusion of cervical spine        Plan:  While the patient was in the office today, I did have a thorough conversation regarding their chronic pain syndrome, medication management, and treatment plan options. The patient will keep her scheduled appointment for the bilateral S1 transforaminal epidural steroid injections which have been significantly beneficial in reducing her radicular component of her pain pattern. I have provided refills for the duloxetine as well as the gabapentin which she is taking as directed with about 50% relief and no side effects. I have recommended that we proceed with MRI of the cervical spine with and without contrast given her increasing radicular symptoms and her history of fusion surgery. The patient regularly participates in home exercises as instructed by physical therapy in the past.    The patient will follow-up in 12 weeks for medication prescription refill and reevaluation. The patient was advised to contact the office should their symptoms worsen in the interim. The patient was agreeable and verbalized an understanding. History of Present Illness: The patient is a 51 y.o. female last seen on 6/30/2023 who presents for a follow up office visit in regards to chronic pain. The patient currently reports chronic neck pain mid back pain low back pain that she presently rates a 7 out of 10 and describes these areas as constant and throbbing, cramping, pressure-like and shooting. The pain in the low back radiates into the buttocks and posterior aspects of bilateral lower extremities. Patient is scheduled for bilateral S1 transforaminal epidural steroid injections.   She is noting increasing neck and upper back pain with radiation into the upper extremities and around into the chest wall regions. Patient has a history of cervical spinal fusion surgery. She routinely performs at home stretching exercises as instructed by physical therapy. Current pain medications includes:  . The patient reports that this regimen is providing 50% pain relief. The patient is reporting no side effects from this pain medication regimen. I have personally reviewed and/or updated the patient's past medical history, past surgical history, family history, social history, current medications, allergies, and vital signs today. Review of Systems:    Review of Systems   Respiratory:  Negative for shortness of breath. Cardiovascular:  Negative for chest pain. Gastrointestinal:  Negative for constipation, diarrhea, nausea and vomiting. Musculoskeletal:  Positive for gait problem and joint swelling (Joint stiffness). Negative for arthralgias and myalgias. Skin:  Negative for rash. Neurological:  Negative for dizziness, seizures and weakness. All other systems reviewed and are negative.         Past Medical History:   Diagnosis Date   • Allergic    • Anxiety    • Arthritis    • Asthma    • Chronic pain disorder     upper back   • Depression    • Disc disorder    • Disease of thyroid gland    • Fibromyalgia, primary    • GERD (gastroesophageal reflux disease)    • Headache(784.0)    • HPV (human papilloma virus) infection     cervical   • Hypertension    • Hypothyroidism    • Inflammatory bowel disease    • Migraine    • Obesity    • Stomach disorder        Past Surgical History:   Procedure Laterality Date   • CERVICAL BIOPSY  W/ LOOP ELECTRODE EXCISION     • CERVICAL FUSION  2015   •  SECTION     • CHOLECYSTECTOMY      lap   • FOOT SURGERY Bilateral     multiple, posterior calcaneal bone spur   • KNEE SURGERY Left     arthroscopy with debribement   • LYMPH NODE BIOPSY • [place] NDSC WRST SURG W/RLS TRANSVRS CARPL LIGM Right 8/22/2023    Procedure: Right endoscopic carpal tunnel release;  Surgeon: Kike Duncan MD;  Location: [facility];  Service: Orthopedics   • [address]     • TENDON REPAIR     • TUBAL LIGATION Bilateral 2012       Family History   Adopted: Yes   Problem Relation Age of Onset   • Thyroid disease Mother    • Arthritis Mother         Mom, dad, aunts, grandmother   • Autoimmune disease Mother    • No Known Problems Father    • Coronary artery disease Maternal Grandmother    • Breast cancer Maternal Grandmother         age unknown   • Heart disease Maternal Grandmother         Mother, Jak Perdue, Father   • Coronary artery disease Maternal Aunt    • Stroke Maternal Ivan Cruz, brothers   • No Known Problems Maternal Grandfather    • No Known Problems Paternal Grandmother    • No Known Problems Paternal Grandfather    • Suicide Attempts Maternal Aunt    • Heart murmur Maternal Aunt    • Hypothyroidism Maternal Aunt    • Hashimoto's thyroiditis Maternal Aunt    • Diabetes unspecified Brother    • Diabetes Brother    • No Known Problems Brother    • No Known Problems Brother    • ADD / ADHD Son        Social History     Occupational History   • Occupation: [employer]     Comment: [employer] department   Tobacco Use   • Smoking status: Every Day     Packs/day: 0.50     Years: 35.00     Total pack years: 17.50     Types: Cigarettes   • Smokeless tobacco: Never   • Tobacco comments:     actually about 3/4 PPD   Vaping Use   • Vaping Use: Never used   Substance and Sexual Activity   • Alcohol use: Not Currently   • Drug use: No   • Sexual activity: Not Currently     Partners: Male     Birth control/protection: Female Sterilization, None         Current Outpatient Medications:   •  acetaminophen (TYLENOL) 650 mg CR tablet, Take 1 tablet (650 mg total) by mouth every 8 (eight) hours as needed for mild pain, Disp: 30 tablet, Rfl: 0  •  albuterol (Ventolin HFA) 90 mcg/act inhaler, Inhale 2 puffs every 6 (six) hours as needed for wheezing, Disp: 18 g, Rfl: 0  •  baclofen 10 mg tablet, Take 1 tablet (10 mg total) by mouth 3 (three) times a day, Disp: 90 tablet, Rfl: 2  •  betamethasone valerate (VALISONE) 0.1 % ointment, Apply 1 to 2 times a day only as needed to finger/hand rash., Disp: 30 g, Rfl: 0  •  Calcium Carbonate (CALCIUM 600 PO), Take by mouth daily, Disp: , Rfl:   •  cholecalciferol (VITAMIN D3) 1,000 units tablet, Take 2,000 Units by mouth daily, Disp: , Rfl:   •  ciclopirox (LOPROX) 0.77 % cream, APPLY 2 TIMES per day to rash on chest for 2-4 weeks. , Disp: 30 g, Rfl: 0  •  DULoxetine (CYMBALTA) 60 mg delayed release capsule, Take 2 PO HS., Disp: 180 capsule, Rfl: 0  •  etanercept (ENBREL SURECLICK) 50 MG/ML injection, Inject 1 mL (50 mg total) under the skin every 7 days, Disp: 4 mL, Rfl: 5  •  Euflexxa 20 MG/2ML SOSY, , Disp: , Rfl:   •  famotidine (PEPCID) 20 mg tablet, Take 1 tablet (20 mg total) by mouth 2 (two) times a day, Disp: 180 tablet, Rfl: 0  •  gabapentin (NEURONTIN) 600 MG tablet, Take 1 tablet (600 mg total) by mouth 3 (three) times a day, Disp: 90 tablet, Rfl: 2  •  HYDROcodone-acetaminophen (NORCO) 5-325 mg per tablet, Take 1 PO BID PRN for pain for ongoing therapy, Disp: 14 tablet, Rfl: 0  •  irbesartan (AVAPRO) 75 mg tablet, Take 1 tablet (75 mg total) by mouth daily at bedtime, Disp: 90 tablet, Rfl: 1  •  levothyroxine (Levoxyl) 200 mcg tablet, Take 1 tablet daily in addition to 25 mcg tablet daily, Disp: 90 tablet, Rfl: 3  •  levothyroxine (Levoxyl) 25 mcg tablet, Take 1 tablet daily in addition to 200 mcg tablet daily, Disp: 90 tablet, Rfl: 3  •  multivitamin (THERAGRAN) TABS, Take 1 tablet by mouth daily, Disp: , Rfl:   •  naproxen sodium (ALEVE) 220 MG tablet, Take 1 tablet (220 mg total) by mouth 2 (two) times a day with meals, Disp: 20 tablet, Rfl: 0  •  ondansetron (Zofran ODT) 4 mg disintegrating tablet, Take 1 tablet (4 mg total) by mouth every 6 (six) hours as needed for nausea or vomiting, Disp: 20 tablet, Rfl: 0  •  Potassium 99 MG TABS, Take by mouth daily, Disp: , Rfl:     Allergies   Allergen Reactions   • Acetaminophen-Codeine      Pt states she does not remember having a reaction to this medication   • Hydrocodone Other (See Comments)     GI issues   • Latex Hives     Latex powder     • Lisinopril Other (See Comments)     Itching, gi intollerance     • Prednisolone Vomiting       Physical Exam:    /82 (BP Location: Left arm, Patient Position: Sitting, Cuff Size: Large)   Pulse 92   Temp 97.9 °F (36.6 °C)   Ht 5' 10" (1.778 m)   Wt (!) 146 kg (321 lb)   BMI 46.06 kg/m²     Constitutional:normal, well developed, well nourished, alert, in no distress and non-toxic and no overt pain behavior.   Eyes:anicteric  HEENT:grossly intact  Neck:supple, symmetric, trachea midline and no masses   Pulmonary:even and unlabored  Cardiovascular:No edema or pitting edema present  Skin:Normal without rashes or lesions and well hydrated  Psychiatric:Mood and affect appropriate  Neurologic:Cranial Nerves II-XII grossly intact  Musculoskeletal: Cervical spine tender to palpation, limited range of motion      Imaging  MRI cervical spine w wo contrast    (Results Pending)         Orders Placed This Encounter   Procedures   • MRI cervical spine w wo contrast

## 2023-10-23 NOTE — PATIENT INSTRUCTIONS
Epidural Steroid Injection   WHAT YOU NEED TO KNOW:   What do I need to know about an epidural steroid injection (MARCIE)? An MARCIE is a procedure to inject steroid medicine into the epidural space. The epidural space is between your spinal cord and vertebrae. Steroids reduce inflammation and fluid buildup in your spine that may be causing pain. You may be given pain medicine along with the steroids. How do I prepare for an MARCIE? Your provider will talk to you about how to prepare for your procedure. He or she will tell you what medicines to take or not take on the day of your procedure. You may need to stop taking blood thinners or other medicines several days before your procedure. You may need to adjust any diabetes medicine you take on the day of your procedure. Steroid medicine can increase your blood sugar level. Arrange for someone to drive you home when you are discharged. What will happen during an MARCIE? You will be given medicine to numb the procedure area. You will be awake for the procedure, but you will not feel pain. You may also be given medicine to help you relax. Contrast liquid will be used to help your provider see the area better. Tell the provider if you have ever had an allergic reaction to contrast liquid. Your provider may place the needle into your neck area, middle of your back, or tailbone area. He or she may inject the medicine next to the nerves that are causing your pain. He or she may instead inject the medicine into a larger area of the epidural space. This helps the medicine spread to more nerves. Your provider will use a fluoroscope to help guide the needle to the right place. A fluoroscope is a type of x-ray. After the procedure, a bandage will be placed over the injection site to prevent infection. What will happen after an MARCIE? You will have a bandage over the injection site to prevent infection.  Your provider will tell you when you can bathe and any activity guidelines. You will be able to go home. What are the risks of an MARCIE? You may have temporary or permanent nerve damage or paralysis. You may have bleeding or develop a serious infection, such as meningitis (swelling of the brain coverings). An abscess may also develop. An abscess is a pus-filled area under the skin. You may need surgery to fix the abscess. You may have a seizure, anxiety, or trouble sleeping. If you are a man, you may have temporary erectile dysfunction (not able to have an erection). CARE AGREEMENT:   You have the right to help plan your care. Learn about your health condition and how it may be treated. Discuss treatment options with your healthcare providers to decide what care you want to receive. You always have the right to refuse treatment. The above information is an  only. It is not intended as medical advice for individual conditions or treatments. Talk to your doctor, nurse or pharmacist before following any medical regimen to see if it is safe and effective for you. © Copyright [facility] 2023 Information is for End User's use only and may not be sold, redistributed or otherwise used for commercial purposes.

## 2023-11-08 ENCOUNTER — HOSPITAL ENCOUNTER (OUTPATIENT)
Dept: RADIOLOGY | Facility: CLINIC | Age: 48
Discharge: HOME | End: 2023-11-08
Payer: COMMERCIAL

## 2023-11-08 VITALS
HEART RATE: 84 BPM | RESPIRATION RATE: 20 BRPM | OXYGEN SATURATION: 94 % | SYSTOLIC BLOOD PRESSURE: 143 MMHG | DIASTOLIC BLOOD PRESSURE: 88 MMHG

## 2023-11-08 DIAGNOSIS — M54.16 LUMBAR RADICULOPATHY: ICD-10-CM

## 2023-11-08 PROCEDURE — 64483 NJX AA&/STRD TFRM EPI L/S 1: CPT | Performed by: ANESTHESIOLOGY

## 2023-11-08 RX ORDER — METHYLPREDNISOLONE ACETATE 80 MG/ML
160 INJECTION, SUSPENSION INTRA-ARTICULAR; INTRALESIONAL; INTRAMUSCULAR; PARENTERAL; SOFT TISSUE ONCE
Status: COMPLETED | OUTPATIENT
Start: 2023-11-08 | End: 2023-11-08

## 2023-11-08 RX ADMIN — METHYLPREDNISOLONE ACETATE 160 MG: 80 INJECTION, SUSPENSION INTRA-ARTICULAR; INTRALESIONAL; INTRAMUSCULAR; SOFT TISSUE at 11:46

## 2023-11-08 RX ADMIN — IOHEXOL 2 ML: 300 INJECTION, SOLUTION INTRAVENOUS at 11:46

## 2023-11-08 NOTE — DISCHARGE INSTRUCTIONS
Epidural Steroid Injection   WHAT YOU NEED TO KNOW:   An epidural steroid injection (MARCIE) is a procedure to inject steroid medicine into the epidural space. The epidural space is between your spinal cord and vertebrae. Steroids reduce inflammation and fluid buildup in your spine that may be causing pain. You may be given pain medicine along with the steroids. ACTIVITY  Do not drive or operate machinery today. No strenuous activity today - bending, lifting, etc.  You may resume normal activites starting tomorrow - start slowly and as tolerated. You may shower today, but no tub baths or hot tubs. You may have numbness for several hours from the local anesthetic. Please use caution and common sense, especially with weight-bearing activities. CARE OF THE INJECTION SITE  If you have soreness or pain, apply ice to the area today (20 minutes on/20 minutes off). Starting tomorrow, you may use warm, moist heat or ice if needed. You may have an increase or change in your discomfort for 36-48 hours after your treatment. Apply ice and continue with any pain medication you have been prescribed. Notify the [facility] if you have any of the following: redness, drainage, swelling, headache, stiff neck or fever above 100°F.    SPECIAL INSTRUCTIONS  Our office will contact you in approximately 7 days for a progress report. MEDICATIONS  Continue to take all routine medications. Our office may have instructed you to hold some medications. As no general anesthesia was used in today's procedure, you should not experience any side effects related to anesthesia. If you are diabetic, the steroids used in today's injection may temporarily increase your blood sugar levels after the first few days after your injection. Please keep a close eye on your sugars and alert the doctor who manages your diabetes if your sugars are significantly high from your baseline or you are symptomatic.      If you have a problem specifically related to your procedure, please call our office at [phone]. Problems not related to your procedure should be directed to your primary care physician.

## 2023-11-08 NOTE — H&P
History of Present Illness: The patient is a 51 y.o. female who presents with complaints of low back and lower extremity pain.     Past Medical History:   Diagnosis Date    Allergic     Anxiety     Arthritis     Asthma     Chronic pain disorder     upper back    Depression     Disc disorder     Disease of thyroid gland     Fibromyalgia, primary     GERD (gastroesophageal reflux disease)     Headache(784.0)     HPV (human papilloma virus) infection     cervical    Hypertension     Hypothyroidism     Inflammatory bowel disease     Migraine     Obesity     Stomach disorder        Past Surgical History:   Procedure Laterality Date    CERVICAL BIOPSY  W/ LOOP ELECTRODE EXCISION      CERVICAL FUSION  2015     SECTION      CHOLECYSTECTOMY      lap    FOOT SURGERY Bilateral     multiple, posterior calcaneal bone spur    KNEE SURGERY Left     arthroscopy with debribement    LYMPH NODE BIOPSY      [facility] NDSC WRST SURG W/RLS TRANSVRS CARPL LIGM Right 2023    Procedure: Right endoscopic carpal tunnel release;  Surgeon: Reyna Garcia MD;  Location: [facility];  Service: [address] [place], [place] [address] [address]      TUBAL LIGATION Bilateral          Current Outpatient Medications:     acetaminophen (TYLENOL) 650 mg CR tablet, Take 1 tablet (650 mg total) by mouth every 8 (eight) hours as needed for mild pain, Disp: 30 tablet, Rfl: 0    albuterol (Ventolin HFA) 90 mcg/act inhaler, Inhale 2 puffs every 6 (six) hours as needed for wheezing, Disp: 18 g, Rfl: 0    baclofen 10 mg tablet, Take 1 tablet (10 mg total) by mouth 3 (three) times a day, Disp: 90 tablet, Rfl: 2    betamethasone valerate (VALISONE) 0.1 % ointment, Apply 1 to 2 times a day only as needed to finger/hand rash., Disp: 30 g, Rfl: 0    Calcium Carbonate (CALCIUM 600 PO), Take by mouth daily, Disp: , Rfl:     cholecalciferol (VITAMIN D3) 1,000 units tablet, Take 2,000 Units by mouth daily, Disp: , Rfl:     ciclopirox (LOPROX) 0.77 % cream, APPLY 2 TIMES per day to rash on chest for 2-4 weeks. , Disp: 30 g, Rfl: 0    DULoxetine (CYMBALTA) 60 mg delayed release capsule, Take 2 PO HS., Disp: 180 capsule, Rfl: 0    etanercept (ENBREL SURECLICK) 50 MG/ML injection, Inject 1 mL (50 mg total) under the skin every 7 days, Disp: 4 mL, Rfl: 5    Euflexxa 20 MG/2ML SOSY, , Disp: , Rfl:     famotidine (PEPCID) 20 mg tablet, Take 1 tablet (20 mg total) by mouth 2 (two) times a day, Disp: 180 tablet, Rfl: 0    gabapentin (NEURONTIN) 600 MG tablet, Take 1 tablet (600 mg total) by mouth 3 (three) times a day, Disp: 90 tablet, Rfl: 2    HYDROcodone-acetaminophen (NORCO) 5-325 mg per tablet, Take 1 PO BID PRN for pain for ongoing therapy, Disp: 14 tablet, Rfl: 0    irbesartan (AVAPRO) 75 mg tablet, Take 1 tablet (75 mg total) by mouth daily at bedtime, Disp: 90 tablet, Rfl: 1    levothyroxine (Levoxyl) 200 mcg tablet, Take 1 tablet daily in addition to 25 mcg tablet daily, Disp: 90 tablet, Rfl: 3    levothyroxine (Levoxyl) 25 mcg tablet, Take 1 tablet daily in addition to 200 mcg tablet daily, Disp: 90 tablet, Rfl: 3    multivitamin (THERAGRAN) TABS, Take 1 tablet by mouth daily, Disp: , Rfl:     naproxen sodium (ALEVE) 220 MG tablet, Take 1 tablet (220 mg total) by mouth 2 (two) times a day with meals, Disp: 20 tablet, Rfl: 0    ondansetron (Zofran ODT) 4 mg disintegrating tablet, Take 1 tablet (4 mg total) by mouth every 6 (six) hours as needed for nausea or vomiting, Disp: 20 tablet, Rfl: 0    Potassium 99 MG TABS, Take by mouth daily, Disp: , Rfl:     Current Facility-Administered Medications:     iohexol (OMNIPAQUE) 300 mg/mL injection 2 mL, 2 mL, Epidural, Once, Edmond Morrow DO    methylPREDNISolone acetate (DEPO-MEDROL) injection 160 mg, 160 mg, Epidural, Once, Edmond Loev, DO    Allergies   Allergen Reactions    Acetaminophen-Codeine      Pt states she does not remember having a reaction to this medication    Hydrocodone Other (See Comments)     GI issues Latex Hives     Latex powder      Lisinopril Other (See Comments)     Itching, gi intollerance      Prednisolone Vomiting       Physical Exam:   General: Awake, Alert, Oriented x 3, Mood and affect appropriate  Respiratory: Respirations even and unlabored  Cardiovascular: Peripheral pulses intact; no edema  Musculoskeletal Exam: Normal gait    ASA Score: III         Assessment:   1.  Lumbar radiculopathy        Plan: B/L S1 TFESI

## 2023-11-10 ENCOUNTER — TELEPHONE (OUTPATIENT)
Age: 48
End: 2023-11-10

## 2023-11-10 NOTE — TELEPHONE ENCOUNTER
Caller: [facility]     Doctor: Marlene Ponce    Reason for call: checking status on authorization form     Call back#: n/a

## 2023-11-13 ENCOUNTER — PATIENT MESSAGE (OUTPATIENT)
Dept: PAIN MEDICINE | Facility: CLINIC | Age: 48
End: 2023-11-13

## 2023-11-14 NOTE — TELEPHONE ENCOUNTER
The MRI order was actually entered as a spinal fusion follow up. I had documented the fact that the patient does home exercises as previously instructed by PT. I did my best to document all of this but insurance is still denying it and it is out of our hands unfortunately unless patient does PT. Let me know if patient would like an order entered in chart.

## 2023-11-15 ENCOUNTER — TELEPHONE (OUTPATIENT)
Dept: PAIN MEDICINE | Facility: CLINIC | Age: 48
End: 2023-11-15

## 2023-11-15 ENCOUNTER — TELEPHONE (OUTPATIENT)
Dept: RHEUMATOLOGY | Facility: CLINIC | Age: 48
End: 2023-11-15

## 2023-11-15 DIAGNOSIS — M51.16 LUMBAR DISC DISEASE WITH RADICULOPATHY: Primary | ICD-10-CM

## 2023-11-15 NOTE — TELEPHONE ENCOUNTER
Reason for call:   [] Refill   [] Prior Auth  [x] Other:     Office:   [] PCP/Provider -   [x] Specialty/Provider -     Medication: enbrel 50mg    Pharmacy: [facility] pharmacy    [phone]  Fax# [phone]    Pharmacy called states they faxed paper work on pt's enbel and its needs to be filled out and send back asap.

## 2023-11-15 NOTE — PATIENT COMMUNICATION
Pt has agreed to start PT. Please place the order for PT as discussed. Pt is aware. The MRI appt has been cancelled.

## 2023-11-17 ENCOUNTER — TELEPHONE (OUTPATIENT)
Dept: RHEUMATOLOGY | Facility: CLINIC | Age: 48
End: 2023-11-17

## 2023-11-17 DIAGNOSIS — M47.819 SPONDYLO-ARTHROPATHY: ICD-10-CM

## 2023-11-21 ENCOUNTER — TELEPHONE (OUTPATIENT)
Dept: RHEUMATOLOGY | Facility: CLINIC | Age: 48
End: 2023-11-21

## 2023-11-21 NOTE — TELEPHONE ENCOUNTER
Patient was called to schedule an appointment, a VM was left. It looks like she needs refills sent to her specialty pharmacy.

## 2023-11-21 NOTE — TELEPHONE ENCOUNTER
Does this mean she needs a new prescription? Also can you please make sure she gets scheduled to see me around the time she was supposed to follow up with Dr. Ranjith Alvarez?  Thanks

## 2023-12-14 ENCOUNTER — APPOINTMENT (EMERGENCY)
Dept: CT IMAGING | Facility: HOSPITAL | Age: 48
End: 2023-12-14
Payer: COMMERCIAL

## 2023-12-14 ENCOUNTER — APPOINTMENT (EMERGENCY)
Dept: RADIOLOGY | Facility: HOSPITAL | Age: 48
End: 2023-12-14
Payer: COMMERCIAL

## 2023-12-14 ENCOUNTER — HOSPITAL ENCOUNTER (EMERGENCY)
Facility: HOSPITAL | Age: 48
Discharge: HOME | End: 2023-12-14
Attending: EMERGENCY MEDICINE
Payer: COMMERCIAL

## 2023-12-14 VITALS
SYSTOLIC BLOOD PRESSURE: 122 MMHG | RESPIRATION RATE: 18 BRPM | HEART RATE: 80 BPM | DIASTOLIC BLOOD PRESSURE: 78 MMHG | OXYGEN SATURATION: 93 % | TEMPERATURE: 97.8 F

## 2023-12-14 DIAGNOSIS — M25.551 RIGHT HIP PAIN: ICD-10-CM

## 2023-12-14 DIAGNOSIS — M25.561 ACUTE PAIN OF RIGHT KNEE: Primary | ICD-10-CM
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
## 2023-12-14 DIAGNOSIS — M17.11 ARTHRITIS OF RIGHT KNEE: ICD-10-CM

## 2023-12-14 LAB
ANION GAP SERPL CALCULATED.3IONS-SCNC: 8 MMOL/L
BASOPHILS # BLD AUTO: 0.03 THOUSANDS/ÂΜL (ref 0–0.1)
BASOPHILS NFR BLD AUTO: 0 % (ref 0–1)
BUN SERPL-MCNC: 19 MG/DL (ref 5–25)
CALCIUM SERPL-MCNC: 9.6 MG/DL (ref 8.4–10.2)
CHLORIDE SERPL-SCNC: 104 MMOL/L (ref 96–108)
CO2 SERPL-SCNC: 26 MMOL/L (ref 21–32)
CREAT SERPL-MCNC: 0.69 MG/DL (ref 0.6–1.3)
EOSINOPHIL # BLD AUTO: 0.14 THOUSAND/ÂΜL (ref 0–0.61)
EOSINOPHIL NFR BLD AUTO: 2 % (ref 0–6)
ERYTHROCYTE [DISTWIDTH] IN BLOOD BY AUTOMATED COUNT: 13.6 % (ref 11.6–15.1)
GFR SERPL CREATININE-BSD FRML MDRD: 102 ML/MIN/1.73SQ M
GLUCOSE SERPL-MCNC: 90 MG/DL (ref 65–140)
HCT VFR BLD AUTO: 41.1 % (ref 34.8–46.1)
HGB BLD-MCNC: 13.5 G/DL (ref 11.5–15.4)
IMM GRANULOCYTES # BLD AUTO: 0.04 THOUSAND/UL (ref 0–0.2)
IMM GRANULOCYTES NFR BLD AUTO: 0 % (ref 0–2)
LYMPHOCYTES # BLD AUTO: 2.46 THOUSANDS/ÂΜL (ref 0.6–4.47)
LYMPHOCYTES NFR BLD AUTO: 27 % (ref 14–44)
MCH RBC QN AUTO: 30.1 PG (ref 26.8–34.3)
MCHC RBC AUTO-ENTMCNC: 32.8 G/DL (ref 31.4–37.4)
MCV RBC AUTO: 92 FL (ref 82–98)
MONOCYTES # BLD AUTO: 0.51 THOUSAND/ÂΜL (ref 0.17–1.22)
MONOCYTES NFR BLD AUTO: 6 % (ref 4–12)
NEUTROPHILS # BLD AUTO: 6 THOUSANDS/ÂΜL (ref 1.85–7.62)
NEUTS SEG NFR BLD AUTO: 65 % (ref 43–75)
NRBC BLD AUTO-RTO: 0 /100 WBCS
PLATELET # BLD AUTO: 263 THOUSANDS/UL (ref 149–390)
PMV BLD AUTO: 9.9 FL (ref 8.9–12.7)
POTASSIUM SERPL-SCNC: 3.8 MMOL/L (ref 3.5–5.3)
RBC # BLD AUTO: 4.49 MILLION/UL (ref 3.81–5.12)
SODIUM SERPL-SCNC: 138 MMOL/L (ref 135–147)
WBC # BLD AUTO: 9.18 THOUSAND/UL (ref 4.31–10.16)

## 2023-12-14 PROCEDURE — 73502 X-RAY EXAM HIP UNI 2-3 VIEWS: CPT

## 2023-12-14 PROCEDURE — 73700 CT LOWER EXTREMITY W/O DYE: CPT

## 2023-12-14 PROCEDURE — 99284 EMERGENCY DEPT VISIT MOD MDM: CPT

## 2023-12-14 PROCEDURE — 73564 X-RAY EXAM KNEE 4 OR MORE: CPT

## 2023-12-14 PROCEDURE — 85025 COMPLETE CBC W/AUTO DIFF WBC: CPT | Performed by: EMERGENCY MEDICINE

## 2023-12-14 PROCEDURE — G1004 CDSM NDSC: HCPCS

## 2023-12-14 PROCEDURE — 99285 EMERGENCY DEPT VISIT HI MDM: CPT | Performed by: EMERGENCY MEDICINE

## 2023-12-14 PROCEDURE — 80048 BASIC METABOLIC PNL TOTAL CA: CPT | Performed by: EMERGENCY MEDICINE

## 2023-12-14 PROCEDURE — 96374 THER/PROPH/DIAG INJ IV PUSH: CPT

## 2023-12-14 PROCEDURE — 36415 COLL VENOUS BLD VENIPUNCTURE: CPT | Performed by: EMERGENCY MEDICINE

## 2023-12-14 RX ORDER — KETOROLAC TROMETHAMINE 30 MG/ML
15 INJECTION, SOLUTION INTRAMUSCULAR; INTRAVENOUS ONCE
Status: COMPLETED | OUTPATIENT
Start: 2023-12-14 | End: 2023-12-14

## 2023-12-14 RX ADMIN — KETOROLAC TROMETHAMINE 15 MG: 30 INJECTION, SOLUTION INTRAMUSCULAR at 19:08

## 2023-12-14 NOTE — ED PROVIDER NOTES
History  Chief Complaint   Patient presents with    Knee Injury     Pt reports tripping and falling over her dog, twisting her right knee, and striking her left arm on something. Denies  head strike, denies thinners. 51year old female presents for evaluation of right knee pain which began when she slipped on water in her kitchen around 4:30 pm today. Patient states her left leg went out in front of her and her bent right leg went behind with her then landing on her bent right leg. She has had severe pain of the right knee with difficulty bending the knee ever since. Patient does not believe the knee had dislocated during the incident. No numbness or tingling. She has not taken anything for pain prior to arrival.  She has intermittent low back spasms, but states these are chronic. Prior to Admission Medications   Prescriptions Last Dose Informant Patient Reported? Taking? Calcium Carbonate (CALCIUM 600 PO)  Self Yes No   Sig: Take by mouth daily   DULoxetine (CYMBALTA) 60 mg delayed release capsule   No No   Sig: Take 2 PO HS. Euflexxa 20 MG/2ML SOSY  Self Yes No   HYDROcodone-acetaminophen (NORCO) 5-325 mg per tablet  Self No No   Sig: Take 1 PO BID PRN for pain for ongoing therapy   Potassium 99 MG TABS  Self Yes No   Sig: Take by mouth daily   acetaminophen (TYLENOL) 650 mg CR tablet  Self No No   Sig: Take 1 tablet (650 mg total) by mouth every 8 (eight) hours as needed for mild pain   albuterol (Ventolin HFA) 90 mcg/act inhaler  Self No No   Sig: Inhale 2 puffs every 6 (six) hours as needed for wheezing   baclofen 10 mg tablet  Self No No   Sig: Take 1 tablet (10 mg total) by mouth 3 (three) times a day   betamethasone valerate (VALISONE) 0.1 % ointment   No No   Sig: Apply 1 to 2 times a day only as needed to finger/hand rash.    cholecalciferol (VITAMIN D3) 1,000 units tablet  Self Yes No   Sig: Take 2,000 Units by mouth daily   ciclopirox (LOPROX) 0.77 % cream  Self No No   Sig: APPLY 2 TIMES per day to rash on chest for 2-4 weeks.    etanercept (ENBREL SURECLICK) 50 MG/ML injection   No No   Sig: Inject 1 mL (50 mg total) under the skin every 7 days   famotidine (PEPCID) 20 mg tablet   No No   Sig: Take 1 tablet (20 mg total) by mouth 2 (two) times a day   gabapentin (NEURONTIN) 600 MG tablet   No No   Sig: Take 1 tablet (600 mg total) by mouth 3 (three) times a day   irbesartan (AVAPRO) 75 mg tablet   No No   Sig: Take 1 tablet (75 mg total) by mouth daily at bedtime   levothyroxine (Levoxyl) 200 mcg tablet  Self No No   Sig: Take 1 tablet daily in addition to 25 mcg tablet daily   levothyroxine (Levoxyl) 25 mcg tablet  Self No No   Sig: Take 1 tablet daily in addition to 200 mcg tablet daily   multivitamin (THERAGRAN) TABS  Self Yes No   Sig: Take 1 tablet by mouth daily   naproxen sodium (ALEVE) 220 MG tablet  Self No No   Sig: Take 1 tablet (220 mg total) by mouth 2 (two) times a day with meals   ondansetron (Zofran ODT) 4 mg disintegrating tablet  Self No No   Sig: Take 1 tablet (4 mg total) by mouth every 6 (six) hours as needed for nausea or vomiting      Facility-Administered Medications: None       Past Medical History:   Diagnosis Date    Allergic     Anxiety     Arthritis     Asthma     Chronic pain disorder     upper back    Depression     Disc disorder     Disease of thyroid gland     Fibromyalgia, primary     GERD (gastroesophageal reflux disease)     Headache(784.0)     HPV (human papilloma virus) infection     cervical    Hypertension     Hypothyroidism     Inflammatory bowel disease     Migraine     Obesity     Stomach disorder        Past Surgical History:   Procedure Laterality Date    CERVICAL BIOPSY  W/ LOOP ELECTRODE EXCISION      CERVICAL FUSION  2015     SECTION      CHOLECYSTECTOMY      lap    FOOT SURGERY Bilateral     multiple, posterior calcaneal bone spur    KNEE SURGERY Left     arthroscopy with debribement    LYMPH NODE BIOPSY      [facility] WRST SURG W/RLS TRANSVRS CARPL LIGM Right 8/22/2023    Procedure: Right endoscopic carpal tunnel release;  Surgeon: Giovanna Aguero MD;  Location[facility];  Service: [facility] [address]      TUBAL LIGATION Bilateral 2012       Family History   Adopted: Yes   Problem Relation Age of Onset    Thyroid disease Mother     Arthritis Mother         Mom, dad, aunts, grandmother    Autoimmune disease Mother     No Known Problems Father     Coronary artery disease Maternal Grandmother     Breast cancer Maternal Grandmother         age unknown    Heart disease Maternal Grandmother         Mother, Brian [place], Father    Coronary artery disease Maternal Aunt     Stroke Maternal Keith Asters, brothers    No Known Problems Maternal Grandfather     No Known Problems Paternal Grandmother     No Known Problems Paternal Grandfather     Suicide Attempts Maternal Aunt     Heart murmur Maternal Aunt     Hypothyroidism Maternal Aunt     Hashimoto's thyroiditis Maternal Aunt     Diabetes unspecified Brother     Diabetes Brother     No Known Problems Brother     No Known Problems Brother     ADD / ADHD Son      I have reviewed and agree with the history as documented. E-Cigarette/Vaping    E-Cigarette Use Never User      E-Cigarette/Vaping Substances    Nicotine No     Flavoring No      Social History     Tobacco Use    Smoking status: Every Day     Current packs/day: 0.50     Average packs/day: 0.5 packs/day for 35.0 years (17.5 ttl pk-yrs)     Types: Cigarettes    Smokeless tobacco: Never    Tobacco comments:     actually about 3/4 PPD   Vaping Use    Vaping status: Never Used   Substance Use Topics    Alcohol use: Not Currently    Drug use: No       Review of Systems    Physical Exam  Physical Exam  Vitals and nursing note reviewed. HENT:      Head: Normocephalic and atraumatic. Cardiovascular:      Rate and Rhythm: Normal rate and regular rhythm. Pulses: Normal pulses.            Dorsalis pedis pulses are 2+ on the right side. Posterior tibial pulses are 2+ on the right side. Pulmonary:      Effort: Pulmonary effort is normal.   Abdominal:      General: There is no distension. Palpations: Abdomen is soft. Tenderness: There is no abdominal tenderness. Musculoskeletal:      Right lower leg: No edema. Left lower leg: No edema. Comments: No midline C/T/L spine tenderness. No step offs or deformities. Tenderness anterior right pelvis. No instability. Developing ecchymosis medial right upper leg. Tenderness anterior right knee. Difficulty with flexion at the right knee. Normal extension. Normal distal sensation and perfusion. Left leg WNL. Skin:     General: Skin is warm and dry.          Vital Signs  ED Triage Vitals [12/14/23 1753]   Temperature Pulse Respirations Blood Pressure SpO2   97.8 °F (36.6 °C) 88 18 154/87 97 %      Temp Source Heart Rate Source Patient Position - Orthostatic VS BP Location FiO2 (%)   Oral Monitor Sitting Left arm --      Pain Score       --           Vitals:    12/14/23 1753 12/14/23 2000 12/14/23 2200   BP: 154/87 112/63 122/78   Pulse: 88 76 80   Patient Position - Orthostatic VS: Sitting           Visual Acuity      ED Medications  Medications   ketorolac (TORADOL) injection 15 mg (15 mg Intravenous Given 12/14/23 1908)       Diagnostic Studies  Results Reviewed       Procedure Component Value Units Date/Time    Basic metabolic panel [[id number]] Collected: 12/14/23 1910    Lab Status: Final result Specimen: Blood from Arm, Left Updated: 12/14/23 1931     Sodium 138 mmol/L      Potassium 3.8 mmol/L      Chloride 104 mmol/L      CO2 26 mmol/L      ANION GAP 8 mmol/L      BUN 19 mg/dL      Creatinine 0.69 mg/dL      Glucose 90 mg/dL      Calcium 9.6 mg/dL      eGFR 102 ml/min/1.73sq m     Narrative:      [place] guidelines for Chronic Kidney Disease (CKD):     Stage 1 with normal or high GFR (GFR > 90 mL/min/1.73 square meters)    Stage 2 Mild CKD (GFR = 60-89 mL/min/1.73 square meters)    Stage 3A Moderate CKD (GFR = 45-59 mL/min/1.73 square meters)    Stage 3B Moderate CKD (GFR = 30-44 mL/min/1.73 square meters)    Stage 4 Severe CKD (GFR = 15-29 mL/min/1.73 square meters)    Stage 5 End Stage CKD (GFR <15 mL/min/1.73 square meters)  Note: GFR calculation is accurate only with a steady state creatinine    CBC and differential [[id number]] Collected: 12/14/23 1910    Lab Status: Final result Specimen: Blood from Arm, Left Updated: 12/14/23 1917     WBC 9.18 Thousand/uL      RBC 4.49 Million/uL      Hemoglobin 13.5 g/dL      Hematocrit 41.1 %      MCV 92 fL      MCH 30.1 pg      MCHC 32.8 g/dL      RDW 13.6 %      MPV 9.9 fL      Platelets 599 Thousands/uL      nRBC 0 /100 WBCs      Neutrophils Relative 65 %      Immat GRANS % 0 %      Lymphocytes Relative 27 %      Monocytes Relative 6 %      Eosinophils Relative 2 %      Basophils Relative 0 %      Neutrophils Absolute 6.00 Thousands/µL      Immature Grans Absolute 0.04 Thousand/uL      Lymphocytes Absolute 2.46 Thousands/µL      Monocytes Absolute 0.51 Thousand/µL      Eosinophils Absolute 0.14 Thousand/µL      Basophils Absolute 0.03 Thousands/µL                    XR knee 4+ vw right injury   ED Interpretation by Iliana Riley MD (12/14 0933)   Degenerative changes. No acute fracture or dislocation      XR hip/pelv 2-3 vws right if performed   ED Interpretation by Iliana Riley MD (12/14 0880)   No acute fracture or dislocation      CT lower extremity wo contrast right    (Results Pending)              Procedures  Procedures         ED Course  ED Course as of 12/14/23 2245   Thu Dec 14, 2023   2234 Received call from [address]. No acute traumatic pathology on CT. Degenerative changes of the right knee. SBIRT 22yo+      Flowsheet Row Most Recent Value   Initial Alcohol Screen: US AUDIT-C     1.  How often do you have a drink containing alcohol? 0 Filed at: 12/14/2023 1754   2. How many drinks containing alcohol do you have on a typical day you are drinking? 0 Filed at: 12/14/2023 1754   3a. Male UNDER 65: How often do you have five or more drinks on one occasion? 0 Filed at: 12/14/2023 1754   3b. FEMALE Any Age, or MALE 65+: How often do you have 4 or more drinks on one occassion? 0 Filed at: 12/14/2023 1754   Audit-C Score 0 Filed at: 12/14/2023 1754   HERLINDA: How many times in the past year have you. .. Used an illegal drug or used a prescription medication for non-medical reasons? Never Filed at: 12/14/2023 1754                      Medical Decision Making  51year old female presents for evaluation of right leg injury sustained during a slip and fall accident. Difficulty with right knee flexion on exam. Tenderness anterior right pelvis. Xrays unremarkable on my independent interpretation. CT ordered given severity of patient's symptoms which was negative for acute traumatic pathology. Given difficulty with flexion, cannot exclude ligamentous injury. Knee immobilizer. Walker. Ortho follow up. Return precautions provided. Amount and/or Complexity of Data Reviewed  Labs: ordered. Radiology: ordered and independent interpretation performed. Risk  Prescription drug management. Disposition  Final diagnoses:   Right hip pain   Acute pain of right knee   Fall, initial encounter   Arthritis of right knee     Time reflects when diagnosis was documented in both MDM as applicable and the Disposition within this note       Time User Action Codes Description Comment    12/14/2023 10:37 PM Yuriy Axe Add [M25.551] Right hip pain     12/14/2023 10:37 PM Yuriy Axe Add [M25.561] Acute pain of right knee     12/14/2023 10:37 PM Yuriy Axe Add [Q61. XQMY] Fall, initial encounter     12/14/2023 10:38 PM Yuriy Axe Modify [M25.551] Right hip pain     12/14/2023 10:38 PM Baljit Back Modify [E75.046] Acute pain of right knee     12/14/2023 10:38 PM Baljit Back Add [G76.37] Arthritis of right knee           ED Disposition       ED Disposition   Discharge    Condition   Stable    Date/Time   Thu Dec 14, 2023 2238    Comment   Sunny Bray discharge to home/self care.                    Follow-up Information       Follow up With Specialties Details Why Contact Info Additional [address] [facility] Schedule an appointment as soon as possible for a visit in 1 week for re-evaluation [place] [phone]  [facility] [address], [address] [place], [place], [address]     [facility] Emergency Department Emergency Medicine  If symptoms worsen, numbness or discoloration of the foot [address] [facility] [id number] [phone] [address] [place] [address] Emergency Department, [address] [facility], [address] [place] [address],[address]            Patient's Medications   Discharge Prescriptions    No medications on file           PDMP Review         Value Time User    PDMP Reviewed  Yes 5/1/2023  3:19 PM Maira Gamez PA-C            ED Provider  Electronically Signed by             Elan Mayer MD  12/14/23 8953       Elan Mayer MD  12/14/23 7633

## 2023-12-14 NOTE — Clinical Note
Connor Haines was seen and treated in our emergency department on 12/14/2023.            no bearing weight on right leg until cleared by orthopedics    Diagnosis:     Priscilla  . She may return on this date: 12/21/2023         If you have any questions or concerns, please don't hesitate to call.       Dustin Yoder MD    ______________________________           _______________          _______________  Hospital Representative                              Date                                Time

## 2023-12-15 NOTE — DISCHARGE INSTRUCTIONS
Take 1000 mg of acetaminophen (Tylenol) with 400 mg of ibuprofen (Advil) every 6-8 hours as needed for pain. Lidocaine patches are available over-the-counter can be found in the back pain aisle of most pharmacies. Look for 4% lidocaine in the list of the active ingredients. These patches can be placed for 12 hours. After 12 hours, discard the patch. The next patch can be placed 12 hours later.

## 2023-12-21 ENCOUNTER — APPOINTMENT (OUTPATIENT)
Dept: RADIOLOGY | Facility: CLINIC | Age: 48
End: 2023-12-21
Payer: COMMERCIAL

## 2023-12-21 ENCOUNTER — OFFICE VISIT (OUTPATIENT)
Dept: OBGYN CLINIC | Facility: CLINIC | Age: 48
End: 2023-12-21
Payer: COMMERCIAL

## 2023-12-21 VITALS
DIASTOLIC BLOOD PRESSURE: 82 MMHG | BODY MASS INDEX: 41.95 KG/M2 | HEIGHT: 70 IN | WEIGHT: 293 LBS | SYSTOLIC BLOOD PRESSURE: 128 MMHG

## 2023-12-21 DIAGNOSIS — M17.11 ARTHRITIS OF RIGHT KNEE: ICD-10-CM

## 2023-12-21 DIAGNOSIS — S86.911A STRAIN OF RIGHT KNEE, INITIAL ENCOUNTER: ICD-10-CM

## 2023-12-21 DIAGNOSIS — M25.561 ACUTE PAIN OF RIGHT KNEE: ICD-10-CM

## 2023-12-21 DIAGNOSIS — M17.11 PRIMARY OSTEOARTHRITIS OF RIGHT KNEE: Primary | ICD-10-CM

## 2023-12-21 DIAGNOSIS — M17.11 PRIMARY OSTEOARTHRITIS OF RIGHT KNEE: ICD-10-CM

## 2023-12-21 PROCEDURE — 73560 X-RAY EXAM OF KNEE 1 OR 2: CPT

## 2023-12-21 PROCEDURE — 99214 OFFICE O/P EST MOD 30 MIN: CPT | Performed by: ORTHOPAEDIC SURGERY

## 2023-12-21 NOTE — ASSESSMENT & PLAN NOTE
Findings consistent with right knee strain, aggravation of pre-existing osteoarthritis. Imaging and prognosis reviewed with patient. Patient has seen improvement since initial injury. Knee is structurally sound on exam. She was given new hinged knee brace to wear with activity. She can remove while resting, sleeping. Encouraged to continue with gentle range of motion. OTC anti inflammatories for pain, stationary bike for low impact exercise. Discussed if symptoms persist or worsen, we can then give cortisone injection. She can also try gel injections again since they provided significant relief in past, just need to place referral. If patient is doing well in 6 weeks she can cancel her appt. all patient's questions were answered to her satisfaction.  This note is created using dictation transcription.  It may contain typographical errors, grammatical errors, improperly dictated words, background noise and other errors.

## 2023-12-21 NOTE — PROGRESS NOTES
Assessment:     1. Primary osteoarthritis of right knee    2. Acute pain of right knee    3. Arthritis of right knee    4. Strain of right knee, initial encounter        Plan:     Problem List Items Addressed This Visit          Musculoskeletal and Integument    Primary osteoarthritis of right knee - Primary     Findings consistent with right knee strain, aggravation of pre-existing osteoarthritis. Imaging and prognosis reviewed with patient. Patient has seen improvement since initial injury. Knee is structurally sound on exam. She was given new hinged knee brace to wear with activity. She can remove while resting, sleeping. Encouraged to continue with gentle range of motion. OTC anti inflammatories for pain, stationary bike for low impact exercise. Discussed if symptoms persist or worsen, we can then give cortisone injection. She can also try gel injections again since they provided significant relief in past, just need to place referral. If patient is doing well in 6 weeks she can cancel her appt. all patient's questions were answered to her satisfaction.  This note is created using dictation transcription.  It may contain typographical errors, grammatical errors, improperly dictated words, background noise and other errors.         Relevant Orders    XR knee 1 or 2 vw right    Brace     Other Visit Diagnoses       Acute pain of right knee        Arthritis of right knee        Strain of right knee, initial encounter               Subjective:     Patient ID: Priscilla Lorenzo is a 48 y.o. female.  Chief Complaint:  48 year old female in for evaluation of right knee pain. Referred by Dr Ellsworth. Seen in ED 12/14/23.  She slipped on water in her kitchen, her left leg went out in front of her and her bent right leg went behind with her then landing on her bent right leg.  She has had severe pain of the right knee with difficulty bending following injury. She was given immobilizer to ambulate. She states immobilizer  kept falling down and is using old hinged knee brace she had for support. She states 2 days ago she was able to start bending her knee. Pain can be throughout knee worse with weight bearing activities, pivoting or twisting motions. Pain can be sharp and acute in nature depending on motion.  She does note swelling in her knee. She denies any locking or instability.   She has treated for osteoarthritis of knee in past with CSI, visco and was doing well until new injury.    Allergy:  Allergies   Allergen Reactions    Acetaminophen-Codeine      Pt states she does not remember having a reaction to this medication    Hydrocodone Other (See Comments)     GI issues    Latex Hives     Latex powder      Lisinopril Other (See Comments)     Itching, gi intollerance      Prednisolone Vomiting     Medications:  all current active meds have been reviewed  Past Medical History:  Past Medical History:   Diagnosis Date    Allergic     Anxiety     Arthritis     Asthma     Chronic pain disorder     upper back    Depression     Disc disorder     Disease of thyroid gland     Fibromyalgia, primary     GERD (gastroesophageal reflux disease)     Headache(784.0)     HPV (human papilloma virus) infection     cervical    Hypertension     Hypothyroidism     Inflammatory bowel disease     Migraine     Obesity     Stomach disorder      Past Surgical History:  Past Surgical History:   Procedure Laterality Date    CERVICAL BIOPSY  W/ LOOP ELECTRODE EXCISION      CERVICAL FUSION  2015     SECTION      CHOLECYSTECTOMY      lap    FOOT SURGERY Bilateral     multiple, posterior calcaneal bone spur    KNEE SURGERY Left     arthroscopy with debribement    LYMPH NODE BIOPSY      RI NDSC WRST SURG W/RLS TRANSVRS CARPL LIGM Right 2023    Procedure: Right endoscopic carpal tunnel release;  Surgeon: Cl Mac MD;  Location: [facility];  Service: Orthopedics    SPINE SURGERY      TENDON REPAIR      TUBAL LIGATION Bilateral       Family History:  Family History   Adopted: Yes   Problem Relation Age of Onset    Thyroid disease Mother     Arthritis Mother         Mom, dad, aunts, grandmother    Autoimmune disease Mother     No Known Problems Father     Coronary artery disease Maternal Grandmother     Breast cancer Maternal Grandmother         age unknown    Heart disease Maternal Grandmother         Mother, Aunt, Grandmother, Father    Coronary artery disease Maternal Aunt     Stroke Maternal Aunt         Chika, brothers    No Known Problems Maternal Grandfather     No Known Problems Paternal Grandmother     No Known Problems Paternal Grandfather     Suicide Attempts Maternal Aunt     Heart murmur Maternal Aunt     Hypothyroidism Maternal Aunt     Hashimoto's thyroiditis Maternal Aunt     Diabetes unspecified Brother     Diabetes Brother     No Known Problems Brother     No Known Problems Brother     ADD / ADHD Son      Social History:  Social History     Substance and Sexual Activity   Alcohol Use Not Currently     Social History     Substance and Sexual Activity   Drug Use No     Social History     Tobacco Use   Smoking Status Every Day    Current packs/day: 0.50    Average packs/day: 0.5 packs/day for 35.0 years (17.5 ttl pk-yrs)    Types: Cigarettes   Smokeless Tobacco Never   Tobacco Comments    actually about 3/4 PPD     Review of Systems   Constitutional: Negative.    HENT: Negative.     Eyes: Negative.    Respiratory: Negative.     Cardiovascular: Negative.    Gastrointestinal: Negative.    Endocrine: Negative.    Genitourinary: Negative.    Musculoskeletal:  Positive for arthralgias (right knee). Negative for gait problem and joint swelling.   Skin: Negative.    Allergic/Immunologic: Negative.    Hematological: Negative.    Psychiatric/Behavioral: Negative.           Objective:  BP Readings from Last 1 Encounters:   12/21/23 128/82      Wt Readings from Last 1 Encounters:   12/21/23 (!) 143 kg (316 lb)      BMI:   Estimated body  "mass index is 45.34 kg/m² as calculated from the following:    Height as of this encounter: 5' 10\" (1.778 m).    Weight as of this encounter: 143 kg (316 lb).  BSA:   Estimated body surface area is 2.53 meters squared as calculated from the following:    Height as of this encounter: 5' 10\" (1.778 m).    Weight as of this encounter: 143 kg (316 lb).   Physical Exam  Vitals and nursing note reviewed.   Constitutional:       Appearance: She is well-developed. She is obese.   HENT:      Head: Normocephalic and atraumatic.      Right Ear: External ear normal.      Left Ear: External ear normal.   Eyes:      Extraocular Movements: Extraocular movements intact.      Conjunctiva/sclera: Conjunctivae normal.   Pulmonary:      Effort: Pulmonary effort is normal.   Musculoskeletal:         General: Tenderness (right knee arthralgia) present.      Cervical back: Neck supple.      Right knee: No effusion.      Instability Tests: Medial Karen test negative and lateral Karen test negative.   Skin:     General: Skin is warm and dry.   Neurological:      Mental Status: She is alert and oriented to person, place, and time.      Deep Tendon Reflexes: Reflexes are normal and symmetric.   Psychiatric:         Mood and Affect: Mood normal.         Behavior: Behavior normal.       Right Knee Exam     Muscle Strength   The patient has normal right knee strength.    Tenderness   Right knee tenderness location: diffuse.    Range of Motion   Extension:  0   Flexion:  120     Tests   Karen:  Medial - negative Lateral - negative  Varus: negative Valgus: negative  Lachman:  Anterior - negative    Posterior - negative  Drawer:  Anterior - negative    Posterior - negative  Patellar apprehension: negative    Other   Erythema: absent  Scars: absent  Sensation: normal  Pulse: present  Swelling: mild  Effusion: no effusion present    Comments:  Crepitation with motion of patella             I have personally reviewed pertinent films in PACS " and my interpretation is xr right knee tricompartmental arthritis severe patellofemoral with spurring in all compartments.  No fracture or dislocation.     Scribe Attestation      I,:  Sukhjinder Gomez am acting as a scribe while in the presence of the attending physician.:       I,:  Darby Coates MD personally performed the services described in this documentation    as scribed in my presence.:

## 2024-01-06 DIAGNOSIS — M54.16 LUMBAR RADICULOPATHY: ICD-10-CM

## 2024-01-06 DIAGNOSIS — E03.9 ACQUIRED HYPOTHYROIDISM: ICD-10-CM

## 2024-01-06 DIAGNOSIS — M79.18 MYOFASCIAL PAIN SYNDROME: ICD-10-CM

## 2024-01-08 RX ORDER — LEVOTHYROXINE SODIUM 0.2 MG/1
TABLET ORAL
Qty: 90 TABLET | Refills: 0 | Status: SHIPPED | OUTPATIENT
Start: 2024-01-08

## 2024-01-08 RX ORDER — BACLOFEN 10 MG/1
10 TABLET ORAL 3 TIMES DAILY
Qty: 90 TABLET | Refills: 0 | Status: SHIPPED | OUTPATIENT
Start: 2024-01-08 | End: 2024-01-15 | Stop reason: SDUPTHER

## 2024-01-08 NOTE — TELEPHONE ENCOUNTER
Requested medication(s) are due for refill today: Yes  Patient has already received a courtesy refill: No  Other reason request has been forwarded to provider: *Dr. Newby pt - he is out all week

## 2024-01-08 NOTE — TELEPHONE ENCOUNTER
Please notify patient that their medication was approved but no refills were given. She is overdue for follow up appt and BW.  She needs to make an appt within the next 60 days and have BW done prior (Dr. Newby ordered at  her appt in the fall)  to con't receiving medications.  TY

## 2024-01-15 ENCOUNTER — OFFICE VISIT (OUTPATIENT)
Dept: PAIN MEDICINE | Facility: CLINIC | Age: 49
End: 2024-01-15
Payer: COMMERCIAL

## 2024-01-15 VITALS
DIASTOLIC BLOOD PRESSURE: 98 MMHG | TEMPERATURE: 97.6 F | SYSTOLIC BLOOD PRESSURE: 125 MMHG | HEIGHT: 70 IN | BODY MASS INDEX: 45.34 KG/M2

## 2024-01-15 DIAGNOSIS — M79.18 MYOFASCIAL PAIN SYNDROME: ICD-10-CM

## 2024-01-15 DIAGNOSIS — M48.061 LUMBAR FORAMINAL STENOSIS: ICD-10-CM

## 2024-01-15 DIAGNOSIS — M51.27 HERNIATED NUCLEUS PULPOSUS, L5-S1: ICD-10-CM

## 2024-01-15 DIAGNOSIS — M54.2 NECK PAIN, CHRONIC: ICD-10-CM

## 2024-01-15 DIAGNOSIS — G89.4 CHRONIC PAIN SYNDROME: ICD-10-CM

## 2024-01-15 DIAGNOSIS — G89.29 NECK PAIN, CHRONIC: ICD-10-CM

## 2024-01-15 DIAGNOSIS — Z98.1 HISTORY OF FUSION OF CERVICAL SPINE: ICD-10-CM

## 2024-01-15 DIAGNOSIS — M47.816 LUMBAR SPONDYLOSIS: ICD-10-CM

## 2024-01-15 DIAGNOSIS — M54.16 LUMBAR RADICULOPATHY: Primary | ICD-10-CM

## 2024-01-15 PROCEDURE — 99214 OFFICE O/P EST MOD 30 MIN: CPT | Performed by: PHYSICIAN ASSISTANT

## 2024-01-15 RX ORDER — DULOXETIN HYDROCHLORIDE 60 MG/1
CAPSULE, DELAYED RELEASE ORAL
Qty: 180 CAPSULE | Refills: 0 | Status: SHIPPED | OUTPATIENT
Start: 2024-01-15

## 2024-01-15 RX ORDER — BACLOFEN 10 MG/1
10 TABLET ORAL 3 TIMES DAILY
Qty: 90 TABLET | Refills: 2 | Status: SHIPPED | OUTPATIENT
Start: 2024-01-15

## 2024-01-15 RX ORDER — GABAPENTIN 600 MG/1
600 TABLET ORAL 4 TIMES DAILY
Qty: 120 TABLET | Refills: 2 | Status: SHIPPED | OUTPATIENT
Start: 2024-01-15

## 2024-01-15 NOTE — PROGRESS NOTES
Assessment:  1. Lumbar radiculopathy    2. Lumbar spondylosis    3. Lumbar foraminal stenosis    4. Herniated nucleus pulposus, L5-S1    5. Neck pain, chronic    6. History of fusion of cervical spine    7. Chronic pain syndrome    8. Myofascial pain syndrome        Plan:  While the patient was in the office today, I did have a thorough conversation regarding their chronic pain syndrome, medication management, and treatment plan options.    After discussing options, we will get the patient scheduled for a therapeutic bilateral S1 transforaminal epidural steroid injection.    She will continue to follow-up with orthopedics regarding her chronic right knee pain secondary to severe osteoarthritis.  Consider following up with orthopedics regarding the chronic left foot and heel pain.    Increase gabapentin to 600 mg 4 times daily.  Continue baclofen 10 mg 3 times daily as needed as well as duloxetine 120 mg at at bedtime.  Refills have been provided on today's visit.    I still highly recommend updating the imaging of the cervical spine in the form of an MRI given her increasing neck pain and cervicogenic headaches and history of prior fusion surgery.  Unfortunately this study has been denied by her insurance company due to lack of recent physical therapy.  Patient has attended physical therapy numerous times in the past and has never been helpful to her.  It is also very hard with her work schedule to attend physical therapy.    The patient will follow-up in 12 weeks for medication prescription refill and reevaluation. The patient was advised to contact the office should their symptoms worsen in the interim. The patient was agreeable and verbalized an understanding.        History of Present Illness:    The patient is a 48 y.o. female last seen on 11/08/2023 who presents for a follow up office visit in regards to chronic pain secondary to lumbar spondylosis and foraminal stenosis, neck pain secondary to a post  laminectomy pain syndrome and spondylosis.  The patient currently reports chronic low back pain with increasing bilateral lower extremity radicular features along the posterior aspects.  She rates her current pain a 6-1/2 out of 10 and reports it as a constant burning, sharp, throbbing, pressure-like and shooting type pain.  It is made worse with standing, walking and bending.  The patient underwent a bilateral S1 transforaminal epidural steroid injection in November and reported approximately 50% relief but unfortunately she sustained a fall in December which may have triggered the back pain and leg pain to increase once again.  She also hurt her right knee, followed up with orthopedics and was told she has severe osteoarthritis.  She was offered injections however the knee pain is something she states she is able to manage currently.    Current pain medications includes: Gabapentin 600 mg 3 times daily, duloxetine 60 mg 2 tablets nightly and baclofen 10 mg 3 times daily.  The patient reports that this regimen is providing 40% pain relief.  The patient is reporting dry mouth from this pain medication regimen.    I have personally reviewed and/or updated the patient's past medical history, past surgical history, family history, social history, current medications, allergies, and vital signs today.       Review of Systems:    Review of Systems   Respiratory:  Negative for shortness of breath.    Cardiovascular:  Negative for chest pain.   Gastrointestinal:  Positive for nausea. Negative for constipation, diarrhea and vomiting.   Musculoskeletal:  Positive for gait problem and joint swelling (joint stiffness). Negative for arthralgias and myalgias.        B/L LE Pain  Decreased ROM   Skin:  Negative for rash.   Neurological:  Negative for dizziness, seizures and weakness.   All other systems reviewed and are negative.        Past Medical History:   Diagnosis Date   • Allergic    • Anxiety    • Arthritis    • Asthma     • Chronic pain disorder     upper back   • Depression    • Disc disorder    • Disease of thyroid gland    • Fibromyalgia, primary    • GERD (gastroesophageal reflux disease)    • Headache(784.0)    • HPV (human papilloma virus) infection     cervical   • Hypertension    • Hypothyroidism    • Inflammatory bowel disease    • Migraine    • Obesity    • Stomach disorder        Past Surgical History:   Procedure Laterality Date   • CERVICAL BIOPSY  W/ LOOP ELECTRODE EXCISION     • CERVICAL FUSION  2015   •  SECTION     • CHOLECYSTECTOMY      lap   • FOOT SURGERY Bilateral     multiple, posterior calcaneal bone spur   • KNEE SURGERY Left     arthroscopy with debribement   • LYMPH NODE BIOPSY     • [place] NDSC WRST SURG W/RLS TRANSVRS CARPL LIGM Right 2023    Procedure: Right endoscopic carpal tunnel release;  Surgeon: Cl Mac MD;  Location: [facility];  Service: Orthopedics   • SPINE SURGERY     • TENDON REPAIR     • TUBAL LIGATION Bilateral        Family History   Adopted: Yes   Problem Relation Age of Onset   • Thyroid disease Mother    • Arthritis Mother         Mom, dad, aunts, grandmother   • Autoimmune disease Mother    • No Known Problems Father    • Coronary artery disease Maternal Grandmother    • Breast cancer Maternal Grandmother         age unknown   • Heart disease Maternal Grandmother         Mother, Aunt, Grandmother, Father   • Coronary artery disease Maternal Aunt    • Stroke Maternal Aunt         Chika, brothers   • No Known Problems Maternal Grandfather    • No Known Problems Paternal Grandmother    • No Known Problems Paternal Grandfather    • Suicide Attempts Maternal Aunt    • Heart murmur Maternal Aunt    • Hypothyroidism Maternal Aunt    • Hashimoto's thyroiditis Maternal Aunt    • Diabetes unspecified Brother    • Diabetes Brother    • No Known Problems Brother    • No Known Problems Brother    • ADD / ADHD Son        Social History     Occupational History   •  Occupation: [employer]     Comment: [employer]   Tobacco Use   • Smoking status: Every Day     Current packs/day: 0.50     Average packs/day: 0.5 packs/day for 35.0 years (17.5 ttl pk-yrs)     Types: Cigarettes   • Smokeless tobacco: Never   • Tobacco comments:     actually about 3/4 PPD   Vaping Use   • Vaping status: Never Used   Substance and Sexual Activity   • Alcohol use: Not Currently   • Drug use: No   • Sexual activity: Not Currently     Partners: Male     Birth control/protection: Female Sterilization, None         Current Outpatient Medications:   •  acetaminophen (TYLENOL) 650 mg CR tablet, Take 1 tablet (650 mg total) by mouth every 8 (eight) hours as needed for mild pain, Disp: 30 tablet, Rfl: 0  •  albuterol (Ventolin HFA) 90 mcg/act inhaler, Inhale 2 puffs every 6 (six) hours as needed for wheezing, Disp: 18 g, Rfl: 0  •  baclofen 10 mg tablet, Take 1 tablet (10 mg total) by mouth 3 (three) times a day, Disp: 90 tablet, Rfl: 2  •  betamethasone valerate (VALISONE) 0.1 % ointment, Apply 1 to 2 times a day only as needed to finger/hand rash., Disp: 30 g, Rfl: 0  •  Calcium Carbonate (CALCIUM 600 PO), Take by mouth daily, Disp: , Rfl:   •  cholecalciferol (VITAMIN D3) 1,000 units tablet, Take 2,000 Units by mouth daily, Disp: , Rfl:   •  ciclopirox (LOPROX) 0.77 % cream, APPLY 2 TIMES per day to rash on chest for 2-4 weeks., Disp: 30 g, Rfl: 0  •  DULoxetine (CYMBALTA) 60 mg delayed release capsule, Take 2 PO HS., Disp: 180 capsule, Rfl: 0  •  etanercept (ENBREL SURECLICK) 50 MG/ML injection, Inject 1 mL (50 mg total) under the skin every 7 days, Disp: 4 mL, Rfl: 5  •  Euflexxa 20 MG/2ML SOSY, , Disp: , Rfl:   •  famotidine (PEPCID) 20 mg tablet, Take 1 tablet (20 mg total) by mouth 2 (two) times a day, Disp: 180 tablet, Rfl: 0  •  gabapentin (NEURONTIN) 600 MG tablet, Take 1 tablet (600 mg total) by mouth 4 (four) times a day, Disp: 120 tablet, Rfl: 2  •  HYDROcodone-acetaminophen (NORCO) 5-325 mg  "per tablet, Take 1 PO BID PRN for pain for ongoing therapy, Disp: 14 tablet, Rfl: 0  •  irbesartan (AVAPRO) 75 mg tablet, Take 1 tablet (75 mg total) by mouth daily at bedtime, Disp: 90 tablet, Rfl: 1  •  levothyroxine (Levoxyl) 200 mcg tablet, Take 1 tablet daily in addition to 25 mcg tablet daily, Disp: 90 tablet, Rfl: 0  •  levothyroxine (Levoxyl) 25 mcg tablet, Take 1 tablet daily in addition to 200 mcg tablet daily, Disp: 90 tablet, Rfl: 3  •  multivitamin (THERAGRAN) TABS, Take 1 tablet by mouth daily, Disp: , Rfl:   •  naproxen sodium (ALEVE) 220 MG tablet, Take 1 tablet (220 mg total) by mouth 2 (two) times a day with meals, Disp: 20 tablet, Rfl: 0  •  ondansetron (Zofran ODT) 4 mg disintegrating tablet, Take 1 tablet (4 mg total) by mouth every 6 (six) hours as needed for nausea or vomiting, Disp: 20 tablet, Rfl: 0  •  Potassium 99 MG TABS, Take by mouth daily, Disp: , Rfl:     Allergies   Allergen Reactions   • Acetaminophen-Codeine      Pt states she does not remember having a reaction to this medication   • Hydrocodone Other (See Comments)     GI issues   • Latex Hives     Latex powder     • Lisinopril Other (See Comments)     Itching, gi intollerance     • Prednisolone Vomiting       Physical Exam:    /98 (BP Location: Left arm, Patient Position: Sitting, Cuff Size: Adult)   Temp 97.6 °F (36.4 °C)   Ht 5' 10\" (1.778 m)   BMI 45.34 kg/m²     Constitutional:normal, well developed, well nourished, alert, in no distress and non-toxic and no overt pain behavior. and overweight  Eyes:anicteric  HEENT:grossly intact  Neck:supple, symmetric, trachea midline and no masses   Pulmonary:even and unlabored  Cardiovascular:No edema or pitting edema present  Skin:Normal without rashes or lesions and well hydrated  Psychiatric:Mood and affect appropriate  Neurologic:Cranial Nerves II-XII grossly intact  Musculoskeletal: Antalgic gait      Imaging  FL spine and pain procedure    (Results Pending)         Orders " Placed This Encounter   Procedures   • [place] spine and pain procedure

## 2024-01-18 ENCOUNTER — TELEPHONE (OUTPATIENT)
Age: 49
End: 2024-01-18

## 2024-01-18 NOTE — TELEPHONE ENCOUNTER
Caller: maosn Wells    Doctor: Odalys    Reason for call: pt requesting to speak to     Call back#: [phone]

## 2024-01-25 ENCOUNTER — HOSPITAL ENCOUNTER (OUTPATIENT)
Dept: RADIOLOGY | Facility: CLINIC | Age: 49
Discharge: HOME | End: 2024-01-25
Payer: COMMERCIAL

## 2024-01-25 VITALS
DIASTOLIC BLOOD PRESSURE: 79 MMHG | HEART RATE: 89 BPM | TEMPERATURE: 97.6 F | OXYGEN SATURATION: 97 % | SYSTOLIC BLOOD PRESSURE: 121 MMHG | RESPIRATION RATE: 18 BRPM

## 2024-01-25 DIAGNOSIS — M54.16 LUMBAR RADICULOPATHY: ICD-10-CM

## 2024-01-25 PROCEDURE — 64483 NJX AA&/STRD TFRM EPI L/S 1: CPT | Performed by: ANESTHESIOLOGY

## 2024-01-25 RX ORDER — METHYLPREDNISOLONE ACETATE 80 MG/ML
160 INJECTION, SUSPENSION INTRA-ARTICULAR; INTRALESIONAL; INTRAMUSCULAR; PARENTERAL; SOFT TISSUE ONCE
Status: COMPLETED | OUTPATIENT
Start: 2024-01-25 | End: 2024-01-25

## 2024-01-25 RX ADMIN — IOHEXOL 2 ML: 300 INJECTION, SOLUTION INTRAVENOUS at 14:08

## 2024-01-25 RX ADMIN — METHYLPREDNISOLONE ACETATE 160 MG: 80 INJECTION, SUSPENSION INTRA-ARTICULAR; INTRALESIONAL; INTRAMUSCULAR; SOFT TISSUE at 14:08

## 2024-01-25 NOTE — DISCHARGE INSTRUCTIONS
Epidural Steroid Injection   WHAT YOU NEED TO KNOW:   An epidural steroid injection (MARCIE) is a procedure to inject steroid medicine into the epidural space. The epidural space is between your spinal cord and vertebrae. Steroids reduce inflammation and fluid buildup in your spine that may be causing pain. You may be given pain medicine along with the steroids.          ACTIVITY  Do not drive or operate machinery today.  No strenuous activity today - bending, lifting, etc.  You may resume normal activites starting tomorrow - start slowly and as tolerated.  You may shower today, but no tub baths or hot tubs.  You may have numbness for several hours from the local anesthetic. Please use caution and common sense, especially with weight-bearing activities.    CARE OF THE INJECTION SITE  If you have soreness or pain, apply ice to the area today (20 minutes on/20 minutes off).  Starting tomorrow, you may use warm, moist heat or ice if needed.  You may have an increase or change in your discomfort for 36-48 hours after your treatment.  Apply ice and continue with any pain medication you have been prescribed.  Notify the [facility] if you have any of the following: redness, drainage, swelling, headache, stiff neck or fever above 100°F.    SPECIAL INSTRUCTIONS  Our office will contact you in approximately 7 days for a progress report.    MEDICATIONS  Continue to take all routine medications.  Our office may have instructed you to hold some medications.    As no general anesthesia was used in today's procedure, you should not experience any side effects related to anesthesia.     If you are diabetic, the steroids used in today's injection may temporarily increase your blood sugar levels after the first few days after your injection. Please keep a close eye on your sugars and alert the doctor who manages your diabetes if your sugars are significantly high from your baseline or you are symptomatic.     If you have a  problem specifically related to your procedure, please call our office at [phone].  Problems not related to your procedure should be directed to your primary care physician.

## 2024-01-25 NOTE — H&P
History of Present Illness: The patient is a 48 y.o. female who presents with complaints of low back and leg pain.    Past Medical History:   Diagnosis Date    Allergic     Anxiety     Arthritis     Asthma     Chronic pain disorder     upper back    Depression     Disc disorder     Disease of thyroid gland     Fibromyalgia, primary     GERD (gastroesophageal reflux disease)     Headache(784.0)     HPV (human papilloma virus) infection     cervical    Hypertension     Hypothyroidism     Inflammatory bowel disease     Migraine     Obesity     Stomach disorder        Past Surgical History:   Procedure Laterality Date    CERVICAL BIOPSY  W/ LOOP ELECTRODE EXCISION      CERVICAL FUSION  2015     SECTION      CHOLECYSTECTOMY      lap    FOOT SURGERY Bilateral     multiple, posterior calcaneal bone spur    KNEE SURGERY Left     arthroscopy with debribement    LYMPH NODE BIOPSY      [facility] WRST SURG W/RLS TRANSVRS CARPL LIGM Right 2023    Procedure: Right endoscopic carpal tunnel release;  Surgeon: Cl Mac MD;  Location: [facility];  Service: Orthopedics    SPINE SURGERY      TENDON REPAIR      TUBAL LIGATION Bilateral          Current Outpatient Medications:     acetaminophen (TYLENOL) 650 mg CR tablet, Take 1 tablet (650 mg total) by mouth every 8 (eight) hours as needed for mild pain, Disp: 30 tablet, Rfl: 0    albuterol (Ventolin HFA) 90 mcg/act inhaler, Inhale 2 puffs every 6 (six) hours as needed for wheezing, Disp: 18 g, Rfl: 0    baclofen 10 mg tablet, Take 1 tablet (10 mg total) by mouth 3 (three) times a day, Disp: 90 tablet, Rfl: 2    betamethasone valerate (VALISONE) 0.1 % ointment, Apply 1 to 2 times a day only as needed to finger/hand rash., Disp: 30 g, Rfl: 0    Calcium Carbonate (CALCIUM 600 PO), Take by mouth daily, Disp: , Rfl:     cholecalciferol (VITAMIN D3) 1,000 units tablet, Take 2,000 Units by mouth daily, Disp: , Rfl:     ciclopirox (LOPROX) 0.77 % cream, APPLY 2  TIMES per day to rash on chest for 2-4 weeks., Disp: 30 g, Rfl: 0    DULoxetine (CYMBALTA) 60 mg delayed release capsule, Take 2 PO HS., Disp: 180 capsule, Rfl: 0    etanercept (ENBREL SURECLICK) 50 MG/ML injection, Inject 1 mL (50 mg total) under the skin every 7 days, Disp: 4 mL, Rfl: 5    Euflexxa 20 MG/2ML SOSY, , Disp: , Rfl:     famotidine (PEPCID) 20 mg tablet, Take 1 tablet (20 mg total) by mouth 2 (two) times a day, Disp: 180 tablet, Rfl: 0    gabapentin (NEURONTIN) 600 MG tablet, Take 1 tablet (600 mg total) by mouth 4 (four) times a day, Disp: 120 tablet, Rfl: 2    HYDROcodone-acetaminophen (NORCO) 5-325 mg per tablet, Take 1 PO BID PRN for pain for ongoing therapy, Disp: 14 tablet, Rfl: 0    irbesartan (AVAPRO) 75 mg tablet, Take 1 tablet (75 mg total) by mouth daily at bedtime, Disp: 90 tablet, Rfl: 1    levothyroxine (Levoxyl) 200 mcg tablet, Take 1 tablet daily in addition to 25 mcg tablet daily, Disp: 90 tablet, Rfl: 0    levothyroxine (Levoxyl) 25 mcg tablet, Take 1 tablet daily in addition to 200 mcg tablet daily, Disp: 90 tablet, Rfl: 3    multivitamin (THERAGRAN) TABS, Take 1 tablet by mouth daily, Disp: , Rfl:     naproxen sodium (ALEVE) 220 MG tablet, Take 1 tablet (220 mg total) by mouth 2 (two) times a day with meals, Disp: 20 tablet, Rfl: 0    ondansetron (Zofran ODT) 4 mg disintegrating tablet, Take 1 tablet (4 mg total) by mouth every 6 (six) hours as needed for nausea or vomiting, Disp: 20 tablet, Rfl: 0    Potassium 99 MG TABS, Take by mouth daily, Disp: , Rfl:     Current Facility-Administered Medications:     iohexol (OMNIPAQUE) 300 mg/mL injection 2 mL, 2 mL, Epidural, Once, Edmond Morrow DO    methylPREDNISolone acetate (DEPO-MEDROL) injection 160 mg, 160 mg, Epidural, Once, Edmond Morrow DO    Allergies   Allergen Reactions    Acetaminophen-Codeine      Pt states she does not remember having a reaction to this medication    Hydrocodone Other (See Comments)     GI issues    Latex Hives      Latex powder      Lisinopril Other (See Comments)     Itching, gi intollerance      Prednisolone Vomiting       Physical Exam:   Vitals:    01/25/24 1355   BP: 120/80   Pulse: 93   Resp: 18   Temp: 97.6 °F (36.4 °C)   SpO2: 96%     General: Awake, Alert, Oriented x 3, Mood and affect appropriate  Respiratory: Respirations even and unlabored  Cardiovascular: Peripheral pulses intact; no edema  Musculoskeletal Exam: Decreased range of motion lumbar spine    ASA Score: III    Patient/Chart Verification  Patient ID Verified: Verbal  ID Band Applied: No  Consents Confirmed: Procedural, To be obtained in the Pre-Procedure area  Interval H&P(within 24 hr) Complete (required for Outpatients and Surgery Admit only): To be obtained in the Pre-Procedure area  Allergies Reviewed: Yes  Anticoag/NSAID held?: NA  Currently on antibiotics?: No  Pregnancy denied?: Yes    Assessment:   1. Lumbar radiculopathy        Plan: B/L S1 TFESI

## 2024-02-01 ENCOUNTER — TELEPHONE (OUTPATIENT)
Dept: PAIN MEDICINE | Facility: CLINIC | Age: 49
End: 2024-02-01

## 2024-02-19 ENCOUNTER — EVALUATION (OUTPATIENT)
Dept: PHYSICAL THERAPY | Facility: CLINIC | Age: 49
End: 2024-02-19
Payer: COMMERCIAL

## 2024-02-19 DIAGNOSIS — M51.16 LUMBAR DISC DISEASE WITH RADICULOPATHY: ICD-10-CM

## 2024-02-19 DIAGNOSIS — M51.27 HERNIATED NUCLEUS PULPOSUS, L5-S1: ICD-10-CM

## 2024-02-19 DIAGNOSIS — Z98.1 HISTORY OF FUSION OF CERVICAL SPINE: ICD-10-CM

## 2024-02-19 DIAGNOSIS — M54.16 LUMBAR RADICULOPATHY: Primary | ICD-10-CM

## 2024-02-19 PROCEDURE — 97163 PT EVAL HIGH COMPLEX 45 MIN: CPT | Performed by: PHYSICAL THERAPIST

## 2024-02-19 NOTE — PROGRESS NOTES
PT Evaluation     Today's date: 2024  Patient name: Priscilla Lorenzo  : 1975  MRN: [id number]  Referring provider: Maddison White, *  Dx:   Encounter Diagnosis     ICD-10-CM    1. Lumbar radiculopathy  M54.16       2. Herniated nucleus pulposus, L5-S1  M51.27                      Assessment  Assessment details: Priscilla Lorenzo is a 48 y.o. female who presents with increased low back pain consistent with referring diagnosis of Lumbar radiculopathy  (primary encounter diagnosis)  Herniated nucleus pulposus, L5-S1 as well as cervical fusion history that is highly complex secondary to chronic onset, high fear avoidance and high pain levels.  Clinically demonstrates decreased L/S and C/S ROM, decreased B/L UE ROM and strength, as well as decreased hip and core strength strength, limited postureal proprioception leading to pain with ADLs and exercise.  This suggests the need for skilled OPPT to address the above listed impairments, achieve established goals and return to PLOF pain-free.  If you have any questions or concerns please contact me at [phone].  Thank you!     Impairments: abnormal coordination, abnormal gait, abnormal muscle firing, abnormal muscle tone, abnormal or restricted ROM, abnormal movement, activity intolerance, impaired balance, impaired physical strength, lacks appropriate home exercise program, pain with function, safety issue and poor posture     Symptom irritability: highUnderstanding of Dx/Px/POC: fair   Prognosis: fair    Goals  Short Term Goals (4 weeks)  1.) Establish independence with HEP  2.) Decrease subjective pain levels from NPRS at least to 2-5/10 at rest and with activity  3.) Improve lumbar and C/S ROM at least 5-10 degrees into all planes to allow for improved ease of movement with less guarding    Long Term Goals (8 weeks)  1.) Improve lumbar and C/S ROM to WNL in all planes to restore normal movement with ADLs and function  2.) Improve B/L hip ABD and Ext  strength to 5/5 in all planes in order to return to pain-free ADLs and function  3.) Improve FOTO score at least to 75 points showing improved self reported disability        Plan  Plan details: Initiate POC for L/S and C/S stability, monitor sxs and progress as able.   Patient would benefit from: PT eval and skilled physical therapy  Planned modality interventions: biofeedback, cryotherapy, electrical stimulation/Russian stimulation and TENS  Planned therapy interventions: abdominal trunk stabilization, activity modification, ADL retraining, ADL training, balance, balance/weight bearing training, behavior modification, body mechanics training, breathing training, compression, coordination, dressing changes, gait training, functional ROM exercises, flexibility, graded activity, graded exercise, graded motor, home exercise program, IADL retraining, transfer training, therapeutic training, therapeutic exercise, therapeutic activities, stretching, strengthening, self care, sensory integrative techniques, patient education, postural training, neuromuscular re-education, nerve gliding, muscle pump exercises, massage, IASTM, joint mobilization, kinesiology taping and manual therapy  Frequency: 2x week  Duration in visits: 16  Duration in weeks: 8  Plan of Care beginning date: 2/19/2024  Plan of Care expiration date: 4/22/2024  Treatment plan discussed with: patient      Subjective Evaluation    History of Present Illness  Date of onset: 12/13/2023  Mechanism of injury: Priscilla Lorenzo is a 48 y.o. female who presents with increased low back pain starting ~ months ago with ERIC.  Reports having a history of a cervical fusion in 2014 (At [facility]).  Recently having increased pain from the chest up, headaches, migranes, visual changes, jaw pain and whole face pain but denies any NT sxs in her face the last few months- denies any recent event that caused her symptoms.  Indicates that her arms feel like they are dead  weight R worse than L with prior to fusion in  (rolling donuts for work).  Notes having R shoulder pain.  Indicates that the symptoms start in her chest and then work its way up.     Decided to seek out MD consult where X-rays were (-) for fx and script for OPPT provided also awaiting for MRI for C/S to make sure that her neck is okay.  Reports that she is getting discomfort at night bowel or bladder function.  Reports increased R sided NT signs or sxs.  F/U with MD in 1 month.  Wishes to return to PLOF pain-free.              Recurrent probem    Quality of life: good    Patient Goals  Patient goals for therapy: decreased edema, decreased pain, improved balance, increased motion, increased strength, independence with ADLs/IADLs and return to sport/leisure activities  Patient goal: Maximize function, understand what I can and cant do  Pain  Current pain ratin  At best pain ratin  At worst pain rating: 10  Location: C/S  Quality: sharp, dull ache, throbbing and burning  Relieving factors: medications, ice, heat and support  Aggravating factors: overhead activity and lifting    Social Support  Lives in: multiple-level home  Lives with: spouse and young children    Employment status: working  Exercise history: No  Life stress: High      Diagnostic Tests  X-ray: abnormal  Treatments  Previous treatment: physical therapy and injection treatment  Current treatment: injection treatment and physical therapy      Objective     Active Range of Motion   Cervical/Thoracic Spine       Cervical    Flexion: 17 degrees   Extension: 23 degrees      Left lateral flexion: 11 degrees      Right lateral flexion: 12 degrees      Left rotation: 40 degrees  Right rotation: 50 degrees     Left Shoulder   Flexion: 143 degrees   Extension: 45 degrees   Abduction: 120 degrees   External rotation 0°: 64 degrees   Internal rotation BTB: T12     Right Shoulder   Flexion: 154 degrees   Extension: 55 degrees   Abduction: 103 degrees    External rotation 0°: 62 degrees   Internal rotation BTB: T10     Lumbar   Flexion: 100 degrees   Extension: 22 degrees   Left lateral flexion: 26 degrees     Right lateral flexion: 30 degrees   Left rotation: 70 degrees   Right rotation: 50 degrees     Strength/Myotome Testing     Left Shoulder     Planes of Motion   Flexion: 4+   Abduction: 4+   External rotation at 0°: 4+   Internal rotation at 0°: 5     Right Shoulder     Planes of Motion   Flexion: 4   Abduction: 4   External rotation at 0°: 4   Internal rotation at 0°: 5     Left Elbow   Flexion: 5  Extension: 5    Right Elbow   Flexion: 4+  Extension: 4    Left Wrist/Hand   Wrist extension: 5  Wrist flexion: 5  Thumb extension: 5    Right Wrist/Hand   Wrist extension: 4+  Wrist flexion: 5  Thumb extension: 5    Left Hip   Planes of Motion   Flexion: 5  Adduction: 5    Right Hip   Planes of Motion   Flexion: 4-  Adduction: 5    Left Knee   Flexion: 5  Extension: 5    Right Knee   Flexion: 4  Extension: 4+    Left Ankle/Foot   Dorsiflexion: 5  Plantar flexion: 4+    Right Ankle/Foot   Dorsiflexion: 5  Plantar flexion: 4+    Tests   Cervical   Negative alar ligament test, Sharp-Juan test and VBI.     Left   Positive Spurling's Test A.     Right   Positive Spurling's Test A.     Functional Assessment      Squat    Unable to perform , pain and right valgus.     Single Leg Stance   Left: 2 seconds  Right: 2 seconds             Precautions: chronic pain, IBS, Asthma, Hashimotos, depression  EPOC: 4/22/24  HEP: Access Code: [email or web address]  URL: [email or web address]  Date: 02/19/2024  Prepared by: Abram Weems    Exercises  - Supine Shoulder Flexion Extension AAROM with Dowel  - 1 x daily - 7 x weekly - 3 sets - 10 reps  - Serratus Activation at Wall  - 1 x daily - 7 x weekly - 3 sets - 10 reps  - Supine Shoulder Press AAROM in Abduction with Dowel  - 1 x daily - 7 x weekly - 3 sets - 10 reps    Manuals 2/19                                                                 Neuro Re-Ed                                                                                                        Ther Ex                                                                                                                     Ther Activity                                       Gait Training                                       Modalities

## 2024-02-28 ENCOUNTER — OFFICE VISIT (OUTPATIENT)
Dept: PHYSICAL THERAPY | Facility: CLINIC | Age: 49
End: 2024-02-28
Payer: COMMERCIAL

## 2024-02-28 DIAGNOSIS — M51.27 HERNIATED NUCLEUS PULPOSUS, L5-S1: ICD-10-CM

## 2024-02-28 DIAGNOSIS — M54.16 LUMBAR RADICULOPATHY: Primary | ICD-10-CM

## 2024-02-28 DIAGNOSIS — M51.16 LUMBAR DISC DISEASE WITH RADICULOPATHY: ICD-10-CM

## 2024-02-28 DIAGNOSIS — Z98.1 HISTORY OF FUSION OF CERVICAL SPINE: ICD-10-CM

## 2024-02-28 PROCEDURE — 97110 THERAPEUTIC EXERCISES: CPT | Performed by: PHYSICAL THERAPIST

## 2024-02-28 PROCEDURE — 97140 MANUAL THERAPY 1/> REGIONS: CPT | Performed by: PHYSICAL THERAPIST

## 2024-02-28 NOTE — PROGRESS NOTES
"Daily Note     Today's date: 2024  Patient name: Priscilla Lorenzo  : 1975  MRN: [id number]  Referring provider: Maddison White, *  Dx:   Encounter Diagnosis     ICD-10-CM    1. Lumbar radiculopathy  M54.16       2. Herniated nucleus pulposus, L5-S1  M51.27       3. Lumbar disc disease with radiculopathy  M51.16       4. History of fusion of cervical spine  Z98.1                      Subjective: Feeling okay, no numbness down Ues.  Just getting stiffness at her neck.        Objective: See treatment diary below      Assessment: Improved C/S flexion and extension today compared to IE.  No onset of NT sxs t/o session.  Gross hypomoblity at T/S noted improved with CPA to T/S.  Will continue to work on strength of periscapular mms and DNF as able.  Able to perform C/S rotation with cues for chin tuck without difficulty.  Will continue with postural retraining as able.     Plan: Continue per plan of care.      Precautions: chronic pain, IBS, Asthma, Hashimotos, depression  EPOC: 24  HEP: Access Code: [email or web address]  URL: [email or web address]  Date: 2024  Prepared by: Abram Weems    Exercises  - Supine Shoulder Flexion Extension AAROM with Dowel  - 1 x daily - 7 x weekly - 3 sets - 10 reps  - Serratus Activation at Wall  - 1 x daily - 7 x weekly - 3 sets - 10 reps  - Supine Shoulder Press AAROM in Abduction with Dowel  - 1 x daily - 7 x weekly - 3 sets - 10 reps    Manuals            T/S CPA  PF 15 min                                                   Neuro Re-Ed             Chin tuck rotation supine  10x ea                                                                                         Ther Ex             LTR  10x3\" ea            LPD  55# 2x10           Machine row  35# 2x10           SA wall slide  2x10           Seated PB rollout  3x15\" ea            Supine AOH  5# KB 2x10                                     Ther Activity                                       Gait " Training                                                  Modalities

## 2024-03-14 DIAGNOSIS — K21.9 GASTROESOPHAGEAL REFLUX DISEASE WITHOUT ESOPHAGITIS: ICD-10-CM

## 2024-03-14 DIAGNOSIS — M79.18 MYOFASCIAL PAIN SYNDROME: ICD-10-CM

## 2024-03-14 RX ORDER — FAMOTIDINE 20 MG/1
20 TABLET, FILM COATED ORAL 2 TIMES DAILY
Qty: 180 TABLET | Refills: 0 | Status: SHIPPED | OUTPATIENT
Start: 2024-03-14

## 2024-03-14 RX ORDER — GABAPENTIN 600 MG/1
600 TABLET ORAL 4 TIMES DAILY
Qty: 120 TABLET | Refills: 0 | OUTPATIENT
Start: 2024-03-14

## 2024-03-14 NOTE — TELEPHONE ENCOUNTER
S/w pt, advised of 1/15/24 rx for gabapentin with 2 refills. Pt will fu with pharmacy. Scheduled 12 w fu ov on 3/25/24 arrive at 2:00 pm. Pt verbalized understanding and agreement. Will cb if there is any question or issue.         Please cancel the attached refill request. Addressed in tc of today.

## 2024-03-20 ENCOUNTER — APPOINTMENT (OUTPATIENT)
Dept: LAB | Facility: HOSPITAL | Age: 49
End: 2024-03-20
Payer: COMMERCIAL

## 2024-03-20 ENCOUNTER — OFFICE VISIT (OUTPATIENT)
Dept: FAMILY MEDICINE CLINIC | Facility: HOSPITAL | Age: 49
End: 2024-03-20
Payer: COMMERCIAL

## 2024-03-20 ENCOUNTER — HOSPITAL ENCOUNTER (OUTPATIENT)
Dept: CT IMAGING | Facility: HOSPITAL | Age: 49
Discharge: HOME | End: 2024-03-20
Payer: COMMERCIAL

## 2024-03-20 VITALS
DIASTOLIC BLOOD PRESSURE: 78 MMHG | SYSTOLIC BLOOD PRESSURE: 136 MMHG | WEIGHT: 293 LBS | TEMPERATURE: 98 F | HEIGHT: 70 IN | OXYGEN SATURATION: 99 % | BODY MASS INDEX: 41.95 KG/M2 | HEART RATE: 99 BPM

## 2024-03-20 DIAGNOSIS — H66.90 ACUTE OTITIS MEDIA, UNSPECIFIED OTITIS MEDIA TYPE: ICD-10-CM

## 2024-03-20 DIAGNOSIS — R10.31 RIGHT LOWER QUADRANT ABDOMINAL PAIN: ICD-10-CM

## 2024-03-20 DIAGNOSIS — F32.1 CURRENT MODERATE EPISODE OF MAJOR DEPRESSIVE DISORDER, UNSPECIFIED WHETHER RECURRENT (HCC): ICD-10-CM

## 2024-03-20 DIAGNOSIS — R10.31 RIGHT LOWER QUADRANT ABDOMINAL PAIN: Primary | ICD-10-CM

## 2024-03-20 DIAGNOSIS — Z12.31 ENCOUNTER FOR SCREENING MAMMOGRAM FOR MALIGNANT NEOPLASM OF BREAST: ICD-10-CM

## 2024-03-20 LAB
ALBUMIN SERPL BCP-MCNC: 4.1 G/DL (ref 3.5–5)
ALP SERPL-CCNC: 54 U/L (ref 34–104)
ALT SERPL W P-5'-P-CCNC: 17 U/L (ref 7–52)
ANION GAP SERPL CALCULATED.3IONS-SCNC: 6 MMOL/L (ref 4–13)
AST SERPL W P-5'-P-CCNC: 14 U/L (ref 13–39)
BILIRUB SERPL-MCNC: 0.29 MG/DL (ref 0.2–1)
BUN SERPL-MCNC: 16 MG/DL (ref 5–25)
CALCIUM SERPL-MCNC: 9.8 MG/DL (ref 8.4–10.2)
CHLORIDE SERPL-SCNC: 104 MMOL/L (ref 96–108)
CO2 SERPL-SCNC: 28 MMOL/L (ref 21–32)
CREAT SERPL-MCNC: 0.69 MG/DL (ref 0.6–1.3)
ERYTHROCYTE [DISTWIDTH] IN BLOOD BY AUTOMATED COUNT: 13.8 % (ref 11.6–15.1)
EXT PREGNANCY TEST URINE: NEGATIVE
EXT. CONTROL: NORMAL
GFR SERPL CREATININE-BSD FRML MDRD: 102 ML/MIN/1.73SQ M
GLUCOSE SERPL-MCNC: 100 MG/DL (ref 65–140)
HCT VFR BLD AUTO: 44.2 % (ref 34.8–46.1)
HGB BLD-MCNC: 14.3 G/DL (ref 11.5–15.4)
MCH RBC QN AUTO: 30.3 PG (ref 26.8–34.3)
MCHC RBC AUTO-ENTMCNC: 32.4 G/DL (ref 31.4–37.4)
MCV RBC AUTO: 94 FL (ref 82–98)
PLATELET # BLD AUTO: 271 THOUSANDS/UL (ref 149–390)
PMV BLD AUTO: 10.4 FL (ref 8.9–12.7)
POTASSIUM SERPL-SCNC: 4.1 MMOL/L (ref 3.5–5.3)
PROT SERPL-MCNC: 7 G/DL (ref 6.4–8.4)
RBC # BLD AUTO: 4.72 MILLION/UL (ref 3.81–5.12)
SL AMB  POCT GLUCOSE, UA: NORMAL
SL AMB LEUKOCYTE ESTERASE,UA: NORMAL
SL AMB POCT BILIRUBIN,UA: NORMAL
SL AMB POCT BLOOD,UA: NORMAL
SL AMB POCT CLARITY,UA: CLEAR
SL AMB POCT COLOR,UA: YELLOW
SL AMB POCT KETONES,UA: NORMAL
SL AMB POCT NITRITE,UA: NORMAL
SL AMB POCT PH,UA: 6
SL AMB POCT SPECIFIC GRAVITY,UA: 1.02
SL AMB POCT URINE PROTEIN: NORMAL
SL AMB POCT UROBILINOGEN: NORMAL
SODIUM SERPL-SCNC: 138 MMOL/L (ref 135–147)
WBC # BLD AUTO: 10.32 THOUSAND/UL (ref 4.31–10.16)

## 2024-03-20 PROCEDURE — 80053 COMPREHEN METABOLIC PANEL: CPT

## 2024-03-20 PROCEDURE — 81002 URINALYSIS NONAUTO W/O SCOPE: CPT | Performed by: FAMILY MEDICINE

## 2024-03-20 PROCEDURE — 74177 CT ABD & PELVIS W/CONTRAST: CPT

## 2024-03-20 PROCEDURE — 99215 OFFICE O/P EST HI 40 MIN: CPT | Performed by: FAMILY MEDICINE

## 2024-03-20 PROCEDURE — 36415 COLL VENOUS BLD VENIPUNCTURE: CPT

## 2024-03-20 PROCEDURE — 81025 URINE PREGNANCY TEST: CPT | Performed by: FAMILY MEDICINE

## 2024-03-20 PROCEDURE — 85027 COMPLETE CBC AUTOMATED: CPT

## 2024-03-20 RX ORDER — AMOXICILLIN AND CLAVULANATE POTASSIUM 875; 125 MG/1; MG/1
1 TABLET, FILM COATED ORAL EVERY 12 HOURS SCHEDULED
Qty: 14 TABLET | Refills: 0 | Status: SHIPPED | OUTPATIENT
Start: 2024-03-20 | End: 2024-03-27

## 2024-03-20 RX ORDER — ARIPIPRAZOLE 2 MG/1
2 TABLET ORAL DAILY
Qty: 30 TABLET | Refills: 0 | Status: SHIPPED | OUTPATIENT
Start: 2024-03-20 | End: 2024-04-19

## 2024-03-20 RX ADMIN — IOHEXOL 100 ML: 350 INJECTION, SOLUTION INTRAVENOUS at 17:07

## 2024-03-20 NOTE — PROGRESS NOTES
Name: Priscilla Lorenzo      : 1975      MRN: [id number]  Encounter Provider: Maurisio Torres MD  Encounter Date: 3/20/2024   Encounter department: [facility] PRIMARY CARE [address]     Assessment & Plan     1. Right lower quadrant abdominal pain  -     CT abdomen pelvis w wo contrast; Future; Expected date: 2024  -     CBC; Future  -     Comprehensive metabolic panel; Future  -     POCT pregnancy, urine  -     POCT urine dip  Patient with close to a week of rlq pain.   Differential to include acute ap, ovarian pathology, diverticultis. Check stat ct scan. Cbc cmp to be done prior.   Preg test and Ua is unremarkable.     2. Encounter for screening mammogram for malignant neoplasm of breast  -     Mammo screening bilateral w 3d & cad; Future    3. Current moderate episode of major depressive disorder, unspecified whether recurrent (HCC)  -     ARIPiprazole (ABILIFY) 2 mg tablet; Take 1 tablet (2 mg total) by mouth daily  Discussed treatment option.   Did not do well on wellbutrin in the past.   Has been on other ssris and is currently on cymbalta.   Would rather not stop cymbalta due to chronic pain.   Discussed and agreed on trial of abilify 2 mg daily to augment depression treatment. Discussed se/ar to include cardiac issues and increase mortality with the use of this class of medications.   4. Acute otitis media, unspecified otitis media type  -     amoxicillin-clavulanate (AUGMENTIN) 875-125 mg per tablet; Take 1 tablet by mouth every 12 (twelve) hours for 7 days    Treat with augmentin.        Subjective      Patient has multiple issues today.    1.  Right ear pain.  Ongoing for the last several days.  No ear discharge no fever no chills.  No hearing loss.  No trauma to the ear.  No sinus pressure and no congestion.    2.  Has a history of depression but worsening depression symptoms.  Mood is down.,  Low energy, lack of interest, tearful,.  No suicidal ideation no homicidal  ideation.  Would like to talk about her medications.  She is currently on Cymbalta at 60 mg daily.  She does have a history of chronic pain.    3.  For about a week now with right lower quadrant pain.  Associated with some nausea.  No vomiting.  No fever no chills.  No dysuria, urgency, nocturia, hematuria.  Bowel movements are regular.  Constipation or diarrhea.  No blood in the stool.  She has had her tubes tied and has not been sexually active.  No trauma falls or muscular injury.  She did have some diarrhea and constipation but nothing current.    Depression  Associated symptoms include abdominal pain, anorexia, arthralgias, headaches and myalgias. Pertinent negatives include no chest pain, chills, congestion, coughing, diaphoresis, fatigue, fever, nausea, sore throat or vomiting.   Abdominal Pain  This is a new problem. The current episode started in the past 7 days. The onset quality is gradual. The problem occurs daily. The problem has been waxing and waning. The pain is located in the generalized abdominal region. The pain is at a severity of 3/10. The quality of the pain is aching, dull and a sensation of fullness. The abdominal pain radiates to the RLQ and right flank. Associated symptoms include anorexia, arthralgias, belching, headaches and myalgias. Pertinent negatives include no constipation, diarrhea, dysuria, fever, flatus, frequency, hematochezia, hematuria, melena, nausea, vomiting or weight loss. The pain is aggravated by certain positions and eating. The pain is relieved by Certain positions and recumbency.     Review of Systems   Constitutional: Negative.  Negative for activity change, appetite change, chills, diaphoresis, fatigue, fever and weight loss.   HENT:  Negative for congestion, facial swelling and sore throat.    Respiratory: Negative.  Negative for apnea, cough, chest tightness and shortness of breath.    Cardiovascular: Negative.  Negative for chest pain and palpitations.    Gastrointestinal:  Positive for abdominal pain and anorexia. Negative for abdominal distention, blood in stool, constipation, diarrhea, flatus, hematochezia, melena, nausea and vomiting.   Genitourinary: Negative.  Negative for difficulty urinating, dysuria, flank pain, frequency and hematuria.   Musculoskeletal:  Positive for arthralgias and myalgias.   Neurological:  Positive for headaches.   Psychiatric/Behavioral:  Positive for depression.        Current Outpatient Medications on File Prior to Visit   Medication Sig   • albuterol (Ventolin HFA) 90 mcg/act inhaler Inhale 2 puffs every 6 (six) hours as needed for wheezing   • baclofen 10 mg tablet Take 1 tablet (10 mg total) by mouth 3 (three) times a day   • betamethasone valerate (VALISONE) 0.1 % ointment Apply 1 to 2 times a day only as needed to finger/hand rash.   • Calcium Carbonate (CALCIUM 600 PO) Take by mouth daily   • cholecalciferol (VITAMIN D3) 1,000 units tablet Take 2,000 Units by mouth daily   • ciclopirox (LOPROX) 0.77 % cream APPLY 2 TIMES per day to rash on chest for 2-4 weeks.   • DULoxetine (CYMBALTA) 60 mg delayed release capsule Take 2 PO HS.   • etanercept (ENBREL SURECLICK) 50 MG/ML injection Inject 1 mL (50 mg total) under the skin every 7 days   • Euflexxa 20 MG/2ML SOSY    • famotidine (PEPCID) 20 mg tablet Take 1 tablet (20 mg total) by mouth 2 (two) times a day   • gabapentin (NEURONTIN) 600 MG tablet Take 1 tablet (600 mg total) by mouth 4 (four) times a day   • HYDROcodone-acetaminophen (NORCO) 5-325 mg per tablet Take 1 PO BID PRN for pain for ongoing therapy   • irbesartan (AVAPRO) 75 mg tablet Take 1 tablet (75 mg total) by mouth daily at bedtime   • levothyroxine (Levoxyl) 200 mcg tablet Take 1 tablet daily in addition to 25 mcg tablet daily   • levothyroxine (Levoxyl) 25 mcg tablet Take 1 tablet daily in addition to 200 mcg tablet daily   • multivitamin (THERAGRAN) TABS Take 1 tablet by mouth daily   • ondansetron (Zofran  "ODT) 4 mg disintegrating tablet Take 1 tablet (4 mg total) by mouth every 6 (six) hours as needed for nausea or vomiting   • Potassium 99 MG TABS Take by mouth daily   • acetaminophen (TYLENOL) 650 mg CR tablet Take 1 tablet (650 mg total) by mouth every 8 (eight) hours as needed for mild pain (Patient not taking: Reported on 3/20/2024)   • naproxen sodium (ALEVE) 220 MG tablet Take 1 tablet (220 mg total) by mouth 2 (two) times a day with meals (Patient not taking: Reported on 3/20/2024)       Objective     /78   Pulse 99   Temp 98 °F (36.7 °C)   Ht 5' 10\" (1.778 m)   Wt (!) 142 kg (314 lb)   SpO2 99%   BMI 45.05 kg/m²     Physical Exam  Vitals and nursing note reviewed.   Constitutional:       Appearance: Normal appearance. She is well-developed.   HENT:      Head: Normocephalic and atraumatic.      Right Ear: External ear normal.      Left Ear: External ear normal.      Nose: Nose normal.   Eyes:      Conjunctiva/sclera: Conjunctivae normal.      Pupils: Pupils are equal, round, and reactive to light.   Neck:      Thyroid: No thyromegaly.      Trachea: No tracheal deviation.   Cardiovascular:      Rate and Rhythm: Normal rate and regular rhythm.      Heart sounds: Normal heart sounds. No murmur heard.  Pulmonary:      Effort: Pulmonary effort is normal. No respiratory distress.      Breath sounds: Normal breath sounds. No wheezing.   Abdominal:      General: Bowel sounds are normal. There is no distension. There are no signs of injury.      Palpations: Abdomen is soft. There is no shifting dullness, fluid wave, hepatomegaly or splenomegaly.      Tenderness: There is abdominal tenderness in the right lower quadrant. There is no right CVA tenderness, left CVA tenderness, guarding or rebound. Negative signs include Cody's sign and obturator sign.      Hernia: No hernia is present.   Musculoskeletal:         General: Normal range of motion.      Cervical back: Normal range of motion and neck supple. "   Skin:     General: Skin is warm and dry.      Capillary Refill: Capillary refill takes less than 2 seconds.   Neurological:      General: No focal deficit present.      Mental Status: She is alert and oriented to person, place, and time.   Psychiatric:         Mood and Affect: Mood normal.         Behavior: Behavior normal.       Maurisio Torres MD  Depression Screening Follow-up Plan: Patient's depression screening was positive with a PHQ-2 score of . Their PHQ-9 score was 5. Patient assessed for underlying major depression. They have no active suicidal ideations. Brief counseling provided and recommend additional follow-up/re-evaluation next office visit.    I have spent a total time of 47 minutes on 03/20/24 in caring for this patient including Risks and benefits of tx options, Instructions for management, Patient and family education, Importance of tx compliance, Risk factor reductions, Impressions, Counseling / Coordination of care, and Reviewing / ordering tests, medicine, procedures  .

## 2024-03-25 ENCOUNTER — TELEPHONE (OUTPATIENT)
Dept: OBGYN CLINIC | Facility: CLINIC | Age: 49
End: 2024-03-25

## 2024-03-25 ENCOUNTER — OFFICE VISIT (OUTPATIENT)
Dept: PAIN MEDICINE | Facility: CLINIC | Age: 49
End: 2024-03-25
Payer: COMMERCIAL

## 2024-03-25 VITALS
SYSTOLIC BLOOD PRESSURE: 134 MMHG | DIASTOLIC BLOOD PRESSURE: 78 MMHG | TEMPERATURE: 98 F | BODY MASS INDEX: 41.95 KG/M2 | HEIGHT: 70 IN | HEART RATE: 99 BPM | WEIGHT: 293 LBS

## 2024-03-25 DIAGNOSIS — M17.11 PRIMARY OSTEOARTHRITIS OF RIGHT KNEE: Primary | ICD-10-CM

## 2024-03-25 DIAGNOSIS — M51.27 HERNIATED NUCLEUS PULPOSUS, L5-S1: ICD-10-CM

## 2024-03-25 DIAGNOSIS — M54.2 NECK PAIN: ICD-10-CM

## 2024-03-25 DIAGNOSIS — M47.816 LUMBAR SPONDYLOSIS: ICD-10-CM

## 2024-03-25 DIAGNOSIS — M54.81 BILATERAL OCCIPITAL NEURALGIA: ICD-10-CM

## 2024-03-25 DIAGNOSIS — M17.11 PRIMARY OSTEOARTHRITIS OF RIGHT KNEE: ICD-10-CM

## 2024-03-25 DIAGNOSIS — G89.4 CHRONIC PAIN SYNDROME: ICD-10-CM

## 2024-03-25 DIAGNOSIS — M79.18 MYOFASCIAL PAIN SYNDROME: ICD-10-CM

## 2024-03-25 DIAGNOSIS — M54.16 LUMBAR RADICULOPATHY: Primary | ICD-10-CM

## 2024-03-25 PROCEDURE — 99214 OFFICE O/P EST MOD 30 MIN: CPT | Performed by: PHYSICIAN ASSISTANT

## 2024-03-25 RX ORDER — DULOXETIN HYDROCHLORIDE 60 MG/1
CAPSULE, DELAYED RELEASE ORAL
Qty: 180 CAPSULE | Refills: 0 | Status: SHIPPED | OUTPATIENT
Start: 2024-03-25

## 2024-03-25 RX ORDER — BACLOFEN 10 MG/1
10 TABLET ORAL 3 TIMES DAILY
Qty: 90 TABLET | Refills: 2 | Status: SHIPPED | OUTPATIENT
Start: 2024-03-25

## 2024-03-25 RX ORDER — GABAPENTIN 600 MG/1
600 TABLET ORAL 3 TIMES DAILY
Qty: 90 TABLET | Refills: 2 | Status: SHIPPED | OUTPATIENT
Start: 2024-03-25

## 2024-03-25 NOTE — TELEPHONE ENCOUNTER
Emiliano Adela      Priscilla stopped in the office today, asking if we can put an order in for her visco injections of the left knee. She had Euflexxa in the pasted. Thank you

## 2024-03-25 NOTE — PATIENT INSTRUCTIONS
Wean off Gabapentin as follows:  600mg 3x/day for one week,  600mg 2x/day for one week,  600mg 1x/day for one week.  Call office or send me a message via [employer] letting me know how you're doing decreasing this.

## 2024-03-25 NOTE — PROGRESS NOTES
Assessment:  1. Lumbar radiculopathy    2. Herniated nucleus pulposus, L5-S1    3. Lumbar spondylosis    4. Neck pain    5. Bilateral occipital neuralgia    6. Primary osteoarthritis of right knee    7. Chronic pain syndrome    8. Myofascial pain syndrome        Plan:  While the patient was in the office today, I did have a thorough conversation regarding their chronic pain syndrome, medication management, and treatment plan options.    The patient is full to note 50% reduction in radicular pain following the bilateral S1 transforaminal epidural steroid injections.  She is aware that injections can be repeated if indicated in the future.    Unfortunately she continues with neck pain and increasing cervicogenic headaches.  We discussed the role of trigger point injections to address the myofascial component of her pain pattern as well as occipital nerve blocks to address the headache component.  She will consider these in the future.  Consider physical therapy geared for the neck pain.  She may require an MRI of the cervical spine in the future.    Patient is not able to say if any of the medication she is on for pain are providing any relief.  We discussed reducing Wannah or some of her medications.  We will start with gabapentin and I have provided her with instructions on how to wean off slowly and safely.  Continue Cymbalta and baclofen for now and possibly wean off of in the future.    I have recommended that patient schedule follow-up visit with orthopedics regarding her right knee.    The patient will follow-up in 12 weeks for medication prescription refill and reevaluation. The patient was advised to contact the office should their symptoms worsen in the interim. The patient was agreeable and verbalized an understanding.        History of Present Illness:    The patient is a 48 y.o. female last seen on 1/25/2024 who presents for a follow up office visit in regards to chronic neck and low back pain secondary  to spondylosis, degenerative disc disease, right knee pain secondary to osteoarthritis.  The patient currently reports increasing neck pain and occipital headaches that she presently rates a 6 out of 10 and describes it as a constant burning, dull, aching, throbbing, pressure-like and shooting pain.  Patient also has low back pain with radiation down both legs.  This radicular pain has improved by 50% following bilateral S1 TFESI's that were done on 1/25/2024.  Her right knee pain is increasing and she will be following up with Ortho regarding injections for that particular pain pattern.  Patient is taking baclofen, gabapentin and duloxetine and is unsure if she is getting any benefit from these.    I have personally reviewed and/or updated the patient's past medical history, past surgical history, family history, social history, current medications, allergies, and vital signs today.       Review of Systems:    Review of Systems   Respiratory:  Negative for shortness of breath.    Cardiovascular:  Negative for chest pain.   Gastrointestinal:  Positive for diarrhea and nausea. Negative for constipation and vomiting.   Musculoskeletal:  Positive for gait problem and joint swelling (Joint stiffness). Negative for arthralgias and myalgias.   Skin:  Negative for rash.   Neurological:  Positive for weakness. Negative for dizziness and seizures.   All other systems reviewed and are negative.        Past Medical History:   Diagnosis Date   • Allergic    • Anxiety    • Arthritis    • Asthma    • Chronic pain disorder     upper back   • Depression    • Disc disorder    • Disease of thyroid gland    • Fibromyalgia, primary    • GERD (gastroesophageal reflux disease)    • Headache(784.0)    • HPV (human papilloma virus) infection     cervical   • Hypertension    • Hypothyroidism    • Inflammatory bowel disease    • Migraine    • Obesity    • Stomach disorder        Past Surgical History:   Procedure Laterality Date   • CERVICAL  BIOPSY  W/ LOOP ELECTRODE EXCISION     • CERVICAL FUSION  2015   •  SECTION     • CHOLECYSTECTOMY      lap   • FOOT SURGERY Bilateral     multiple, posterior calcaneal bone spur   • KNEE SURGERY Left     arthroscopy with debribement   • LYMPH NODE BIOPSY     • AL NDSC WRST SURG W/RLS TRANSVRS CARPL LIGM Right 2023    Procedure: Right endoscopic carpal tunnel release;  Surgeon: Cl Mac MD;  Location: [facility];  Service: Orthopedics   • SPINE SURGERY     • TENDON REPAIR     • TUBAL LIGATION Bilateral        Family History   Adopted: Yes   Problem Relation Age of Onset   • Thyroid disease Mother    • Arthritis Mother         Mom, dad, aunts, grandmother   • Autoimmune disease Mother    • No Known Problems Father    • Coronary artery disease Maternal Grandmother    • Breast cancer Maternal Grandmother         age unknown   • Heart disease Maternal Grandmother         Mother, Aunt, Grandmother, Father   • Coronary artery disease Maternal Aunt    • Stroke Maternal Aunt         Chika, brothers   • No Known Problems Maternal Grandfather    • No Known Problems Paternal Grandmother    • No Known Problems Paternal Grandfather    • Suicide Attempts Maternal Aunt    • Heart murmur Maternal Aunt    • Hypothyroidism Maternal Aunt    • Hashimoto's thyroiditis Maternal Aunt    • Diabetes unspecified Brother    • Diabetes Brother    • No Known Problems Brother    • No Known Problems Brother    • ADD / ADHD Son        Social History     Occupational History   • Occupation: [employer]     Comment: [employer] department   Tobacco Use   • Smoking status: Every Day     Current packs/day: 0.50     Average packs/day: 0.5 packs/day for 35.0 years (17.5 ttl pk-yrs)     Types: Cigarettes   • Smokeless tobacco: Never   • Tobacco comments:     actually about 3/4 PPD   Vaping Use   • Vaping status: Never Used   Substance and Sexual Activity   • Alcohol use: Not Currently   • Drug use: No   • Sexual activity: Not  Currently     Partners: Male     Birth control/protection: Female Sterilization, None         Current Outpatient Medications:   •  acetaminophen (TYLENOL) 650 mg CR tablet, Take 1 tablet (650 mg total) by mouth every 8 (eight) hours as needed for mild pain, Disp: 30 tablet, Rfl: 0  •  albuterol (Ventolin HFA) 90 mcg/act inhaler, Inhale 2 puffs every 6 (six) hours as needed for wheezing, Disp: 18 g, Rfl: 0  •  amoxicillin-clavulanate (AUGMENTIN) 875-125 mg per tablet, Take 1 tablet by mouth every 12 (twelve) hours for 7 days, Disp: 14 tablet, Rfl: 0  •  ARIPiprazole (ABILIFY) 2 mg tablet, Take 1 tablet (2 mg total) by mouth daily, Disp: 30 tablet, Rfl: 0  •  baclofen 10 mg tablet, Take 1 tablet (10 mg total) by mouth 3 (three) times a day, Disp: 90 tablet, Rfl: 2  •  Calcium Carbonate (CALCIUM 600 PO), Take by mouth daily, Disp: , Rfl:   •  cholecalciferol (VITAMIN D3) 1,000 units tablet, Take 2,000 Units by mouth daily, Disp: , Rfl:   •  ciclopirox (LOPROX) 0.77 % cream, APPLY 2 TIMES per day to rash on chest for 2-4 weeks., Disp: 30 g, Rfl: 0  •  DULoxetine (CYMBALTA) 60 mg delayed release capsule, Take 2 PO HS., Disp: 180 capsule, Rfl: 0  •  etanercept (ENBREL SURECLICK) 50 MG/ML injection, Inject 1 mL (50 mg total) under the skin every 7 days, Disp: 4 mL, Rfl: 5  •  Euflexxa 20 MG/2ML SOSY, , Disp: , Rfl:   •  famotidine (PEPCID) 20 mg tablet, Take 1 tablet (20 mg total) by mouth 2 (two) times a day, Disp: 180 tablet, Rfl: 0  •  gabapentin (NEURONTIN) 600 MG tablet, Take 1 tablet (600 mg total) by mouth 3 (three) times a day, Disp: 90 tablet, Rfl: 2  •  HYDROcodone-acetaminophen (NORCO) 5-325 mg per tablet, Take 1 PO BID PRN for pain for ongoing therapy, Disp: 14 tablet, Rfl: 0  •  irbesartan (AVAPRO) 75 mg tablet, Take 1 tablet (75 mg total) by mouth daily at bedtime, Disp: 90 tablet, Rfl: 1  •  levothyroxine (Levoxyl) 200 mcg tablet, Take 1 tablet daily in addition to 25 mcg tablet daily, Disp: 90 tablet, Rfl:  "0  •  levothyroxine (Levoxyl) 25 mcg tablet, Take 1 tablet daily in addition to 200 mcg tablet daily, Disp: 90 tablet, Rfl: 3  •  multivitamin (THERAGRAN) TABS, Take 1 tablet by mouth daily, Disp: , Rfl:   •  naproxen sodium (ALEVE) 220 MG tablet, Take 1 tablet (220 mg total) by mouth 2 (two) times a day with meals, Disp: 20 tablet, Rfl: 0  •  ondansetron (Zofran ODT) 4 mg disintegrating tablet, Take 1 tablet (4 mg total) by mouth every 6 (six) hours as needed for nausea or vomiting, Disp: 20 tablet, Rfl: 0  •  Potassium 99 MG TABS, Take by mouth daily, Disp: , Rfl:   •  betamethasone valerate (VALISONE) 0.1 % ointment, Apply 1 to 2 times a day only as needed to finger/hand rash., Disp: 30 g, Rfl: 0    Allergies   Allergen Reactions   • Acetaminophen-Codeine      Pt states she does not remember having a reaction to this medication   • Hydrocodone Other (See Comments)     GI issues   • Latex Hives     Latex powder     • Lisinopril Other (See Comments)     Itching, gi intollerance     • Prednisolone Vomiting       Physical Exam:    /78 (BP Location: Left arm, Patient Position: Sitting, Cuff Size: Large)   Pulse 99   Temp 98 °F (36.7 °C)   Ht 5' 10\" (1.778 m)   Wt (!) 143 kg (316 lb)   BMI 45.34 kg/m²     Constitutional:normal, well developed, well nourished, alert, in no distress and non-toxic and no overt pain behavior. and overweight  Eyes:anicteric  HEENT:grossly intact  Neck:supple, symmetric, trachea midline and no masses   Pulmonary:even and unlabored  Cardiovascular:No edema or pitting edema present  Skin:Normal without rashes or lesions and well hydrated  Psychiatric:Mood and affect appropriate  Neurologic:Cranial Nerves II-XII grossly intact  Musculoskeletal: Tender bilateral paraspinal muscles of the cervical spine, tender bilateral trapezius muscles, limited range of motion.  Stable gait      Imaging  No orders to display         No orders of the defined types were placed in this encounter.      "

## 2024-03-27 ENCOUNTER — TELEPHONE (OUTPATIENT)
Dept: FAMILY MEDICINE CLINIC | Facility: HOSPITAL | Age: 49
End: 2024-03-27

## 2024-03-27 NOTE — TELEPHONE ENCOUNTER
Hi I'm calling from [facility] prior authorization department. I have a patient who got a stat imaging done and her authorization is currently denied. If you guys could please call and I a for a [id number]. It is available until tomorrow. There have been inbox messages sent to your clinical team about it. I have a phone number for KEERTHI is [phone] with a tracking number of [id number]. Patients name is Kayla Lorenzo. Date of birth [birth year]. If you can have any licensed clinician, please do a [id number]. That'd be great. Thank you.

## 2024-03-29 ENCOUNTER — TELEPHONE (OUTPATIENT)
Age: 49
End: 2024-03-29

## 2024-03-29 NOTE — TELEPHONE ENCOUNTER
From: Priscilla Lorenzo   Sent: 3/27/2024  12:20 PM EDT   To: [facility] Clerical   Subject: Appointment Request                                Appointment Request From: Priscilla Lorenzo      With Provider: Rosa Ibanez DO [[facility]]      Preferred Date Range: 4/8/2024 - 4/12/2024      Preferred Times: Any Time      Reason for visit: Gynecology Office Visit      Comments:   Na     Lm for pt to cb

## 2024-04-09 ENCOUNTER — OFFICE VISIT (OUTPATIENT)
Dept: URGENT CARE | Facility: CLINIC | Age: 49
End: 2024-04-09
Payer: COMMERCIAL

## 2024-04-09 VITALS
OXYGEN SATURATION: 97 % | RESPIRATION RATE: 18 BRPM | SYSTOLIC BLOOD PRESSURE: 148 MMHG | WEIGHT: 293 LBS | BODY MASS INDEX: 41.95 KG/M2 | DIASTOLIC BLOOD PRESSURE: 74 MMHG | TEMPERATURE: 97.8 F | HEIGHT: 70 IN | HEART RATE: 96 BPM

## 2024-04-09 DIAGNOSIS — I10 PRIMARY HYPERTENSION: ICD-10-CM

## 2024-04-09 DIAGNOSIS — M10.072 ACUTE IDIOPATHIC GOUT INVOLVING TOE OF LEFT FOOT: Primary | ICD-10-CM

## 2024-04-09 PROCEDURE — 99213 OFFICE O/P EST LOW 20 MIN: CPT | Performed by: FAMILY MEDICINE

## 2024-04-09 RX ORDER — IRBESARTAN 75 MG/1
75 TABLET ORAL
Qty: 90 TABLET | Refills: 1 | Status: SHIPPED | OUTPATIENT
Start: 2024-04-09 | End: 2024-10-06

## 2024-04-09 RX ORDER — PREDNISONE 20 MG/1
40 TABLET ORAL DAILY
Qty: 10 TABLET | Refills: 0 | Status: SHIPPED | OUTPATIENT
Start: 2024-04-09 | End: 2024-04-14

## 2024-04-09 NOTE — PROGRESS NOTES
[facility]        NAME: Priscilla Lorenzo is a 48 y.o. female  : 1975    MRN: [id number]  DATE: 2024  TIME: 12:36 PM    Assessment and Plan   Acute idiopathic gout involving toe of left foot [M10.072]  1. Acute idiopathic gout involving toe of left foot  predniSONE 20 mg tablet            Patient Instructions       Follow up with PCP in 3-5 days.  Proceed to  ER if symptoms worsen.    If tests have been performed at [facility], our office will contact you with results if changes need to be made to the care plan discussed with you at the visit.  You can review your full results on [facility].    Chief Complaint     Chief Complaint   Patient presents with    Foot Pain     Patient reports that for the past 2 weeks she has had a shooting burning pain with her first 2 toes on her left foot. Patient states within the past 2 days she now has pain on the top of her foot, with occasional redness. Does not recall injury to the foot.          History of Present Illness       48-year-old female with 3-day history of pain in her left great toe.  She also notices increased redness and swelling that radiates into the medial part of her foot.  Pain is reproduced with any attempted weightbearing.  Denies any numbness or tingling.        Review of Systems   Review of Systems   Constitutional: Negative.    HENT: Negative.     Eyes: Negative.    Respiratory: Negative.     Cardiovascular: Negative.    Gastrointestinal: Negative.    Genitourinary: Negative.    Musculoskeletal:  Positive for arthralgias and joint swelling.   Skin: Negative.    Allergic/Immunologic: Negative.    Neurological: Negative.    Hematological: Negative.    Psychiatric/Behavioral: Negative.           Current Medications       Current Outpatient Medications:     predniSONE 20 mg tablet, Take 2 tablets (40 mg total) by mouth daily for 5 days, Disp: 10 tablet, Rfl: 0    acetaminophen (TYLENOL) 650 mg CR tablet, Take 1 tablet (650 mg  total) by mouth every 8 (eight) hours as needed for mild pain, Disp: 30 tablet, Rfl: 0    albuterol (Ventolin HFA) 90 mcg/act inhaler, Inhale 2 puffs every 6 (six) hours as needed for wheezing, Disp: 18 g, Rfl: 0    ARIPiprazole (ABILIFY) 2 mg tablet, Take 1 tablet (2 mg total) by mouth daily, Disp: 30 tablet, Rfl: 0    baclofen 10 mg tablet, Take 1 tablet (10 mg total) by mouth 3 (three) times a day, Disp: 90 tablet, Rfl: 2    betamethasone valerate (VALISONE) 0.1 % ointment, Apply 1 to 2 times a day only as needed to finger/hand rash., Disp: 30 g, Rfl: 0    Calcium Carbonate (CALCIUM 600 PO), Take by mouth daily, Disp: , Rfl:     cholecalciferol (VITAMIN D3) 1,000 units tablet, Take 2,000 Units by mouth daily, Disp: , Rfl:     ciclopirox (LOPROX) 0.77 % cream, APPLY 2 TIMES per day to rash on chest for 2-4 weeks., Disp: 30 g, Rfl: 0    DULoxetine (CYMBALTA) 60 mg delayed release capsule, Take 2 PO HS., Disp: 180 capsule, Rfl: 0    etanercept (ENBREL SURECLICK) 50 MG/ML injection, Inject 1 mL (50 mg total) under the skin every 7 days, Disp: 4 mL, Rfl: 5    Euflexxa 20 MG/2ML SOSY, , Disp: , Rfl:     famotidine (PEPCID) 20 mg tablet, Take 1 tablet (20 mg total) by mouth 2 (two) times a day, Disp: 180 tablet, Rfl: 0    gabapentin (NEURONTIN) 600 MG tablet, Take 1 tablet (600 mg total) by mouth 3 (three) times a day, Disp: 90 tablet, Rfl: 2    HYDROcodone-acetaminophen (NORCO) 5-325 mg per tablet, Take 1 PO BID PRN for pain for ongoing therapy, Disp: 14 tablet, Rfl: 0    irbesartan (AVAPRO) 75 mg tablet, Take 1 tablet (75 mg total) by mouth daily at bedtime, Disp: 90 tablet, Rfl: 1    levothyroxine (Levoxyl) 200 mcg tablet, Take 1 tablet daily in addition to 25 mcg tablet daily, Disp: 90 tablet, Rfl: 0    levothyroxine (Levoxyl) 25 mcg tablet, Take 1 tablet daily in addition to 200 mcg tablet daily, Disp: 90 tablet, Rfl: 3    multivitamin (THERAGRAN) TABS, Take 1 tablet by mouth daily, Disp: , Rfl:     naproxen sodium  (ALEVE) 220 MG tablet, Take 1 tablet (220 mg total) by mouth 2 (two) times a day with meals, Disp: 20 tablet, Rfl: 0    ondansetron (Zofran ODT) 4 mg disintegrating tablet, Take 1 tablet (4 mg total) by mouth every 6 (six) hours as needed for nausea or vomiting, Disp: 20 tablet, Rfl: 0    Potassium 99 MG TABS, Take by mouth daily, Disp: , Rfl:     Current Allergies     Allergies as of 2024 - Reviewed 2024   Allergen Reaction Noted    Acetaminophen-codeine  10/23/2017    Hydrocodone Other (See Comments) 2016    Latex Hives 2021    Lisinopril Other (See Comments) 11/10/2022    Prednisolone Vomiting 10/23/2017            The following portions of the patient's history were reviewed and updated as appropriate: allergies, current medications, past family history, past medical history, past social history, past surgical history and problem list.     Past Medical History:   Diagnosis Date    Allergic     Anxiety     Arthritis     Asthma     Chronic pain disorder     upper back    Depression     Disc disorder     Disease of thyroid gland     Fibromyalgia, primary     GERD (gastroesophageal reflux disease)     Headache(784.0)     HPV (human papilloma virus) infection     cervical    Hypertension     Hypothyroidism     Inflammatory bowel disease     Migraine     Obesity     Stomach disorder        Past Surgical History:   Procedure Laterality Date    CERVICAL BIOPSY  W/ LOOP ELECTRODE EXCISION      CERVICAL FUSION  2015     SECTION      CHOLECYSTECTOMY      lap    FOOT SURGERY Bilateral     multiple, posterior calcaneal bone spur    KNEE SURGERY Left     arthroscopy with debribement    LYMPH NODE BIOPSY      KS NDSC WRST SURG W/RLS TRANSVRS CARPL LIGM Right 2023    Procedure: Right endoscopic carpal tunnel release;  Surgeon: Cl Mac MD;  Location: [facility];  Service: Orthopedics    SPINE SURGERY      TENDON REPAIR      TUBAL LIGATION Bilateral        Family History  "  Adopted: Yes   Problem Relation Age of Onset    Thyroid disease Mother     Arthritis Mother         Mom, dad, aunts, grandmother    Autoimmune disease Mother     No Known Problems Father     Coronary artery disease Maternal Grandmother     Breast cancer Maternal Grandmother         age unknown    Heart disease Maternal Grandmother         Mother, Aunt, Grandmother, Father    Coronary artery disease Maternal Aunt     Stroke Maternal Aunt         Chika, brothers    No Known Problems Maternal Grandfather     No Known Problems Paternal Grandmother     No Known Problems Paternal Grandfather     Suicide Attempts Maternal Aunt     Heart murmur Maternal Aunt     Hypothyroidism Maternal Aunt     Hashimoto's thyroiditis Maternal Aunt     Diabetes unspecified Brother     Diabetes Brother     No Known Problems Brother     No Known Problems Brother     ADD / ADHD Son          Medications have been verified.        Objective   /74   Pulse 96   Temp 97.8 °F (36.6 °C) (Tympanic)   Resp 18   Ht 5' 10\" (1.778 m)   Wt (!) 142 kg (312 lb 12.8 oz)   SpO2 97%   BMI 44.88 kg/m²   No LMP recorded.       Physical Exam     Physical Exam  Vitals and nursing note reviewed.   Constitutional:       Appearance: She is well-developed.   HENT:      Head: Normocephalic.      Nose: Nose normal.   Eyes:      Pupils: Pupils are equal, round, and reactive to light.   Cardiovascular:      Rate and Rhythm: Normal rate and regular rhythm.   Pulmonary:      Effort: Pulmonary effort is normal.   Abdominal:      General: Abdomen is flat.   Musculoskeletal:         General: Swelling and tenderness present. No signs of injury.      Cervical back: Normal range of motion.      Left foot: Decreased range of motion. Swelling and tenderness present.        Legs:    Skin:     General: Skin is warm and dry.   Neurological:      Mental Status: She is alert and oriented to person, place, and time.                   "

## 2024-04-12 ENCOUNTER — TELEPHONE (OUTPATIENT)
Dept: PAIN MEDICINE | Facility: CLINIC | Age: 49
End: 2024-04-12

## 2024-04-12 DIAGNOSIS — M47.819 SPONDYLO-ARTHROPATHY: ICD-10-CM

## 2024-04-16 DIAGNOSIS — M47.819 SPONDYLO-ARTHROPATHY: ICD-10-CM

## 2024-04-16 DIAGNOSIS — F32.1 CURRENT MODERATE EPISODE OF MAJOR DEPRESSIVE DISORDER, UNSPECIFIED WHETHER RECURRENT (HCC): ICD-10-CM

## 2024-04-17 RX ORDER — ARIPIPRAZOLE 2 MG/1
2 TABLET ORAL DAILY
Qty: 30 TABLET | Refills: 0 | Status: SHIPPED | OUTPATIENT
Start: 2024-04-17 | End: 2024-05-17

## 2024-04-18 ENCOUNTER — OFFICE VISIT (OUTPATIENT)
Dept: OBGYN CLINIC | Facility: CLINIC | Age: 49
End: 2024-04-18
Payer: COMMERCIAL

## 2024-04-18 VITALS
DIASTOLIC BLOOD PRESSURE: 82 MMHG | HEIGHT: 70 IN | SYSTOLIC BLOOD PRESSURE: 126 MMHG | WEIGHT: 293 LBS | BODY MASS INDEX: 41.95 KG/M2

## 2024-04-18 DIAGNOSIS — M17.11 PRIMARY OSTEOARTHRITIS OF RIGHT KNEE: Primary | ICD-10-CM

## 2024-04-18 PROCEDURE — 20610 DRAIN/INJ JOINT/BURSA W/O US: CPT | Performed by: PHYSICIAN ASSISTANT

## 2024-04-18 RX ORDER — HYALURONATE SODIUM 10 MG/ML
20 SYRINGE (ML) INTRAARTICULAR
Status: COMPLETED | OUTPATIENT
Start: 2024-04-18 | End: 2024-04-18

## 2024-04-18 RX ADMIN — Medication 20 MG: at 17:30

## 2024-04-18 NOTE — ASSESSMENT & PLAN NOTE
Findings consistent with right knee osteoarthritis.  Findings and treatment options were discussed with the patient.  The first of 3 right knee Euflexxa injections were given today.  She tolerated the procedure well.  Advised to apply cold compress today.  Follow-up in 1 week for the second injection.  All questions were answered to patient's satisfaction.

## 2024-04-18 NOTE — PROGRESS NOTES
Assessment:     1. Primary osteoarthritis of right knee          Plan:     Problem List Items Addressed This Visit          Musculoskeletal and Integument    Primary osteoarthritis of right knee - Primary     Findings consistent with right knee osteoarthritis.  Findings and treatment options were discussed with the patient.  The first of 3 right knee Euflexxa injections were given today.  She tolerated the procedure well.  Advised to apply cold compress today.  Follow-up in 1 week for the second injection.  All questions were answered to patient's satisfaction.         Relevant Medications    Sodium Hyaluronate (Viscosup) 20 mg (Completed) (Start on 4/18/2024  5:30 PM)    Other Relevant Orders    Large joint arthrocentesis: R knee (Completed)           Subjective:     Patient ID: Priscilla Lorenzo is a 48 y.o. female.  Chief Complaint:  This is a 48-year-old white female following up for right knee osteoarthritis.  She is here to start a repeat series of Euflexxa injections.  Last series was in June 2023.  She did very well until she had a fall in December 2023.  She continues to have aching pain in her knee.       Allergy:  Allergies   Allergen Reactions    Acetaminophen-Codeine      Pt states she does not remember having a reaction to this medication    Hydrocodone Other (See Comments)     GI issues    Latex Hives     Latex powder      Lisinopril Other (See Comments)     Itching, gi intollerance      Prednisolone Vomiting     Medications:  all current active meds have been reviewed  Past Medical History:  Past Medical History:   Diagnosis Date    Allergic     Anxiety     Arthritis     Asthma     Chronic pain disorder     upper back    Depression     Disc disorder     Disease of thyroid gland     Fibromyalgia, primary     GERD (gastroesophageal reflux disease)     Headache(784.0)     HPV (human papilloma virus) infection     cervical    Hypertension     Hypothyroidism     Inflammatory bowel disease     Migraine      Obesity     Stomach disorder      Past Surgical History:  Past Surgical History:   Procedure Laterality Date    CERVICAL BIOPSY  W/ LOOP ELECTRODE EXCISION      CERVICAL FUSION  2015     SECTION      CHOLECYSTECTOMY      lap    FOOT SURGERY Bilateral     multiple, posterior calcaneal bone spur    KNEE SURGERY Left     arthroscopy with debribement    LYMPH NODE BIOPSY      CT NDSC WRST SURG W/RLS TRANSVRS CARPL LIGM Right 2023    Procedure: Right endoscopic carpal tunnel release;  Surgeon: Cl Mac MD;  Location: [facility];  Service: Orthopedics    SPINE SURGERY      TENDON REPAIR      TUBAL LIGATION Bilateral      Family History:  Family History   Adopted: Yes   Problem Relation Age of Onset    Thyroid disease Mother     Arthritis Mother         Mom, dad, aunts, grandmother    Autoimmune disease Mother     No Known Problems Father     Coronary artery disease Maternal Grandmother     Breast cancer Maternal Grandmother         age unknown    Heart disease Maternal Grandmother         Mother, Aunt, Grandmother, Father    Coronary artery disease Maternal Aunt     Stroke Maternal Aunt         Chika, brothers    No Known Problems Maternal Grandfather     No Known Problems Paternal Grandmother     No Known Problems Paternal Grandfather     Suicide Attempts Maternal Aunt     Heart murmur Maternal Aunt     Hypothyroidism Maternal Aunt     Hashimoto's thyroiditis Maternal Aunt     Diabetes unspecified Brother     Diabetes Brother     No Known Problems Brother     No Known Problems Brother     ADD / ADHD Son      Social History:  Social History     Substance and Sexual Activity   Alcohol Use Not Currently     Social History     Substance and Sexual Activity   Drug Use No     Social History     Tobacco Use   Smoking Status Every Day    Current packs/day: 0.50    Average packs/day: 0.5 packs/day for 35.0 years (17.5 ttl pk-yrs)    Types: Cigarettes   Smokeless Tobacco Never   Tobacco Comments     "actually about 3/4 PPD     Review of Systems   Constitutional:  Negative for chills and fever.   HENT:  Negative for ear pain and sore throat.    Eyes:  Negative for pain and visual disturbance.   Respiratory:  Negative for cough and shortness of breath.    Cardiovascular:  Negative for chest pain and palpitations.   Gastrointestinal:  Negative for abdominal pain and vomiting.   Genitourinary:  Negative for dysuria and hematuria.   Musculoskeletal:  Positive for arthralgias (right knee) and gait problem (antalgic). Negative for back pain.   Skin:  Negative for color change and rash.   Neurological:  Negative for seizures and syncope.   Psychiatric/Behavioral: Negative.     All other systems reviewed and are negative.        Objective:  BP Readings from Last 1 Encounters:   04/18/24 126/82      Wt Readings from Last 1 Encounters:   04/18/24 (!) 145 kg (320 lb)      BMI:   Estimated body mass index is 45.92 kg/m² as calculated from the following:    Height as of this encounter: 5' 10\" (1.778 m).    Weight as of this encounter: 145 kg (320 lb).  BSA:   Estimated body surface area is 2.55 meters squared as calculated from the following:    Height as of this encounter: 5' 10\" (1.778 m).    Weight as of this encounter: 145 kg (320 lb).   Physical Exam  Vitals and nursing note reviewed.   Constitutional:       Appearance: Normal appearance. She is well-developed. She is obese.   HENT:      Head: Normocephalic and atraumatic.      Right Ear: External ear normal.      Left Ear: External ear normal.   Eyes:      Extraocular Movements: Extraocular movements intact.      Conjunctiva/sclera: Conjunctivae normal.   Pulmonary:      Effort: Pulmonary effort is normal.   Musculoskeletal:      Cervical back: Neck supple.      Right knee: No effusion.      Instability Tests: Medial Karen test negative and lateral Karen test negative.   Skin:     General: Skin is warm and dry.   Neurological:      Mental Status: She is alert and " oriented to person, place, and time.      Deep Tendon Reflexes: Reflexes are normal and symmetric.   Psychiatric:         Mood and Affect: Mood normal.         Behavior: Behavior normal.       Right Knee Exam     Tenderness   Right knee tenderness location: patellofemoral.    Range of Motion   Extension:  0   Flexion:  120     Tests   Karen:  Medial - negative Lateral - negative  Varus: negative Valgus: negative  Patellar apprehension: negative    Other   Erythema: absent  Scars: absent  Sensation: normal  Pulse: present  Swelling: none  Effusion: no effusion present    Comments:  Patellofemoral joint crepitation with knee motion          Large joint arthrocentesis: R knee  Universal Protocol:  Consent: Verbal consent obtained.  Risks and benefits: risks, benefits and alternatives were discussed  Consent given by: patient  Patient understanding: patient states understanding of the procedure being performed  Supporting Documentation  Indications: pain   Procedure Details  Location: knee - R knee  Preparation: Patient was prepped and draped in the usual sterile fashion  Needle size: 22 G  Approach: anterolateral  Medications administered: 20 mg Sodium Hyaluronate (Viscosup) 20 MG/2ML    Patient tolerance: patient tolerated the procedure well with no immediate complications  Dressing:  Sterile dressing applied           No new imaging was obtained today

## 2024-04-19 ENCOUNTER — TELEPHONE (OUTPATIENT)
Age: 49
End: 2024-04-19

## 2024-04-19 NOTE — TELEPHONE ENCOUNTER
----- Message from Priscilla Lorenzo sent at 4/19/2024  2:38 PM EDT -----  Regarding: Appointment  Contact: [phone]  Olya. Didn’t know that the request was asked for. Been trying to make an appointment but it’s hard for me. Will try n get one asap. Ty

## 2024-04-24 ENCOUNTER — OFFICE VISIT (OUTPATIENT)
Dept: FAMILY MEDICINE CLINIC | Facility: HOSPITAL | Age: 49
End: 2024-04-24
Payer: COMMERCIAL

## 2024-04-24 VITALS
OXYGEN SATURATION: 99 % | BODY MASS INDEX: 41.95 KG/M2 | SYSTOLIC BLOOD PRESSURE: 158 MMHG | WEIGHT: 293 LBS | DIASTOLIC BLOOD PRESSURE: 88 MMHG | HEART RATE: 80 BPM | HEIGHT: 70 IN | TEMPERATURE: 97 F

## 2024-04-24 DIAGNOSIS — E66.01 MORBID OBESITY WITH BODY MASS INDEX (BMI) OF 45.0 TO 49.9 IN ADULT (HCC): Primary | ICD-10-CM

## 2024-04-24 DIAGNOSIS — F33.1 MODERATE EPISODE OF RECURRENT MAJOR DEPRESSIVE DISORDER (HCC): ICD-10-CM

## 2024-04-24 PROCEDURE — 99214 OFFICE O/P EST MOD 30 MIN: CPT | Performed by: FAMILY MEDICINE

## 2024-04-24 RX ORDER — MEDROXYPROGESTERONE ACETATE 150 MG/ML
INJECTION, SUSPENSION INTRAMUSCULAR
Qty: 4 ML | Refills: 2 | Status: SHIPPED | OUTPATIENT
Start: 2024-04-24

## 2024-04-24 RX ORDER — TIRZEPATIDE 2.5 MG/.5ML
2.5 INJECTION, SOLUTION SUBCUTANEOUS WEEKLY
Qty: 2 ML | Refills: 0 | Status: SHIPPED | OUTPATIENT
Start: 2024-04-24 | End: 2024-05-22

## 2024-04-24 NOTE — PROGRESS NOTES
Name: Priscilla Lorenzo      : 1975      MRN: [id number]  Encounter Provider: Maurisio Torres MD  Encounter Date: 2024   Encounter department: [facility] [address]     Assessment & Plan     1. Morbid obesity with body mass index (BMI) of 45.0 to 49.9 in adult (HCC)  -     tirzepatide (Zepbound) 2.5 mg/0.5 mL auto-injector; Inject 0.5 mL (2.5 mg total) under the skin once a week for 28 day    Discussed treatments.   Discussed need for deitary control and regular exercise.     Agree on trial of zepbound.   Start w 2.5 mg weekly.   Fu in 3 months.   Discussed se/ar of medications.   2. Moderate episode of recurrent major depressive disorder (HCC)  Improved. On abilify 2 mg daily and cymbalta 120 mg daily.   No si/hi.   No tremors, no rigidity, no walking difficulty.   No note of se/ar from medications.          Marvin Wells is here for fu of mdd.   Last visit abilify was initiated.   Reports improvement overall of her depression and mood.   No si/hi. No se/ar noted.     Asking about weight loss medications.   Working on improving her diet. Has been trying to do more exercise.         Review of Systems   Constitutional: Negative.  Negative for activity change, appetite change, chills, diaphoresis, fatigue and fever.   HENT:  Negative for congestion, facial swelling and sore throat.    Respiratory: Negative.  Negative for apnea, cough, chest tightness and shortness of breath.    Cardiovascular: Negative.  Negative for chest pain and palpitations.   Gastrointestinal: Negative.  Negative for abdominal distention, abdominal pain, blood in stool, constipation, diarrhea and nausea.   Genitourinary: Negative.  Negative for difficulty urinating, dysuria, flank pain and frequency.       Current Outpatient Medications on File Prior to Visit   Medication Sig   • acetaminophen (TYLENOL) 650 mg CR tablet Take 1 tablet (650 mg total) by mouth every 8 (eight) hours as needed for mild  "pain   • albuterol (Ventolin HFA) 90 mcg/act inhaler Inhale 2 puffs every 6 (six) hours as needed for wheezing   • ARIPiprazole (ABILIFY) 2 mg tablet Take 1 tablet (2 mg total) by mouth daily   • baclofen 10 mg tablet Take 1 tablet (10 mg total) by mouth 3 (three) times a day   • betamethasone valerate (VALISONE) 0.1 % ointment Apply 1 to 2 times a day only as needed to finger/hand rash.   • Calcium Carbonate (CALCIUM 600 PO) Take by mouth daily   • cholecalciferol (VITAMIN D3) 1,000 units tablet Take 2,000 Units by mouth daily   • ciclopirox (LOPROX) 0.77 % cream APPLY 2 TIMES per day to rash on chest for 2-4 weeks.   • DULoxetine (CYMBALTA) 60 mg delayed release capsule Take 2 PO HS.   • etanercept (Enbrel SureClick) 50 MG/ML injection Inject 1 pen (50 mg total) under the skin every 7 days.   • Euflexxa 20 MG/2ML SOSY    • famotidine (PEPCID) 20 mg tablet Take 1 tablet (20 mg total) by mouth 2 (two) times a day   • gabapentin (NEURONTIN) 600 MG tablet Take 1 tablet (600 mg total) by mouth 3 (three) times a day   • HYDROcodone-acetaminophen (NORCO) 5-325 mg per tablet Take 1 PO BID PRN for pain for ongoing therapy   • irbesartan (AVAPRO) 75 mg tablet Take 1 tablet (75 mg total) by mouth daily at bedtime   • levothyroxine (Levoxyl) 200 mcg tablet Take 1 tablet daily in addition to 25 mcg tablet daily   • levothyroxine (Levoxyl) 25 mcg tablet Take 1 tablet daily in addition to 200 mcg tablet daily   • multivitamin (THERAGRAN) TABS Take 1 tablet by mouth daily   • naproxen sodium (ALEVE) 220 MG tablet Take 1 tablet (220 mg total) by mouth 2 (two) times a day with meals   • ondansetron (Zofran ODT) 4 mg disintegrating tablet Take 1 tablet (4 mg total) by mouth every 6 (six) hours as needed for nausea or vomiting   • Potassium 99 MG TABS Take by mouth daily       Objective     /88   Pulse 80   Temp (!) 97 °F (36.1 °C)   Ht 5' 10\" (1.778 m)   Wt (!) 146 kg (321 lb)   SpO2 99%   BMI 46.06 kg/m²     Physical " Exam  Vitals and nursing note reviewed.   Constitutional:       Appearance: Normal appearance. She is well-developed. She is obese.   HENT:      Head: Normocephalic and atraumatic.      Right Ear: External ear normal.      Left Ear: External ear normal.      Nose: Nose normal.   Eyes:      Conjunctiva/sclera: Conjunctivae normal.      Pupils: Pupils are equal, round, and reactive to light.   Neck:      Thyroid: No thyromegaly.      Trachea: No tracheal deviation.   Cardiovascular:      Rate and Rhythm: Normal rate and regular rhythm.      Heart sounds: Normal heart sounds. No murmur heard.  Pulmonary:      Effort: Pulmonary effort is normal. No respiratory distress.      Breath sounds: Normal breath sounds. No wheezing.   Abdominal:      General: Bowel sounds are normal.      Palpations: Abdomen is soft.   Musculoskeletal:         General: Normal range of motion.      Cervical back: Normal range of motion and neck supple.   Skin:     General: Skin is warm and dry.      Capillary Refill: Capillary refill takes less than 2 seconds.   Neurological:      General: No focal deficit present.      Mental Status: She is alert and oriented to person, place, and time.   Psychiatric:         Mood and Affect: Mood normal.         Behavior: Behavior normal.         Thought Content: Thought content normal.         Judgment: Judgment normal.       Maurisio Torres MD

## 2024-04-24 NOTE — TELEPHONE ENCOUNTER
As noted in separate phone encounter, patient needs to establish with a new rheumatologist    Please have her schedule with either Dr. Helms or myself    In the meantime will provide a three month refill to avoid lapse in treatment, as Enbrel often loses effectiveness if stopped and then restarted

## 2024-04-25 ENCOUNTER — PROCEDURE VISIT (OUTPATIENT)
Dept: OBGYN CLINIC | Facility: CLINIC | Age: 49
End: 2024-04-25
Payer: COMMERCIAL

## 2024-04-25 VITALS
WEIGHT: 293 LBS | SYSTOLIC BLOOD PRESSURE: 122 MMHG | BODY MASS INDEX: 41.95 KG/M2 | DIASTOLIC BLOOD PRESSURE: 80 MMHG | HEIGHT: 70 IN

## 2024-04-25 DIAGNOSIS — M17.11 PRIMARY OSTEOARTHRITIS OF RIGHT KNEE: Primary | ICD-10-CM

## 2024-04-25 PROCEDURE — 20610 DRAIN/INJ JOINT/BURSA W/O US: CPT | Performed by: PHYSICIAN ASSISTANT

## 2024-04-25 RX ORDER — HYALURONATE SODIUM 10 MG/ML
20 SYRINGE (ML) INTRAARTICULAR
Status: COMPLETED | OUTPATIENT
Start: 2024-04-25 | End: 2024-04-25

## 2024-04-25 RX ADMIN — Medication 20 MG: at 17:15

## 2024-04-25 NOTE — PROGRESS NOTES
Assessment:     1. Primary osteoarthritis of right knee          Plan:     Problem List Items Addressed This Visit          Musculoskeletal and Integument    Primary osteoarthritis of right knee - Primary     Findings consistent with right knee osteoarthritis.  Findings and treatment options were discussed with the patient.  The 2nd of 3 right knee Euflexxa injections were given today.  She tolerated the procedure well.  Advised to apply cold compress today.  Follow-up in 1 week for the third injection.  All questions were answered to patient's satisfaction.         Relevant Medications    Sodium Hyaluronate (Viscosup) 20 mg (Completed) (Start on 4/25/2024  5:15 PM)    Other Relevant Orders    Large joint arthrocentesis: R knee (Completed)           Subjective:     Patient ID: Priscilla Lorenzo is a 48 y.o. female.  Chief Complaint:  This is a 48-year-old white female following up for right knee osteoarthritis.  She is here for the second of 3 right knee Euflexxa injections.  No issues after the first injection.  She continues to have soreness and pulling sensation the posterior aspect of the knee.  She has been trying to stretch her hamstrings.       Allergy:  Allergies   Allergen Reactions    Acetaminophen-Codeine      Pt states she does not remember having a reaction to this medication    Hydrocodone Other (See Comments)     GI issues    Latex Hives     Latex powder      Lisinopril Other (See Comments)     Itching, gi intollerance      Prednisolone Vomiting     Medications:  all current active meds have been reviewed  Past Medical History:  Past Medical History:   Diagnosis Date    Allergic     Anxiety     Arthritis     Asthma     Chronic pain disorder     upper back    Depression     Disc disorder     Disease of thyroid gland     Fibromyalgia, primary     GERD (gastroesophageal reflux disease)     Headache(784.0)     HPV (human papilloma virus) infection     cervical    Hypertension     Hypothyroidism      Inflammatory bowel disease     Migraine     Obesity     Stomach disorder      Past Surgical History:  Past Surgical History:   Procedure Laterality Date    CERVICAL BIOPSY  W/ LOOP ELECTRODE EXCISION      CERVICAL FUSION  2015     SECTION      CHOLECYSTECTOMY      lap    FOOT SURGERY Bilateral     multiple, posterior calcaneal bone spur    KNEE SURGERY Left     arthroscopy with debribement    LYMPH NODE BIOPSY      [place] [facility] WRST SURG W/RLS TRANSVRS CARPL LIGM Right 2023    Procedure: Right endoscopic carpal tunnel release;  Surgeon: Cl Mac MD;  Location: [facility];  Service: Orthopedics    SPINE SURGERY      TENDON REPAIR      TUBAL LIGATION Bilateral      Family History:  Family History   Adopted: Yes   Problem Relation Age of Onset    Thyroid disease Mother     Arthritis Mother         Mom, dad, aunts, grandmother    Autoimmune disease Mother     No Known Problems Father     Coronary artery disease Maternal Grandmother     Breast cancer Maternal Grandmother         age unknown    Heart disease Maternal Grandmother         Mother, Aunt, Grandmother, Father    Coronary artery disease Maternal Aunt     Stroke Maternal Aunt         Chika, brothers    No Known Problems Maternal Grandfather     No Known Problems Paternal Grandmother     No Known Problems Paternal Grandfather     Suicide Attempts Maternal Aunt     Heart murmur Maternal Aunt     Hypothyroidism Maternal Aunt     Hashimoto's thyroiditis Maternal Aunt     Diabetes unspecified Brother     Diabetes Brother     No Known Problems Brother     No Known Problems Brother     ADD / ADHD Son      Social History:  Social History     Substance and Sexual Activity   Alcohol Use Not Currently     Social History     Substance and Sexual Activity   Drug Use No     Social History     Tobacco Use   Smoking Status Every Day    Current packs/day: 0.50    Average packs/day: 0.5 packs/day for 35.0 years (17.5 ttl pk-yrs)    Types: Cigarettes  "  Smokeless Tobacco Never   Tobacco Comments    actually about 3/4 PPD     Review of Systems   Constitutional:  Negative for chills and fever.   HENT:  Negative for ear pain and sore throat.    Eyes:  Negative for pain and visual disturbance.   Respiratory:  Negative for cough and shortness of breath.    Cardiovascular:  Negative for chest pain and palpitations.   Gastrointestinal:  Negative for abdominal pain and vomiting.   Genitourinary:  Negative for dysuria and hematuria.   Musculoskeletal:  Positive for arthralgias (right knee) and gait problem (antalgic). Negative for back pain.   Skin:  Negative for color change and rash.   Neurological:  Negative for seizures and syncope.   Psychiatric/Behavioral: Negative.     All other systems reviewed and are negative.        Objective:  BP Readings from Last 1 Encounters:   04/25/24 122/80      Wt Readings from Last 1 Encounters:   04/25/24 (!) 146 kg (321 lb)      BMI:   Estimated body mass index is 46.06 kg/m² as calculated from the following:    Height as of this encounter: 5' 10\" (1.778 m).    Weight as of this encounter: 146 kg (321 lb).  BSA:   Estimated body surface area is 2.56 meters squared as calculated from the following:    Height as of this encounter: 5' 10\" (1.778 m).    Weight as of this encounter: 146 kg (321 lb).   Physical Exam  Vitals and nursing note reviewed.   Constitutional:       Appearance: Normal appearance. She is well-developed. She is obese.   HENT:      Head: Normocephalic and atraumatic.      Right Ear: External ear normal.      Left Ear: External ear normal.   Eyes:      Extraocular Movements: Extraocular movements intact.      Conjunctiva/sclera: Conjunctivae normal.   Pulmonary:      Effort: Pulmonary effort is normal.   Musculoskeletal:      Cervical back: Neck supple.      Right knee: No effusion.      Instability Tests: Medial Karen test negative and lateral Karen test negative.   Skin:     General: Skin is warm and dry. "   Neurological:      Mental Status: She is alert and oriented to person, place, and time.      Deep Tendon Reflexes: Reflexes are normal and symmetric.   Psychiatric:         Mood and Affect: Mood normal.         Behavior: Behavior normal.       Right Knee Exam     Tenderness   Right knee tenderness location: patellofemoral.    Range of Motion   Extension:  0   Flexion:  120     Tests   Karen:  Medial - negative Lateral - negative  Varus: negative Valgus: negative  Patellar apprehension: negative    Other   Erythema: absent  Scars: absent  Sensation: normal  Pulse: present  Swelling: none  Effusion: no effusion present    Comments:  Patellofemoral joint crepitation with knee motion          Large joint arthrocentesis: R knee  Universal Protocol:  Consent: Verbal consent obtained.  Risks and benefits: risks, benefits and alternatives were discussed  Consent given by: patient  Patient understanding: patient states understanding of the procedure being performed  Supporting Documentation  Indications: pain   Procedure Details  Location: knee - R knee  Preparation: Patient was prepped and draped in the usual sterile fashion  Needle size: 22 G  Approach: anterolateral  Medications administered: 20 mg Sodium Hyaluronate (Viscosup) 20 MG/2ML    Patient tolerance: patient tolerated the procedure well with no immediate complications  Dressing:  Sterile dressing applied           No new imaging was obtained today

## 2024-04-29 NOTE — PATIENT INSTRUCTIONS
Weight Management   AMBULATORY CARE:   Why it is important to manage your weight:  Being overweight increases your risk of health conditions such as heart disease, high blood pressure, type 2 diabetes, and certain types of cancer. It can also increase your risk for osteoarthritis, sleep apnea, and other respiratory problems. Aim for a slow, steady weight loss. Even a small amount of weight loss can lower your risk of health problems.  Risks of being overweight:  Extra weight can cause many health problems, including the following:  Diabetes (high blood sugar level)    High blood pressure or high cholesterol    Heart disease    Stroke    Gallbladder or liver disease    Cancer of the colon, breast, prostate, liver, or kidney    Sleep apnea    Arthritis or gout    Screening  is done to check for health conditions before you have signs or symptoms. If you are 35 to 70 years old, your blood sugar level may be checked every 3 years for signs of prediabetes or diabetes. Your healthcare provider will check your blood pressure at each visit. High blood pressure can lead to a stroke or other problems. Your provider may check for signs of heart disease, cancer, or other health problems.  How to lose weight safely:  A safe and healthy way to lose weight is to eat fewer calories and get regular exercise.  You can lose up about 1 pound a week by decreasing the number of calories you eat by 500 calories each day. You can decrease calories by eating smaller portion sizes or by cutting out high-calorie foods. Read labels to find out how many calories are in the foods you eat.         You can also burn calories with exercise such as walking, swimming, or biking. You will be more likely to keep weight off if you make these changes part of your lifestyle. Exercise at least 30 minutes per day on most days of the week. You can also fit in more physical activity by taking the stairs instead of the elevator or parking farther away from  stores. Ask your healthcare provider about the best exercise plan for you.       Healthy meal plan for weight management:  A healthy meal plan includes a variety of foods, contains fewer calories, and helps you stay healthy. A healthy meal plan includes the following:     Eat whole-grain foods more often.  A healthy meal plan should contain fiber. Fiber is the part of grains, fruits, and vegetables that is not broken down by your body. Whole-grain foods are healthy and provide extra fiber in your diet. Some examples of whole-grain foods are whole-wheat breads and pastas, oatmeal, brown rice, and bulgur.    Eat a variety of vegetables every day.  Include dark, leafy greens such as spinach, kale, jose guadalupe greens, and mustard greens. Eat yellow and orange vegetables such as carrots, sweet potatoes, and winter squash.     Eat a variety of fruits every day.  Choose fresh or canned fruit (canned in its own juice or light syrup) instead of juice. Fruit juice has very little or no fiber.    Eat low-fat dairy foods.  Drink fat-free (skim) milk or 1% milk. Eat fat-free yogurt and low-fat cottage cheese. Try low-fat cheeses such as mozzarella and other reduced-fat cheeses.    Choose meat and other protein foods that are low in fat.  Choose beans or other legumes such as split peas or lentils. Choose fish, skinless poultry (chicken or turkey), or lean cuts of red meat (beef or pork). Before you cook meat or poultry, cut off any visible fat.     Use less fat and oil.  Try baking foods instead of frying them. Add less fat, such as margarine, sour cream, regular salad dressing and mayonnaise to foods. Eat fewer high-fat foods. Some examples of high-fat foods include french fries, doughnuts, ice cream, and cakes.    Eat fewer sweets.  Limit foods and drinks that are high in sugar. This includes candy, cookies, regular soda, and sweetened drinks.  Ways to decrease calories:   Eat smaller portions.     Use a small plate with smaller  servings.    Do not eat second helpings.    When you eat at a restaurant, ask for a box and place half of your meal in the box before you eat.    Share an entrée with someone else.    Replace high-calorie snacks with healthy, low-calorie snacks.     Choose fresh fruit, vegetables, fat-free rice cakes, or air-popped popcorn instead of potato chips, nuts, or chocolate.    Choose water or calorie-free drinks instead of soda or sweetened drinks.    Do not shop for groceries when you are hungry.  You may be more likely to make unhealthy food choices. Take a grocery list of healthy foods and shop after you have eaten.    Eat regular meals. Do not skip meals. Skipping meals can lead to overeating later in the day. This can make it harder for you to lose weight. Eat a healthy snack in place of a meal if you do not have time to eat a regular meal. Talk with a dietitian to help you create a meal plan and schedule that is right for you.    Other things to consider as you try to lose weight:   Be aware of situations that may give you the urge to overeat, such as eating while watching television. Find ways to avoid these situations. For example, read a book, go for a walk, or do crafts.    Meet with a weight loss support group or friends who are also trying to lose weight. This may help you stay motivated to continue working on your weight loss goals.    © Copyright [employer] 2023 Information is for End User's use only and may not be sold, redistributed or otherwise used for commercial purposes.  The above information is an  only. It is not intended as medical advice for individual conditions or treatments. Talk to your doctor, nurse or pharmacist before following any medical regimen to see if it is safe and effective for you.    Calorie Counting Diet   WHAT YOU NEED TO KNOW:   What is a calorie counting diet?  It is a meal plan based on counting calories each day to reach a healthy body weight. You will need to  eat fewer calories if you are trying to lose weight. Weight loss may decrease your risk for certain health problems or improve your health if you have health problems. Some of these health problems include heart disease, high blood pressure, and diabetes.   What foods should I avoid?  Your dietitian will tell you if you need to avoid certain foods based on your body weight and health condition. You may need to avoid high-fat foods if you are at risk for or have heart disease. You may need to eat fewer foods from the breads and starches food group if you have diabetes.   How many calories are in foods?  The following is a list of foods and drinks with the approximate number of calories in each. Check the food label to find the exact number of calories. A dietitian can tell you how many calories you should have from each food group each day.  Carbohydrate:      ½ of a 3-inch bagel, 1 slice of bread, or ½ of a hamburger bun or hot dog bun (80)    1 (8-inch) flour tortilla or ½ cup of cooked rice (100)    1 (6-inch) corn tortilla (80)    1 (6-inch) pancake or 1 cup of bran flakes cereal (110)    ½ cup of cooked cereal (80)    ½ cup of cooked pasta (85)    1 ounce of pretzels (100)    3 cups of air-popped popcorn without butter or oil (80)    Dairy:      1 cup of skim or 1% milk (90)    1 cup of 2% milk (120)    1 cup of whole milk (160)    1 cup of 2% chocolate milk (220)    1 ounce of low-fat cheese with 3 grams of fat per ounce (70)    1 ounce of cheddar cheese (114)    ½ cup of 1% fat cottage cheese (80)    1 cup of plain or sugar-free, fat-free yogurt (90)    Protein foods:      3 ounces of fish (not breaded or fried) (95)    3 ounces of breaded, fried fish (195)    ¾ cup of tuna canned in water (105)    3 ounces of chicken breast without skin (105)    1 fried chicken breast with skin (350)    ¼ cup of fat free egg substitute (40)    1 large egg (75)    3 ounces of lean beef or pork (165)    3 ounces of fried pork  chop or ham (185)    ½ cup of cooked dried beans, such as kidney, cid, lentils, or navy (115)    3 ounces of bologna or lunch meat (225)    2 links of breakfast sausage (140)    Vegetables:      ½ cup of sliced mushrooms (10)    1 cup of salad greens, such as lettuce, spinach, or zaid (15)    ½ cup of steamed asparagus (20)    ½ cup of cooked summer squash, zucchini squash, or green or wax beans (25)    1 cup of broccoli or cauliflower florets, or 1 medium tomato (25)    1 large raw carrot or ½ cup of cooked carrots (40)    ? of a medium cucumber or 1 stalk of celery (5)    1 small baked potato (160)    1 cup of breaded, fried vegetables (230)    Fruit:      1 (6-inch) banana (55)     ½ of a 4-inch grapefruit (55)    15 grapes (60)    1 medium orange or apple (70)    1 large peach (65)    1 cup of fresh pineapple chunks (75)    1 cup of melon cubes (50)    1¼ cups of whole strawberries (45)    ½ cup of fruit canned in juice (55)    ½ cup of fruit canned in heavy syrup (110)    ? cup of raisins (130)    ½ cup of unsweetened fruit juice (60)    ½ cup of grape, cranberry, or prune juice (90)    Fat:      10 peanuts or 2 teaspoons of peanut butter (55)    2 tablespoons of avocado or 1 tablespoon of regular salad dressing (45)    2 slices of benítez (90)    1 teaspoon of oil, such as safflower, canola, corn, or olive oil (45)    2 teaspoons of low-fat margarine, or 1 tablespoon of low-fat mayonnaise (50)    1 teaspoon of regular margarine (40)    1 tablespoon of regular mayonnaise (135)    1 tablespoon of cream cheese or 2 tablespoons of low-fat cream cheese (45)    2 tablespoons of vegetable shortening (215)    Dessert and sweets:      8 animal crackers or 5 vanilla wafers (80)    1 frozen fruit juice bar (80)    ½ cup of ice milk or low-fat frozen yogurt (90)    ½ cup of sherbet or sorbet (125)    ½ cup of sugar-free pudding or custard (60)    ½ cup of ice cream (140)    ½ cup of pudding or custard (175)    1  (2-inch) square chocolate brownie (185)    Combination foods:      Bean burrito made with an 8-inch tortilla, without cheese (275)    Chicken breast sandwich with lettuce and tomato (325)    1 cup of chicken noodle soup (60)    1 beef taco (175)    Regular hamburger with lettuce and tomato (310)    Regular cheeseburger with lettuce and tomato (410)     ¼ of a 12-inch cheese pizza (280)    Fried fish sandwich with lettuce and tomato (425)    Hot dog and bun (275)    1½ cups of macaroni and cheese (310)    Taco salad with a fried tortilla shell (870)    Low-calorie foods:      1 tablespoon of ketchup or 1 tablespoon of fat free sour cream (15)    1 teaspoon of mustard (5)    ¼ cup of salsa (20)    1 large dill pickle (15)    1 tablespoon of fat free salad dressing (10)    2 teaspoons of low-sugar, light jam or jelly, or 1 tablespoon of sugar-free syrup (15)    1 sugar-free popsicle (15)    1 cup of club soda, seltzer water, or diet soda (0)    CARE AGREEMENT:   You have the right to help plan your care. Discuss treatment options with your healthcare provider to decide what care you want to receive. You always have the right to refuse treatment. The above information is an  only. It is not intended as medical advice for individual conditions or treatments. Talk to your doctor, nurse or pharmacist before following any medical regimen to see if it is safe and effective for you.  © Copyright [employer] 2023 Information is for End User's use only and may not be sold, redistributed or otherwise used for commercial purposes.    Mediterranean Diet   AMBULATORY CARE:   A Mediterranean diet  is a meal plan that includes foods that are commonly eaten in countries that border the [place]. This meal plan may provide several health benefits. These include losing or maintaining weight, and decreasing blood pressure, blood sugar, and cholesterol levels. It may also help protect against certain health conditions  such as heart disease, cancer, type 2 diabetes, and Alzheimer disease. Work with a dietitian to develop a meal plan that is right for you.  Foods to include in the Mediterranean diet:   Include fruits and vegetables in each meal.  Eat a variety of fresh fruits and vegetables.    Choose whole grains every day.  These foods include whole-grain breads, pastas, and cereals. It also includes brown rice, quinoa, and millet.    Use unsaturated fats instead of saturated fats.  Cook with olive or canola oil. Limit saturated fats, such as butter, margarine, and shortening. Saturated fat is an unhealthy fat that can increase your cholesterol levels.    Choose plant foods, poultry, and fish as your main sources of protein.      Eat plant-based foods that provide protein,  such as lentils, beans, chickpeas, nuts, and seeds. Choose mostly plant-based foods in place of meat on most days of the week.    Eat protein foods high in omega-3 fats.  Fish high in omega-3 fats include salmon, trout, and tuna. Include these types of fish 1 or 2 times each week. Limit fish high in mercury, such as shark, swordfish, tilefish, and shun mackerel. Omega-3 fats are also found in walnuts and flaxseed.         Choose poultry (chicken or turkey)  without skin instead of red meat. Red meat is high in saturated fat. Limit eggs and high-fat meats, such as benítez, sausage, and hot dogs.    Choose low-fat dairy foods  such as nonfat or 1% milk, or low-fat almond, cashew, or soy milk. Other examples include low-fat cheese, yogurt, and cottage cheese.    Limit sweets.  Limit your intake of high-sugar foods, such as soda, desserts, and candy.    Talk to your healthcare provider about alcohol.  Studies have shown that moderate intake of wine may reduce the risk of heart disease. A moderate amount of wine is 1 serving for women and men 65 years and older each day. Two servings is recommended for men 21 to 64 years of age each day. A serving of wine is 5  ounces.    Other things you need to know if you follow the Mediterranean diet:   Include foods high in iron and vitamin C.  Plant-based foods that are high in iron include spinach, beans, tofu, and artichoke. Eat a serving of vitamin C with any iron-rich food to help your body absorb more iron. Examples include oranges, strawberries, cantaloupe, broccoli, and yellow peppers.         Get regular physical activity.  The Mediterranean diet will have the most benefit if you get regular physical activity. Get 30 minutes of physical activity at least 5 days a week. Choose physical activities that increase your heart rate. Examples include walking, hiking, swimming, and riding a bike. Ask your healthcare provider about the best exercise plan for you.       © Copyright [employer] 2023 Information is for End User's use only and may not be sold, redistributed or otherwise used for commercial purposes.  The above information is an  only. It is not intended as medical advice for individual conditions or treatments. Talk to your doctor, nurse or pharmacist before following any medical regimen to see if it is safe and effective for you.    Shopping for a Healthy Diet   AMBULATORY CARE:   Why shopping for healthy foods is important:  You may be more likely to follow a healthy meal plan if you have healthy foods available in your home. A healthy meal plan includes a variety of healthy foods from all the food groups. It is also low in unhealthy fats, salt, and added sugar. Healthy foods may decrease your risk for heart disease, osteoporosis (brittle bones), and some types of cancer.       Healthy meal plan:  My Plate is a model for planning healthy meals. It shows the types and amounts of foods that should go on your plate. Fruits and vegetables make up about half of your plate, and grains and protein make up the other half. A serving of dairy is also included. The amount of calories and serving sizes you need depends  on your age, gender, weight, and height. Examples of healthy foods are listed below:  Eat a variety of vegetables  such as dark green, red, and orange vegetables. You can also include canned vegetables low in sodium (salt) and frozen vegetables without added butter or sauces.    Eat a variety of fresh fruits , canned fruit in 100% juice, frozen fruit, and dried fruit.    Include whole grains.  At least half of the grains you eat should be whole grains. Examples include whole wheat bread, wheat pasta, brown rice, and whole grain cereals such as oatmeal.    Eat a variety of protein foods such as seafood (fish and shellfish), lean meat, and poultry without skin (turkey and chicken). Examples of lean meats include pork leg, shoulder, or tenderloin, and beef round, sirloin, tenderloin, and extra lean ground beef. Other protein foods include eggs and egg substitutes, beans, peas, soy products, nuts, and seeds.    Choose low-fat dairy products such as skim or 1% milk or low-fat yogurt, cheese, and cottage cheese.       What to do before you go shopping:   Plan your meals.      Plan your shopping around healthy meals and recipes that you will prepare for the week. Switch ingredients in recipes to lower the total fat and calorie content. For example, if a recipe calls for milk, you can add nonfat or 1% milk instead of whole milk. Use egg substitute in a recipe that calls for whole eggs. Make a list of the foods you will need for your planned meals.    Plan healthy meals that can be made quickly on days when you are extra busy. Another idea is to plan meals that you can make in large batches and freeze. You can thaw and eat these meals on days when you do not have time to cook. For example, spaghetti can be made in several batches at once and frozen.    Find ways to make affordable healthy choices.  Look through supermarket ads for sales. Buy fruits and vegetables when they are in season or buy them at your local farmer's  market.    Eat before you go shopping.  Shopping on an empty stomach may cause you to buy unhealthy foods because you feel hungry.    Tips for making healthy choices while you are shopping:   Know where to find healthy foods in the grocery store.  Go to the food sections that are found along the walls of the store first. Healthy foods, such as fruits, vegetables, bread, dairy products, meat, and fish, are usually placed in these areas. The inner aisles have other healthy foods, such as canned and frozen fruits and vegetables, and cereals. However, there are also other less healthy foods in the inner aisles. Some of these foods include packaged foods, snack foods, and desserts. Shopping in the inner aisles last may help you buy fewer of these foods.    Read food labels.  Use the nutrition information on food labels to help you make healthier food choices when you shop.    The serving size  is usually listed in cups or pieces and may include a weight (grams, ounces). It also shows the number of servings that are in a package. Compare the amount that you will eat to the serving size listed. If the package contains 2 servings and you eat the whole package, then you will eat twice the amount of calories and nutrients listed.    The nutrients  listed on the top section of the label are fat, saturated fat, trans fat, cholesterol, and sodium. Limit these nutrients because eating too much may increase your risk of certain diseases. The total carbohydrates, fiber, and sugars are also listed. The nutrients you should try to get enough of include dietary fiber, vitamin A, vitamin C, calcium, and iron. These nutrients may help you to reduce your risk of diseases such as osteoporosis and heart disease.    The % daily value  (DV) listed on the food label next to the nutrients tells you if a food is low or high in these nutrients. A percent DV of 5% or less means that the food is low in that nutrient. A percent DV of 20% or more  means that the food is high in that nutrient.    Keep food safety in mind.  Keep raw, cooked, and ready-to-eat foods separate in your shopping cart. Place raw seafood, meat, and poultry in plastic bags to prevent leakage of juices to other foods. Foods that need to be refrigerated or frozen should be taken home and stored within 2 hours. This prevents the growth of bacteria that can lead to food poisoning.    © Copyright [employer] 2023 Information is for End User's use only and may not be sold, redistributed or otherwise used for commercial purposes.  The above information is an  only. It is not intended as medical advice for individual conditions or treatments. Talk to your doctor, nurse or pharmacist before following any medical regimen to see if it is safe and effective for you.

## 2024-05-01 ENCOUNTER — PATIENT MESSAGE (OUTPATIENT)
Dept: PAIN MEDICINE | Facility: CLINIC | Age: 49
End: 2024-05-01

## 2024-05-01 DIAGNOSIS — M54.16 LUMBAR RADICULOPATHY: Primary | ICD-10-CM

## 2024-05-01 RX ORDER — METHYLPREDNISOLONE 4 MG/1
TABLET ORAL
Qty: 21 TABLET | Refills: 0 | Status: SHIPPED | OUTPATIENT
Start: 2024-05-01

## 2024-05-04 DIAGNOSIS — K21.9 GASTROESOPHAGEAL REFLUX DISEASE WITHOUT ESOPHAGITIS: ICD-10-CM

## 2024-05-04 RX ORDER — FAMOTIDINE 20 MG/1
20 TABLET, FILM COATED ORAL 2 TIMES DAILY
Qty: 180 TABLET | Refills: 1 | Status: SHIPPED | OUTPATIENT
Start: 2024-05-04

## 2024-05-07 ENCOUNTER — TELEPHONE (OUTPATIENT)
Age: 49
End: 2024-05-07

## 2024-05-07 NOTE — TELEPHONE ENCOUNTER
Patient called regarding zepbound. Pharmacy called and they need a prior authorization. Please advise.

## 2024-05-09 ENCOUNTER — PROCEDURE VISIT (OUTPATIENT)
Dept: OBGYN CLINIC | Facility: CLINIC | Age: 49
End: 2024-05-09
Payer: COMMERCIAL

## 2024-05-09 VITALS
HEIGHT: 70 IN | SYSTOLIC BLOOD PRESSURE: 130 MMHG | WEIGHT: 293 LBS | DIASTOLIC BLOOD PRESSURE: 82 MMHG | BODY MASS INDEX: 41.95 KG/M2

## 2024-05-09 DIAGNOSIS — M17.11 PRIMARY OSTEOARTHRITIS OF RIGHT KNEE: Primary | ICD-10-CM

## 2024-05-09 PROCEDURE — 20610 DRAIN/INJ JOINT/BURSA W/O US: CPT | Performed by: PHYSICIAN ASSISTANT

## 2024-05-09 RX ORDER — HYALURONATE SODIUM 10 MG/ML
20 SYRINGE (ML) INTRAARTICULAR
Status: COMPLETED | OUTPATIENT
Start: 2024-05-09 | End: 2024-05-09

## 2024-05-09 RX ADMIN — Medication 20 MG: at 17:30

## 2024-05-09 NOTE — PROGRESS NOTES
Assessment:     1. Primary osteoarthritis of right knee          Plan:     Problem List Items Addressed This Visit          Musculoskeletal and Integument    Primary osteoarthritis of right knee - Primary     Findings consistent with right knee osteoarthritis.  Findings and treatment options were discussed with the patient.  The 3rd of 3 right knee Euflexxa injections were given today.  She tolerated the procedure well.  Advised to apply cold compress today.  Follow-up as needed if symptoms return.  Advised patient the soonest she can have another series is in 6 months.  All questions were answered to patient's satisfaction.         Relevant Medications    Sodium Hyaluronate (Viscosup) 20 mg (Completed) (Start on 5/9/2024  5:30 PM)    Other Relevant Orders    Large joint arthrocentesis: R knee (Completed)           Subjective:     Patient ID: Priscilla Lorenzo is a 48 y.o. female.  Chief Complaint:  This is a 48-year-old white female following up for right knee osteoarthritis.  She is here for the 3rd of 3 right knee Euflexxa injections.  No issues after the second injection.        Allergy:  Allergies   Allergen Reactions    Acetaminophen-Codeine      Pt states she does not remember having a reaction to this medication    Hydrocodone Other (See Comments)     GI issues    Latex Hives     Latex powder      Lisinopril Other (See Comments)     Itching, gi intollerance      Prednisolone Vomiting     Medications:  all current active meds have been reviewed  Past Medical History:  Past Medical History:   Diagnosis Date    Allergic     Anxiety     Arthritis     Asthma     Chronic pain disorder     upper back    Depression     Disc disorder     Disease of thyroid gland     Fibromyalgia, primary     GERD (gastroesophageal reflux disease)     Headache(784.0)     HPV (human papilloma virus) infection     cervical    Hypertension     Hypothyroidism     Inflammatory bowel disease     Migraine     Obesity     Stomach disorder       Past Surgical History:  Past Surgical History:   Procedure Laterality Date    CERVICAL BIOPSY  W/ LOOP ELECTRODE EXCISION      CERVICAL FUSION  2015     SECTION      CHOLECYSTECTOMY      lap    FOOT SURGERY Bilateral     multiple, posterior calcaneal bone spur    KNEE SURGERY Left     arthroscopy with debribement    LYMPH NODE BIOPSY      SD NDSC WRST SURG W/RLS TRANSVRS CARPL LIGM Right 2023    Procedure: Right endoscopic carpal tunnel release;  Surgeon: Cl Mac MD;  Location: [facility];  Service: Orthopedics    SPINE SURGERY      TENDON REPAIR      TUBAL LIGATION Bilateral      Family History:  Family History   Adopted: Yes   Problem Relation Age of Onset    Thyroid disease Mother     Arthritis Mother         Mom, dad, aunts, grandmother    Autoimmune disease Mother     No Known Problems Father     Coronary artery disease Maternal Grandmother     Breast cancer Maternal Grandmother         age unknown    Heart disease Maternal Grandmother         Mother, Aunt, Grandmother, Father    Coronary artery disease Maternal Aunt     Stroke Maternal Aunt         Chika, brothers    No Known Problems Maternal Grandfather     No Known Problems Paternal Grandmother     No Known Problems Paternal Grandfather     Suicide Attempts Maternal Aunt     Heart murmur Maternal Aunt     Hypothyroidism Maternal Aunt     Hashimoto's thyroiditis Maternal Aunt     Diabetes unspecified Brother     Diabetes Brother     No Known Problems Brother     No Known Problems Brother     ADD / ADHD Son      Social History:  Social History     Substance and Sexual Activity   Alcohol Use Not Currently     Social History     Substance and Sexual Activity   Drug Use No     Social History     Tobacco Use   Smoking Status Every Day    Current packs/day: 0.50    Average packs/day: 0.5 packs/day for 35.0 years (17.5 ttl pk-yrs)    Types: Cigarettes   Smokeless Tobacco Never   Tobacco Comments    actually about 3/4 PPD  "    Review of Systems   Constitutional:  Negative for chills and fever.   HENT:  Negative for ear pain and sore throat.    Eyes:  Negative for pain and visual disturbance.   Respiratory:  Negative for cough and shortness of breath.    Cardiovascular:  Negative for chest pain and palpitations.   Gastrointestinal:  Negative for abdominal pain and vomiting.   Genitourinary:  Negative for dysuria and hematuria.   Musculoskeletal:  Positive for arthralgias (right knee) and gait problem (antalgic). Negative for back pain.   Skin:  Negative for color change and rash.   Neurological:  Negative for seizures and syncope.   Psychiatric/Behavioral: Negative.     All other systems reviewed and are negative.        Objective:  BP Readings from Last 1 Encounters:   05/09/24 130/82      Wt Readings from Last 1 Encounters:   05/09/24 (!) 146 kg (321 lb)      BMI:   Estimated body mass index is 46.06 kg/m² as calculated from the following:    Height as of this encounter: 5' 10\" (1.778 m).    Weight as of this encounter: 146 kg (321 lb).  BSA:   Estimated body surface area is 2.56 meters squared as calculated from the following:    Height as of this encounter: 5' 10\" (1.778 m).    Weight as of this encounter: 146 kg (321 lb).   Physical Exam  Vitals and nursing note reviewed.   Constitutional:       Appearance: Normal appearance. She is well-developed. She is obese.   HENT:      Head: Normocephalic and atraumatic.      Right Ear: External ear normal.      Left Ear: External ear normal.   Eyes:      Extraocular Movements: Extraocular movements intact.      Conjunctiva/sclera: Conjunctivae normal.   Pulmonary:      Effort: Pulmonary effort is normal.   Musculoskeletal:      Cervical back: Neck supple.      Right knee: No effusion.      Instability Tests: Medial Karen test negative and lateral Karen test negative.   Skin:     General: Skin is warm and dry.   Neurological:      Mental Status: She is alert and oriented to person, " place, and time.      Deep Tendon Reflexes: Reflexes are normal and symmetric.   Psychiatric:         Mood and Affect: Mood normal.         Behavior: Behavior normal.       Right Knee Exam     Tenderness   Right knee tenderness location: patellofemoral.    Range of Motion   Extension:  0   Flexion:  120     Tests   Karen:  Medial - negative Lateral - negative  Varus: negative Valgus: negative  Patellar apprehension: negative    Other   Erythema: absent  Scars: absent  Sensation: normal  Pulse: present  Swelling: none  Effusion: no effusion present    Comments:  Patellofemoral joint crepitation with knee motion          Large joint arthrocentesis: R knee  Universal Protocol:  Consent: Verbal consent obtained.  Risks and benefits: risks, benefits and alternatives were discussed  Consent given by: patient  Patient understanding: patient states understanding of the procedure being performed  Supporting Documentation  Indications: pain   Procedure Details  Location: knee - R knee  Preparation: Patient was prepped and draped in the usual sterile fashion  Needle size: 22 G  Approach: anterolateral  Medications administered: 20 mg Sodium Hyaluronate (Viscosup) 20 MG/2ML    Patient tolerance: patient tolerated the procedure well with no immediate complications  Dressing:  Sterile dressing applied           No new imaging was obtained today

## 2024-05-09 NOTE — ASSESSMENT & PLAN NOTE
Findings consistent with right knee osteoarthritis.  Findings and treatment options were discussed with the patient.  The 3rd of 3 right knee Euflexxa injections were given today.  She tolerated the procedure well.  Advised to apply cold compress today.  Follow-up as needed if symptoms return.  Advised patient the soonest she can have another series is in 6 months.  All questions were answered to patient's satisfaction.

## 2024-05-13 ENCOUNTER — HOSPITAL ENCOUNTER (OUTPATIENT)
Dept: NEUROLOGY | Facility: CLINIC | Age: 49
Discharge: HOME | End: 2024-05-13
Payer: COMMERCIAL

## 2024-05-13 DIAGNOSIS — M54.16 LUMBAR RADICULOPATHY: ICD-10-CM

## 2024-05-13 PROCEDURE — 95910 NRV CNDJ TEST 7-8 STUDIES: CPT | Performed by: PSYCHIATRY & NEUROLOGY

## 2024-05-13 PROCEDURE — 95886 MUSC TEST DONE W/N TEST COMP: CPT | Performed by: PSYCHIATRY & NEUROLOGY

## 2024-05-14 DIAGNOSIS — F32.1 CURRENT MODERATE EPISODE OF MAJOR DEPRESSIVE DISORDER, UNSPECIFIED WHETHER RECURRENT (HCC): ICD-10-CM

## 2024-05-16 RX ORDER — ARIPIPRAZOLE 2 MG/1
2 TABLET ORAL DAILY
Qty: 30 TABLET | Refills: 0 | Status: SHIPPED | OUTPATIENT
Start: 2024-05-16 | End: 2024-06-15

## 2024-05-17 ENCOUNTER — TELEPHONE (OUTPATIENT)
Dept: PAIN MEDICINE | Facility: CLINIC | Age: 49
End: 2024-05-17

## 2024-05-17 DIAGNOSIS — M54.16 LUMBAR RADICULOPATHY: Primary | ICD-10-CM

## 2024-05-17 NOTE — TELEPHONE ENCOUNTER
Please put in orders for procedure you would like to have patient have.             From  Priscilla Lorenzo To  [facility] (supporting Maddison White PA-C) Sent  5/16/2024  9:53 PM       Left leg please this time      Previous Messages    ----- Message -----       From:Maddison White PA-C       Sent:5/16/2024  8:07 AM EDT         To:Priscilla Lorenzo    Subject:Pain Management    Priscilla,  We can do the same bilateral S1 injection if you feel that helped you or we can focus more on the left leg pain and inject 2 nerves on that side.        ----- Message -----       From:Priscilla Lorenzo       Sent:5/15/2024  2:11 PM EDT         To:User Message Message List    Subject:Pain Management    My lower back hurts bad on rt side. My left leg especially my foot hurt bad on a constant basis. My rt knee n above n below in front n back have been hurting for about a month now. When I get up from a seated position I can’t walk and if I do I walk like I’m 89 yrs old.      ----- Message -----       From:Priscilla Lorenzo       Sent:5/15/2024  1:58 PM EDT         To:User Message Message List    Subject:Pain Management    It feels like my whole left leg but my rt is all around my knee n down the back      ----- Message -----       From:Maddison White PA-C       Sent:5/15/2024  1:56 PM EDT         To:Priscilla Lorenzo    Subject:Pain Management    Sorry for all the questions;  is the pain going down the front, side or back of your leg?        ----- Message -----       From:Priscilla Lorenzo       Sent:5/15/2024  1:36 PM EDT         To:User Message Message List    Subject:Pain Management    Yes I have pain in both but the left is worse. It’s my whole leg and my foot      ----- Message -----       From:Maddison White PA-C       Sent:5/15/2024 12:52 PM EDT         To:Priscilla Lorenzo    Subject:Pain Management    Hi Priscilla,  Your EMG shows chronic nerve irritation from L4/L5 on the left leg.  Do you have pain going  down both legs?  Which part?  Still going down the back of the legs?        ----- Message -----       From:Priscilla Lorenzo       Sent:5/15/2024  7:21 AM EDT         To:User Message Message List    Subject:Pain Management    Good morning! Medication has been done. Pain is creeping back especially in my left leg and foot. EMG has been done as well. What can we do now? Another dose of double injections? Let me know. Ty      ----- Message -----       From:Priscilla Lorenzo       Sent:5/1/2024 12:04 PM EDT         To:User Message Message List    Subject:Pain Management    Ty Bonnie!!!      ----- Message -----       From:Bonnie MATA       Sent:5/1/2024 10:31 AM EDT         To:Priscilla Lorenzo    Subject:Pain Management    Priscilla,    A medrol dose pack has been sent to your pharmacy as discussed. Please follow the directions listed on the back of the package. Do not take NSAIDS with this medication; Advil, Aleve, ibuprofen, Motrin, naproxen... Tylenol is ok as well as your prescribed medication. Please contact the office once this medication is complete to provide an update on your pain. Please call sooner if you have any questions or issues with this medication.    Bonnie VELASCO RN            ----- Message -----       From:Priscilla Lorenzo       Sent:5/1/2024  8:49 AM EDT         To:User Message Message List    Subject:Pain Management    Oh ty so much. I really appreciate it and I’m sorry if I came off abrupt. This is the worst flare up I’ve had in a long time. I can barely walk. But ty again. And you can send them to [facility] pharmacy. Ty      ----- Message -----       From:Bonnie MATA       Sent:5/1/2024  8:42 AM EDT         To:Priscilla Lorenzo    Subject:Pain Management    Good morning, Priscilla.    In regard to what can be done to help your pain until your EMG, Dr. Morrow offered a course of oral steroids. I can have them sent to the pharmacy today if you would like to start those. You would  need to stop taking any NSAIDS; Advil,  Aleve, ibuprofen, naproxen, motrin... Tylenol is ok to continue and topical medications. Gabapentin, duloxetine and baclofen would all be fine to continue as well.    Bonnie VELASCO RN

## 2024-05-20 ENCOUNTER — OFFICE VISIT (OUTPATIENT)
Dept: URGENT CARE | Facility: CLINIC | Age: 49
End: 2024-05-20
Payer: COMMERCIAL

## 2024-05-20 VITALS
RESPIRATION RATE: 16 BRPM | SYSTOLIC BLOOD PRESSURE: 130 MMHG | HEIGHT: 70 IN | TEMPERATURE: 97.9 F | OXYGEN SATURATION: 97 % | WEIGHT: 293 LBS | HEART RATE: 85 BPM | BODY MASS INDEX: 41.95 KG/M2 | DIASTOLIC BLOOD PRESSURE: 80 MMHG

## 2024-05-20 DIAGNOSIS — J02.9 SORE THROAT: ICD-10-CM

## 2024-05-20 DIAGNOSIS — J30.2 SEASONAL ALLERGIES: Primary | ICD-10-CM

## 2024-05-20 LAB — S PYO AG THROAT QL: NEGATIVE

## 2024-05-20 PROCEDURE — 99213 OFFICE O/P EST LOW 20 MIN: CPT

## 2024-05-20 PROCEDURE — 87880 STREP A ASSAY W/OPTIC: CPT

## 2024-05-20 NOTE — PATIENT INSTRUCTIONS
Your rapid strep test was negative. No antibiotics are needed at this time.     Throat swab will be sent for definitive culture. You can download [employer] for the results which take approximately 48-72 hours. You will be notified if positive.     For sore throat you can use Cepacol lozenges, do warm salt water gargles, drink warm water with lemon or herbal teas, or use an over-the-counter throat spray (Chloraseptic).    Follow up with your PCP in 3-5 days if symptoms persist.    Go to the ER if symptoms significantly worsen.

## 2024-05-20 NOTE — PROGRESS NOTES
[facility]        NAME: Priscilla Lorenzo is a 48 y.o. female  : 1975    MRN: [id number]  DATE: May 20, 2024  TIME: 1:56 PM    Assessment and Plan   Seasonal allergies [J30.2]  1. Seasonal allergies        2. Sore throat  POCT rapid ANTIGEN strepA    Throat culture    Throat culture            Patient Instructions     Your rapid strep test was negative. No antibiotics are needed at this time.     Throat swab will be sent for definitive culture. You can download [employer] for the results which take approximately 48-72 hours. You will be notified if positive.     For sore throat you can use Cepacol lozenges, do warm salt water gargles, drink warm water with lemon or herbal teas, or use an over-the-counter throat spray (Chloraseptic).    Follow up with your PCP in 3-5 days if symptoms persist.    Go to the ER if symptoms significantly worsen.     If tests are performed, our office will contact you with results only if changes need to made to the care plan discussed with you at the visit. You can review your full results on [employer].      Chief Complaint     Chief Complaint   Patient presents with    Cold Like Symptoms     Pt reports 4 days of post nasal drip and sore throat. Reports losing her voice earlier today. Denies fevers.  Taking allegra.          History of Present Illness       48-year-old female presenting with 4-day history of postnasal drip she believes is causing sore throat.  Feels fatigued.  No known fevers.  Patient states she does take Allegra each morning.  Does not like using nasal sprays, such as Flonase.  Her son was sick last week with strep throat.        Review of Systems   Review of Systems   Constitutional:  Positive for fatigue. Negative for chills and fever.   HENT:  Positive for postnasal drip and sore throat. Negative for congestion and ear pain.    Eyes:  Negative for discharge and redness.   Respiratory:  Negative for cough, shortness of breath and  wheezing.    Cardiovascular:  Negative for chest pain and palpitations.   Gastrointestinal:  Negative for abdominal pain, diarrhea, nausea and vomiting.   Skin:  Negative for pallor and rash.   Neurological:  Negative for dizziness, light-headedness and headaches.         Current Medications       Current Outpatient Medications:     acetaminophen (TYLENOL) 650 mg CR tablet, Take 1 tablet (650 mg total) by mouth every 8 (eight) hours as needed for mild pain, Disp: 30 tablet, Rfl: 0    albuterol (Ventolin HFA) 90 mcg/act inhaler, Inhale 2 puffs every 6 (six) hours as needed for wheezing, Disp: 18 g, Rfl: 0    ARIPiprazole (ABILIFY) 2 mg tablet, Take 1 tablet (2 mg total) by mouth daily, Disp: 30 tablet, Rfl: 0    baclofen 10 mg tablet, Take 1 tablet (10 mg total) by mouth 3 (three) times a day, Disp: 90 tablet, Rfl: 2    Calcium Carbonate (CALCIUM 600 PO), Take by mouth daily, Disp: , Rfl:     cholecalciferol (VITAMIN D3) 1,000 units tablet, Take 2,000 Units by mouth daily, Disp: , Rfl:     DULoxetine (CYMBALTA) 60 mg delayed release capsule, Take 2 PO HS., Disp: 180 capsule, Rfl: 0    etanercept (Enbrel SureClick) 50 MG/ML injection, Inject 1 pen (50 mg total) under the skin every 7 days., Disp: 4 mL, Rfl: 2    Euflexxa 20 MG/2ML SOSY, , Disp: , Rfl:     famotidine (PEPCID) 20 mg tablet, Take 1 tablet (20 mg total) by mouth 2 (two) times a day, Disp: 180 tablet, Rfl: 1    gabapentin (NEURONTIN) 600 MG tablet, Take 1 tablet (600 mg total) by mouth 3 (three) times a day, Disp: 90 tablet, Rfl: 2    HYDROcodone-acetaminophen (NORCO) 5-325 mg per tablet, Take 1 PO BID PRN for pain for ongoing therapy, Disp: 14 tablet, Rfl: 0    irbesartan (AVAPRO) 75 mg tablet, Take 1 tablet (75 mg total) by mouth daily at bedtime, Disp: 90 tablet, Rfl: 1    levothyroxine (Levoxyl) 200 mcg tablet, Take 1 tablet daily in addition to 25 mcg tablet daily, Disp: 90 tablet, Rfl: 0    levothyroxine (Levoxyl) 25 mcg tablet, Take 1 tablet daily  in addition to 200 mcg tablet daily, Disp: 90 tablet, Rfl: 3    multivitamin (THERAGRAN) TABS, Take 1 tablet by mouth daily, Disp: , Rfl:     naproxen sodium (ALEVE) 220 MG tablet, Take 1 tablet (220 mg total) by mouth 2 (two) times a day with meals, Disp: 20 tablet, Rfl: 0    Potassium 99 MG TABS, Take by mouth daily, Disp: , Rfl:     betamethasone valerate (VALISONE) 0.1 % ointment, Apply 1 to 2 times a day only as needed to finger/hand rash., Disp: 30 g, Rfl: 0    ciclopirox (LOPROX) 0.77 % cream, APPLY 2 TIMES per day to rash on chest for 2-4 weeks., Disp: 30 g, Rfl: 0    methylPREDNISolone 4 MG tablet therapy pack, Use as directed on package (Patient not taking: Reported on 5/20/2024), Disp: 21 tablet, Rfl: 0    ondansetron (Zofran ODT) 4 mg disintegrating tablet, Take 1 tablet (4 mg total) by mouth every 6 (six) hours as needed for nausea or vomiting (Patient not taking: Reported on 5/20/2024), Disp: 20 tablet, Rfl: 0    tirzepatide (Zepbound) 2.5 mg/0.5 mL auto-injector, Inject 0.5 mL (2.5 mg total) under the skin once a week for 28 days (Patient not taking: Reported on 5/20/2024), Disp: 2 mL, Rfl: 0    Current Allergies     Allergies as of 05/20/2024 - Reviewed 05/20/2024   Allergen Reaction Noted    Acetaminophen-codeine  10/23/2017    Hydrocodone Other (See Comments) 02/29/2016    Latex Hives 05/13/2021    Lisinopril Other (See Comments) 11/10/2022    Prednisolone Vomiting 10/23/2017            The following portions of the patient's history were reviewed and updated as appropriate: allergies, current medications, past family history, past medical history, past social history, past surgical history and problem list.     Past Medical History:   Diagnosis Date    Allergic     Anxiety     Arthritis     Asthma     Chronic pain disorder     upper back    Depression     Disc disorder     Disease of thyroid gland     Fibromyalgia, primary     GERD (gastroesophageal reflux disease)     Headache(784.0)     HPV  "(human papilloma virus) infection     cervical    Hypertension     Hypothyroidism     Inflammatory bowel disease     Migraine     Obesity     Stomach disorder        Past Surgical History:   Procedure Laterality Date    CERVICAL BIOPSY  W/ LOOP ELECTRODE EXCISION      CERVICAL FUSION  2015     SECTION      CHOLECYSTECTOMY      lap    FOOT SURGERY Bilateral     multiple, posterior calcaneal bone spur    KNEE SURGERY Left     arthroscopy with debribement    LYMPH NODE BIOPSY      AL NDSC WRST SURG W/RLS TRANSVRS CARPL LIGM Right 2023    Procedure: Right endoscopic carpal tunnel release;  Surgeon: Cl Mac MD;  Location: [facility];  Service: Orthopedics    SPINE SURGERY      TENDON REPAIR      TUBAL LIGATION Bilateral        Family History   Adopted: Yes   Problem Relation Age of Onset    Thyroid disease Mother     Arthritis Mother         Mom, dad, aunts, grandmother    Autoimmune disease Mother     No Known Problems Father     Coronary artery disease Maternal Grandmother     Breast cancer Maternal Grandmother         age unknown    Heart disease Maternal Grandmother         Mother, Aunt, Grandmother, Father    Coronary artery disease Maternal Aunt     Stroke Maternal Aunt         Chika, brothers    No Known Problems Maternal Grandfather     No Known Problems Paternal Grandmother     No Known Problems Paternal Grandfather     Suicide Attempts Maternal Aunt     Heart murmur Maternal Aunt     Hypothyroidism Maternal Aunt     Hashimoto's thyroiditis Maternal Aunt     Diabetes unspecified Brother     Diabetes Brother     No Known Problems Brother     No Known Problems Brother     ADD / ADHD Son          Medications have been verified.        Objective   /80   Pulse 85   Temp 97.9 °F (36.6 °C)   Resp 16   Ht 5' 10\" (1.778 m)   Wt (!) 146 kg (322 lb)   SpO2 97%   BMI 46.20 kg/m²        Physical Exam     Physical Exam  Vitals and nursing note reviewed.   Constitutional:       " General: She is not in acute distress.     Appearance: She is obese. She is not ill-appearing or diaphoretic.   HENT:      Head: Normocephalic and atraumatic.      Right Ear: Tympanic membrane, ear canal and external ear normal.      Left Ear: Tympanic membrane, ear canal and external ear normal.      Nose: Congestion present.      Mouth/Throat:      Mouth: Mucous membranes are moist.      Pharynx: Oropharynx is clear. Uvula midline. Posterior oropharyngeal erythema present. No pharyngeal swelling or oropharyngeal exudate.      Comments: Raspy voice noted.  Eyes:      Conjunctiva/sclera: Conjunctivae normal.      Pupils: Pupils are equal, round, and reactive to light.   Cardiovascular:      Rate and Rhythm: Normal rate and regular rhythm.      Pulses: Normal pulses.      Heart sounds: Normal heart sounds.   Pulmonary:      Effort: Pulmonary effort is normal.      Breath sounds: Normal breath sounds.   Musculoskeletal:         General: Normal range of motion.      Cervical back: Normal range of motion and neck supple.   Lymphadenopathy:      Cervical: No cervical adenopathy.   Skin:     General: Skin is warm and dry.      Capillary Refill: Capillary refill takes less than 2 seconds.   Neurological:      Mental Status: She is alert and oriented to person, place, and time.

## 2024-05-23 LAB — B-HEM STREP SPEC QL CULT: NEGATIVE

## 2024-06-02 ENCOUNTER — OFFICE VISIT (OUTPATIENT)
Dept: URGENT CARE | Facility: CLINIC | Age: 49
End: 2024-06-02
Payer: COMMERCIAL

## 2024-06-02 VITALS
HEART RATE: 87 BPM | DIASTOLIC BLOOD PRESSURE: 80 MMHG | BODY MASS INDEX: 46.35 KG/M2 | OXYGEN SATURATION: 96 % | WEIGHT: 293 LBS | SYSTOLIC BLOOD PRESSURE: 130 MMHG | TEMPERATURE: 98.3 F | RESPIRATION RATE: 18 BRPM

## 2024-06-02 DIAGNOSIS — H65.01 NON-RECURRENT ACUTE SEROUS OTITIS MEDIA OF RIGHT EAR: Primary | ICD-10-CM

## 2024-06-02 PROCEDURE — 99213 OFFICE O/P EST LOW 20 MIN: CPT | Performed by: FAMILY MEDICINE

## 2024-06-02 RX ORDER — AMOXICILLIN 500 MG/1
500 CAPSULE ORAL EVERY 12 HOURS SCHEDULED
Qty: 20 CAPSULE | Refills: 0 | Status: SHIPPED | OUTPATIENT
Start: 2024-06-02 | End: 2024-06-12

## 2024-06-02 RX ORDER — METHYLPREDNISOLONE 4 MG/1
TABLET ORAL
Qty: 21 TABLET | Refills: 0 | Status: SHIPPED | OUTPATIENT
Start: 2024-06-02

## 2024-06-02 NOTE — PROGRESS NOTES
[facility]        NAME: Priscilla Lorenoz is a 48 y.o. female  : 1975    MRN: [id number]  DATE: 2024  TIME: 8:59 AM    Assessment and Plan   Non-recurrent acute serous otitis media of right ear [H65.01]  1. Non-recurrent acute serous otitis media of right ear  amoxicillin (AMOXIL) 500 mg capsule    methylPREDNISolone 4 MG tablet therapy pack            Patient Instructions       Follow up with PCP in 3-5 days.  Proceed to  ER if symptoms worsen.    If tests have been performed at [facility], our office will contact you with results if changes need to be made to the care plan discussed with you at the visit.  [facility]    Chief Complaint     Chief Complaint   Patient presents with    Right Ear Pain     Pt reports right ear pain x5 days approx w/ muffled hearing and pain with certain head movements.          History of Present Illness       48-year-old female presenting with 5-day history of right ear pain.  She also reports difficulty hearing and pain reproduced with turning her head from side-to-side.  Denies any fevers or chills.        Review of Systems   Review of Systems   Constitutional: Negative.    HENT:  Positive for ear pain.    Eyes: Negative.    Respiratory: Negative.     Cardiovascular: Negative.    Gastrointestinal: Negative.    Genitourinary: Negative.    Musculoskeletal: Negative.    Skin: Negative.    Allergic/Immunologic: Negative.    Neurological: Negative.    Hematological: Negative.    Psychiatric/Behavioral: Negative.           Current Medications       Current Outpatient Medications:     amoxicillin (AMOXIL) 500 mg capsule, Take 1 capsule (500 mg total) by mouth every 12 (twelve) hours for 10 days, Disp: 20 capsule, Rfl: 0    ARIPiprazole (ABILIFY) 2 mg tablet, Take 1 tablet (2 mg total) by mouth daily, Disp: 30 tablet, Rfl: 0    baclofen 10 mg tablet, Take 1 tablet (10 mg total) by mouth 3 (three) times a day, Disp: 90  tablet, Rfl: 2    Calcium Carbonate (CALCIUM 600 PO), Take by mouth daily, Disp: , Rfl:     cholecalciferol (VITAMIN D3) 1,000 units tablet, Take 2,000 Units by mouth daily, Disp: , Rfl:     DULoxetine (CYMBALTA) 60 mg delayed release capsule, Take 2 PO HS., Disp: 180 capsule, Rfl: 0    etanercept (Enbrel SureClick) 50 MG/ML injection, Inject 1 pen (50 mg total) under the skin every 7 days., Disp: 4 mL, Rfl: 2    famotidine (PEPCID) 20 mg tablet, Take 1 tablet (20 mg total) by mouth 2 (two) times a day, Disp: 180 tablet, Rfl: 1    gabapentin (NEURONTIN) 600 MG tablet, Take 1 tablet (600 mg total) by mouth 3 (three) times a day, Disp: 90 tablet, Rfl: 2    HYDROcodone-acetaminophen (NORCO) 5-325 mg per tablet, Take 1 PO BID PRN for pain for ongoing therapy, Disp: 14 tablet, Rfl: 0    irbesartan (AVAPRO) 75 mg tablet, Take 1 tablet (75 mg total) by mouth daily at bedtime, Disp: 90 tablet, Rfl: 1    levothyroxine (Levoxyl) 200 mcg tablet, Take 1 tablet daily in addition to 25 mcg tablet daily, Disp: 90 tablet, Rfl: 0    methylPREDNISolone 4 MG tablet therapy pack, Use as directed on package, Disp: 21 tablet, Rfl: 0    Potassium 99 MG TABS, Take by mouth daily, Disp: , Rfl:     acetaminophen (TYLENOL) 650 mg CR tablet, Take 1 tablet (650 mg total) by mouth every 8 (eight) hours as needed for mild pain, Disp: 30 tablet, Rfl: 0    albuterol (Ventolin HFA) 90 mcg/act inhaler, Inhale 2 puffs every 6 (six) hours as needed for wheezing, Disp: 18 g, Rfl: 0    betamethasone valerate (VALISONE) 0.1 % ointment, Apply 1 to 2 times a day only as needed to finger/hand rash., Disp: 30 g, Rfl: 0    ciclopirox (LOPROX) 0.77 % cream, APPLY 2 TIMES per day to rash on chest for 2-4 weeks., Disp: 30 g, Rfl: 0    Euflexxa 20 MG/2ML SOSY, , Disp: , Rfl:     levothyroxine (Levoxyl) 25 mcg tablet, Take 1 tablet daily in addition to 200 mcg tablet daily, Disp: 90 tablet, Rfl: 3    methylPREDNISolone 4 MG tablet therapy pack, Use as directed on  package (Patient not taking: Reported on 2024), Disp: 21 tablet, Rfl: 0    multivitamin (THERAGRAN) TABS, Take 1 tablet by mouth daily, Disp: , Rfl:     naproxen sodium (ALEVE) 220 MG tablet, Take 1 tablet (220 mg total) by mouth 2 (two) times a day with meals, Disp: 20 tablet, Rfl: 0    ondansetron (Zofran ODT) 4 mg disintegrating tablet, Take 1 tablet (4 mg total) by mouth every 6 (six) hours as needed for nausea or vomiting (Patient not taking: Reported on 2024), Disp: 20 tablet, Rfl: 0    Current Allergies     Allergies as of 2024 - Reviewed 2024   Allergen Reaction Noted    Acetaminophen-codeine  10/23/2017    Hydrocodone Other (See Comments) 2016    Latex Hives 2021    Lisinopril Other (See Comments) 11/10/2022    Prednisolone Vomiting 10/23/2017            The following portions of the patient's history were reviewed and updated as appropriate: allergies, current medications, past family history, past medical history, past social history, past surgical history and problem list.     Past Medical History:   Diagnosis Date    Allergic     Anxiety     Arthritis     Asthma     Chronic pain disorder     upper back    Depression     Disc disorder     Disease of thyroid gland     Fibromyalgia, primary     GERD (gastroesophageal reflux disease)     Headache(784.0)     HPV (human papilloma virus) infection     cervical    Hypertension     Hypothyroidism     Inflammatory bowel disease     Migraine     Obesity     Stomach disorder        Past Surgical History:   Procedure Laterality Date    CERVICAL BIOPSY  W/ LOOP ELECTRODE EXCISION      CERVICAL FUSION  2015     SECTION      CHOLECYSTECTOMY      lap    FOOT SURGERY Bilateral     multiple, posterior calcaneal bone spur    KNEE SURGERY Left     arthroscopy with debribement    LYMPH NODE BIOPSY      SD NDSC WRST SURG W/RLS TRANSVRS CARPL LIGM Right 2023    Procedure: Right endoscopic carpal tunnel release;  Surgeon:  Cl Mac MD;  Location: [facility];  Service: Orthopedics    SPINE SURGERY      TENDON REPAIR      TUBAL LIGATION Bilateral 2012       Family History   Adopted: Yes   Problem Relation Age of Onset    Thyroid disease Mother     Arthritis Mother         Mom, dad, aunts, grandmother    Autoimmune disease Mother     No Known Problems Father     Coronary artery disease Maternal Grandmother     Breast cancer Maternal Grandmother         age unknown    Heart disease Maternal Grandmother         Mother, Aunt, Grandmother, Father    Coronary artery disease Maternal Aunt     Stroke Maternal Aunt         Chika, brothers    No Known Problems Maternal Grandfather     No Known Problems Paternal Grandmother     No Known Problems Paternal Grandfather     Suicide Attempts Maternal Aunt     Heart murmur Maternal Aunt     Hypothyroidism Maternal Aunt     Hashimoto's thyroiditis Maternal Aunt     Diabetes unspecified Brother     Diabetes Brother     No Known Problems Brother     No Known Problems Brother     ADD / ADHD Son          Medications have been verified.        Objective   /80   Pulse 87   Temp 98.3 °F (36.8 °C)   Resp 18   Wt (!) 147 kg (323 lb)   SpO2 96%   BMI 46.35 kg/m²   No LMP recorded.       Physical Exam     Physical Exam  Vitals and nursing note reviewed.   Constitutional:       Appearance: She is well-developed.   HENT:      Head: Normocephalic.      Right Ear: Tympanic membrane is injected, erythematous and bulging.      Left Ear: Hearing, tympanic membrane, ear canal and external ear normal.      Nose: Nose normal.   Eyes:      Pupils: Pupils are equal, round, and reactive to light.   Cardiovascular:      Rate and Rhythm: Normal rate.   Pulmonary:      Effort: Pulmonary effort is normal.   Abdominal:      General: Abdomen is flat.   Musculoskeletal:         General: Normal range of motion.      Cervical back: Normal range of motion.   Skin:     General: Skin is warm and dry.   Neurological:       Mental Status: She is alert and oriented to person, place, and time.

## 2024-06-11 DIAGNOSIS — F32.1 CURRENT MODERATE EPISODE OF MAJOR DEPRESSIVE DISORDER, UNSPECIFIED WHETHER RECURRENT (HCC): ICD-10-CM

## 2024-06-13 RX ORDER — ARIPIPRAZOLE 2 MG/1
2 TABLET ORAL DAILY
Qty: 30 TABLET | Refills: 0 | Status: SHIPPED | OUTPATIENT
Start: 2024-06-13 | End: 2024-07-13

## 2024-06-17 DIAGNOSIS — E66.01 MORBID OBESITY WITH BODY MASS INDEX (BMI) OF 45.0 TO 49.9 IN ADULT (HCC): Primary | ICD-10-CM

## 2024-06-17 RX ORDER — TIRZEPATIDE 2.5 MG/.5ML
2.5 INJECTION, SOLUTION SUBCUTANEOUS WEEKLY
Qty: 2 ML | Refills: 0 | Status: SHIPPED | OUTPATIENT
Start: 2024-06-17

## 2024-07-01 ENCOUNTER — HOSPITAL ENCOUNTER (OUTPATIENT)
Dept: RADIOLOGY | Facility: CLINIC | Age: 49
Discharge: HOME | End: 2024-07-01
Payer: COMMERCIAL

## 2024-07-01 VITALS
OXYGEN SATURATION: 97 % | HEART RATE: 92 BPM | DIASTOLIC BLOOD PRESSURE: 85 MMHG | SYSTOLIC BLOOD PRESSURE: 142 MMHG | TEMPERATURE: 98.6 F | RESPIRATION RATE: 18 BRPM

## 2024-07-01 DIAGNOSIS — M54.16 LUMBAR RADICULOPATHY: ICD-10-CM

## 2024-07-01 PROCEDURE — 64484 NJX AA&/STRD TFRM EPI L/S EA: CPT | Performed by: ANESTHESIOLOGY

## 2024-07-01 PROCEDURE — 64483 NJX AA&/STRD TFRM EPI L/S 1: CPT | Performed by: ANESTHESIOLOGY

## 2024-07-01 RX ORDER — PAPAVERINE HCL 150 MG
15 CAPSULE, EXTENDED RELEASE ORAL ONCE
Status: COMPLETED | OUTPATIENT
Start: 2024-07-01 | End: 2024-07-01

## 2024-07-01 RX ADMIN — DEXAMETHASONE SODIUM PHOSPHATE 15 MG: 10 INJECTION, SOLUTION INTRAMUSCULAR; INTRAVENOUS at 15:01

## 2024-07-01 RX ADMIN — IOHEXOL 2 ML: 300 INJECTION, SOLUTION INTRAVENOUS at 15:01

## 2024-07-01 NOTE — DISCHARGE INSTRUCTIONS
Epidural Steroid Injection   WHAT YOU NEED TO KNOW:   An epidural steroid injection (MARCIE) is a procedure to inject steroid medicine into the epidural space. The epidural space is between your spinal cord and vertebrae. Steroids reduce inflammation and fluid buildup in your spine that may be causing pain. You may be given pain medicine along with the steroids.          ACTIVITY  Do not drive or operate machinery today.  No strenuous activity today - bending, lifting, etc.  You may resume normal activites starting tomorrow - start slowly and as tolerated.  You may shower today, but no tub baths or hot tubs.  You may have numbness for several hours from the local anesthetic. Please use caution and common sense, especially with weight-bearing activities.    CARE OF THE INJECTION SITE  If you have soreness or pain, apply ice to the area today (20 minutes on/20 minutes off).  Starting tomorrow, you may use warm, moist heat or ice if needed.  You may have an increase or change in your discomfort for 36-48 hours after your treatment.  Apply ice and continue with any pain medication you have been prescribed.  Notify the [facility] if you have any of the following: redness, drainage, swelling, headache, stiff neck or fever above 100°F.    SPECIAL INSTRUCTIONS  Our office will contact you in approximately 14 days for a progress report.    MEDICATIONS  Continue to take all routine medications.  Our office may have instructed you to hold some medications.    As no general anesthesia was used in today's procedure, you should not experience any side effects related to anesthesia.     If you are diabetic, the steroids used in today's injection may temporarily increase your blood sugar levels after the first few days after your injection. Please keep a close eye on your sugars and alert the doctor who manages your diabetes if your sugars are significantly high from your baseline or you are symptomatic.     If you have a  problem specifically related to your procedure, please call our office at [phone].  Problems not related to your procedure should be directed to your primary care physician.

## 2024-07-01 NOTE — H&P
History of Present Illness: The patient is a 49 y.o. female who presents with complaints of low back and leg pain.    Past Medical History:   Diagnosis Date    Allergic     Anxiety     Arthritis     Asthma     Chronic pain disorder     upper back    Depression     Disc disorder     Disease of thyroid gland     Fibromyalgia, primary     GERD (gastroesophageal reflux disease)     Headache(784.0)     HPV (human papilloma virus) infection     cervical    Hypertension     Hypothyroidism     Inflammatory bowel disease     Migraine     Obesity     Stomach disorder        Past Surgical History:   Procedure Laterality Date    CERVICAL BIOPSY  W/ LOOP ELECTRODE EXCISION      CERVICAL FUSION  2015     SECTION      CHOLECYSTECTOMY      lap    FOOT SURGERY Bilateral     multiple, posterior calcaneal bone spur    KNEE SURGERY Left     arthroscopy with debribement    LYMPH NODE BIOPSY      VT NDSC WRST SURG W/RLS TRANSVRS CARPL LIGM Right 2023    Procedure: Right endoscopic carpal tunnel release;  Surgeon: Cl Mac MD;  Location: [facility];  Service: Orthopedics    SPINE SURGERY      TENDON REPAIR      TUBAL LIGATION Bilateral          Current Outpatient Medications:     acetaminophen (TYLENOL) 650 mg CR tablet, Take 1 tablet (650 mg total) by mouth every 8 (eight) hours as needed for mild pain, Disp: 30 tablet, Rfl: 0    albuterol (Ventolin HFA) 90 mcg/act inhaler, Inhale 2 puffs every 6 (six) hours as needed for wheezing, Disp: 18 g, Rfl: 0    ARIPiprazole (ABILIFY) 2 mg tablet, Take 1 tablet (2 mg total) by mouth daily, Disp: 30 tablet, Rfl: 0    baclofen 10 mg tablet, Take 1 tablet (10 mg total) by mouth 3 (three) times a day, Disp: 90 tablet, Rfl: 2    betamethasone valerate (VALISONE) 0.1 % ointment, Apply 1 to 2 times a day only as needed to finger/hand rash., Disp: 30 g, Rfl: 0    Calcium Carbonate (CALCIUM 600 PO), Take by mouth daily, Disp: , Rfl:     cholecalciferol (VITAMIN D3) 1,000  units tablet, Take 2,000 Units by mouth daily, Disp: , Rfl:     ciclopirox (LOPROX) 0.77 % cream, APPLY 2 TIMES per day to rash on chest for 2-4 weeks., Disp: 30 g, Rfl: 0    DULoxetine (CYMBALTA) 60 mg delayed release capsule, Take 2 PO HS., Disp: 180 capsule, Rfl: 0    etanercept (Enbrel SureClick) 50 MG/ML injection, Inject 1 pen (50 mg total) under the skin every 7 days., Disp: 4 mL, Rfl: 2    Euflexxa 20 MG/2ML SOSY, , Disp: , Rfl:     famotidine (PEPCID) 20 mg tablet, Take 1 tablet (20 mg total) by mouth 2 (two) times a day, Disp: 180 tablet, Rfl: 1    gabapentin (NEURONTIN) 600 MG tablet, Take 1 tablet (600 mg total) by mouth 3 (three) times a day, Disp: 90 tablet, Rfl: 2    HYDROcodone-acetaminophen (NORCO) 5-325 mg per tablet, Take 1 PO BID PRN for pain for ongoing therapy, Disp: 14 tablet, Rfl: 0    irbesartan (AVAPRO) 75 mg tablet, Take 1 tablet (75 mg total) by mouth daily at bedtime, Disp: 90 tablet, Rfl: 1    levothyroxine (Levoxyl) 200 mcg tablet, Take 1 tablet daily in addition to 25 mcg tablet daily, Disp: 90 tablet, Rfl: 0    levothyroxine (Levoxyl) 25 mcg tablet, Take 1 tablet daily in addition to 200 mcg tablet daily, Disp: 90 tablet, Rfl: 3    multivitamin (THERAGRAN) TABS, Take 1 tablet by mouth daily, Disp: , Rfl:     naproxen sodium (ALEVE) 220 MG tablet, Take 1 tablet (220 mg total) by mouth 2 (two) times a day with meals, Disp: 20 tablet, Rfl: 0    ondansetron (Zofran ODT) 4 mg disintegrating tablet, Take 1 tablet (4 mg total) by mouth every 6 (six) hours as needed for nausea or vomiting (Patient not taking: Reported on 5/20/2024), Disp: 20 tablet, Rfl: 0    Potassium 99 MG TABS, Take by mouth daily, Disp: , Rfl:     Zepbound 2.5 MG/0.5ML auto-injector, Inject 0.5 mL (2.5 mg total) under the skin once a week, Disp: 2 mL, Rfl: 0    Current Facility-Administered Medications:     dexamethasone (PF) (DECADRON) injection 15 mg, 15 mg, Epidural, Once, Edmond Morrow DO    iohexol (OMNIPAQUE) 300  mg/mL injection 2 mL, 2 mL, Epidural, Once, Edmond Morrow, DO    Allergies   Allergen Reactions    Acetaminophen-Codeine      Pt states she does not remember having a reaction to this medication    Hydrocodone Other (See Comments)     GI issues    Latex Hives     Latex powder      Lisinopril Other (See Comments)     Itching, gi intollerance      Prednisolone Vomiting       Physical Exam:   General: Awake, Alert, Oriented x 3, Mood and affect appropriate  Respiratory: Respirations even and unlabored  Cardiovascular: Peripheral pulses intact; no edema  Musculoskeletal Exam: Decreased range of motion lumbar spine    ASA Score: III         Assessment:   1. Lumbar radiculopathy        Plan: LT L4, L5 TFESI

## 2024-07-02 DIAGNOSIS — K21.9 GASTROESOPHAGEAL REFLUX DISEASE WITHOUT ESOPHAGITIS: ICD-10-CM

## 2024-07-02 DIAGNOSIS — J45.20 MILD INTERMITTENT ASTHMA WITHOUT COMPLICATION: ICD-10-CM

## 2024-07-02 DIAGNOSIS — E03.9 ACQUIRED HYPOTHYROIDISM: ICD-10-CM

## 2024-07-02 DIAGNOSIS — R11.0 NAUSEA: ICD-10-CM

## 2024-07-02 PROBLEM — H65.01 NON-RECURRENT ACUTE SEROUS OTITIS MEDIA OF RIGHT EAR: Status: RESOLVED | Noted: 2024-06-02 | Resolved: 2024-07-02

## 2024-07-02 RX ORDER — ALBUTEROL SULFATE 90 UG/1
2 AEROSOL, METERED RESPIRATORY (INHALATION) EVERY 6 HOURS PRN
Qty: 18 G | Refills: 0 | Status: SHIPPED | OUTPATIENT
Start: 2024-07-02

## 2024-07-02 RX ORDER — ONDANSETRON 4 MG/1
4 TABLET, ORALLY DISINTEGRATING ORAL EVERY 6 HOURS PRN
Qty: 20 TABLET | Refills: 0 | Status: SHIPPED | OUTPATIENT
Start: 2024-07-02

## 2024-07-02 RX ORDER — LEVOTHYROXINE SODIUM 0.2 MG/1
TABLET ORAL
Qty: 100 TABLET | Refills: 1 | Status: SHIPPED | OUTPATIENT
Start: 2024-07-02

## 2024-07-02 RX ORDER — FAMOTIDINE 20 MG/1
20 TABLET, FILM COATED ORAL 2 TIMES DAILY
Qty: 200 TABLET | Refills: 1 | Status: SHIPPED | OUTPATIENT
Start: 2024-07-02

## 2024-07-08 DIAGNOSIS — E66.01 MORBID OBESITY WITH BODY MASS INDEX (BMI) OF 45.0 TO 49.9 IN ADULT (HCC): ICD-10-CM

## 2024-07-11 DIAGNOSIS — I10 PRIMARY HYPERTENSION: ICD-10-CM

## 2024-07-11 RX ORDER — TIRZEPATIDE 2.5 MG/.5ML
2.5 INJECTION, SOLUTION SUBCUTANEOUS WEEKLY
Qty: 2 ML | Refills: 0 | Status: SHIPPED | OUTPATIENT
Start: 2024-07-11

## 2024-07-11 RX ORDER — IRBESARTAN 75 MG/1
75 TABLET ORAL
Qty: 90 TABLET | Refills: 1 | Status: SHIPPED | OUTPATIENT
Start: 2024-07-11 | End: 2025-01-07

## 2024-07-15 ENCOUNTER — TELEPHONE (OUTPATIENT)
Dept: PAIN MEDICINE | Facility: CLINIC | Age: 49
End: 2024-07-15

## 2024-07-18 DIAGNOSIS — F32.1 CURRENT MODERATE EPISODE OF MAJOR DEPRESSIVE DISORDER, UNSPECIFIED WHETHER RECURRENT (HCC): ICD-10-CM

## 2024-07-18 RX ORDER — ARIPIPRAZOLE 2 MG/1
2 TABLET ORAL DAILY
Qty: 30 TABLET | Refills: 0 | Status: SHIPPED | OUTPATIENT
Start: 2024-07-18

## 2024-07-22 DIAGNOSIS — M79.18 MYOFASCIAL PAIN SYNDROME: ICD-10-CM

## 2024-07-22 RX ORDER — GABAPENTIN 600 MG/1
600 TABLET ORAL 3 TIMES DAILY
Qty: 90 TABLET | Refills: 2 | Status: SHIPPED | OUTPATIENT
Start: 2024-07-22 | End: 2024-07-23 | Stop reason: SDUPTHER

## 2024-07-23 ENCOUNTER — OFFICE VISIT (OUTPATIENT)
Dept: PAIN MEDICINE | Facility: CLINIC | Age: 49
End: 2024-07-23
Payer: COMMERCIAL

## 2024-07-23 ENCOUNTER — TELEPHONE (OUTPATIENT)
Dept: PAIN MEDICINE | Facility: CLINIC | Age: 49
End: 2024-07-23

## 2024-07-23 VITALS
BODY MASS INDEX: 41.95 KG/M2 | HEIGHT: 70 IN | WEIGHT: 293 LBS | TEMPERATURE: 98.8 F | HEART RATE: 91 BPM | SYSTOLIC BLOOD PRESSURE: 130 MMHG | DIASTOLIC BLOOD PRESSURE: 82 MMHG

## 2024-07-23 DIAGNOSIS — M51.27 HERNIATED NUCLEUS PULPOSUS, L5-S1: ICD-10-CM

## 2024-07-23 DIAGNOSIS — M54.16 LUMBAR RADICULOPATHY: Primary | ICD-10-CM

## 2024-07-23 DIAGNOSIS — G89.4 CHRONIC PAIN SYNDROME: ICD-10-CM

## 2024-07-23 DIAGNOSIS — M47.816 LUMBAR SPONDYLOSIS: ICD-10-CM

## 2024-07-23 DIAGNOSIS — M79.18 MYOFASCIAL PAIN SYNDROME: ICD-10-CM

## 2024-07-23 PROCEDURE — 99214 OFFICE O/P EST MOD 30 MIN: CPT | Performed by: PHYSICIAN ASSISTANT

## 2024-07-23 RX ORDER — DULOXETIN HYDROCHLORIDE 60 MG/1
CAPSULE, DELAYED RELEASE ORAL
Qty: 180 CAPSULE | Refills: 1 | Status: SHIPPED | OUTPATIENT
Start: 2024-07-23

## 2024-07-23 RX ORDER — BACLOFEN 10 MG/1
10 TABLET ORAL 3 TIMES DAILY
Qty: 270 TABLET | Refills: 1 | Status: SHIPPED | OUTPATIENT
Start: 2024-07-23

## 2024-07-23 RX ORDER — GABAPENTIN 600 MG/1
600 TABLET ORAL 3 TIMES DAILY
Qty: 270 TABLET | Refills: 1 | Status: SHIPPED | OUTPATIENT
Start: 2024-07-23

## 2024-07-23 NOTE — PROGRESS NOTES
Assessment:  1. Lumbar radiculopathy    2. Herniated nucleus pulposus, L5-S1    3. Lumbar spondylosis    4. Myofascial pain syndrome    5. Chronic pain syndrome        Plan:  While the patient was in the office today, I did have a thorough conversation regarding their chronic pain syndrome, medication management, and treatment plan options.    The patient is able to report 50% reduction in radicular pain following the left L4 and L5 transforaminal epidural steroid injection that was done on 7/1/2024.  She feels her residual symptoms are manageable with the current medication regimen of gabapentin, duloxetine and baclofen.  I have electronically sent refills to her pharmacy on today's visit.    The patient will follow-up in 6 months for medication prescription refill and reevaluation. The patient was advised to contact the office should their symptoms worsen in the interim. The patient was agreeable and verbalized an understanding.        History of Present Illness:    The patient is a 49 y.o. female last seen on 7/1/2024 who presents for a follow up office visit in regards to chronic pain secondary to lumbar spondylosis, lumbar degenerative disc disease, lumbar radiculopathy as well as multisite polyarthralgias..  The patient currently reports low back pain with intermittent radiation into the lower extremities that she presently rates a 6 out of 10 and describes it as a constant burning, sharp, throbbing and cramping type of pain.  On 7/1/2024 the patient underwent a left L4 and L5 TFESI and she is able to report 50% reduction in pain.  She feels her residual pain is manageable with the current medication regimen of gabapentin, duloxetine and baclofen.  She takes hydrocodone sparingly and still does not require a refill.  She is actively looking for a new rheumatologist as to when she was seeing is no longer in practice.    I have personally reviewed and/or updated the patient's past medical history, past surgical  history, family history, social history, current medications, allergies, and vital signs today.       Review of Systems:    Review of Systems   Respiratory:  Negative for shortness of breath.    Cardiovascular:  Negative for chest pain.   Gastrointestinal:  Negative for constipation, diarrhea, nausea and vomiting.   Musculoskeletal:  Positive for gait problem and joint swelling (Joint stiffness). Negative for arthralgias and myalgias.   Skin:  Negative for rash.   Neurological:  Positive for weakness. Negative for dizziness and seizures.   All other systems reviewed and are negative.        Past Medical History:   Diagnosis Date   • Allergic    • Anxiety    • Arthritis    • Asthma    • Chronic pain disorder     upper back   • Depression    • Disc disorder    • Disease of thyroid gland    • Fibromyalgia, primary    • GERD (gastroesophageal reflux disease)    • Headache(784.0)    • HPV (human papilloma virus) infection     cervical   • Hypertension    • Hypothyroidism    • Inflammatory bowel disease    • Migraine    • Obesity    • Stomach disorder        Past Surgical History:   Procedure Laterality Date   • CERVICAL BIOPSY  W/ LOOP ELECTRODE EXCISION     • CERVICAL FUSION  2015   •  SECTION     • CHOLECYSTECTOMY      lap   • FOOT SURGERY Bilateral     multiple, posterior calcaneal bone spur   • KNEE SURGERY Left     arthroscopy with debribement   • LYMPH NODE BIOPSY     • NE NDSC WRST SURG W/RLS TRANSVRS CARPL LIGM Right 2023    Procedure: Right endoscopic carpal tunnel release;  Surgeon: Cl Mac MD;  Location: [facility];  Service: Orthopedics   • SPINE SURGERY     • TENDON REPAIR     • TUBAL LIGATION Bilateral        Family History   Adopted: Yes   Problem Relation Age of Onset   • Thyroid disease Mother    • Arthritis Mother         Mom, dad, aunts, grandmother   • Autoimmune disease Mother    • No Known Problems Father    • Coronary artery disease Maternal Grandmother    • Breast  cancer Maternal Grandmother         age unknown   • Heart disease Maternal Grandmother         Mother, Aunt, Grandmother, Father   • Coronary artery disease Maternal Aunt    • Stroke Maternal Aunt         Chika, brothers   • No Known Problems Maternal Grandfather    • No Known Problems Paternal Grandmother    • No Known Problems Paternal Grandfather    • Suicide Attempts Maternal Aunt    • Heart murmur Maternal Aunt    • Hypothyroidism Maternal Aunt    • Hashimoto's thyroiditis Maternal Aunt    • Diabetes unspecified Brother    • Diabetes Brother    • No Known Problems Brother    • No Known Problems Brother    • ADD / ADHD Son        Social History     Occupational History   • Occupation: [employer]     Comment: [employer]   Tobacco Use   • Smoking status: Every Day     Current packs/day: 0.50     Average packs/day: 0.5 packs/day for 35.0 years (17.5 ttl pk-yrs)     Types: Cigarettes   • Smokeless tobacco: Never   • Tobacco comments:     actually about 3/4 PPD   Vaping Use   • Vaping status: Never Used   Substance and Sexual Activity   • Alcohol use: Not Currently   • Drug use: No   • Sexual activity: Not Currently     Partners: Male     Birth control/protection: Female Sterilization, None         Current Outpatient Medications:   •  acetaminophen (TYLENOL) 650 mg CR tablet, Take 1 tablet (650 mg total) by mouth every 8 (eight) hours as needed for mild pain, Disp: 30 tablet, Rfl: 0  •  albuterol (Ventolin HFA) 90 mcg/act inhaler, Inhale 2 puffs every 6 (six) hours as needed for wheezing, Disp: 18 g, Rfl: 0  •  ARIPiprazole (ABILIFY) 2 mg tablet, TAKE ONE TABLET BY MOUTH DAILY, Disp: 30 tablet, Rfl: 0  •  baclofen 10 mg tablet, Take 1 tablet (10 mg total) by mouth 3 (three) times a day, Disp: 270 tablet, Rfl: 1  •  betamethasone valerate (VALISONE) 0.1 % ointment, Apply 1 to 2 times a day only as needed to finger/hand rash., Disp: 30 g, Rfl: 0  •  Calcium Carbonate (CALCIUM 600 PO), Take by mouth daily, Disp: ,  Rfl:   •  cholecalciferol (VITAMIN D3) 1,000 units tablet, Take 2,000 Units by mouth daily, Disp: , Rfl:   •  ciclopirox (LOPROX) 0.77 % cream, APPLY 2 TIMES per day to rash on chest for 2-4 weeks., Disp: 30 g, Rfl: 0  •  DULoxetine (CYMBALTA) 60 mg delayed release capsule, Take 2 PO HS., Disp: 180 capsule, Rfl: 1  •  Euflexxa 20 MG/2ML SOSY, , Disp: , Rfl:   •  famotidine (PEPCID) 20 mg tablet, Take 1 tablet (20 mg total) by mouth 2 (two) times a day, Disp: 200 tablet, Rfl: 1  •  gabapentin (NEURONTIN) 600 MG tablet, Take 1 tablet (600 mg total) by mouth 3 (three) times a day, Disp: 270 tablet, Rfl: 1  •  HYDROcodone-acetaminophen (NORCO) 5-325 mg per tablet, Take 1 PO BID PRN for pain for ongoing therapy, Disp: 14 tablet, Rfl: 0  •  irbesartan (AVAPRO) 75 mg tablet, Take 1 tablet (75 mg total) by mouth daily at bedtime, Disp: 90 tablet, Rfl: 1  •  levothyroxine (Levoxyl) 200 mcg tablet, Take 1 tablet daily in addition to 25 mcg tablet daily, Disp: 100 tablet, Rfl: 1  •  levothyroxine (Levoxyl) 25 mcg tablet, Take 1 tablet daily in addition to 200 mcg tablet daily, Disp: 90 tablet, Rfl: 3  •  multivitamin (THERAGRAN) TABS, Take 1 tablet by mouth daily, Disp: , Rfl:   •  naproxen sodium (ALEVE) 220 MG tablet, Take 1 tablet (220 mg total) by mouth 2 (two) times a day with meals, Disp: 20 tablet, Rfl: 0  •  ondansetron (Zofran ODT) 4 mg disintegrating tablet, Take 1 tablet (4 mg total) by mouth every 6 (six) hours as needed for nausea or vomiting, Disp: 20 tablet, Rfl: 0  •  Potassium 99 MG TABS, Take by mouth daily, Disp: , Rfl:   •  Zepbound 2.5 MG/0.5ML auto-injector, Inject 0.5 mL (2.5 mg total) under the skin once a week, Disp: 2 mL, Rfl: 0    Allergies   Allergen Reactions   • Acetaminophen-Codeine      Pt states she does not remember having a reaction to this medication   • Hydrocodone Other (See Comments)     GI issues   • Latex Hives     Latex powder     • Lisinopril Other (See Comments)     Itching, gi  "intollerance     • Prednisolone Vomiting       Physical Exam:    /82 (BP Location: Left arm, Patient Position: Sitting, Cuff Size: Large)   Pulse 91   Temp 98.8 °F (37.1 °C)   Ht 5' 10\" (1.778 m)   Wt (!) 141 kg (311 lb)   BMI 44.62 kg/m²     Constitutional:normal, well developed, well nourished, alert, in no distress and non-toxic and no overt pain behavior. and overweight  Eyes:anicteric  HEENT:grossly intact  Neck:supple, symmetric, trachea midline and no masses   Pulmonary:even and unlabored  Cardiovascular:No edema or pitting edema present  Skin:Normal without rashes or lesions and well hydrated  Psychiatric:Mood and affect appropriate  Neurologic:Cranial Nerves II-XII grossly intact  Musculoskeletal:normal      Imaging  No orders to display         No orders of the defined types were placed in this encounter.      "

## 2024-07-23 NOTE — TELEPHONE ENCOUNTER
LVM to see if Pt has flexibility to come in early at 9:45am, 10:45am or 2:45pm?    If not that's ok too

## 2024-07-29 DIAGNOSIS — E66.01 MORBID OBESITY WITH BODY MASS INDEX (BMI) OF 45.0 TO 49.9 IN ADULT (HCC): ICD-10-CM

## 2024-07-29 DIAGNOSIS — K21.9 GASTROESOPHAGEAL REFLUX DISEASE WITHOUT ESOPHAGITIS: ICD-10-CM

## 2024-07-30 RX ORDER — FAMOTIDINE 20 MG/1
20 TABLET, FILM COATED ORAL 2 TIMES DAILY
Qty: 200 TABLET | Refills: 0 | OUTPATIENT
Start: 2024-07-30

## 2024-07-30 RX ORDER — TIRZEPATIDE 2.5 MG/.5ML
2.5 INJECTION, SOLUTION SUBCUTANEOUS WEEKLY
Qty: 2 ML | Refills: 0 | OUTPATIENT
Start: 2024-07-30

## 2024-08-07 ENCOUNTER — OFFICE VISIT (OUTPATIENT)
Dept: FAMILY MEDICINE CLINIC | Facility: HOSPITAL | Age: 49
End: 2024-08-07
Payer: COMMERCIAL

## 2024-08-07 VITALS
TEMPERATURE: 97.1 F | HEIGHT: 70 IN | HEART RATE: 83 BPM | WEIGHT: 293 LBS | OXYGEN SATURATION: 99 % | BODY MASS INDEX: 41.95 KG/M2 | DIASTOLIC BLOOD PRESSURE: 82 MMHG | SYSTOLIC BLOOD PRESSURE: 124 MMHG
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
## 2024-08-07 DIAGNOSIS — E66.01 MORBID OBESITY WITH BODY MASS INDEX (BMI) OF 45.0 TO 49.9 IN ADULT (HCC): Primary | ICD-10-CM

## 2024-08-07 DIAGNOSIS — S80.861A INSECT BITE OF RIGHT LOWER LEG, INITIAL ENCOUNTER: ICD-10-CM

## 2024-08-07 DIAGNOSIS — H69.93 DYSFUNCTION OF BOTH EUSTACHIAN TUBES: ICD-10-CM

## 2024-08-07 PROCEDURE — 99214 OFFICE O/P EST MOD 30 MIN: CPT | Performed by: FAMILY MEDICINE

## 2024-08-07 RX ORDER — TIRZEPATIDE 7.5 MG/.5ML
7.5 INJECTION, SOLUTION SUBCUTANEOUS WEEKLY
Qty: 2 ML | Refills: 0 | Status: SHIPPED | OUTPATIENT
Start: 2024-09-08

## 2024-08-07 RX ORDER — TIRZEPATIDE 5 MG/.5ML
5 INJECTION, SOLUTION SUBCUTANEOUS WEEKLY
Qty: 2 ML | Refills: 0 | Status: SHIPPED | OUTPATIENT
Start: 2024-08-07

## 2024-08-08 NOTE — ASSESSMENT & PLAN NOTE
Priscilla is tolerating Zepbound. She has not seen any results but she needs to be on higher doses. Discussed need to start low and ocntinue to increase dose. Will increase every 4 weeks as she tolerates. New rx sent for 5 mg weekly and then 7.5 mg weekly.   She will fu in 3 months.     Discussed continued efforts with diet and increase physical activity.

## 2024-08-08 NOTE — PROGRESS NOTES
Ambulatory Visit  Name: Priscilla Lorenzo      : 1975      MRN: [id number]  Encounter Provider: Maurisio Torres MD  Encounter Date: 2024   Encounter department: [facility] [address]     Assessment & Plan   1. Morbid obesity with body mass index (BMI) of 45.0 to 49.9 in adult (HCC)  Assessment & Plan:  Priscilla is tolerating Zepbound. She has not seen any results but she needs to be on higher doses. Discussed need to start low and ocntinue to increase dose. Will increase every 4 weeks as she tolerates. New rx sent for 5 mg weekly and then 7.5 mg weekly.   She will fu in 3 months.     Discussed continued efforts with diet and increase physical activity.       Orders:  -     Zepbound 5 MG/0.5ML auto-injector; Inject 0.5 mL (5 mg total) under the skin once a week  -     Zepbound 7.5 MG/0.5ML auto-injector; Inject 0.5 mL (7.5 mg total) under the skin once a week Do not start before 2024.  2. Insect bite of right lower leg, initial encounter  Comments:  supportive treatment. no signs of infeciton at this time.  3. Dysfunction of both eustachian tubes  Comments:  no infection notedd. has some congestion . advised otc decongestants to try.       History of Present Illness     Priscilla is here for fu of weight loss mgt.   She is tolerating zepbound but has not seen any weight loss.   No se/ar noted.     Also with a what appears to be an insect bite on the right lower leg. Nof ever, no chills.   Not increasing in size. No open wound.     Has clogged ears. Some sinus congestion. No pnd, no fever, no chills. No coughing, no sob.       Earache   Pertinent negatives include no abdominal pain or diarrhea.   Insect Bite  Associated symptoms include congestion. Pertinent negatives include no abdominal pain, chills, diaphoresis, fatigue or nausea.       Review of Systems   Constitutional:  Negative for activity change, appetite change, chills, diaphoresis and fatigue.   HENT:  Positive  "for congestion and ear pain.    Gastrointestinal:  Negative for abdominal distention, abdominal pain, anal bleeding, blood in stool, constipation, diarrhea and nausea.       Objective     /82   Pulse 83   Temp (!) 97.1 °F (36.2 °C)   Ht 5' 10\" (1.778 m)   Wt (!) 142 kg (314 lb)   SpO2 99%   BMI 45.05 kg/m²     Physical Exam  Vitals and nursing note reviewed.   Constitutional:       General: She is not in acute distress.     Appearance: Normal appearance. She is well-developed. She is obese.   HENT:      Head: Normocephalic and atraumatic.      Right Ear: Tympanic membrane, ear canal and external ear normal.      Left Ear: Tympanic membrane, ear canal and external ear normal.      Nose: Nose normal.      Mouth/Throat:      Mouth: Mucous membranes are moist.   Eyes:      Conjunctiva/sclera: Conjunctivae normal.      Pupils: Pupils are equal, round, and reactive to light.   Cardiovascular:      Rate and Rhythm: Normal rate and regular rhythm.      Heart sounds: Normal heart sounds.   Pulmonary:      Effort: Pulmonary effort is normal.      Breath sounds: Normal breath sounds.   Abdominal:      General: Bowel sounds are normal.      Palpations: Abdomen is soft.   Musculoskeletal:      Cervical back: Normal range of motion and neck supple.   Skin:     General: Skin is warm and dry.   Neurological:      General: No focal deficit present.      Mental Status: She is alert and oriented to person, place, and time.   Psychiatric:         Mood and Affect: Mood normal.         Behavior: Behavior normal.       Administrative Statements           "

## 2024-08-24 DIAGNOSIS — F32.1 CURRENT MODERATE EPISODE OF MAJOR DEPRESSIVE DISORDER, UNSPECIFIED WHETHER RECURRENT (HCC): ICD-10-CM

## 2024-08-28 RX ORDER — ARIPIPRAZOLE 2 MG/1
2 TABLET ORAL DAILY
Qty: 30 TABLET | Refills: 0 | Status: SHIPPED | OUTPATIENT
Start: 2024-08-28

## 2024-08-30 DIAGNOSIS — K21.9 GASTROESOPHAGEAL REFLUX DISEASE WITHOUT ESOPHAGITIS: ICD-10-CM

## 2024-08-30 DIAGNOSIS — M79.18 MYOFASCIAL PAIN SYNDROME: ICD-10-CM

## 2024-08-30 DIAGNOSIS — M54.16 LUMBAR RADICULOPATHY: ICD-10-CM

## 2024-08-30 DIAGNOSIS — G89.4 CHRONIC PAIN SYNDROME: ICD-10-CM

## 2024-08-30 DIAGNOSIS — M51.27 HERNIATED NUCLEUS PULPOSUS, L5-S1: ICD-10-CM

## 2024-08-30 DIAGNOSIS — M47.816 LUMBAR SPONDYLOSIS: ICD-10-CM

## 2024-08-30 RX ORDER — DULOXETIN HYDROCHLORIDE 60 MG/1
CAPSULE, DELAYED RELEASE ORAL
Qty: 180 CAPSULE | Refills: 0 | Status: SHIPPED | OUTPATIENT
Start: 2024-08-30

## 2024-08-30 RX ORDER — FAMOTIDINE 20 MG/1
20 TABLET, FILM COATED ORAL 2 TIMES DAILY
Qty: 180 TABLET | Refills: 1 | Status: SHIPPED | OUTPATIENT
Start: 2024-08-30

## 2024-08-30 RX ORDER — BACLOFEN 10 MG/1
10 TABLET ORAL 3 TIMES DAILY
Qty: 270 TABLET | Refills: 0 | Status: SHIPPED | OUTPATIENT
Start: 2024-08-30

## 2024-09-09 DIAGNOSIS — E66.01 MORBID OBESITY WITH BODY MASS INDEX (BMI) OF 45.0 TO 49.9 IN ADULT (HCC): ICD-10-CM

## 2024-09-10 RX ORDER — TIRZEPATIDE 7.5 MG/.5ML
7.5 INJECTION, SOLUTION SUBCUTANEOUS WEEKLY
Qty: 2 ML | Refills: 0 | Status: SHIPPED | OUTPATIENT
Start: 2024-09-10

## 2024-09-23 DIAGNOSIS — E66.01 MORBID OBESITY WITH BODY MASS INDEX (BMI) OF 45.0 TO 49.9 IN ADULT (HCC): ICD-10-CM

## 2024-09-23 RX ORDER — TIRZEPATIDE 5 MG/.5ML
5 INJECTION, SOLUTION SUBCUTANEOUS WEEKLY
Qty: 6 ML | Refills: 1 | Status: SHIPPED | OUTPATIENT
Start: 2024-09-23 | End: 2025-03-22

## 2024-09-29 DIAGNOSIS — F32.1 CURRENT MODERATE EPISODE OF MAJOR DEPRESSIVE DISORDER, UNSPECIFIED WHETHER RECURRENT (HCC): ICD-10-CM

## 2024-09-29 DIAGNOSIS — G89.4 CHRONIC PAIN SYNDROME: ICD-10-CM

## 2024-09-29 DIAGNOSIS — M47.816 LUMBAR SPONDYLOSIS: ICD-10-CM

## 2024-09-29 DIAGNOSIS — E03.9 ACQUIRED HYPOTHYROIDISM: ICD-10-CM

## 2024-09-29 DIAGNOSIS — K21.9 GASTROESOPHAGEAL REFLUX DISEASE WITHOUT ESOPHAGITIS: ICD-10-CM

## 2024-09-29 DIAGNOSIS — M54.16 LUMBAR RADICULOPATHY: ICD-10-CM

## 2024-09-29 DIAGNOSIS — M79.18 MYOFASCIAL PAIN SYNDROME: ICD-10-CM

## 2024-09-29 DIAGNOSIS — M51.27 HERNIATED NUCLEUS PULPOSUS, L5-S1: ICD-10-CM

## 2024-09-29 RX ORDER — LEVOTHYROXINE SODIUM 200 UG/1
TABLET ORAL
Qty: 100 TABLET | Refills: 0 | Status: SHIPPED | OUTPATIENT
Start: 2024-09-29

## 2024-09-30 RX ORDER — FAMOTIDINE 20 MG/1
20 TABLET, FILM COATED ORAL 2 TIMES DAILY
Qty: 180 TABLET | Refills: 1 | Status: SHIPPED | OUTPATIENT
Start: 2024-09-30

## 2024-10-02 RX ORDER — ARIPIPRAZOLE 2 MG/1
2 TABLET ORAL DAILY
Qty: 30 TABLET | Refills: 0 | Status: SHIPPED | OUTPATIENT
Start: 2024-10-02 | End: 2024-10-03 | Stop reason: SDUPTHER

## 2024-10-03 DIAGNOSIS — F32.1 CURRENT MODERATE EPISODE OF MAJOR DEPRESSIVE DISORDER, UNSPECIFIED WHETHER RECURRENT (HCC): ICD-10-CM

## 2024-10-03 RX ORDER — BACLOFEN 10 MG/1
10 TABLET ORAL 3 TIMES DAILY
Qty: 270 TABLET | Refills: 0 | OUTPATIENT
Start: 2024-10-03

## 2024-10-03 RX ORDER — DULOXETIN HYDROCHLORIDE 60 MG/1
CAPSULE, DELAYED RELEASE ORAL
Qty: 180 CAPSULE | Refills: 0 | OUTPATIENT
Start: 2024-10-03

## 2024-10-04 RX ORDER — ARIPIPRAZOLE 2 MG/1
2 TABLET ORAL DAILY
Qty: 30 TABLET | Refills: 0 | Status: SHIPPED | OUTPATIENT
Start: 2024-10-04

## 2024-10-11 ENCOUNTER — TELEPHONE (OUTPATIENT)
Dept: RADIOLOGY | Facility: CLINIC | Age: 49
End: 2024-10-11

## 2024-10-11 DIAGNOSIS — M54.16 LUMBAR RADICULOPATHY: Primary | ICD-10-CM

## 2024-10-11 NOTE — TELEPHONE ENCOUNTER
Priscilla Lorenzo   to [facility] (supporting Maddison White PA-C)         10/11/24  8:43 AM  Gm! I was wondering if I can get scheduled a double injection like before in my lower back again? Pain is getting worse. Please let me know. Ty  Liza Sanchez RN   to Priscilla Lorenzo   VO      10/11/24  9:00 AM  Good morning Priscilla,     Your last injection was on July 1, 2024, a left L4L5 TFESI.  Is this the injection you would like to repeat? If so, please answer the following questions:      What % of relief did you get from your last injection?  How long did your pain relief last?  Are you having the same pain you had prior to your last injection?  When did your pain start to come back?  What is your current pain level?  Are you on any blood thinners?  Are you diabetic?     Thank you,  SILKE Bahena    Last read by Priscilla Lorenzo at 10:18 AM on 10/11/2024.  Priscilla Lorenzo   to [facility] (supporting Maddison White PA-C)         10/11/24  9:58 AM  No it was an injection one on each side. Wasn’t what I had last time but before that one. I received a great amount of relief from the last shot about 70%. The pain is now starting again. Yes lower back straight across n down both legs and feet. Pain started to come back last week. Current pain is around a 7. No blood thinners and I’m not diabetic   Liza Sanchez RN   to Priscilla Lorenzo   VO      10/11/24 10:17 AM  Priscilla,      You had a BL S1 TFESI January 25, 2024, is this the one you are referencing?      Thank you,  SILKE Bahena    Last read by Priscilla Lorenzo at 10:18 AM on 10/11/2024.  Priscilla Lorenzo   to [facility] (supporting Maddison White PA-C)         10/11/24 10:18 AM  I’m assuming BL means bilateral and if so yes  Liza Sanchez RN   to Maddison White PA-C   VO    10/11/24 10:20 AM  Please see previous and advise on scheduling BL TFESI pt last had in January,  ty.  Maddison White PA-C   to [facility] Coordinator [facility]       10/11/24 10:56 AM  Note     Please schedule B/L S1 TFESI.  Order entered.

## 2024-10-13 LAB
GLUCOSE SERPL-MCNC: 90 MG/DL (ref 70–99)
T3FREE SERPL-MCNC: 2.8 PG/ML (ref 2–4.4)
T4 FREE SERPL-MCNC: 1.77 NG/DL (ref 0.82–1.77)
TSH SERPL DL<=0.005 MIU/L-ACNC: 0.27 UIU/ML (ref 0.45–4.5)

## 2024-10-14 NOTE — TELEPHONE ENCOUNTER
PRE OP INSTRUCTIONS:  -If you are on prescription blood thinners, you may have to hold the medication for several days before the procedure.    Please call the office to discuss medication holds at [phone].  -Do not eat or drink ONE HOUR prior to your procedure. If you are diabetic, may follow regular breakfast/lunch schedule and take usual    diabetic medications.  -Lumbar( low back) procedure, please wear comfortable slacks/pants.  -Cervical (neck) procedure, please wear a shirt/blouse that is easy to remove.  -A  is required to take you home form your procedure.  -Continue all to take prescribed medication the day of your procedure, including blood pressure medications.  -If you are prescribe antibiotics, have an active infection or have an open wound, please contact the office at [phone].  -Please refrain from any vaccinations two weeks before and two weeks after injection.  -Insurance authorization received in not a guarantee of payment per your insurance company's authorization disclaimer and it is    your responsibility to verify your benefits.          -If you have any questions about the instructions, please call me at [phone]          -PATIENT INSTRUCTED TO STOP ALL NSAID'S 4 DAYS PRIOR TO PROCEDURE            EXCEPT FOR IBUPROFEN IT CAN BE TAKEN UP TO 24 HOURS PRIOR TO PROCEDURE.

## 2024-10-14 NOTE — TELEPHONE ENCOUNTER
Caller: Priscilla     Doctor/Office: Odalys    Call regarding :       Call was transferred to: Warm transfer to

## 2024-10-17 ENCOUNTER — HOSPITAL ENCOUNTER (OUTPATIENT)
Dept: RADIOLOGY | Facility: CLINIC | Age: 49
Discharge: HOME | End: 2024-10-17
Payer: COMMERCIAL

## 2024-10-17 VITALS
DIASTOLIC BLOOD PRESSURE: 76 MMHG | OXYGEN SATURATION: 96 % | HEART RATE: 102 BPM | RESPIRATION RATE: 16 BRPM | SYSTOLIC BLOOD PRESSURE: 112 MMHG | TEMPERATURE: 97.2 F

## 2024-10-17 DIAGNOSIS — M54.16 LUMBAR RADICULOPATHY: ICD-10-CM

## 2024-10-17 PROCEDURE — 64483 NJX AA&/STRD TFRM EPI L/S 1: CPT | Performed by: ANESTHESIOLOGY

## 2024-10-17 RX ORDER — METHYLPREDNISOLONE ACETATE 80 MG/ML
160 INJECTION, SUSPENSION INTRA-ARTICULAR; INTRALESIONAL; INTRAMUSCULAR; PARENTERAL; SOFT TISSUE ONCE
Status: COMPLETED | OUTPATIENT
Start: 2024-10-17 | End: 2024-10-17

## 2024-10-17 RX ADMIN — METHYLPREDNISOLONE ACETATE 160 MG: 80 INJECTION, SUSPENSION INTRA-ARTICULAR; INTRALESIONAL; INTRAMUSCULAR; SOFT TISSUE at 14:09

## 2024-10-17 RX ADMIN — IOHEXOL 2 ML: 300 INJECTION, SOLUTION INTRAVENOUS at 14:09

## 2024-10-17 NOTE — H&P
History of Present Illness: The patient is a 49 y.o. female who presents with complaints of low back and leg pain.    Past Medical History:   Diagnosis Date    Allergic     Anxiety     Arthritis     Asthma     Chronic pain disorder     upper back    Depression     Disc disorder     Disease of thyroid gland     Fibromyalgia, primary     GERD (gastroesophageal reflux disease)     Headache(784.0)     HPV (human papilloma virus) infection     cervical    Hypertension     Hypothyroidism     Inflammatory bowel disease     Migraine     Obesity     Stomach disorder        Past Surgical History:   Procedure Laterality Date    CERVICAL BIOPSY  W/ LOOP ELECTRODE EXCISION      CERVICAL FUSION  2015     SECTION      CHOLECYSTECTOMY      lap    FOOT SURGERY Bilateral     multiple, posterior calcaneal bone spur    KNEE SURGERY Left     arthroscopy with debribement    LYMPH NODE BIOPSY      [facility] NDSC WRST SURG W/RLS TRANSVRS CARPL LIGM Right 2023    Procedure: Right endoscopic carpal tunnel release;  Surgeon: Cl Mac MD;  Location: [facility];  Service: Orthopedics    SPINE SURGERY      TENDON REPAIR      TUBAL LIGATION Bilateral          Current Outpatient Medications:     acetaminophen (TYLENOL) 650 mg CR tablet, Take 1 tablet (650 mg total) by mouth every 8 (eight) hours as needed for mild pain, Disp: 30 tablet, Rfl: 0    albuterol (Ventolin HFA) 90 mcg/act inhaler, Inhale 2 puffs every 6 (six) hours as needed for wheezing, Disp: 18 g, Rfl: 0    ARIPiprazole (ABILIFY) 2 mg tablet, Take 1 tablet (2 mg total) by mouth daily, Disp: 30 tablet, Rfl: 0    baclofen 10 mg tablet, Take 1 tablet (10 mg total) by mouth 3 (three) times a day, Disp: 270 tablet, Rfl: 0    betamethasone valerate (VALISONE) 0.1 % ointment, Apply 1 to 2 times a day only as needed to finger/hand rash., Disp: 30 g, Rfl: 0    Calcium Carbonate (CALCIUM 600 PO), Take by mouth daily, Disp: , Rfl:     cholecalciferol (VITAMIN D3) 1,000  units tablet, Take 2,000 Units by mouth daily, Disp: , Rfl:     ciclopirox (LOPROX) 0.77 % cream, APPLY 2 TIMES per day to rash on chest for 2-4 weeks., Disp: 30 g, Rfl: 0    DULoxetine (CYMBALTA) 60 mg delayed release capsule, Take 2 PO HS., Disp: 180 capsule, Rfl: 0    Euflexxa 20 MG/2ML SOSY, , Disp: , Rfl:     famotidine (PEPCID) 20 mg tablet, Take 1 tablet (20 mg total) by mouth 2 (two) times a day, Disp: 180 tablet, Rfl: 1    gabapentin (NEURONTIN) 600 MG tablet, Take 1 tablet (600 mg total) by mouth 3 (three) times a day, Disp: 270 tablet, Rfl: 1    HYDROcodone-acetaminophen (NORCO) 5-325 mg per tablet, Take 1 PO BID PRN for pain for ongoing therapy, Disp: 14 tablet, Rfl: 0    irbesartan (AVAPRO) 75 mg tablet, Take 1 tablet (75 mg total) by mouth daily at bedtime, Disp: 90 tablet, Rfl: 1    levothyroxine (Levoxyl) 200 mcg tablet, Take 1 tablet daily in addition to 25 mcg tablet daily, Disp: 100 tablet, Rfl: 0    levothyroxine (Levoxyl) 25 mcg tablet, Take 1 tablet daily in addition to 200 mcg tablet daily, Disp: 90 tablet, Rfl: 3    multivitamin (THERAGRAN) TABS, Take 1 tablet by mouth daily, Disp: , Rfl:     naproxen sodium (ALEVE) 220 MG tablet, Take 1 tablet (220 mg total) by mouth 2 (two) times a day with meals, Disp: 20 tablet, Rfl: 0    ondansetron (Zofran ODT) 4 mg disintegrating tablet, Take 1 tablet (4 mg total) by mouth every 6 (six) hours as needed for nausea or vomiting, Disp: 20 tablet, Rfl: 0    Potassium 99 MG TABS, Take by mouth daily, Disp: , Rfl:     Zepbound 5 MG/0.5ML auto-injector, Inject 0.5 mL (5 mg total) under the skin once a week, Disp: 6 mL, Rfl: 1    Current Facility-Administered Medications:     iohexol (OMNIPAQUE) 300 mg/mL injection 2 mL, 2 mL, Epidural, Once, Edmond Morrow DO    methylPREDNISolone acetate (DEPO-MEDROL) injection 160 mg, 160 mg, Epidural, Once, Edmond Morrow DO    Allergies   Allergen Reactions    Acetaminophen-Codeine      Pt states she does not remember having a  reaction to this medication    Hydrocodone Other (See Comments)     GI issues    Latex Hives     Latex powder      Lisinopril Other (See Comments)     Itching, gi intollerance      Prednisolone Vomiting       Physical Exam: There were no vitals filed for this visit.  General: Awake, Alert, Oriented x 3, Mood and affect appropriate  Respiratory: Respirations even and unlabored  Cardiovascular: Peripheral pulses intact; no edema  Musculoskeletal Exam: Decreased range of motion lumbar spine    ASA Score: III    Patient/Chart Verification  Patient ID Verified: Verbal  ID Band Applied: No  Consents Confirmed: Procedural, To be obtained in the Pre-Procedure area  Interval H&P(within 24 hr) Complete (required for Outpatients and Surgery Admit only): To be obtained in the Procedural area  Allergies Reviewed: Yes  Anticoag/NSAID held?: NA  Currently on antibiotics?: No  Pregnancy denied?: Yes    Assessment:   1. Lumbar radiculopathy        Plan: B/L S1 TFESI

## 2024-10-17 NOTE — DISCHARGE INSTRUCTIONS
Epidural Steroid Injection   WHAT YOU NEED TO KNOW:   An epidural steroid injection (MARCIE) is a procedure to inject steroid medicine into the epidural space. The epidural space is between your spinal cord and vertebrae. Steroids reduce inflammation and fluid buildup in your spine that may be causing pain. You may be given pain medicine along with the steroids.          ACTIVITY  Do not drive or operate machinery today.  No strenuous activity today - bending, lifting, etc.  You may resume normal activites starting tomorrow - start slowly and as tolerated.  You may shower today, but no tub baths or hot tubs.  You may have numbness for several hours from the local anesthetic. Please use caution and common sense, especially with weight-bearing activities.    CARE OF THE INJECTION SITE  If you have soreness or pain, apply ice to the area today (20 minutes on/20 minutes off).  Starting tomorrow, you may use warm, moist heat or ice if needed.  You may have an increase or change in your discomfort for 36-48 hours after your treatment.  Apply ice and continue with any pain medication you have been prescribed.  Notify the [facility] if you have any of the following: redness, drainage, swelling, headache, stiff neck or fever above 100°F.    SPECIAL INSTRUCTIONS  Our office will contact you in approximately 14 days for a progress report.    MEDICATIONS  Continue to take all routine medications.  Our office may have instructed you to hold some medications.    As no general anesthesia was used in today's procedure, you should not experience any side effects related to anesthesia.     If you are diabetic, the steroids used in today's injection may temporarily increase your blood sugar levels after the first few days after your injection. Please keep a close eye on your sugars and alert the doctor who manages your diabetes if your sugars are significantly high from your baseline or you are symptomatic.     If you have a  problem specifically related to your procedure, please call our office at [phone].  Problems not related to your procedure should be directed to your primary care physician.

## 2024-10-20 DIAGNOSIS — E66.01 MORBID OBESITY WITH BODY MASS INDEX (BMI) OF 45.0 TO 49.9 IN ADULT (HCC): ICD-10-CM

## 2024-10-23 RX ORDER — TIRZEPATIDE 5 MG/.5ML
5 INJECTION, SOLUTION SUBCUTANEOUS WEEKLY
Qty: 6 ML | Refills: 0 | Status: SHIPPED | OUTPATIENT
Start: 2024-10-23 | End: 2025-04-21

## 2024-10-24 ENCOUNTER — OFFICE VISIT (OUTPATIENT)
Dept: ENDOCRINOLOGY | Facility: HOSPITAL | Age: 49
End: 2024-10-24
Payer: COMMERCIAL

## 2024-10-24 VITALS
BODY MASS INDEX: 41.8 KG/M2 | HEART RATE: 88 BPM | HEIGHT: 70 IN | OXYGEN SATURATION: 96 % | SYSTOLIC BLOOD PRESSURE: 118 MMHG | WEIGHT: 292 LBS | DIASTOLIC BLOOD PRESSURE: 80 MMHG

## 2024-10-24 DIAGNOSIS — E06.3 HYPOTHYROIDISM DUE TO HASHIMOTO'S THYROIDITIS: Primary | ICD-10-CM

## 2024-10-24 PROCEDURE — 99213 OFFICE O/P EST LOW 20 MIN: CPT | Performed by: PHYSICIAN ASSISTANT

## 2024-10-24 NOTE — PROGRESS NOTES
Priscilla Lorenzo 49 y.o. female MRN: [id number]    Encounter: [id number]      Assessment & Plan     Assessment:  This is a 49 y.o.-year-old female with thyroidism due to Hashimoto's thyroiditis we.    Plan:  Hypothyroidism: Recent thyroid lab work came back with a low TSH and a high and normal free T4.  She has lost 32 pounds since last office visit which is the likely reason she is closer to the hyperactive side at this time.  Will decrease her levothyroxine to 200 mcg daily.  Asked her to repeat lab work in about 6 weeks.  If she continues to lose weight, we may need to continue decreasing her dose.  Contact the office with any concerns or questions.  At this time she will follow-up again in 1 year.    CC: Hypothyroidism follow-up    History of Present Illness     HPI:  Priscilla Lorenzo is a 49 year old female with hypothyroidism due to Hashimoto's thyroiditis for follow-up visit.       She was diagnosed with hypothyroidism at the age of 21.  She is on thyroid hormone for replacement purposes and uses generic levothyroxine.  Thyroid antibodies were positive confirming Hashimoto's thyroiditis as the underlying cause of her hypothyroidism.  He is currently on levothyroxine 225 mcg daily.  Since last office visit she was started on Zepbound to help with weight loss.  Has lost 32 pounds on the medication.  She denies heat or cold intolerance but can be hot and sweaty at times.  She has diarrhea with her IBS.  She denies palpitations, constipation, tremors, or depression.  Sleep can vary from night to night.  Has dry skin and brittle nails, but denies any excessive hair loss.  She denies diplopia.  She denies compressive thyroid symptoms or difficulties with swallowing.  From an endocrine standpoint believe she is doing well this time, but does state that she is under a lot of stress with family life stuff.  Open has been having some episodes of dizziness.  This started even after having side effects with higher dose  [facility].     She had a lot of fatigue and abnormal weight gain for which 24 hour urine cortisol and insulin resistance was evaluated.  Testosterone was noted to be low.  24 hour urine cortisol was normal.    Review of Systems   Constitutional:  Positive for fatigue. Negative for activity change, appetite change, diaphoresis and unexpected weight change.   HENT:  Negative for sore throat, trouble swallowing and voice change.    Eyes:  Negative for visual disturbance.   Respiratory:  Negative for chest tightness and shortness of breath.    Cardiovascular:  Negative for chest pain, palpitations and leg swelling.   Gastrointestinal:  Positive for diarrhea. Negative for abdominal pain and constipation.   Endocrine: Negative for cold intolerance, heat intolerance, polydipsia, polyphagia and polyuria.   Musculoskeletal:  Positive for arthralgias.   Skin:  Negative for rash.        Dry skin and brittle nails   Neurological:  Positive for dizziness. Negative for tremors, light-headedness, numbness and headaches.   Hematological:  Negative for adenopathy.   Psychiatric/Behavioral:  Negative for dysphoric mood and sleep disturbance. The patient is not nervous/anxious.        Historical Information   Past Medical History:   Diagnosis Date    Allergic     Anxiety     Arthritis     Asthma     Chronic pain disorder     upper back    Depression     Disc disorder     Disease of thyroid gland     Fibromyalgia, primary     GERD (gastroesophageal reflux disease)     Headache(784.0)     HPV (human papilloma virus) infection     cervical    Hypertension     Hypothyroidism     Inflammatory bowel disease     Migraine     Obesity     Stomach disorder      Past Surgical History:   Procedure Laterality Date    CERVICAL BIOPSY  W/ LOOP ELECTRODE EXCISION      CERVICAL FUSION  2015     SECTION      CHOLECYSTECTOMY      lap    FOOT SURGERY Bilateral     multiple, posterior calcaneal bone spur    KNEE SURGERY Left      arthroscopy with debribement    LYMPH NODE BIOPSY      SC NDSC WRST SURG W/RLS TRANSVRS CARPL LIGM Right 8/22/2023    Procedure: Right endoscopic carpal tunnel release;  Surgeon: Cl Mac MD;  Location: [facility];  Service: Orthopedics    SPINE SURGERY      TENDON REPAIR      TUBAL LIGATION Bilateral 2012     Social History   Social History     Substance and Sexual Activity   Alcohol Use Not Currently     Social History     Substance and Sexual Activity   Drug Use No     Social History     Tobacco Use   Smoking Status Every Day    Current packs/day: 0.50    Average packs/day: 0.5 packs/day for 35.0 years (17.5 ttl pk-yrs)    Types: Cigarettes   Smokeless Tobacco Never   Tobacco Comments    actually about 3/4 PPD     Family History:   Family History   Adopted: Yes   Problem Relation Age of Onset    Thyroid disease Mother     Arthritis Mother         Mom, dad, aunts, grandmother    Autoimmune disease Mother     No Known Problems Father     Coronary artery disease Maternal Grandmother     Breast cancer Maternal Grandmother         age unknown    Heart disease Maternal Grandmother         Mother, Aunt, Grandmother, Father    Coronary artery disease Maternal Aunt     Stroke Maternal Aunt         Chika, brothers    No Known Problems Maternal Grandfather     No Known Problems Paternal Grandmother     No Known Problems Paternal Grandfather     Suicide Attempts Maternal Aunt     Heart murmur Maternal Aunt     Hypothyroidism Maternal Aunt     Hashimoto's thyroiditis Maternal Aunt     Diabetes unspecified Brother     Diabetes Brother     No Known Problems Brother     No Known Problems Brother     ADD / ADHD Son        Meds/Allergies   Current Outpatient Medications   Medication Sig Dispense Refill    acetaminophen (TYLENOL) 650 mg CR tablet Take 1 tablet (650 mg total) by mouth every 8 (eight) hours as needed for mild pain 30 tablet 0    albuterol (Ventolin HFA) 90 mcg/act inhaler Inhale 2 puffs every 6 (six) hours  as needed for wheezing 18 g 0    ARIPiprazole (ABILIFY) 2 mg tablet Take 1 tablet (2 mg total) by mouth daily 30 tablet 0    baclofen 10 mg tablet Take 1 tablet (10 mg total) by mouth 3 (three) times a day 270 tablet 0    betamethasone valerate (VALISONE) 0.1 % ointment Apply 1 to 2 times a day only as needed to finger/hand rash. 30 g 0    Calcium Carbonate (CALCIUM 600 PO) Take by mouth daily      cholecalciferol (VITAMIN D3) 1,000 units tablet Take 2,000 Units by mouth daily      ciclopirox (LOPROX) 0.77 % cream APPLY 2 TIMES per day to rash on chest for 2-4 weeks. 30 g 0    DULoxetine (CYMBALTA) 60 mg delayed release capsule Take 2 PO HS. 180 capsule 0    Euflexxa 20 MG/2ML SOSY       famotidine (PEPCID) 20 mg tablet Take 1 tablet (20 mg total) by mouth 2 (two) times a day 180 tablet 1    gabapentin (NEURONTIN) 600 MG tablet Take 1 tablet (600 mg total) by mouth 3 (three) times a day 270 tablet 1    HYDROcodone-acetaminophen (NORCO) 5-325 mg per tablet Take 1 PO BID PRN for pain for ongoing therapy 14 tablet 0    irbesartan (AVAPRO) 75 mg tablet Take 1 tablet (75 mg total) by mouth daily at bedtime 90 tablet 1    levothyroxine (Levoxyl) 200 mcg tablet Take 1 tablet daily in addition to 25 mcg tablet daily 100 tablet 0    multivitamin (THERAGRAN) TABS Take 1 tablet by mouth daily      naproxen sodium (ALEVE) 220 MG tablet Take 1 tablet (220 mg total) by mouth 2 (two) times a day with meals 20 tablet 0    ondansetron (Zofran ODT) 4 mg disintegrating tablet Take 1 tablet (4 mg total) by mouth every 6 (six) hours as needed for nausea or vomiting 20 tablet 0    Potassium 99 MG TABS Take by mouth daily      Zepbound 5 MG/0.5ML auto-injector Inject 0.5 mL (5 mg total) under the skin once a week 6 mL 0     No current facility-administered medications for this visit.     Allergies   Allergen Reactions    Acetaminophen-Codeine      Pt states she does not remember having a reaction to this medication    Hydrocodone Other  "(See Comments)     GI issues    Latex Hives     Latex powder      Lisinopril Other (See Comments)     Itching, gi intollerance      Prednisolone Vomiting       Objective   Vitals: Blood pressure 118/80, pulse 88, height 5' 10\" (1.778 m), weight 132 kg (292 lb), SpO2 96%, not currently breastfeeding.    Physical Exam  Vitals and nursing note reviewed.   Constitutional:       General: She is not in acute distress.     Appearance: Normal appearance. She is not diaphoretic.   HENT:      Head: Normocephalic and atraumatic.   Eyes:      General: No scleral icterus.     Extraocular Movements: Extraocular movements intact.      Conjunctiva/sclera: Conjunctivae normal.      Pupils: Pupils are equal, round, and reactive to light.   Cardiovascular:      Rate and Rhythm: Normal rate and regular rhythm.      Heart sounds: No murmur heard.  Pulmonary:      Effort: Pulmonary effort is normal. No respiratory distress.      Breath sounds: Normal breath sounds. No wheezing.   Musculoskeletal:      Cervical back: Normal range of motion.      Right lower leg: No edema.   Lymphadenopathy:      Cervical: No cervical adenopathy.   Neurological:      Mental Status: She is alert and oriented to person, place, and time. Mental status is at baseline.      Sensory: No sensory deficit.      Gait: Gait normal.   Psychiatric:         Mood and Affect: Mood normal.         Behavior: Behavior normal.         Thought Content: Thought content normal.         The history was obtained from the review of the chart, patient.    Lab Results:   Lab Results   Component Value Date/Time    Free t4 1.77 10/12/2024 08:30 AM       Imaging Studies:       Results Review Statement: No pertinent imaging studies reviewed.    Portions of the record may have been created with voice recognition software. Occasional wrong word or \"sound a like\" substitutions may have occurred due to the inherent limitations of voice recognition software. Read the chart carefully and " recognize, using context, where substitutions have occurred.

## 2024-10-24 NOTE — PATIENT INSTRUCTIONS
Decrease levothyroxine to 200 mcg daily.    Get lab work in 6 weeks.    Call with any concerns or questions.     Follow up in 1 year with lab work prior to visit.

## 2024-10-31 ENCOUNTER — TELEPHONE (OUTPATIENT)
Dept: PAIN MEDICINE | Facility: CLINIC | Age: 49
End: 2024-10-31

## 2024-11-11 ENCOUNTER — TELEPHONE (OUTPATIENT)
Dept: PAIN MEDICINE | Facility: CLINIC | Age: 49
End: 2024-11-11

## 2024-11-11 DIAGNOSIS — E66.01 MORBID OBESITY WITH BODY MASS INDEX (BMI) OF 45.0 TO 49.9 IN ADULT (HCC): ICD-10-CM

## 2024-11-11 DIAGNOSIS — F32.1 CURRENT MODERATE EPISODE OF MAJOR DEPRESSIVE DISORDER, UNSPECIFIED WHETHER RECURRENT (HCC): ICD-10-CM

## 2024-11-11 NOTE — TELEPHONE ENCOUNTER
Maddison White PA-C   to [facility]       11/8/24  1:46 PM  Can you schedule patient for TPI?  Thank you.    VASYL to schedule office side TPI procedure with Maddison

## 2024-11-11 NOTE — TELEPHONE ENCOUNTER
Caller: patient    Doctor:     Reason for call: Calling back to schedule TPI  Transfer to front office    Call back#:

## 2024-11-13 ENCOUNTER — OFFICE VISIT (OUTPATIENT)
Dept: FAMILY MEDICINE CLINIC | Facility: HOSPITAL | Age: 49
End: 2024-11-13
Payer: COMMERCIAL

## 2024-11-13 VITALS
SYSTOLIC BLOOD PRESSURE: 136 MMHG | OXYGEN SATURATION: 97 % | WEIGHT: 291 LBS | DIASTOLIC BLOOD PRESSURE: 92 MMHG | HEART RATE: 82 BPM | HEIGHT: 70 IN | TEMPERATURE: 98.3 F | BODY MASS INDEX: 41.66 KG/M2

## 2024-11-13 DIAGNOSIS — E66.01 MORBID OBESITY WITH BODY MASS INDEX (BMI) OF 45.0 TO 49.9 IN ADULT (HCC): ICD-10-CM

## 2024-11-13 DIAGNOSIS — R42 VERTIGO: Primary | ICD-10-CM

## 2024-11-13 PROCEDURE — 99214 OFFICE O/P EST MOD 30 MIN: CPT | Performed by: FAMILY MEDICINE

## 2024-11-13 RX ORDER — TIRZEPATIDE 5 MG/.5ML
5 INJECTION, SOLUTION SUBCUTANEOUS WEEKLY
Qty: 6 ML | Refills: 0 | Status: SHIPPED | OUTPATIENT
Start: 2024-11-13 | End: 2025-05-12

## 2024-11-13 RX ORDER — MECLIZINE HCL 12.5 MG 12.5 MG/1
12.5 TABLET ORAL EVERY 8 HOURS PRN
Qty: 90 TABLET | Refills: 0 | Status: SHIPPED | OUTPATIENT
Start: 2024-11-13

## 2024-11-13 RX ORDER — ARIPIPRAZOLE 2 MG/1
2 TABLET ORAL DAILY
Qty: 30 TABLET | Refills: 0 | Status: SHIPPED | OUTPATIENT
Start: 2024-11-13

## 2024-11-13 NOTE — ASSESSMENT & PLAN NOTE
Kayla has been doing well with Zepbound at 5 mg weekly.  She cannot tolerate higher doses.  Continues to lose weight.  Continue with increased physical activity is much as she tolerates.  Continue with good dietary control.

## 2024-11-13 NOTE — PROGRESS NOTES
Name: Priscilla Lorenzo      : 1975      MRN: [id number]  Encounter Provider: Maurisio Torres MD  Encounter Date: 2024   Encounter department: [facility] [address]   :  Assessment & Plan  Vertigo  Unclear etiology.    Does think it is more secondary to cervical neck pain.  Continue with muscle relaxants.  Continue with gabapentin at current dose.  Continue follow-up with pain management as well.    Does have hyperthyroidism but recent dosage adjustment through endocrinology of Synthroid.    No signs of hypotension or orthostatic hypotension.    No new medications.    Possibly secondary to BPPV.        Treatment with meclizine.  To use this every 8 hours for 24 hours then as needed.  Continue to monitor.    No red flags.  No signs of CVA.  Orders:    meclizine (ANTIVERT) 12.5 MG tablet; Take 1 tablet (12.5 mg total) by mouth every 8 (eight) hours as needed for dizziness    Morbid obesity with body mass index (BMI) of 45.0 to 49.9 in adult (HCC)    Kayla has been doing well with Zepbound at 5 mg weekly.  She cannot tolerate higher doses.  Continues to lose weight.  Continue with increased physical activity is much as she tolerates.  Continue with good dietary control.                   History of Present Illness   Kayla is seen for 2 weeks of intermittent vertigo.  World appears to be spinning.    There is no trauma.  No falls.  No injury.    No new medications.    She has known hypothyroidism.  Last TSH was low.  Synthroid has since been adjusted.  She is on the lower dose.  Following up with endocrinology.    No allergies no sinus infection.  No sinus pressure.  No URI symptoms over the last 2 weeks.  No fever no chills.    No headaches, lightheadedness, no numbness weakness tingling.  No balance problems.    The dizziness may occur randomly.  Not associated with sudden movements.  Not associated with position.    No chest pain, palpitations, skipped beats.    She does have  "an increase in her neck pain.  She does thinks it is related to this.    Dizziness  Pertinent negatives include no chest pain, chills, coughing, diaphoresis, fatigue, fever, headaches, numbness or weakness.     Priscilla Lorenzo is a 49 y.o. female who presents due to dizziness    Review of Systems   Constitutional:  Positive for activity change. Negative for chills, diaphoresis, fatigue and fever.   Respiratory:  Negative for cough, shortness of breath and stridor.    Cardiovascular:  Negative for chest pain and leg swelling.   Neurological:  Positive for dizziness. Negative for tremors, seizures, syncope, facial asymmetry, speech difficulty, weakness, light-headedness, numbness and headaches.          Objective   /92 (Patient Position: Standing) Comment (Patient Position): x3 mins  Pulse 82   Temp 98.3 °F (36.8 °C)   Ht 5' 10\" (1.778 m)   Wt 132 kg (291 lb)   SpO2 97%   BMI 41.75 kg/m²      Physical Exam  Vitals and nursing note reviewed.   Constitutional:       General: She is not in acute distress.     Appearance: Normal appearance. She is well-developed.   HENT:      Head: Normocephalic and atraumatic.      Right Ear: External ear normal.      Left Ear: External ear normal.      Nose: Nose normal.      Mouth/Throat:      Mouth: Mucous membranes are moist.   Eyes:      Conjunctiva/sclera: Conjunctivae normal.      Pupils: Pupils are equal, round, and reactive to light.   Cardiovascular:      Rate and Rhythm: Normal rate and regular rhythm.      Heart sounds: Normal heart sounds.   Pulmonary:      Effort: Pulmonary effort is normal.      Breath sounds: Normal breath sounds.   Abdominal:      General: Bowel sounds are normal.      Palpations: Abdomen is soft.   Musculoskeletal:      Cervical back: Normal range of motion and neck supple.   Skin:     General: Skin is warm and dry.   Neurological:      General: No focal deficit present.      Mental Status: She is alert and oriented to person, place, and " time.   Psychiatric:         Mood and Affect: Mood normal.         Behavior: Behavior normal.

## 2024-12-16 DIAGNOSIS — L30.9 HAND DERMATITIS: ICD-10-CM

## 2024-12-16 DIAGNOSIS — E66.01 MORBID OBESITY WITH BODY MASS INDEX (BMI) OF 45.0 TO 49.9 IN ADULT (HCC): ICD-10-CM

## 2024-12-17 RX ORDER — TIRZEPATIDE 5 MG/.5ML
5 INJECTION, SOLUTION SUBCUTANEOUS WEEKLY
Qty: 6 ML | Refills: 0 | Status: SHIPPED | OUTPATIENT
Start: 2024-12-17 | End: 2025-06-15

## 2024-12-21 LAB
T3FREE SERPL-MCNC: 2.8 PG/ML (ref 2–4.4)
T4 FREE SERPL-MCNC: 1.52 NG/DL (ref 0.82–1.77)
TSH SERPL DL<=0.005 MIU/L-ACNC: 0.96 UIU/ML (ref 0.45–4.5)

## 2024-12-29 ENCOUNTER — RESULTS FOLLOW-UP (OUTPATIENT)
Dept: ENDOCRINOLOGY | Facility: HOSPITAL | Age: 49
End: 2024-12-29

## 2024-12-30 ENCOUNTER — TELEPHONE (OUTPATIENT)
Age: 49
End: 2024-12-30

## 2024-12-30 DIAGNOSIS — E66.01 MORBID OBESITY WITH BODY MASS INDEX (BMI) OF 45.0 TO 49.9 IN ADULT (HCC): ICD-10-CM

## 2024-12-30 RX ORDER — TIRZEPATIDE 7.5 MG/.5ML
7.5 INJECTION, SOLUTION SUBCUTANEOUS WEEKLY
Qty: 6 ML | Refills: 0 | Status: SHIPPED | OUTPATIENT
Start: 2024-12-30

## 2024-12-30 NOTE — TELEPHONE ENCOUNTER
PA for (Zepbound) 7.5 mg/0.5 mL auto-injector SUBMITTED to [employer]    via    []CMM-KEY:   [x]Surescripts-Case ID # [id number]   []Availity-Auth ID # NDC #   []Faxed to plan   []Other website   []Phone call Case ID #     []PA sent as URGENT    All office notes, labs and other pertaining documents and studies sent. Clinical questions answered. Awaiting determination from insurance company.     Turnaround time for your insurance to make a decision on your Prior Authorization can take 7-21 business days.

## 2024-12-30 NOTE — TELEPHONE ENCOUNTER
PA for (Zepbound) 7.5 mg/0.5 mL auto-injector APPROVED     Date(s) approved 12/30/24-06/30/25    Case # [id number]    Patient advised by          [][employer] Message  []Phone call   [x]LMOM  []L/M to call office as no active Communication consent on file  []Unable to leave detailed message as VM not approved on Communication consent       Pharmacy advised by    [x]Fax  []Phone call    Approval letter scanned into [employer] No , not available at time of approval

## 2024-12-31 DIAGNOSIS — F32.1 CURRENT MODERATE EPISODE OF MAJOR DEPRESSIVE DISORDER, UNSPECIFIED WHETHER RECURRENT (HCC): ICD-10-CM

## 2025-01-02 RX ORDER — ARIPIPRAZOLE 2 MG/1
2 TABLET ORAL DAILY
Qty: 30 TABLET | Refills: 0 | Status: SHIPPED | OUTPATIENT
Start: 2025-01-02

## 2025-01-07 ENCOUNTER — OFFICE VISIT (OUTPATIENT)
Dept: PAIN MEDICINE | Facility: CLINIC | Age: 50
End: 2025-01-07
Payer: COMMERCIAL

## 2025-01-07 ENCOUNTER — APPOINTMENT (OUTPATIENT)
Dept: RADIOLOGY | Facility: CLINIC | Age: 50
End: 2025-01-07
Payer: COMMERCIAL

## 2025-01-07 ENCOUNTER — TELEPHONE (OUTPATIENT)
Age: 50
End: 2025-01-07

## 2025-01-07 VITALS
SYSTOLIC BLOOD PRESSURE: 122 MMHG | DIASTOLIC BLOOD PRESSURE: 88 MMHG | TEMPERATURE: 98.2 F | WEIGHT: 292 LBS | BODY MASS INDEX: 41.8 KG/M2 | HEIGHT: 70 IN

## 2025-01-07 DIAGNOSIS — M79.18 MYOFASCIAL PAIN SYNDROME: ICD-10-CM

## 2025-01-07 DIAGNOSIS — M54.16 LUMBAR RADICULOPATHY: ICD-10-CM

## 2025-01-07 DIAGNOSIS — M47.812 CERVICAL SPONDYLOSIS: ICD-10-CM

## 2025-01-07 DIAGNOSIS — M54.9 MID BACK PAIN: Primary | ICD-10-CM

## 2025-01-07 DIAGNOSIS — M47.816 LUMBAR SPONDYLOSIS: ICD-10-CM

## 2025-01-07 DIAGNOSIS — Z98.1 HISTORY OF FUSION OF CERVICAL SPINE: ICD-10-CM

## 2025-01-07 DIAGNOSIS — M54.9 MID BACK PAIN: ICD-10-CM

## 2025-01-07 PROCEDURE — 72072 X-RAY EXAM THORAC SPINE 3VWS: CPT

## 2025-01-07 PROCEDURE — 72050 X-RAY EXAM NECK SPINE 4/5VWS: CPT

## 2025-01-07 PROCEDURE — 99214 OFFICE O/P EST MOD 30 MIN: CPT | Performed by: PHYSICIAN ASSISTANT

## 2025-01-07 PROCEDURE — 72114 X-RAY EXAM L-S SPINE BENDING: CPT

## 2025-01-07 RX ORDER — CHLORZOXAZONE 500 MG/1
500 TABLET ORAL 3 TIMES DAILY PRN
Qty: 90 TABLET | Refills: 2 | Status: SHIPPED | OUTPATIENT
Start: 2025-01-07

## 2025-01-07 RX ORDER — GABAPENTIN 600 MG/1
600 TABLET ORAL 3 TIMES DAILY
Qty: 270 TABLET | Refills: 1 | Status: SHIPPED | OUTPATIENT
Start: 2025-01-07

## 2025-01-07 NOTE — TELEPHONE ENCOUNTER
PA for CHLORZOXAZONE 500 MG  APPROVED     Date(s) approved January 7, 2025 to January 7, 2026         Patient advised by          [x][employer] Message  []Phone call   [x]LMOM  []L/M to call office as no active Communication consent on file  []Unable to leave detailed message as VM not approved on Communication consent       Pharmacy advised by    [x]Fax  []Phone call    Approval letter scanned into [employer] Yes

## 2025-01-07 NOTE — PROGRESS NOTES
Assessment:  1. Mid back pain    2. Cervical spondylosis    3. History of fusion of cervical spine    4. Lumbar spondylosis    5. Lumbar radiculopathy    6. Myofascial pain syndrome        Plan:  While the patient was in the office today, I did have a thorough conversation regarding their chronic pain syndrome, medication management, and treatment plan options.    After discussing options, I have placed orders for x-rays of the cervical, thoracic and lumbar spine.  Patient is having significant neck and upper back pain.  She has a history of ACDF done in 2015 and her symptoms continue to worsen.  Depending upon the results of the x-rays, she will likely be a candidate for MRI.    Consider neurosurgical consultation in the future.    Discontinue baclofen.  Trial chlorzoxazone 500 mg 3 times daily as needed for muscle spasms.  Continue gabapentin as prescribed.  I advised the patient that they should not drive or operate machinery while on this medication until they see how it affects them, as it could cause lethargy and mental cloudiness. I advised the patient to call our office if they experience any side effects or issues with the medication changes. The patient verbalized an understanding.    The patient will follow-up in 12 weeks for medication prescription refill and reevaluation. The patient was advised to contact the office should their symptoms worsen in the interim. The patient was agreeable and verbalized an understanding.        History of Present Illness:    The patient is a 49 y.o. female last seen on 10/17/2024 who presents for a follow up office visit in regards to chronic neck pain secondary to cervical spondylosis, history of ACDF, chronic mid back pain and low back pain secondary to thoracic and lumbar spondylosis.  The patient currently reports a significant increase in neck pain and midthoracic pain.  She rates her current pain a 6 out of 10 and describes it as a constant burning, dull, aching,  sharp, throbbing and pressure-like pain.  She is intermittent upper extremity numbness and paresthesias as well as referred pain into the chest wall area bilaterally.  Patient has a history of ACDF performed in .  Patient have ongoing low back pain with radiation into the lower extremities and bilateral feet.  Transforaminal epidural steroid injections provide moderate amount of relief for approximately 3 months followed by return of pain again.  She is not sleeping well due to the amount of pain she is in.  Does not feel the baclofen is helping as it once did.  She has attended physical therapy multiple times which has caused increased pain.  She does perform routine home stretching exercises on a regular basis but with difficulty at times due to the severity of her pain.    I have personally reviewed and/or updated the patient's past medical history, past surgical history, family history, social history, current medications, allergies, and vital signs today.       Review of Systems:    Review of Systems   Cardiovascular:  Positive for chest pain.   Musculoskeletal:         Decreased ROM, joint stiffness.    Neurological:  Positive for dizziness.         Past Medical History:   Diagnosis Date   • Allergic    • Anxiety    • Arthritis    • Asthma    • Chronic pain disorder     upper back   • Depression    • Disc disorder    • Disease of thyroid gland    • Fibromyalgia, primary    • GERD (gastroesophageal reflux disease)    • Headache(784.0)    • HPV (human papilloma virus) infection     cervical   • Hypertension    • Hypothyroidism    • Inflammatory bowel disease    • Migraine    • Obesity    • Stomach disorder        Past Surgical History:   Procedure Laterality Date   • CERVICAL BIOPSY  W/ LOOP ELECTRODE EXCISION     • CERVICAL FUSION  2015   •  SECTION     • CHOLECYSTECTOMY      lap   • FOOT SURGERY Bilateral     multiple, posterior calcaneal bone spur   • KNEE SURGERY Left     arthroscopy with  debribement   • LYMPH NODE BIOPSY     • PA NDSC WRST SURG W/RLS TRANSVRS CARPL LIGM Right 8/22/2023    Procedure: Right endoscopic carpal tunnel release;  Surgeon: Cl Mac MD;  Location: [facility];  Service: Orthopedics   • SPINE SURGERY     • TENDON REPAIR     • TUBAL LIGATION Bilateral 2012       Family History   Adopted: Yes   Problem Relation Age of Onset   • Thyroid disease Mother    • Arthritis Mother         Mom, dad, aunts, grandmother   • Autoimmune disease Mother    • No Known Problems Father    • Coronary artery disease Maternal Grandmother    • Breast cancer Maternal Grandmother         age unknown   • Heart disease Maternal Grandmother         Mother, Aunt, Grandmother, Father   • Coronary artery disease Maternal Aunt    • Stroke Maternal Aunt         Chika, brothers   • No Known Problems Maternal Grandfather    • No Known Problems Paternal Grandmother    • No Known Problems Paternal Grandfather    • Suicide Attempts Maternal Aunt    • Heart murmur Maternal Aunt    • Hypothyroidism Maternal Aunt    • Hashimoto's thyroiditis Maternal Aunt    • Diabetes unspecified Brother    • Diabetes Brother    • No Known Problems Brother    • No Known Problems Brother    • ADD / ADHD Son        Social History     Occupational History   • Occupation: [employer]     Comment: [place] [employer]   Tobacco Use   • Smoking status: Every Day     Current packs/day: 0.50     Average packs/day: 0.5 packs/day for 35.0 years (17.5 ttl pk-yrs)     Types: Cigarettes   • Smokeless tobacco: Never   • Tobacco comments:     actually about 3/4 PPD   Vaping Use   • Vaping status: Never Used   Substance and Sexual Activity   • Alcohol use: Not Currently   • Drug use: No   • Sexual activity: Not Currently     Partners: Male     Birth control/protection: Female Sterilization, None         Current Outpatient Medications:   •  acetaminophen (TYLENOL) 650 mg CR tablet, Take 1 tablet (650 mg total) by mouth every 8 (eight) hours as  needed for mild pain, Disp: 30 tablet, Rfl: 0  •  albuterol (Ventolin HFA) 90 mcg/act inhaler, Inhale 2 puffs every 6 (six) hours as needed for wheezing, Disp: 18 g, Rfl: 0  •  ARIPiprazole (ABILIFY) 2 mg tablet, Take 1 tablet (2 mg total) by mouth daily, Disp: 30 tablet, Rfl: 0  •  betamethasone valerate (VALISONE) 0.1 % ointment, Apply 1 to 2 times a day only as needed to finger/hand rash., Disp: 30 g, Rfl: 0  •  Calcium Carbonate (CALCIUM 600 PO), Take by mouth daily, Disp: , Rfl:   •  chlorzoxazone (PARAFON FORTE) 500 mg tablet, Take 1 tablet (500 mg total) by mouth 3 (three) times a day as needed for muscle spasms, Disp: 90 tablet, Rfl: 2  •  cholecalciferol (VITAMIN D3) 1,000 units tablet, Take 2,000 Units by mouth daily, Disp: , Rfl:   •  ciclopirox (LOPROX) 0.77 % cream, APPLY 2 TIMES per day to rash on chest for 2-4 weeks., Disp: 30 g, Rfl: 0  •  DULoxetine (CYMBALTA) 60 mg delayed release capsule, Take 2 PO HS., Disp: 180 capsule, Rfl: 0  •  Euflexxa 20 MG/2ML SOSY, , Disp: , Rfl:   •  famotidine (PEPCID) 20 mg tablet, Take 1 tablet (20 mg total) by mouth 2 (two) times a day, Disp: 180 tablet, Rfl: 1  •  gabapentin (NEURONTIN) 600 MG tablet, Take 1 tablet (600 mg total) by mouth 3 (three) times a day, Disp: 270 tablet, Rfl: 1  •  HYDROcodone-acetaminophen (NORCO) 5-325 mg per tablet, Take 1 PO BID PRN for pain for ongoing therapy, Disp: 14 tablet, Rfl: 0  •  irbesartan (AVAPRO) 75 mg tablet, Take 1 tablet (75 mg total) by mouth daily at bedtime, Disp: 90 tablet, Rfl: 1  •  levothyroxine (Levoxyl) 200 mcg tablet, Take 1 tablet daily in addition to 25 mcg tablet daily, Disp: 100 tablet, Rfl: 0  •  meclizine (ANTIVERT) 12.5 MG tablet, Take 1 tablet (12.5 mg total) by mouth every 8 (eight) hours as needed for dizziness, Disp: 90 tablet, Rfl: 0  •  multivitamin (THERAGRAN) TABS, Take 1 tablet by mouth daily, Disp: , Rfl:   •  naproxen sodium (ALEVE) 220 MG tablet, Take 1 tablet (220 mg total) by mouth 2 (two)  "times a day with meals, Disp: 20 tablet, Rfl: 0  •  ondansetron (Zofran ODT) 4 mg disintegrating tablet, Take 1 tablet (4 mg total) by mouth every 6 (six) hours as needed for nausea or vomiting, Disp: 20 tablet, Rfl: 0  •  Potassium 99 MG TABS, Take by mouth daily, Disp: , Rfl:   •  tirzepatide (Zepbound) 7.5 mg/0.5 mL auto-injector, Inject 0.5 mL (7.5 mg total) under the skin once a week, Disp: 6 mL, Rfl: 0    Allergies   Allergen Reactions   • Acetaminophen-Codeine      Pt states she does not remember having a reaction to this medication   • Hydrocodone Other (See Comments)     GI issues   • Latex Hives     Latex powder     • Lisinopril Other (See Comments)     Itching, gi intollerance     • Prednisolone Vomiting       Physical Exam:    /88 (BP Location: Left arm, Patient Position: Sitting, Cuff Size: Large) Comment: Patient requested to check her B/P  Temp 98.2 °F (36.8 °C)   Ht 5' 10\" (1.778 m) Comment: Verbal  Wt 132 kg (292 lb)   BMI 41.90 kg/m²     Constitutional:normal, well developed, well nourished, alert, in no distress and non-toxic and no overt pain behavior., underweight, and overweight  Eyes:anicteric  HEENT:grossly intact  Neck:supple, symmetric, trachea midline and no masses   Pulmonary:even and unlabored  Cardiovascular:No edema or pitting edema present  Skin:Normal without rashes or lesions and well hydrated  Psychiatric:Mood and affect appropriate  Neurologic:Cranial Nerves II-XII grossly intact  Musculoskeletal: Cervical spine anterior scar from prior surgery, tenderness to palpation along the bilateral paraspinal muscles, limited range of motion, positive Spurling's to the left, tender mid thoracic spine.  Gait is slow but stable.      Imaging  XR spine cervical complete 4 or 5 vw non injury    (Results Pending)   XR spine thoracic 3 vw    (Results Pending)   XR spine lumbar complete w bending minimum 6 views    (Results Pending)         Orders Placed This Encounter   Procedures   • " XR spine cervical complete 4 or 5 vw non injury   • XR spine thoracic 3 vw   • XR spine lumbar complete w bending minimum 6 views

## 2025-01-07 NOTE — TELEPHONE ENCOUNTER
PA for CHLORZOXAZONE 500 MG SUBMITTED to [facility]    via      [x][facility]-Case ID # [id number]     [x]PA sent as URGENT    All office notes, labs and other pertaining documents and studies sent. Clinical questions answered. Awaiting determination from insurance company.     Turnaround time for your insurance to make a decision on your Prior Authorization can take 7-21 business days.

## 2025-01-08 DIAGNOSIS — M54.2 NECK PAIN: ICD-10-CM

## 2025-01-08 DIAGNOSIS — M54.14 THORACIC RADICULOPATHY: ICD-10-CM

## 2025-01-08 DIAGNOSIS — M54.12 CERVICAL RADICULOPATHY: ICD-10-CM

## 2025-01-08 DIAGNOSIS — M54.9 MID BACK PAIN: Primary | ICD-10-CM

## 2025-01-08 DIAGNOSIS — Z98.1 HISTORY OF FUSION OF CERVICAL SPINE: ICD-10-CM

## 2025-01-09 ENCOUNTER — RESULTS FOLLOW-UP (OUTPATIENT)
Dept: PAIN MEDICINE | Facility: CLINIC | Age: 50
End: 2025-01-09

## 2025-01-09 NOTE — TELEPHONE ENCOUNTER
Caller: pt    Doctor: sumeet    Reason for call: I read pt the results and she is going to call central scheduling after work to get those scheduled.    Call back#: [phone]

## 2025-01-25 DIAGNOSIS — M47.812 CERVICAL SPONDYLOSIS: ICD-10-CM

## 2025-01-25 DIAGNOSIS — M54.9 MID BACK PAIN: ICD-10-CM

## 2025-01-25 DIAGNOSIS — M47.816 LUMBAR SPONDYLOSIS: ICD-10-CM

## 2025-01-25 DIAGNOSIS — Z98.1 HISTORY OF FUSION OF CERVICAL SPINE: ICD-10-CM

## 2025-01-27 NOTE — TELEPHONE ENCOUNTER
Attempted to contact pt. Left a detailed message on machine per medical communication consent on file advising of 1/7/25 rx for parafon Forte with 2 refills. Please fu with [facility] pharmacy and cb if there are any questions or issues with this rx. Provided cb number and office hours.

## 2025-01-29 RX ORDER — CHLORZOXAZONE 500 MG/1
500 TABLET ORAL 3 TIMES DAILY PRN
Qty: 90 TABLET | Refills: 0 | OUTPATIENT
Start: 2025-01-29

## 2025-02-01 DIAGNOSIS — E66.01 MORBID OBESITY WITH BODY MASS INDEX (BMI) OF 45.0 TO 49.9 IN ADULT (HCC): ICD-10-CM

## 2025-02-01 RX ORDER — TIRZEPATIDE 7.5 MG/.5ML
7.5 INJECTION, SOLUTION SUBCUTANEOUS WEEKLY
Qty: 6 ML | Refills: 0 | Status: SHIPPED | OUTPATIENT
Start: 2025-02-01

## 2025-02-04 ENCOUNTER — OFFICE VISIT (OUTPATIENT)
Dept: URGENT CARE | Facility: CLINIC | Age: 50
End: 2025-02-04
Payer: COMMERCIAL

## 2025-02-04 VITALS
SYSTOLIC BLOOD PRESSURE: 118 MMHG | HEART RATE: 86 BPM | OXYGEN SATURATION: 98 % | TEMPERATURE: 97.6 F | RESPIRATION RATE: 20 BRPM | DIASTOLIC BLOOD PRESSURE: 71 MMHG

## 2025-02-04 DIAGNOSIS — M54.2 NECK PAIN: Primary | ICD-10-CM

## 2025-02-04 DIAGNOSIS — M54.6 THORACIC SPINE PAIN: ICD-10-CM

## 2025-02-04 DIAGNOSIS — J06.9 UPPER RESPIRATORY TRACT INFECTION, UNSPECIFIED TYPE: Primary | ICD-10-CM

## 2025-02-04 PROCEDURE — 99213 OFFICE O/P EST LOW 20 MIN: CPT | Performed by: FAMILY MEDICINE

## 2025-02-04 RX ORDER — GUAIFENESIN 200 MG/10ML
200 LIQUID ORAL 3 TIMES DAILY PRN
Qty: 120 ML | Refills: 0 | Status: SHIPPED | OUTPATIENT
Start: 2025-02-04

## 2025-02-04 RX ORDER — BENZONATATE 200 MG/1
200 CAPSULE ORAL 3 TIMES DAILY PRN
Qty: 30 CAPSULE | Refills: 0 | Status: SHIPPED | OUTPATIENT
Start: 2025-02-04

## 2025-02-04 NOTE — PROGRESS NOTES
[facility]        NAME: Priscilla Lorenzo is a 49 y.o. female  : 1975    MRN: [id number]  DATE: 2025  TIME: 9:28 AM    Assessment and Plan   Upper respiratory tract infection, unspecified type [J06.9]  1. Upper respiratory tract infection, unspecified type  guaiFENesin (ROBITUSSIN) 100 MG/5ML oral liquid    benzonatate (TESSALON) 200 MG capsule            Patient Instructions       Follow up with PCP in 3-5 days.  Proceed to  ER if symptoms worsen.    If tests have been performed at [facility], our office will contact you with results if changes need to be made to the care plan discussed with you at the visit.  You can review your full results on [facility].    Chief Complaint     Chief Complaint   Patient presents with    Cold Like Symptoms     Patient states that she has post-nasal drip, coughing, runny nose and ear pain that started 4 days ago. She is concerned for bronchitis.          History of Present Illness       49-year-old female presenting with 3-day history increased nasal congestion, cough and sore throat.  Denies any fevers or chills.        Review of Systems   Review of Systems   Constitutional: Negative.    HENT:  Positive for congestion.    Eyes: Negative.    Respiratory:  Positive for cough.    Cardiovascular: Negative.    Gastrointestinal: Negative.    Endocrine: Negative.    Genitourinary: Negative.    Musculoskeletal: Negative.    Skin: Negative.    Allergic/Immunologic: Negative.    Neurological: Negative.    Hematological: Negative.    Psychiatric/Behavioral: Negative.           Current Medications       Current Outpatient Medications:     benzonatate (TESSALON) 200 MG capsule, Take 1 capsule (200 mg total) by mouth 3 (three) times a day as needed for cough, Disp: 30 capsule, Rfl: 0    guaiFENesin (ROBITUSSIN) 100 MG/5ML oral liquid, Take 10 mL (200 mg total) by mouth 3 (three) times a day as needed for cough, Disp: 120 mL, Rfl: 0    acetaminophen (TYLENOL)  650 mg CR tablet, Take 1 tablet (650 mg total) by mouth every 8 (eight) hours as needed for mild pain, Disp: 30 tablet, Rfl: 0    albuterol (Ventolin HFA) 90 mcg/act inhaler, Inhale 2 puffs every 6 (six) hours as needed for wheezing, Disp: 18 g, Rfl: 0    ARIPiprazole (ABILIFY) 2 mg tablet, Take 1 tablet (2 mg total) by mouth daily, Disp: 30 tablet, Rfl: 0    betamethasone valerate (VALISONE) 0.1 % ointment, Apply 1 to 2 times a day only as needed to finger/hand rash., Disp: 30 g, Rfl: 0    Calcium Carbonate (CALCIUM 600 PO), Take by mouth daily, Disp: , Rfl:     chlorzoxazone (PARAFON FORTE) 500 mg tablet, Take 1 tablet (500 mg total) by mouth 3 (three) times a day as needed for muscle spasms, Disp: 90 tablet, Rfl: 2    cholecalciferol (VITAMIN D3) 1,000 units tablet, Take 2,000 Units by mouth daily, Disp: , Rfl:     ciclopirox (LOPROX) 0.77 % cream, APPLY 2 TIMES per day to rash on chest for 2-4 weeks., Disp: 30 g, Rfl: 0    DULoxetine (CYMBALTA) 60 mg delayed release capsule, Take 2 PO HS., Disp: 180 capsule, Rfl: 0    Euflexxa 20 MG/2ML SOSY, , Disp: , Rfl:     famotidine (PEPCID) 20 mg tablet, Take 1 tablet (20 mg total) by mouth 2 (two) times a day, Disp: 180 tablet, Rfl: 1    gabapentin (NEURONTIN) 600 MG tablet, Take 1 tablet (600 mg total) by mouth 3 (three) times a day, Disp: 270 tablet, Rfl: 1    HYDROcodone-acetaminophen (NORCO) 5-325 mg per tablet, Take 1 PO BID PRN for pain for ongoing therapy, Disp: 14 tablet, Rfl: 0    irbesartan (AVAPRO) 75 mg tablet, Take 1 tablet (75 mg total) by mouth daily at bedtime, Disp: 90 tablet, Rfl: 1    levothyroxine (Levoxyl) 200 mcg tablet, Take 1 tablet daily in addition to 25 mcg tablet daily, Disp: 100 tablet, Rfl: 0    meclizine (ANTIVERT) 12.5 MG tablet, Take 1 tablet (12.5 mg total) by mouth every 8 (eight) hours as needed for dizziness, Disp: 90 tablet, Rfl: 0    multivitamin (THERAGRAN) TABS, Take 1 tablet by mouth daily, Disp: , Rfl:     naproxen sodium  (ALEVE) 220 MG tablet, Take 1 tablet (220 mg total) by mouth 2 (two) times a day with meals, Disp: 20 tablet, Rfl: 0    ondansetron (Zofran ODT) 4 mg disintegrating tablet, Take 1 tablet (4 mg total) by mouth every 6 (six) hours as needed for nausea or vomiting, Disp: 20 tablet, Rfl: 0    Potassium 99 MG TABS, Take by mouth daily, Disp: , Rfl:     tirzepatide (Zepbound) 7.5 mg/0.5 mL auto-injector, Inject 0.5 mL (7.5 mg total) under the skin once a week, Disp: 6 mL, Rfl: 0    Current Allergies     Allergies as of 2025 - Reviewed 2025   Allergen Reaction Noted    Acetaminophen-codeine  10/23/2017    Hydrocodone Other (See Comments) 2016    Latex Hives 2021    Lisinopril Other (See Comments) 11/10/2022    Prednisolone Vomiting 10/23/2017            The following portions of the patient's history were reviewed and updated as appropriate: allergies, current medications, past family history, past medical history, past social history, past surgical history and problem list.     Past Medical History:   Diagnosis Date    Allergic     Anxiety     Arthritis     Asthma     Chronic pain disorder     upper back    Depression     Disc disorder     Disease of thyroid gland     Fibromyalgia, primary     GERD (gastroesophageal reflux disease)     Headache(784.0)     HPV (human papilloma virus) infection     cervical    Hypertension     Hypothyroidism     Inflammatory bowel disease     Migraine     Obesity     Stomach disorder        Past Surgical History:   Procedure Laterality Date    CERVICAL BIOPSY  W/ LOOP ELECTRODE EXCISION      CERVICAL FUSION  2015     SECTION      CHOLECYSTECTOMY      lap    FOOT SURGERY Bilateral     multiple, posterior calcaneal bone spur    KNEE SURGERY Left     arthroscopy with debribement    LYMPH NODE BIOPSY      [place] NDSC WRST SURG W/RLS TRANSVRS CARPL LIGM Right 2023    Procedure: Right endoscopic carpal tunnel release;  Surgeon: Cl Mac MD;   Location: [facility];  Service: Orthopedics    SPINE SURGERY      TENDON REPAIR      TUBAL LIGATION Bilateral 2012       Family History   Adopted: Yes   Problem Relation Age of Onset    Thyroid disease Mother     Arthritis Mother         Mom, dad, aunts, grandmother    Autoimmune disease Mother     No Known Problems Father     Coronary artery disease Maternal Grandmother     Breast cancer Maternal Grandmother         age unknown    Heart disease Maternal Grandmother         Mother, Aunt, Grandmother, Father    Coronary artery disease Maternal Aunt     Stroke Maternal Aunt         Chika, brothers    No Known Problems Maternal Grandfather     No Known Problems Paternal Grandmother     No Known Problems Paternal Grandfather     Suicide Attempts Maternal Aunt     Heart murmur Maternal Aunt     Hypothyroidism Maternal Aunt     Hashimoto's thyroiditis Maternal Aunt     Diabetes unspecified Brother     Diabetes Brother     No Known Problems Brother     No Known Problems Brother     ADD / ADHD Son          Medications have been verified.        Objective   /71   Pulse 86   Temp 97.6 °F (36.4 °C)   Resp 20   SpO2 98%   No LMP recorded.       Physical Exam     Physical Exam  Vitals and nursing note reviewed.   Constitutional:       Appearance: She is well-developed.   HENT:      Head: Normocephalic.      Nose: Congestion present.   Eyes:      Pupils: Pupils are equal, round, and reactive to light.   Cardiovascular:      Rate and Rhythm: Normal rate and regular rhythm.   Pulmonary:      Effort: Pulmonary effort is normal.   Abdominal:      General: Abdomen is flat.   Musculoskeletal:         General: Normal range of motion.      Cervical back: Normal range of motion.   Skin:     General: Skin is warm and dry.   Neurological:      Mental Status: She is alert and oriented to person, place, and time.                    no

## 2025-02-04 NOTE — TELEPHONE ENCOUNTER
Caller: mason Wells    Doctor: Dr. Morrow    Reason for call: pt stated she is returning someone's call.  I don't see any messages so not sure who called her.    While on the phone tho the pt mentioned that she has never gotten her refill request of the chlorzoxazone     Call back#: [phone]

## 2025-02-15 DIAGNOSIS — F32.1 CURRENT MODERATE EPISODE OF MAJOR DEPRESSIVE DISORDER, UNSPECIFIED WHETHER RECURRENT (HCC): ICD-10-CM

## 2025-02-17 RX ORDER — ARIPIPRAZOLE 2 MG/1
2 TABLET ORAL DAILY
Qty: 30 TABLET | Refills: 0 | Status: SHIPPED | OUTPATIENT
Start: 2025-02-17

## 2025-02-26 DIAGNOSIS — E66.01 MORBID OBESITY WITH BODY MASS INDEX (BMI) OF 45.0 TO 49.9 IN ADULT (HCC): ICD-10-CM

## 2025-02-26 RX ORDER — TIRZEPATIDE 10 MG/.5ML
10 INJECTION, SOLUTION SUBCUTANEOUS WEEKLY
Qty: 2 ML | Refills: 0 | Status: SHIPPED | OUTPATIENT
Start: 2025-02-26

## 2025-03-11 DIAGNOSIS — K21.9 GASTROESOPHAGEAL REFLUX DISEASE WITHOUT ESOPHAGITIS: ICD-10-CM

## 2025-03-12 RX ORDER — FAMOTIDINE 20 MG/1
20 TABLET, FILM COATED ORAL 2 TIMES DAILY
Qty: 180 TABLET | Refills: 1 | Status: SHIPPED | OUTPATIENT
Start: 2025-03-12

## 2025-03-17 DIAGNOSIS — L30.9 HAND DERMATITIS: ICD-10-CM

## 2025-03-17 DIAGNOSIS — F32.1 CURRENT MODERATE EPISODE OF MAJOR DEPRESSIVE DISORDER, UNSPECIFIED WHETHER RECURRENT (HCC): ICD-10-CM

## 2025-03-19 RX ORDER — ARIPIPRAZOLE 2 MG/1
2 TABLET ORAL DAILY
Qty: 30 TABLET | Refills: 0 | Status: SHIPPED | OUTPATIENT
Start: 2025-03-19 | End: 2025-03-24 | Stop reason: SDUPTHER

## 2025-03-24 DIAGNOSIS — L30.9 HAND DERMATITIS: ICD-10-CM

## 2025-03-24 DIAGNOSIS — F32.1 CURRENT MODERATE EPISODE OF MAJOR DEPRESSIVE DISORDER, UNSPECIFIED WHETHER RECURRENT (HCC): ICD-10-CM

## 2025-03-25 DIAGNOSIS — E66.01 MORBID OBESITY WITH BODY MASS INDEX (BMI) OF 45.0 TO 49.9 IN ADULT (HCC): ICD-10-CM

## 2025-03-26 RX ORDER — ARIPIPRAZOLE 2 MG/1
2 TABLET ORAL DAILY
Qty: 30 TABLET | Refills: 0 | Status: SHIPPED | OUTPATIENT
Start: 2025-03-26

## 2025-03-26 RX ORDER — TIRZEPATIDE 10 MG/.5ML
10 INJECTION, SOLUTION SUBCUTANEOUS WEEKLY
Qty: 2 ML | Refills: 0 | Status: SHIPPED | OUTPATIENT
Start: 2025-03-26

## 2025-04-05 DIAGNOSIS — E03.9 ACQUIRED HYPOTHYROIDISM: ICD-10-CM

## 2025-04-05 RX ORDER — LEVOTHYROXINE SODIUM 200 UG/1
TABLET ORAL
Qty: 100 TABLET | Refills: 1 | Status: SHIPPED | OUTPATIENT
Start: 2025-04-05

## 2025-04-06 DIAGNOSIS — Z98.1 HISTORY OF FUSION OF CERVICAL SPINE: ICD-10-CM

## 2025-04-06 DIAGNOSIS — I10 PRIMARY HYPERTENSION: ICD-10-CM

## 2025-04-06 DIAGNOSIS — M54.9 MID BACK PAIN: ICD-10-CM

## 2025-04-06 DIAGNOSIS — M47.816 LUMBAR SPONDYLOSIS: ICD-10-CM

## 2025-04-06 DIAGNOSIS — M47.812 CERVICAL SPONDYLOSIS: ICD-10-CM

## 2025-04-06 RX ORDER — CHLORZOXAZONE 500 MG/1
500 TABLET ORAL 3 TIMES DAILY PRN
Qty: 90 TABLET | Refills: 0 | Status: CANCELLED | OUTPATIENT
Start: 2025-04-06

## 2025-04-07 DIAGNOSIS — E03.9 ACQUIRED HYPOTHYROIDISM: ICD-10-CM

## 2025-04-08 RX ORDER — LEVOTHYROXINE SODIUM 25 UG/1
TABLET ORAL
Qty: 90 TABLET | Refills: 3 | OUTPATIENT
Start: 2025-04-08

## 2025-04-08 RX ORDER — IRBESARTAN 75 MG/1
75 TABLET ORAL
Qty: 30 TABLET | Refills: 0 | Status: SHIPPED | OUTPATIENT
Start: 2025-04-08 | End: 2025-10-05

## 2025-04-16 ENCOUNTER — OFFICE VISIT (OUTPATIENT)
Dept: FAMILY MEDICINE CLINIC | Facility: HOSPITAL | Age: 50
End: 2025-04-16
Payer: COMMERCIAL

## 2025-04-16 VITALS
WEIGHT: 274.6 LBS | HEIGHT: 69 IN | OXYGEN SATURATION: 95 % | DIASTOLIC BLOOD PRESSURE: 78 MMHG | SYSTOLIC BLOOD PRESSURE: 110 MMHG | HEART RATE: 81 BPM | BODY MASS INDEX: 40.67 KG/M2

## 2025-04-16 DIAGNOSIS — Z00.00 ANNUAL PHYSICAL EXAM: Primary | ICD-10-CM

## 2025-04-16 DIAGNOSIS — Z13.6 SCREENING FOR CARDIOVASCULAR CONDITION: ICD-10-CM

## 2025-04-16 DIAGNOSIS — E66.01 MORBID OBESITY WITH BODY MASS INDEX (BMI) OF 45.0 TO 49.9 IN ADULT (HCC): ICD-10-CM

## 2025-04-16 DIAGNOSIS — F32.1 CURRENT MODERATE EPISODE OF MAJOR DEPRESSIVE DISORDER, UNSPECIFIED WHETHER RECURRENT (HCC): ICD-10-CM

## 2025-04-16 DIAGNOSIS — Z13.1 SCREENING FOR DIABETES MELLITUS (DM): ICD-10-CM

## 2025-04-16 DIAGNOSIS — Z12.31 ENCOUNTER FOR SCREENING MAMMOGRAM FOR BREAST CANCER: ICD-10-CM

## 2025-04-16 DIAGNOSIS — B35.3 TINEA PEDIS OF BOTH FEET: ICD-10-CM

## 2025-04-16 PROCEDURE — 99396 PREV VISIT EST AGE 40-64: CPT | Performed by: FAMILY MEDICINE

## 2025-04-16 PROCEDURE — 99214 OFFICE O/P EST MOD 30 MIN: CPT | Performed by: FAMILY MEDICINE

## 2025-04-16 RX ORDER — ARIPIPRAZOLE 5 MG/1
5 TABLET ORAL DAILY
Qty: 100 TABLET | Refills: 0 | Status: SHIPPED | OUTPATIENT
Start: 2025-04-16

## 2025-04-16 RX ORDER — TIRZEPATIDE 10 MG/.5ML
10 INJECTION, SOLUTION SUBCUTANEOUS WEEKLY
Qty: 7 ML | Refills: 0 | Status: SHIPPED | OUTPATIENT
Start: 2025-04-16 | End: 2025-07-25

## 2025-04-16 RX ORDER — TERBINAFINE HYDROCHLORIDE 250 MG/1
250 TABLET ORAL DAILY
Qty: 14 TABLET | Refills: 0 | Status: SHIPPED | OUTPATIENT
Start: 2025-04-16 | End: 2025-04-30

## 2025-04-16 RX ORDER — CLOTRIMAZOLE AND BETAMETHASONE DIPROPIONATE 10; .5 MG/ML; MG/ML
LOTION TOPICAL 2 TIMES DAILY
Qty: 30 ML | Refills: 1 | Status: SHIPPED | OUTPATIENT
Start: 2025-04-16 | End: 2025-04-18

## 2025-04-16 NOTE — ASSESSMENT & PLAN NOTE
Prior Authorization Clinical Questions for Weight Management Pharmacotherapy    1. Does the patient have a contrainidcation to medication prescribed for weight management?: No  2. Does the patient have a diagnosis of obesity, confirmed by a BMI greater than or equal to 30 kg/m^2?: Yes  3. Does the patient have a BMI of greater than or equal to 27 kg/m^2 with at least one weight-related comorbidity/risk factor/complication (e.g. diabetes, dyslipidemia, coronary artery disease)?: Yes  4. Weight-related co-morbidities/risk factors: prediabetes, hypertension, depression  5. WEGOVY CVA Indication: Does patient have established documented cardiovascular disease (history of a prior heart attack (myocardial infarction), stroke, or symptomatic peripheral arterial disease (PAD)?: N/A  6. ZEPBOUND NAYE Indication: Does patient have documented NAYE diagnosed via sleep study (insurance will require copy of sleep study results for approval)?: N/A  7. Has the patient been on a weight loss regimen of low-calorie diet, increased physical activity, and lifestyle modifications for a minimum of 6 months?: Yes  8. Has the patient completed a comprehensive weight loss program (ie, Weight Watchers, Noom, Bariatrics, other trevor on phone)? If so, what?: No  9. Does the patient have a history of type 2 diabetes?: No  10. Has the member tried and failed other weight loss medication within the past 12 months?: No  11. Will the member use requested medication in combination with another GLP agonist or weight loss drug?: No  12. Is the medication a controlled substance?: No  For renewals: Has the patient had a positive outcome with current weight management medication (i.e., change in body weight of at least 4-5% after 12-16 weeks on maximally tolerated dose)?: Yes     Baseline weight (in pounds): 323 lbs  Current weight (in pounds): 274 lbs  Weight loss percentage: -15.17%       She continues to lose weight.   For now continue with zepbound at 10  mg weekly.   If weight plateaus will increase dosing.     Orders:  •  tirzepatide (Zepbound) 10 mg/0.5 mL auto-injector; Inject 0.5 mL (10 mg total) under the skin once a week

## 2025-04-16 NOTE — PATIENT INSTRUCTIONS
"Patient Education     Routine physical for adults   The Basics   Written by the doctors and editors at [facility]   What is a physical? -- A physical is a routine visit, or \"check-up,\" with your doctor. You might also hear it called a \"wellness visit\" or \"preventive visit.\"  During each visit, the doctor will:   Ask about your physical and mental health   Ask about your habits, behaviors, and lifestyle   Do an exam   Give you vaccines if needed   Talk to you about any medicines you take   Give advice about your health   Answer your questions  Getting regular check-ups is an important part of taking care of your health. It can help your doctor find and treat any problems you have. But it's also important for preventing health problems.  A routine physical is different from a \"sick visit.\" A sick visit is when you see a doctor because of a health concern or problem. Since physicals are scheduled ahead of time, you can think about what you want to ask the doctor.  How often should I get a physical? -- It depends on your age and health. In general, for people age 21 years and older:   If you are younger than 50 years, you might be able to get a physical every 3 years.   If you are 50 years or older, your doctor might recommend a physical every year.  If you have an ongoing health condition, like diabetes or high blood pressure, your doctor will probably want to see you more often.  What happens during a physical? -- In general, each visit will include:   Physical exam - The doctor or nurse will check your height, weight, heart rate, and blood pressure. They will also look at your eyes and ears. They will ask about how you are feeling and whether you have any symptoms that bother you.   Medicines - It's a good idea to bring a list of all the medicines you take to each doctor visit. Your doctor will talk to you about your medicines and answer any questions. Tell them if you are having any side effects that bother you. You " "should also tell them if you are having trouble paying for any of your medicines.   Habits and behaviors - This includes:   Your diet   Your exercise habits   Whether you smoke, drink alcohol, or use drugs   Whether you are sexually active   Whether you feel safe at home  Your doctor will talk to you about things you can do to improve your health and lower your risk of health problems. They will also offer help and support. For example, if you want to quit smoking, they can give you advice and might prescribe medicines. If you want to improve your diet or get more physical activity, they can help you with this, too.   Lab tests, if needed - The tests you get will depend on your age and situation. For example, your doctor might want to check your:   Cholesterol   Blood sugar   Iron level   Vaccines - The recommended vaccines will depend on your age, health, and what vaccines you already had. Vaccines are very important because they can prevent certain serious or deadly infections.   Discussion of screening - \"Screening\" means checking for diseases or other health problems before they cause symptoms. Your doctor can recommend screening based on your age, risk, and preferences. This might include tests to check for:   Cancer, such as breast, prostate, cervical, ovarian, colorectal, prostate, lung, or skin cancer   Sexually transmitted infections, such as chlamydia and gonorrhea   Mental health conditions like depression and anxiety  Your doctor will talk to you about the different types of screening tests. They can help you decide which screenings to have. They can also explain what the results might mean.   Answering questions - The physical is a good time to ask the doctor or nurse questions about your health. If needed, they can refer you to other doctors or specialists, too.  Adults older than 65 years often need other care, too. As you get older, your doctor will talk to you about:   How to prevent falling at " home   Hearing or vision tests   Memory testing   How to take your medicines safely   Making sure that you have the help and support you need at home  All topics are updated as new evidence becomes available and our peer review process is complete.  This topic retrieved from [employer] on: May 02, 2024.  Topic 018036 Version 1.0  Release: 32.4.3 - C32.122  © 2024 UpToDate, Inc. and/or its affiliates. All rights reserved.  Consumer Information Use and Disclaimer   Disclaimer: This generalized information is a limited summary of diagnosis, treatment, and/or medication information. It is not meant to be comprehensive and should be used as a tool to help the user understand and/or assess potential diagnostic and treatment options. It does NOT include all information about conditions, treatments, medications, side effects, or risks that may apply to a specific patient. It is not intended to be medical advice or a substitute for the medical advice, diagnosis, or treatment of a health care provider based on the health care provider's examination and assessment of a patient's specific and unique circumstances. Patients must speak with a health care provider for complete information about their health, medical questions, and treatment options, including any risks or benefits regarding use of medications. This information does not endorse any treatments or medications as safe, effective, or approved for treating a specific patient. UpToDate, Inc. and its affiliates disclaim any warranty or liability relating to this information or the use thereof.The use of this information is governed by the Terms of Use, available at https://www.woltersDebt Resolveuwer.com/en/know/clinical-effectiveness-terms. 2024© UpToDate, Inc. and its affiliates and/or licensors. All rights reserved.  Copyright   © 2024 UpToDate, Inc. and/or its affiliates. All rights reserved.

## 2025-04-16 NOTE — PROGRESS NOTES
Adult Annual Physical  Name: Priscilla Lorenzo      : 1975      MRN: [id number]  Encounter Provider: Maurisio Torres MD  Encounter Date: 2025   Encounter department: [facility] [address]     :  Assessment & Plan  Encounter for screening mammogram for breast cancer    Orders:  •  Mammo screening bilateral w 3d and cad; Future    Current moderate episode of major depressive disorder, unspecified whether recurrent (HCC)  In partial remission.   She continues on cymbalta 60 mg bid.   Will increase abilify to 5 mg daily.   Discussed se/ar.   Monitor.   Fu in 3 months.     Orders:  •  ARIPiprazole (ABILIFY) 5 mg tablet; Take 1 tablet (5 mg total) by mouth daily  •  CBC; Future  •  Comprehensive metabolic panel; Future    Morbid obesity with body mass index (BMI) of 45.0 to 49.9 in adult (HCC)  Prior Authorization Clinical Questions for Weight Management Pharmacotherapy    1. Does the patient have a contrainidcation to medication prescribed for weight management?: No  2. Does the patient have a diagnosis of obesity, confirmed by a BMI greater than or equal to 30 kg/m^2?: Yes  3. Does the patient have a BMI of greater than or equal to 27 kg/m^2 with at least one weight-related comorbidity/risk factor/complication (e.g. diabetes, dyslipidemia, coronary artery disease)?: Yes  4. Weight-related co-morbidities/risk factors: prediabetes, hypertension, depression  5. WEGOVY CVA Indication: Does patient have established documented cardiovascular disease (history of a prior heart attack (myocardial infarction), stroke, or symptomatic peripheral arterial disease (PAD)?: N/A  6. ZEPBOUND NAYE Indication: Does patient have documented NAYE diagnosed via sleep study (insurance will require copy of sleep study results for approval)?: N/A  7. Has the patient been on a weight loss regimen of low-calorie diet, increased physical activity, and lifestyle modifications for a minimum of 6 months?: Yes  8.  Has the patient completed a comprehensive weight loss program (ie, Weight Watchers, Noom, Bariatrics, other trevor on phone)? If so, what?: No  9. Does the patient have a history of type 2 diabetes?: No  10. Has the member tried and failed other weight loss medication within the past 12 months?: No  11. Will the member use requested medication in combination with another GLP agonist or weight loss drug?: No  12. Is the medication a controlled substance?: No  For renewals: Has the patient had a positive outcome with current weight management medication (i.e., change in body weight of at least 4-5% after 12-16 weeks on maximally tolerated dose)?: Yes     Baseline weight (in pounds): 323 lbs  Current weight (in pounds): 274 lbs  Weight loss percentage: -15.17%       She continues to lose weight.   For now continue with zepbound at 10 mg weekly.   If weight plateaus will increase dosing.     Orders:  •  tirzepatide (Zepbound) 10 mg/0.5 mL auto-injector; Inject 0.5 mL (10 mg total) under the skin once a week    Screening for cardiovascular condition    Orders:  •  Lipid Panel with Direct LDL reflex; Future    Screening for diabetes mellitus (DM)    Orders:  •  Hemoglobin A1C; Future    Tinea pedis of both feet  Severe tinea pedis.   Will use lotrisone to help with inflammation.   Will start oral lamisil 250 mg x 14 days.   Monitor and watch for bacterial cellulitis.   Stop neosporin.   Orders:  •  clotrimazole-betamethasone (LOTRISONE) 1-0.05 % lotion; Apply topically 2 (two) times a day for 14 days  •  terbinafine (LamISIL) 250 mg tablet; Take 1 tablet (250 mg total) by mouth daily for 14 days    Annual physical exam             Preventive Screenings:  - Diabetes Screening: screening up-to-date  - Hepatitis C screening: screening up-to-date and screening not indicated   - HIV screening: screening not indicated   - Cervical cancer screening: screening up-to-date   - Colon cancer screening: screening up-to-date   - Lung  "cancer screening: screening not indicated     Immunizations:  - Immunizations due: Influenza and Zoster (Shingrix)         History of Present Illness     Adult Annual Physical:  Patient presents for annual physical. Continues to work on weight loss.   Has been losing weight with use of mounjaro. Tolerating medication. Well.     Concerned about depression. Reports ongoing deprssion and apathy.   No si/hi. Continues with cymbalta. Did note a difference on adding abilify at 2 mg daily.     Has a rash on her feet bilaterally. .     Diet and Physical Activity:  - Diet/Nutrition: well balanced diet.  - Exercise: no formal exercise.    Depression Screening:    - PHQ-9 Score: 4    General Health:  - Sleep: 4-6 hours of sleep on average.  - Hearing: normal hearing right ear and normal hearing left ear.  - Vision: vision problems.  - Dental: no dental visits for > 1 year.    /GYN Health:  - Follows with GYN: yes.   - Menopause: premenopausal.   - History of STDs: no  - Contraception: tubal ligation.      Advanced Care Planning:  - Has an advanced directive?: yes    - Has a durable medical POA?: yes      Review of Systems      Objective   /78 (BP Location: Left arm, Patient Position: Sitting)   Pulse 81   Ht 5' 8.5\" (1.74 m)   Wt 125 kg (274 lb 9.6 oz)   SpO2 95%   BMI 41.14 kg/m²     Physical Exam  Vitals and nursing note reviewed.   Constitutional:       Appearance: Normal appearance. She is well-developed.   HENT:      Head: Normocephalic and atraumatic.      Right Ear: External ear normal.      Left Ear: External ear normal.      Nose: Nose normal.   Eyes:      Conjunctiva/sclera: Conjunctivae normal.      Pupils: Pupils are equal, round, and reactive to light.   Neck:      Thyroid: No thyromegaly.      Trachea: No tracheal deviation.   Cardiovascular:      Rate and Rhythm: Normal rate and regular rhythm.      Heart sounds: Normal heart sounds. No murmur heard.  Pulmonary:      Effort: Pulmonary effort is " normal. No respiratory distress.      Breath sounds: Normal breath sounds. No wheezing.   Abdominal:      General: Bowel sounds are normal.      Palpations: Abdomen is soft.   Musculoskeletal:         General: Normal range of motion.      Cervical back: Normal range of motion and neck supple.   Skin:     General: Skin is warm and dry.      Capillary Refill: Capillary refill takes less than 2 seconds.      Comments: Erythematous, scaly, dry patches on her soles , heels, toes, webbing.    Neurological:      General: No focal deficit present.      Mental Status: She is alert and oriented to person, place, and time.   Psychiatric:         Mood and Affect: Mood normal.         Behavior: Behavior normal.

## 2025-04-17 ENCOUNTER — TELEPHONE (OUTPATIENT)
Age: 50
End: 2025-04-17

## 2025-04-17 NOTE — TELEPHONE ENCOUNTER
PA for Clotrimazole-betamethasone (LOTRISONE) 1-0.05 % lotion SUBMITTED to [employer]    via    []CMM-KEY:   [x]Surescripts-Case ID #: [id number]   []Availity-Auth ID #   []Faxed to plan   []Other website   []Phone call Case ID #     [x]PA sent as URGENT    All office notes, labs and other pertaining documents and studies sent. Clinical questions answered. Awaiting determination from insurance company.     Turnaround time for your insurance to make a decision on your Prior Authorization can take 7-21 business days.

## 2025-04-17 NOTE — TELEPHONE ENCOUNTER
PA for Clotrimazole-betamethasone (LOTRISONE) 1-0.05 % lotion DENIED    Reason:(Screenshot if applicable)        Message sent to office clinical pool Yes    Denial letter scanned into Media No - Awaiting letter    Appeal started No (Provider will need to decide if appeal is warranted and send clinical documentation to Prior Authorization Team for initiation.)    **Please follow up with your patient regarding denial and next steps**

## 2025-04-18 DIAGNOSIS — B35.3 TINEA PEDIS OF BOTH FEET: Primary | ICD-10-CM

## 2025-04-18 RX ORDER — CLOTRIMAZOLE AND BETAMETHASONE DIPROPIONATE 10; .64 MG/G; MG/G
CREAM TOPICAL 2 TIMES DAILY
Qty: 45 G | Refills: 0 | Status: SHIPPED | OUTPATIENT
Start: 2025-04-18

## 2025-04-18 NOTE — TELEPHONE ENCOUNTER
Martina Townsend  1963    NEUROSURGERY PROGRESS NOTE    2 Days Post-Op    SUBJECTIVE: LBP    PHYSICAL EXAM:  Blood pressure 119/67, pulse 63, temperature 98.2 °F (36.8 °C), temperature source Oral, resp. rate 16, height 5' 7\" (1.702 m), weight 120.2 kg (265 lb), SpO2 94%.  Incision: dry  Neuro: stable    I&O's:  Date 07/13/24 0700 - 07/14/24 0659 07/14/24 0700 - 07/15/24 0659   Shift 9632-9927 7592-3693 7163-6840 24 Hour Total 5370-4948 3159-8156 8690-8726 24 Hour Total   INTAKE   P.O. 120  850 970       I.V.   0 0       Shift Total 120  850 970       OUTPUT   Drains  28 69 97 16   16     Lumbar Drain Volume out (mL) (Lumbar Drain 07/12/24)  28 69 97 16   16   Shift Total  28 69 97 16   16   Weight (kg) 120.2 120.2 120.2 120.2 120.2 120.2 120.2 120.2       Labs:    HGB (g/dL)   Date Value   07/05/2024 8.5 (L)   07/04/2024 8.1 (L)     HCT (%)   Date Value   07/05/2024 26.4 (L)   07/04/2024 25.5 (L)       ASSESSMENT/PLAN:   S/p lumbar fusion.  The patient is tolerating the drain well.  Continue present care       Please call. Denied formulation.   Will rx same in a cream from.

## 2025-04-29 ENCOUNTER — OFFICE VISIT (OUTPATIENT)
Dept: PAIN MEDICINE | Facility: CLINIC | Age: 50
End: 2025-04-29
Payer: COMMERCIAL

## 2025-04-29 VITALS
HEIGHT: 69 IN | TEMPERATURE: 97.9 F | WEIGHT: 275 LBS | BODY MASS INDEX: 40.73 KG/M2 | HEART RATE: 82 BPM | OXYGEN SATURATION: 97 %

## 2025-04-29 DIAGNOSIS — Z98.1 HISTORY OF FUSION OF CERVICAL SPINE: ICD-10-CM

## 2025-04-29 DIAGNOSIS — M79.18 MYOFASCIAL PAIN SYNDROME: ICD-10-CM

## 2025-04-29 DIAGNOSIS — M47.816 LUMBAR SPONDYLOSIS: ICD-10-CM

## 2025-04-29 DIAGNOSIS — M54.2 NECK PAIN: ICD-10-CM

## 2025-04-29 DIAGNOSIS — M46.1 SACROILIITIS (HCC): Primary | ICD-10-CM

## 2025-04-29 PROCEDURE — 99214 OFFICE O/P EST MOD 30 MIN: CPT | Performed by: PHYSICIAN ASSISTANT

## 2025-04-29 NOTE — PROGRESS NOTES
Assessment:  1. Sacroiliitis (HCC)    2. Lumbar spondylosis    3. History of fusion of cervical spine    4. Neck pain    5. Myofascial pain syndrome        Plan:  While the patient was in the office today, I did have a thorough conversation regarding their chronic pain syndrome, medication management, and treatment plan options.    Based on patient report and physical exam, the patient's symptomatology does seem to be consistent with sacroiliac mediated pain from sacroiliitis. We will schedule the patient for a right SIJ injection to decrease any inflammatory component of the patient's pain symptoms.    The patient will continue the current medication regimen of duloxetine, Parafon forte as needed and gabapentin.  She states she does not require refills on today's visit.    Patient states that she will make an appointment for physical therapy.    The patient will follow-up in 12 weeks for medication prescription refill and reevaluation. The patient was advised to contact the office should their symptoms worsen in the interim. The patient was agreeable and verbalized an understanding.        History of Present Illness:    The patient is a 49 y.o. female last seen on 1/7/2025 who presents for a follow up office visit in regards to chronic neck pain secondary to cervical spondylosis and history of cervical fusion, chronic mid back pain with myofascial features and chronic low back pain secondary to lumbar spondylosis and sacroiliitis.  The patient currently reports low back pain that is increased since I last saw her, primarily right-sided with referred pain into the right hip, buttock and groin area.  She states that her pain became worse after her dog jumped on her low back when she was laying down.  This occurred about 3 weeks ago.  Otherwise patient is maintained on gabapentin, duloxetine and Parafon forte with partial relief and no side effects.  Patient states that she is trying to get into physical therapy and  trying to make the time for that.    I have personally reviewed and/or updated the patient's past medical history, past surgical history, family history, social history, current medications, allergies, and vital signs today.       Review of Systems:    Review of Systems   Respiratory:  Negative for shortness of breath.    Cardiovascular:  Negative for chest pain.   Gastrointestinal:  Negative for constipation, diarrhea, nausea and vomiting.   Musculoskeletal:  Positive for arthralgias, gait problem and joint swelling. Negative for myalgias.   Skin:  Negative for rash.   Neurological:  Negative for dizziness, seizures and weakness.   Psychiatric/Behavioral:  Negative for dysphoric mood.    All other systems reviewed and are negative.        Past Medical History:   Diagnosis Date   • Allergic    • Anxiety    • Arthritis    • Asthma    • Chronic pain disorder     upper back   • Depression    • Disc disorder    • Disease of thyroid gland    • Fibromyalgia, primary    • GERD (gastroesophageal reflux disease)    • Headache(784.0)    • HPV (human papilloma virus) infection     cervical   • Hypertension    • Hypothyroidism    • Inflammatory bowel disease    • Migraine    • Obesity    • Stomach disorder        Past Surgical History:   Procedure Laterality Date   • CERVICAL BIOPSY  W/ LOOP ELECTRODE EXCISION     • CERVICAL FUSION  2015   •  SECTION     • CHOLECYSTECTOMY      lap   • FOOT SURGERY Bilateral     multiple, posterior calcaneal bone spur   • KNEE SURGERY Left     arthroscopy with debribement   • LYMPH NODE BIOPSY     • [place] NDSC WRST SURG W/RLS TRANSVRS CARPL LIGM Right 2023    Procedure: Right endoscopic carpal tunnel release;  Surgeon: Cl Mac MD;  Location: [facility];  Service: Orthopedics   • SPINE SURGERY     • TENDON REPAIR     • TUBAL LIGATION Bilateral        Family History   Adopted: Yes   Problem Relation Age of Onset   • Thyroid disease Mother    • Arthritis Mother          Mom, dad, aunts, grandmother   • Autoimmune disease Mother    • No Known Problems Father    • Coronary artery disease Maternal Grandmother    • Breast cancer Maternal Grandmother         age unknown   • Heart disease Maternal Grandmother         Mother, Aunt, Grandmother, Father   • Coronary artery disease Maternal Aunt    • Stroke Maternal Aunt         Chika, brothers   • No Known Problems Maternal Grandfather    • No Known Problems Paternal Grandmother    • No Known Problems Paternal Grandfather    • Suicide Attempts Maternal Aunt    • Heart murmur Maternal Aunt    • Hypothyroidism Maternal Aunt    • Hashimoto's thyroiditis Maternal Aunt    • Diabetes unspecified Brother    • Diabetes Brother    • No Known Problems Brother    • No Known Problems Brother    • ADD / ADHD Son        Social History     Occupational History   • Occupation: [employer]     Comment: [employer]   Tobacco Use   • Smoking status: Every Day     Current packs/day: 0.50     Average packs/day: 0.5 packs/day for 35.0 years (17.5 ttl pk-yrs)     Types: Cigarettes   • Smokeless tobacco: Never   • Tobacco comments:     actually about 3/4 PPD   Vaping Use   • Vaping status: Never Used   Substance and Sexual Activity   • Alcohol use: Not Currently   • Drug use: No   • Sexual activity: Not Currently     Partners: Male     Birth control/protection: Female Sterilization, None         Current Outpatient Medications:   •  acetaminophen (TYLENOL) 650 mg CR tablet, Take 1 tablet (650 mg total) by mouth every 8 (eight) hours as needed for mild pain, Disp: 30 tablet, Rfl: 0  •  albuterol (Ventolin HFA) 90 mcg/act inhaler, Inhale 2 puffs every 6 (six) hours as needed for wheezing, Disp: 18 g, Rfl: 0  •  ARIPiprazole (ABILIFY) 5 mg tablet, Take 1 tablet (5 mg total) by mouth daily, Disp: 100 tablet, Rfl: 0  •  benzonatate (TESSALON) 200 MG capsule, Take 1 capsule (200 mg total) by mouth 3 (three) times a day as needed for cough, Disp: 30 capsule, Rfl: 0  •   betamethasone valerate (VALISONE) 0.1 % ointment, Apply 1 to 2 times a day only as needed to finger/hand rash., Disp: 30 g, Rfl: 0  •  Calcium Carbonate (CALCIUM 600 PO), Take by mouth daily, Disp: , Rfl:   •  chlorzoxazone (PARAFON FORTE) 500 mg tablet, Take 1 tablet (500 mg total) by mouth 3 (three) times a day as needed for muscle spasms, Disp: 90 tablet, Rfl: 2  •  cholecalciferol (VITAMIN D3) 1,000 units tablet, Take 2,000 Units by mouth daily, Disp: , Rfl:   •  ciclopirox (LOPROX) 0.77 % cream, APPLY 2 TIMES per day to rash on chest for 2-4 weeks., Disp: 30 g, Rfl: 0  •  clotrimazole-betamethasone (LOTRISONE) 1-0.05 % cream, Apply topically 2 (two) times a day, Disp: 45 g, Rfl: 0  •  DULoxetine (CYMBALTA) 60 mg delayed release capsule, Take 2 PO HS., Disp: 180 capsule, Rfl: 0  •  famotidine (PEPCID) 20 mg tablet, Take 1 tablet (20 mg total) by mouth 2 (two) times a day, Disp: 180 tablet, Rfl: 1  •  gabapentin (NEURONTIN) 600 MG tablet, Take 1 tablet (600 mg total) by mouth 3 (three) times a day, Disp: 270 tablet, Rfl: 1  •  guaiFENesin (ROBITUSSIN) 100 MG/5ML oral liquid, Take 10 mL (200 mg total) by mouth 3 (three) times a day as needed for cough, Disp: 120 mL, Rfl: 0  •  HYDROcodone-acetaminophen (NORCO) 5-325 mg per tablet, Take 1 PO BID PRN for pain for ongoing therapy, Disp: 14 tablet, Rfl: 0  •  irbesartan (AVAPRO) 75 mg tablet, Take 1 tablet (75 mg total) by mouth daily at bedtime, Disp: 30 tablet, Rfl: 0  •  levothyroxine 200 mcg tablet, Take 1 tablet daily in addition to 25 mcg tablet daily, Disp: 100 tablet, Rfl: 1  •  meclizine (ANTIVERT) 12.5 MG tablet, Take 1 tablet (12.5 mg total) by mouth every 8 (eight) hours as needed for dizziness, Disp: 90 tablet, Rfl: 0  •  multivitamin (THERAGRAN) TABS, Take 1 tablet by mouth daily, Disp: , Rfl:   •  naproxen sodium (ALEVE) 220 MG tablet, Take 1 tablet (220 mg total) by mouth 2 (two) times a day with meals, Disp: 20 tablet, Rfl: 0  •  ondansetron (Zofran  "ODT) 4 mg disintegrating tablet, Take 1 tablet (4 mg total) by mouth every 6 (six) hours as needed for nausea or vomiting, Disp: 20 tablet, Rfl: 0  •  Potassium 99 MG TABS, Take by mouth daily, Disp: , Rfl:   •  terbinafine (LamISIL) 250 mg tablet, Take 1 tablet (250 mg total) by mouth daily for 14 days, Disp: 14 tablet, Rfl: 0  •  tirzepatide (Zepbound) 10 mg/0.5 mL auto-injector, Inject 0.5 mL (10 mg total) under the skin once a week, Disp: 7 mL, Rfl: 0  •  Euflexxa 20 MG/2ML SOSY, , Disp: , Rfl:     Allergies   Allergen Reactions   • Acetaminophen-Codeine      Pt states she does not remember having a reaction to this medication   • Hydrocodone Other (See Comments)     GI issues   • Latex Hives     Latex powder     • Lisinopril Other (See Comments)     Itching, gi intollerance     • Prednisolone Vomiting       Physical Exam:    Pulse 82   Temp 97.9 °F (36.6 °C)   Ht 5' 8.5\" (1.74 m)   Wt 125 kg (275 lb)   SpO2 97%   BMI 41.21 kg/m²     Constitutional:normal, well developed, well nourished, alert, in no distress and non-toxic and no overt pain behavior. and obese  Eyes:anicteric  HEENT:grossly intact  Neck:supple, symmetric, trachea midline and no masses   Pulmonary:even and unlabored  Cardiovascular:No edema or pitting edema present  Skin:Normal without rashes or lesions and well hydrated  Psychiatric:Mood and affect appropriate  Neurologic:Cranial Nerves II-XII grossly intact  Musculoskeletal: Tender right SI joint.  Right + Rehana finger sign + Patricks test +Gaenslen's test+ Posterior pelvic pain provocation      Imaging  FL spine and pain procedure    (Results Pending)         Orders Placed This Encounter   Procedures   • FL spine and pain procedure       "

## 2025-05-02 ENCOUNTER — TELEPHONE (OUTPATIENT)
Dept: OBGYN CLINIC | Facility: HOSPITAL | Age: 50
End: 2025-05-02

## 2025-05-02 ENCOUNTER — TELEPHONE (OUTPATIENT)
Dept: OBGYN CLINIC | Facility: CLINIC | Age: 50
End: 2025-05-02

## 2025-05-04 DIAGNOSIS — M51.27 HERNIATED NUCLEUS PULPOSUS, L5-S1: ICD-10-CM

## 2025-05-04 DIAGNOSIS — M54.9 MID BACK PAIN: ICD-10-CM

## 2025-05-04 DIAGNOSIS — M47.816 LUMBAR SPONDYLOSIS: ICD-10-CM

## 2025-05-04 DIAGNOSIS — M79.18 MYOFASCIAL PAIN SYNDROME: ICD-10-CM

## 2025-05-04 DIAGNOSIS — M47.812 CERVICAL SPONDYLOSIS: ICD-10-CM

## 2025-05-04 DIAGNOSIS — Z98.1 HISTORY OF FUSION OF CERVICAL SPINE: ICD-10-CM

## 2025-05-04 DIAGNOSIS — B35.3 TINEA PEDIS OF BOTH FEET: ICD-10-CM

## 2025-05-04 DIAGNOSIS — I10 PRIMARY HYPERTENSION: ICD-10-CM

## 2025-05-04 DIAGNOSIS — G89.4 CHRONIC PAIN SYNDROME: ICD-10-CM

## 2025-05-05 RX ORDER — DULOXETIN HYDROCHLORIDE 60 MG/1
CAPSULE, DELAYED RELEASE ORAL
Qty: 180 CAPSULE | Refills: 1 | Status: SHIPPED | OUTPATIENT
Start: 2025-05-05

## 2025-05-05 RX ORDER — GABAPENTIN 600 MG/1
600 TABLET ORAL 3 TIMES DAILY
Qty: 270 TABLET | Refills: 1 | Status: SHIPPED | OUTPATIENT
Start: 2025-05-05

## 2025-05-05 RX ORDER — CHLORZOXAZONE 500 MG/1
500 TABLET ORAL 3 TIMES DAILY PRN
Qty: 90 TABLET | Refills: 2 | Status: SHIPPED | OUTPATIENT
Start: 2025-05-05

## 2025-05-05 NOTE — TELEPHONE ENCOUNTER
Pt was last seen on 4/29/25 and is requesting refills of cymbalta, parafon forte and gabapentin. Next ov 6/9/25

## 2025-05-07 RX ORDER — IRBESARTAN 75 MG/1
75 TABLET ORAL
Qty: 30 TABLET | Refills: 0 | Status: SHIPPED | OUTPATIENT
Start: 2025-05-07 | End: 2025-11-03

## 2025-05-07 RX ORDER — CLOTRIMAZOLE AND BETAMETHASONE DIPROPIONATE 10; .64 MG/G; MG/G
CREAM TOPICAL 2 TIMES DAILY
Qty: 45 G | Refills: 0 | Status: SHIPPED | OUTPATIENT
Start: 2025-05-07 | End: 2025-05-14 | Stop reason: SDUPTHER

## 2025-05-12 ENCOUNTER — HOSPITAL ENCOUNTER (OUTPATIENT)
Dept: RADIOLOGY | Facility: CLINIC | Age: 50
Discharge: HOME | End: 2025-05-12
Attending: PHYSICIAN ASSISTANT
Payer: COMMERCIAL

## 2025-05-12 VITALS
RESPIRATION RATE: 18 BRPM | TEMPERATURE: 97.5 F | OXYGEN SATURATION: 97 % | HEART RATE: 77 BPM | SYSTOLIC BLOOD PRESSURE: 114 MMHG | DIASTOLIC BLOOD PRESSURE: 79 MMHG

## 2025-05-12 DIAGNOSIS — M46.1 SACROILIITIS (HCC): ICD-10-CM

## 2025-05-12 PROCEDURE — 27096 INJECT SACROILIAC JOINT: CPT | Performed by: ANESTHESIOLOGY

## 2025-05-12 RX ORDER — ROPIVACAINE HYDROCHLORIDE 2 MG/ML
1 INJECTION, SOLUTION EPIDURAL; INFILTRATION; PERINEURAL ONCE
Status: COMPLETED | OUTPATIENT
Start: 2025-05-12 | End: 2025-05-12

## 2025-05-12 RX ORDER — METHYLPREDNISOLONE ACETATE 80 MG/ML
40 INJECTION, SUSPENSION INTRA-ARTICULAR; INTRALESIONAL; INTRAMUSCULAR; PARENTERAL; SOFT TISSUE ONCE
Status: COMPLETED | OUTPATIENT
Start: 2025-05-12 | End: 2025-05-12

## 2025-05-12 RX ADMIN — METHYLPREDNISOLONE ACETATE 40 MG: 80 INJECTION, SUSPENSION INTRA-ARTICULAR; INTRALESIONAL; INTRAMUSCULAR; SOFT TISSUE at 10:08

## 2025-05-12 RX ADMIN — IOHEXOL 0.2 ML: 300 INJECTION, SOLUTION INTRAVENOUS at 10:08

## 2025-05-12 RX ADMIN — ROPIVACAINE HYDROCHLORIDE 1 ML: 2 INJECTION EPIDURAL; INFILTRATION; PERINEURAL at 10:08

## 2025-05-12 NOTE — DISCHARGE INSTR - LAB

## 2025-05-12 NOTE — H&P
History of Present Illness: The patient is a 49 y.o. female who presents with complaints of low back pain.    Past Medical History:   Diagnosis Date    Allergic     Anxiety     Arthritis     Asthma     Chronic pain disorder     upper back    Depression     Disc disorder     Disease of thyroid gland     Fibromyalgia, primary     GERD (gastroesophageal reflux disease)     Headache(784.0)     HPV (human papilloma virus) infection     cervical    Hypertension     Hypothyroidism     Inflammatory bowel disease     Migraine     Obesity     Stomach disorder        Past Surgical History:   Procedure Laterality Date    CERVICAL BIOPSY  W/ LOOP ELECTRODE EXCISION      CERVICAL FUSION  2015     SECTION      CHOLECYSTECTOMY      lap    FOOT SURGERY Bilateral     multiple, posterior calcaneal bone spur    KNEE SURGERY Left     arthroscopy with debribement    LYMPH NODE BIOPSY      [place] NDSC WRST SURG W/RLS TRANSVRS CARPL LIGM Right 2023    Procedure: Right endoscopic carpal tunnel release;  Surgeon: Cl Mac MD;  Location: [facility];  Service: Orthopedics    SPINE SURGERY      TENDON REPAIR      TUBAL LIGATION Bilateral          Current Outpatient Medications:     acetaminophen (TYLENOL) 650 mg CR tablet, Take 1 tablet (650 mg total) by mouth every 8 (eight) hours as needed for mild pain, Disp: 30 tablet, Rfl: 0    albuterol (Ventolin HFA) 90 mcg/act inhaler, Inhale 2 puffs every 6 (six) hours as needed for wheezing, Disp: 18 g, Rfl: 0    ARIPiprazole (ABILIFY) 5 mg tablet, Take 1 tablet (5 mg total) by mouth daily, Disp: 100 tablet, Rfl: 0    benzonatate (TESSALON) 200 MG capsule, Take 1 capsule (200 mg total) by mouth 3 (three) times a day as needed for cough, Disp: 30 capsule, Rfl: 0    betamethasone valerate (VALISONE) 0.1 % ointment, Apply 1 to 2 times a day only as needed to finger/hand rash., Disp: 30 g, Rfl: 0    Calcium Carbonate (CALCIUM 600 PO), Take by mouth daily, Disp: , Rfl:      chlorzoxazone (PARAFON FORTE) 500 mg tablet, Take 1 tablet (500 mg total) by mouth 3 (three) times a day as needed for muscle spasms, Disp: 90 tablet, Rfl: 2    cholecalciferol (VITAMIN D3) 1,000 units tablet, Take 2,000 Units by mouth daily, Disp: , Rfl:     ciclopirox (LOPROX) 0.77 % cream, APPLY 2 TIMES per day to rash on chest for 2-4 weeks., Disp: 30 g, Rfl: 0    clotrimazole-betamethasone (LOTRISONE) 1-0.05 % cream, Apply topically 2 (two) times a day, Disp: 45 g, Rfl: 0    DULoxetine (CYMBALTA) 60 mg delayed release capsule, Take 2 PO HS., Disp: 180 capsule, Rfl: 1    Euflexxa 20 MG/2ML SOSY, , Disp: , Rfl:     famotidine (PEPCID) 20 mg tablet, Take 1 tablet (20 mg total) by mouth 2 (two) times a day, Disp: 180 tablet, Rfl: 1    gabapentin (NEURONTIN) 600 MG tablet, Take 1 tablet (600 mg total) by mouth 3 (three) times a day, Disp: 270 tablet, Rfl: 1    guaiFENesin (ROBITUSSIN) 100 MG/5ML oral liquid, Take 10 mL (200 mg total) by mouth 3 (three) times a day as needed for cough, Disp: 120 mL, Rfl: 0    HYDROcodone-acetaminophen (NORCO) 5-325 mg per tablet, Take 1 PO BID PRN for pain for ongoing therapy, Disp: 14 tablet, Rfl: 0    irbesartan (AVAPRO) 75 mg tablet, Take 1 tablet (75 mg total) by mouth daily at bedtime, Disp: 30 tablet, Rfl: 0    levothyroxine 200 mcg tablet, Take 1 tablet daily in addition to 25 mcg tablet daily, Disp: 100 tablet, Rfl: 1    meclizine (ANTIVERT) 12.5 MG tablet, Take 1 tablet (12.5 mg total) by mouth every 8 (eight) hours as needed for dizziness, Disp: 90 tablet, Rfl: 0    multivitamin (THERAGRAN) TABS, Take 1 tablet by mouth daily, Disp: , Rfl:     naproxen sodium (ALEVE) 220 MG tablet, Take 1 tablet (220 mg total) by mouth 2 (two) times a day with meals, Disp: 20 tablet, Rfl: 0    ondansetron (Zofran ODT) 4 mg disintegrating tablet, Take 1 tablet (4 mg total) by mouth every 6 (six) hours as needed for nausea or vomiting, Disp: 20 tablet, Rfl: 0    Potassium 99 MG TABS, Take by  mouth daily, Disp: , Rfl:     tirzepatide (Zepbound) 10 mg/0.5 mL auto-injector, Inject 0.5 mL (10 mg total) under the skin once a week, Disp: 7 mL, Rfl: 0    Current Facility-Administered Medications:     iohexol (OMNIPAQUE) 300 mg/mL injection 0.2 mL, 0.2 mL, Intra-articular, Once, Edmond Morrow DO    methylPREDNISolone acetate (DEPO-MEDROL) injection 40 mg, 40 mg, Intra-articular, Once, Edmond Morrow DO    ropivacaine (NAROPIN) injection 1 mL, 1 mL, Intra-articular, Once, Edmond Morrow DO    Allergies   Allergen Reactions    Acetaminophen-Codeine      Pt states she does not remember having a reaction to this medication    Hydrocodone Other (See Comments)     GI issues    Latex Hives     Latex powder      Lisinopril Other (See Comments)     Itching, gi intollerance      Prednisolone Vomiting       Physical Exam:   Vitals:    05/12/25 0947   BP: 115/76   Pulse: 83   Resp: 20   Temp: 97.5 °F (36.4 °C)   SpO2: 95%     General: Awake, Alert, Oriented x 3, Mood and affect appropriate  Respiratory: Respirations even and unlabored  Cardiovascular: Peripheral pulses intact; no edema  Musculoskeletal Exam: Normal gait    ASA Score: 3    Patient/Chart Verification  Patient ID Verified: Verbal  ID Band Applied: No  Consents Confirmed: To be obtained in the Procedural area  H&P( within 30 days) Verified: To be obtained in the Procedural area  Interval H&P(within 24 hr) Complete (required for Outpatients and Surgery Admit only): To be obtained in the Procedural area  Allergies Reviewed: Yes  Anticoag/NSAID held?: NA  Currently on antibiotics?: No    Assessment:   1. Sacroiliitis (HCC)        Plan: RT SIJ

## 2025-05-13 DIAGNOSIS — B35.3 TINEA PEDIS OF BOTH FEET: ICD-10-CM

## 2025-05-13 DIAGNOSIS — E66.01 MORBID OBESITY WITH BODY MASS INDEX (BMI) OF 45.0 TO 49.9 IN ADULT (HCC): ICD-10-CM

## 2025-05-14 ENCOUNTER — OFFICE VISIT (OUTPATIENT)
Dept: PODIATRY | Facility: CLINIC | Age: 50
End: 2025-05-14
Payer: COMMERCIAL

## 2025-05-14 VITALS — WEIGHT: 273 LBS | HEIGHT: 69 IN | BODY MASS INDEX: 40.43 KG/M2

## 2025-05-14 DIAGNOSIS — B35.3 TINEA PEDIS OF BOTH FEET: Primary | ICD-10-CM

## 2025-05-14 DIAGNOSIS — B35.1 ONYCHOMYCOSIS: ICD-10-CM

## 2025-05-14 PROCEDURE — 99203 OFFICE O/P NEW LOW 30 MIN: CPT | Performed by: PODIATRIST

## 2025-05-14 RX ORDER — TERBINAFINE HYDROCHLORIDE 250 MG/1
250 TABLET ORAL DAILY
Qty: 14 TABLET | Refills: 0 | Status: SHIPPED | OUTPATIENT
Start: 2025-05-14 | End: 2025-05-28

## 2025-05-14 RX ORDER — CLOTRIMAZOLE AND BETAMETHASONE DIPROPIONATE 10; .64 MG/G; MG/G
CREAM TOPICAL 2 TIMES DAILY
Qty: 45 G | Refills: 5 | Status: SHIPPED | OUTPATIENT
Start: 2025-05-14

## 2025-05-14 RX ORDER — CLOTRIMAZOLE AND BETAMETHASONE DIPROPIONATE 10; .64 MG/G; MG/G
CREAM TOPICAL 2 TIMES DAILY
Qty: 45 G | Refills: 0 | OUTPATIENT
Start: 2025-05-14

## 2025-05-14 RX ORDER — TIRZEPATIDE 10 MG/.5ML
10 INJECTION, SOLUTION SUBCUTANEOUS WEEKLY
Qty: 7 ML | Refills: 0 | Status: SHIPPED | OUTPATIENT
Start: 2025-05-14 | End: 2025-08-22

## 2025-05-14 NOTE — PROGRESS NOTES
PATIENT:  Priscilla Lorenzo  1975       ASSESSMENT:     1. Tinea pedis of both feet  terbinafine (LamISIL) 250 mg tablet    clotrimazole-betamethasone (LOTRISONE) 1-0.05 % cream      2. Onychomycosis                  PLAN:  1. Reviewed medical records.  Reviewed the note from PCP.  Patient was counseled and educated on the condition and the diagnosis.    2. The diagnosis, treatment options and prognosis were discussed with the patient.    3. Restart Lotrisone.  Repeat 2 week course of Lamisil.  4. Discussed proper skin care.  Instructed her to consult Dermatology to rule out psoriasis.    5. Patient will return in 8 weeks for re-evaluation.       Imaging: I have personally reviewed pertinent films in PACS  Labs, pathology, and Other Studies: I have personally reviewed pertinent reports.        Subjective:       HPI  The patient presents with chief complaint of skin problem in both feet.  She has skin peeling, itching, and burning in plantar feet.  She also had it on her palms.  She had it on her feet for about 1 month, but on her hands for a while.  She was on Lotrisone for 2 weeks, but ran out of Rx.  She also had Lamisil for 2 weeks.  Her condition seems to be better with those medication.  She also has thickening of toenails.  No history of diabetes.  No associated numbness or paresthesia.  No significant weakness or dysfunction.         The following portions of the patient's history were reviewed and updated as appropriate: allergies, current medications, past family history, past medical history, past social history, past surgical history and problem list.  All pertinent labs and images were reviewed.      Past Medical History  Past Medical History:   Diagnosis Date    Allergic     Anxiety     Arthritis     Asthma     Callus     Chronic pain disorder     upper back    Depression     Disc disorder     Disease of thyroid gland     Fibromyalgia, primary     GERD (gastroesophageal reflux disease)      Headache(784.0)     HPV (human papilloma virus) infection     cervical    Hypertension     Hypothyroidism     Inflammatory bowel disease     Ingrown toenail     Migraine     Obesity     Plantar fasciitis     Stomach disorder        Past Surgical History  Past Surgical History:   Procedure Laterality Date    CERVICAL BIOPSY  W/ LOOP ELECTRODE EXCISION      CERVICAL FUSION  2015     SECTION      CHOLECYSTECTOMY      lap    FOOT SURGERY Bilateral     multiple, posterior calcaneal bone spur    KNEE SURGERY Left     arthroscopy with debribement    LYMPH NODE BIOPSY      MT [facility] WRST SURG W/RLS TRANSVRS CARPL LIGM Right 2023    Procedure: Right endoscopic carpal tunnel release;  Surgeon: Cl Mac MD;  Location: [facility];  Service: Orthopedics    SPINE SURGERY      TENDON REPAIR      TUBAL LIGATION Bilateral         Allergies:  Acetaminophen-codeine, Hydrocodone, Latex, Lisinopril, and Prednisolone    Medications:  Current Medications[1]    Social History:  Social History     Socioeconomic History    Marital status:      Spouse name: None    Number of children: None    Years of education: None    Highest education level: None   Occupational History    Occupation: [employer]     Comment: [employer]   Tobacco Use    Smoking status: Every Day     Current packs/day: 0.50     Average packs/day: 0.5 packs/day for 35.0 years (17.5 ttl pk-yrs)     Types: Cigarettes     Passive exposure: Current    Smokeless tobacco: Never    Tobacco comments:     actually about 3/4 PPD   Vaping Use    Vaping status: Never Used   Substance and Sexual Activity    Alcohol use: Not Currently    Drug use: No    Sexual activity: Not Currently     Partners: Male     Birth control/protection: Female Sterilization, None   Other Topics Concern    None   Social History Narrative    None     Social Drivers of Health     Financial Resource Strain: Not on file   Food Insecurity: No Food Insecurity (4/15/2025)  "   Nursing - Inadequate Food Risk Classification     Worried About Running Out of Food in the Last Year: Never true     Ran Out of Food in the Last Year: Never true     Ran Out of Food in the Last Year: Not on file   Transportation Needs: No Transportation Needs (4/15/2025)    PRAPARE - Transportation     Lack of Transportation (Medical): No     Lack of Transportation (Non-Medical): No   Physical Activity: Not on file   Stress: Not on file   Social Connections: Not on file   Intimate Partner Violence: Not on file   Housing Stability: High Risk (4/15/2025)    Housing Stability Vital Sign     Unable to Pay for Housing in the Last Year: Yes     Number of Times Moved in the Last Year: 0     Homeless in the Last Year: No          Review of Systems   Constitutional:  Negative for chills and fever.   Respiratory:  Negative for cough and shortness of breath.    Cardiovascular:  Negative for chest pain.   Gastrointestinal:  Negative for nausea and vomiting.   Musculoskeletal:  Negative for gait problem.   Skin:  Positive for rash.   Allergic/Immunologic: Negative for immunocompromised state.   Neurological:  Negative for weakness and numbness.   Hematological: Negative.    Psychiatric/Behavioral:  Negative for behavioral problems and confusion.          Objective:      Ht 5' 8.5\" (1.74 m) Comment: stated  Wt 124 kg (273 lb)   BMI 40.91 kg/m²          Physical Exam  Vitals reviewed.   Constitutional:       General: She is not in acute distress.     Appearance: She is not toxic-appearing or diaphoretic.   HENT:      Head: Normocephalic and atraumatic.     Eyes:      Extraocular Movements: Extraocular movements intact.       Cardiovascular:      Rate and Rhythm: Normal rate and regular rhythm.      Pulses: Normal pulses.           Dorsalis pedis pulses are 2+ on the right side and 2+ on the left side.        Posterior tibial pulses are 2+ on the right side and 2+ on the left side.   Pulmonary:      Effort: Pulmonary effort is " normal. No respiratory distress.     Musculoskeletal:         General: No swelling or signs of injury.      Cervical back: Normal range of motion and neck supple.      Right lower leg: No edema.      Left lower leg: No edema.      Right foot: No foot drop.      Left foot: No foot drop.     Skin:     General: Skin is warm.      Capillary Refill: Capillary refill takes less than 2 seconds.      Coloration: Skin is not cyanotic or mottled.      Findings: No abscess.      Nails: There is no clubbing.      Comments: Skin eruption / xerosis / crusts / macules in plantar feet.  She has onychomycosis.        Neurological:      General: No focal deficit present.      Mental Status: She is alert and oriented to person, place, and time.      Cranial Nerves: No cranial nerve deficit.      Sensory: No sensory deficit.      Motor: No weakness.      Coordination: Coordination normal.     Psychiatric:         Mood and Affect: Mood normal.         Behavior: Behavior normal.         Thought Content: Thought content normal.         Judgment: Judgment normal.                [1]   Current Outpatient Medications   Medication Sig Dispense Refill    acetaminophen (TYLENOL) 650 mg CR tablet Take 1 tablet (650 mg total) by mouth every 8 (eight) hours as needed for mild pain 30 tablet 0    albuterol (Ventolin HFA) 90 mcg/act inhaler Inhale 2 puffs every 6 (six) hours as needed for wheezing 18 g 0    ARIPiprazole (ABILIFY) 5 mg tablet Take 1 tablet (5 mg total) by mouth daily 100 tablet 0    benzonatate (TESSALON) 200 MG capsule Take 1 capsule (200 mg total) by mouth 3 (three) times a day as needed for cough 30 capsule 0    betamethasone valerate (VALISONE) 0.1 % ointment Apply 1 to 2 times a day only as needed to finger/hand rash. 30 g 0    Calcium Carbonate (CALCIUM 600 PO) Take by mouth daily      chlorzoxazone (PARAFON FORTE) 500 mg tablet Take 1 tablet (500 mg total) by mouth 3 (three) times a day as needed for muscle spasms 90 tablet 2     cholecalciferol (VITAMIN D3) 1,000 units tablet Take 2,000 Units by mouth daily      ciclopirox (LOPROX) 0.77 % cream APPLY 2 TIMES per day to rash on chest for 2-4 weeks. 30 g 0    clotrimazole-betamethasone (LOTRISONE) 1-0.05 % cream Apply topically 2 (two) times a day 45 g 5    DULoxetine (CYMBALTA) 60 mg delayed release capsule Take 2 PO HS. 180 capsule 1    Euflexxa 20 MG/2ML SOSY       famotidine (PEPCID) 20 mg tablet Take 1 tablet (20 mg total) by mouth 2 (two) times a day 180 tablet 1    gabapentin (NEURONTIN) 600 MG tablet Take 1 tablet (600 mg total) by mouth 3 (three) times a day 270 tablet 1    guaiFENesin (ROBITUSSIN) 100 MG/5ML oral liquid Take 10 mL (200 mg total) by mouth 3 (three) times a day as needed for cough 120 mL 0    HYDROcodone-acetaminophen (NORCO) 5-325 mg per tablet Take 1 PO BID PRN for pain for ongoing therapy 14 tablet 0    irbesartan (AVAPRO) 75 mg tablet Take 1 tablet (75 mg total) by mouth daily at bedtime 30 tablet 0    levothyroxine 200 mcg tablet Take 1 tablet daily in addition to 25 mcg tablet daily 100 tablet 1    meclizine (ANTIVERT) 12.5 MG tablet Take 1 tablet (12.5 mg total) by mouth every 8 (eight) hours as needed for dizziness 90 tablet 0    multivitamin (THERAGRAN) TABS Take 1 tablet by mouth daily      naproxen sodium (ALEVE) 220 MG tablet Take 1 tablet (220 mg total) by mouth 2 (two) times a day with meals 20 tablet 0    ondansetron (Zofran ODT) 4 mg disintegrating tablet Take 1 tablet (4 mg total) by mouth every 6 (six) hours as needed for nausea or vomiting 20 tablet 0    Potassium 99 MG TABS Take by mouth daily      terbinafine (LamISIL) 250 mg tablet Take 1 tablet (250 mg total) by mouth daily for 14 days 14 tablet 0    tirzepatide (Zepbound) 10 mg/0.5 mL auto-injector Inject 0.5 mL (10 mg total) under the skin once a week 7 mL 0     No current facility-administered medications for this visit.

## 2025-05-15 LAB
ALBUMIN SERPL-MCNC: 4.3 G/DL (ref 3.9–4.9)
ALP SERPL-CCNC: 73 IU/L (ref 44–121)
ALT SERPL-CCNC: 22 IU/L (ref 0–32)
AST SERPL-CCNC: 17 IU/L (ref 0–40)
BILIRUB SERPL-MCNC: 0.2 MG/DL (ref 0–1.2)
BUN SERPL-MCNC: 15 MG/DL (ref 6–24)
BUN/CREAT SERPL: 23 (ref 9–23)
CALCIUM SERPL-MCNC: 10 MG/DL (ref 8.7–10.2)
CHLORIDE SERPL-SCNC: 102 MMOL/L (ref 96–106)
CHOLEST SERPL-MCNC: 209 MG/DL (ref 100–199)
CO2 SERPL-SCNC: 22 MMOL/L (ref 20–29)
CREAT SERPL-MCNC: 0.64 MG/DL (ref 0.57–1)
EGFR: 108 ML/MIN/1.73
ERYTHROCYTE [DISTWIDTH] IN BLOOD BY AUTOMATED COUNT: 14.7 % (ref 11.7–15.4)
EST. AVERAGE GLUCOSE BLD GHB EST-MCNC: 108 MG/DL
GLOBULIN SER-MCNC: 2.4 G/DL (ref 1.5–4.5)
GLUCOSE SERPL-MCNC: 84 MG/DL (ref 70–99)
HBA1C MFR BLD: 5.4 % (ref 4.8–5.6)
HCT VFR BLD AUTO: 43.4 % (ref 34–46.6)
HDLC SERPL-MCNC: 51 MG/DL
HGB BLD-MCNC: 14.2 G/DL (ref 11.1–15.9)
LDLC SERPL CALC-MCNC: 118 MG/DL (ref 0–99)
LDLC/HDLC SERPL: 2.3 RATIO (ref 0–3.2)
MCH RBC QN AUTO: 30.7 PG (ref 26.6–33)
MCHC RBC AUTO-ENTMCNC: 32.7 G/DL (ref 31.5–35.7)
MCV RBC AUTO: 94 FL (ref 79–97)
PLATELET # BLD AUTO: 300 X10E3/UL (ref 150–450)
POTASSIUM SERPL-SCNC: 4.8 MMOL/L (ref 3.5–5.2)
PROT SERPL-MCNC: 6.7 G/DL (ref 6–8.5)
RBC # BLD AUTO: 4.63 X10E6/UL (ref 3.77–5.28)
SL AMB VLDL CHOLESTEROL CALC: 40 MG/DL (ref 5–40)
SODIUM SERPL-SCNC: 140 MMOL/L (ref 134–144)
TRIGL SERPL-MCNC: 228 MG/DL (ref 0–149)
WBC # BLD AUTO: 6.8 X10E3/UL (ref 3.4–10.8)

## 2025-05-27 ENCOUNTER — TELEPHONE (OUTPATIENT)
Dept: PAIN MEDICINE | Facility: CLINIC | Age: 50
End: 2025-05-27

## 2025-06-26 DIAGNOSIS — E03.9 ACQUIRED HYPOTHYROIDISM: ICD-10-CM

## 2025-06-26 DIAGNOSIS — M47.816 LUMBAR SPONDYLOSIS: ICD-10-CM

## 2025-06-26 DIAGNOSIS — I10 PRIMARY HYPERTENSION: ICD-10-CM

## 2025-06-26 DIAGNOSIS — M51.27 HERNIATED NUCLEUS PULPOSUS, L5-S1: ICD-10-CM

## 2025-06-26 DIAGNOSIS — G89.4 CHRONIC PAIN SYNDROME: ICD-10-CM

## 2025-06-26 DIAGNOSIS — M79.18 MYOFASCIAL PAIN SYNDROME: ICD-10-CM

## 2025-06-26 RX ORDER — DULOXETIN HYDROCHLORIDE 60 MG/1
CAPSULE, DELAYED RELEASE ORAL
Qty: 180 CAPSULE | Refills: 0 | Status: CANCELLED | OUTPATIENT
Start: 2025-06-26

## 2025-06-27 RX ORDER — IRBESARTAN 75 MG/1
75 TABLET ORAL
Qty: 30 TABLET | Refills: 5 | Status: SHIPPED | OUTPATIENT
Start: 2025-06-27 | End: 2025-12-24

## 2025-06-27 RX ORDER — LEVOTHYROXINE SODIUM 200 UG/1
TABLET ORAL
Qty: 100 TABLET | Refills: 1 | Status: SHIPPED | OUTPATIENT
Start: 2025-06-27

## 2025-07-17 ENCOUNTER — OFFICE VISIT (OUTPATIENT)
Dept: FAMILY MEDICINE CLINIC | Facility: HOSPITAL | Age: 50
End: 2025-07-17
Payer: COMMERCIAL

## 2025-07-17 VITALS
BODY MASS INDEX: 39.66 KG/M2 | DIASTOLIC BLOOD PRESSURE: 80 MMHG | OXYGEN SATURATION: 98 % | HEART RATE: 81 BPM | WEIGHT: 267.8 LBS | SYSTOLIC BLOOD PRESSURE: 103 MMHG | HEIGHT: 69 IN | TEMPERATURE: 97.1 F

## 2025-07-17 DIAGNOSIS — J45.20 MILD INTERMITTENT ASTHMA WITHOUT COMPLICATION: ICD-10-CM

## 2025-07-17 DIAGNOSIS — E66.01 MORBID OBESITY WITH BODY MASS INDEX (BMI) OF 45.0 TO 49.9 IN ADULT (HCC): Primary | ICD-10-CM

## 2025-07-17 PROBLEM — R63.5 ABNORMAL WEIGHT GAIN: Status: RESOLVED | Noted: 2022-03-09 | Resolved: 2025-07-17

## 2025-07-17 PROCEDURE — 99213 OFFICE O/P EST LOW 20 MIN: CPT | Performed by: FAMILY MEDICINE

## 2025-07-17 RX ORDER — TIRZEPATIDE 12.5 MG/.5ML
12.5 INJECTION, SOLUTION SUBCUTANEOUS WEEKLY
Qty: 6 ML | Refills: 0 | Status: SHIPPED | OUTPATIENT
Start: 2025-07-17

## 2025-07-17 NOTE — ASSESSMENT & PLAN NOTE
Prior Authorization Clinical Questions for Weight Management Pharmacotherapy    1. Does the patient have a contrainidcation to medication prescribed for weight management?: No  2. Does the patient have a diagnosis of obesity, confirmed by a BMI greater than or equal to 30 kg/m^2?: Yes  3. Does the patient have a BMI of greater than or equal to 27 kg/m^2 with at least one weight-related comorbidity/risk factor/complication (e.g. diabetes, dyslipidemia, coronary artery disease)?: Yes  4. Weight-related co-morbidities/risk factors: osteoarthritis  5. WEGOVY CVA Indication: Does patient have established documented cardiovascular disease (history of a prior heart attack (myocardial infarction), stroke, or symptomatic peripheral arterial disease (PAD)?: N/A  6. ZEPBOUND NAYE Indication: Does patient have documented NAYE diagnosed via sleep study (insurance will require copy of sleep study results for approval)?: N/A  7. Has the patient been on a weight loss regimen of low-calorie diet, increased physical activity, and lifestyle modifications for a minimum of 6 months?: Yes  8. Has the patient completed a comprehensive weight loss program (ie, Weight Watchers, Noom, Bariatrics, other trevor on phone)? If so, what?: No  9. Does the patient have a history of type 2 diabetes?: No  10. Has the member tried and failed other weight loss medication within the past 12 months?: No  11. Will the member use requested medication in combination with another GLP agonist or weight loss drug?: No  12. Is the medication a controlled substance?: No  For renewals: Has the patient had a positive outcome with current weight management medication (i.e., change in body weight of at least 4-5% after 12-16 weeks on maximally tolerated dose)?: Yes     Baseline weight (in pounds): 323 lbs  Current weight (in pounds): 267.8 lbs  Weight loss percentage: -17.09%       Doing well, relatively plateaued.   Increase zepbound to 12.5 mg weekly.   Orders:  •   tirzepatide (Zepbound) 12.5 mg/0.5 mL auto-injector; Inject 0.5 mL (12.5 mg total) under the skin once a week

## 2025-07-17 NOTE — PROGRESS NOTES
Name: Prisclila Lorenzo      : 1975      MRN: [id number]  Encounter Provider: Maurisio Torres MD  Encounter Date: 2025   Encounter department: [facility] [address]   :  Assessment & Plan  Morbid obesity with body mass index (BMI) of 45.0 to 49.9 in adult (HCC)  Prior Authorization Clinical Questions for Weight Management Pharmacotherapy    1. Does the patient have a contrainidcation to medication prescribed for weight management?: No  2. Does the patient have a diagnosis of obesity, confirmed by a BMI greater than or equal to 30 kg/m^2?: Yes  3. Does the patient have a BMI of greater than or equal to 27 kg/m^2 with at least one weight-related comorbidity/risk factor/complication (e.g. diabetes, dyslipidemia, coronary artery disease)?: Yes  4. Weight-related co-morbidities/risk factors: osteoarthritis  5. WEGOVY CVA Indication: Does patient have established documented cardiovascular disease (history of a prior heart attack (myocardial infarction), stroke, or symptomatic peripheral arterial disease (PAD)?: N/A  6. ZEPBOUND NAYE Indication: Does patient have documented NAYE diagnosed via sleep study (insurance will require copy of sleep study results for approval)?: N/A  7. Has the patient been on a weight loss regimen of low-calorie diet, increased physical activity, and lifestyle modifications for a minimum of 6 months?: Yes  8. Has the patient completed a comprehensive weight loss program (ie, Weight Watchers, Noom, Bariatrics, other trevor on phone)? If so, what?: No  9. Does the patient have a history of type 2 diabetes?: No  10. Has the member tried and failed other weight loss medication within the past 12 months?: No  11. Will the member use requested medication in combination with another GLP agonist or weight loss drug?: No  12. Is the medication a controlled substance?: No  For renewals: Has the patient had a positive outcome with current weight management medication  "(i.e., change in body weight of at least 4-5% after 12-16 weeks on maximally tolerated dose)?: Yes     Baseline weight (in pounds): 323 lbs  Current weight (in pounds): 267.8 lbs  Weight loss percentage: -17.09%       Doing well, relatively plateaued.   Increase zepbound to 12.5 mg weekly.   Orders:  •  tirzepatide (Zepbound) 12.5 mg/0.5 mL auto-injector; Inject 0.5 mL (12.5 mg total) under the skin once a week    Mild intermittent asthma without complication                History of Present Illness   Here for fu of weight loss mgt.   Overall seems to have plateuaed in weight and appetitte.   Althoug did lose about 7 pounds since last visit.   Would like to try a higher dose of zepbound to 12.5 mg weekkly.           Review of Systems    Objective   /80 (BP Location: Left arm, Patient Position: Sitting, Cuff Size: Large)   Pulse 81   Temp (!) 97.1 °F (36.2 °C)   Ht 5' 8.5\" (1.74 m)   Wt 121 kg (267 lb 12.8 oz)   SpO2 98%   BMI 40.13 kg/m²      Physical Exam    "

## 2025-07-18 ENCOUNTER — TELEPHONE (OUTPATIENT)
Age: 50
End: 2025-07-18

## 2025-07-18 NOTE — TELEPHONE ENCOUNTER
PA Zepbound 12.5 MG/0.5ML APPROVED     Date(s) approved July 18, 2025 to January 18, 2026     Case # [id number]     Patient advised by          [x]MyChart Message  []Phone call   []LMOM  []L/M to call office as no active Communication consent on file  []Unable to leave detailed message as VM not approved on Communication consent       Pharmacy advised by    [x]Fax  []Phone call  []Secure Chat    Specialty Pharmacy    []

## 2025-07-18 NOTE — TELEPHONE ENCOUNTER
PA Zepbound 12.5 MG/0.5ML SUBMITTED    to [employer]     via    []CMM-KEY:    [x]Surescripts-Case ID # [id number]   []Availity-Auth ID #  NDC #    []Faxed to plan   []Other website    []Phone call Case ID #      []PA sent as URGENT    All office notes, labs and other pertaining documents and studies sent. Clinical questions answered. Awaiting determination from insurance company.     Turnaround time for your insurance to make a decision on your Prior Authorization can take 7-21 business days.

## 2025-08-11 ENCOUNTER — TELEPHONE (OUTPATIENT)
Dept: PAIN MEDICINE | Facility: CLINIC | Age: 50
End: 2025-08-11

## 2025-08-20 ENCOUNTER — OFFICE VISIT (OUTPATIENT)
Dept: PAIN MEDICINE | Facility: CLINIC | Age: 50
End: 2025-08-20
Payer: COMMERCIAL

## 2025-08-20 VITALS — BODY MASS INDEX: 38.95 KG/M2 | HEIGHT: 69 IN | HEART RATE: 73 BPM | TEMPERATURE: 97.7 F | WEIGHT: 263 LBS

## 2025-08-20 DIAGNOSIS — M47.816 LUMBAR SPONDYLOSIS: ICD-10-CM

## 2025-08-20 DIAGNOSIS — M51.16 LUMBAR DISC DISEASE WITH RADICULOPATHY: Primary | ICD-10-CM

## 2025-08-20 DIAGNOSIS — M50.120 CERVICAL DISC DISORDER WITH RADICULOPATHY OF MID-CERVICAL REGION: ICD-10-CM

## 2025-08-20 DIAGNOSIS — Z98.1 HISTORY OF FUSION OF CERVICAL SPINE: ICD-10-CM

## 2025-08-20 PROCEDURE — 99214 OFFICE O/P EST MOD 30 MIN: CPT | Performed by: PHYSICIAN ASSISTANT

## (undated) DEVICE — RETROGRADE KNIFE BOX OF 6: Brand: ECTRA

## (undated) DEVICE — STERILE BETHLEHEM PLASTIC HAND: Brand: CARDINAL HEALTH

## (undated) DEVICE — ACE WRAP 4 IN STERILE

## (undated) DEVICE — PREMIUM DRY TRAY LF: Brand: MEDLINE INDUSTRIES, INC.

## (undated) DEVICE — DISPOSABLE EQUIPMENT COVER: Brand: SMALL TOWEL DRAPE

## (undated) DEVICE — NEEDLE 25G X 1 1/2

## (undated) DEVICE — GLOVE INDICATOR PI UNDERGLOVE SZ 8 BLUE

## (undated) DEVICE — PADDING CAST 4 IN  COTTON STRL

## (undated) DEVICE — STRL COTTON TIP APPLCTR 6IN PK: Brand: CARDINAL HEALTH

## (undated) DEVICE — GAUZE SPONGES,16 PLY: Brand: CURITY

## (undated) DEVICE — SPONGE PVP SCRUB WING STERILE

## (undated) DEVICE — OCCLUSIVE GAUZE STRIP,3% BISMUTH TRIBROMOPHENATE IN PETROLATUM BLEND: Brand: XEROFORM